# Patient Record
Sex: MALE | Race: BLACK OR AFRICAN AMERICAN | NOT HISPANIC OR LATINO | Employment: OTHER | URBAN - METROPOLITAN AREA
[De-identification: names, ages, dates, MRNs, and addresses within clinical notes are randomized per-mention and may not be internally consistent; named-entity substitution may affect disease eponyms.]

---

## 2017-01-26 ENCOUNTER — INFUSION (OUTPATIENT)
Dept: INFUSION THERAPY | Facility: HOSPITAL | Age: 72
End: 2017-01-26
Attending: INTERNAL MEDICINE
Payer: MEDICARE

## 2017-01-26 DIAGNOSIS — C85.90 LYMPHOMA: ICD-10-CM

## 2017-01-26 DIAGNOSIS — R11.15 EMESIS, PERSISTENT: Primary | ICD-10-CM

## 2017-01-26 PROCEDURE — 25000003 PHARM REV CODE 250: Mod: PO | Performed by: INTERNAL MEDICINE

## 2017-01-26 PROCEDURE — 96523 IRRIG DRUG DELIVERY DEVICE: CPT | Mod: PO

## 2017-01-26 RX ORDER — SODIUM CHLORIDE 0.9 % (FLUSH) 0.9 %
10 SYRINGE (ML) INJECTION
Status: CANCELLED | OUTPATIENT
Start: 2017-01-26

## 2017-01-26 RX ORDER — SODIUM CHLORIDE 0.9 % (FLUSH) 0.9 %
10 SYRINGE (ML) INJECTION
Status: DISCONTINUED | OUTPATIENT
Start: 2017-01-26 | End: 2017-01-26 | Stop reason: HOSPADM

## 2017-01-26 RX ORDER — HEPARIN 100 UNIT/ML
500 SYRINGE INTRAVENOUS
Status: CANCELLED | OUTPATIENT
Start: 2017-01-26

## 2017-01-26 RX ORDER — HEPARIN 100 UNIT/ML
500 SYRINGE INTRAVENOUS
Status: DISCONTINUED | OUTPATIENT
Start: 2017-01-26 | End: 2017-01-26 | Stop reason: HOSPADM

## 2017-01-26 RX ADMIN — HEPARIN 500 UNITS: 100 SYRINGE at 09:01

## 2017-01-26 RX ADMIN — SODIUM CHLORIDE, PRESERVATIVE FREE 10 ML: 5 INJECTION INTRAVENOUS at 09:01

## 2017-01-26 NOTE — MR AVS SNAPSHOT
Patient Information     Patient Name Sex Foreign [place] Male 1945      Visit Information        Provider Department Dept Phone Center    2017 9:00 AM [facility] Chemo Infusion [facility] Chemotherapy Infusion [phone] [facility]      Patient Instructions      [facility]  [address] [facility] [address]  [address]  [phone] phone     [phone] fax  Hours of Operation: Monday- Friday 8:00am- 5:00pm  After hours phone  [phone]  Hematology / Oncology Physicians on call      Dr. Luis Angulo    Please call with any concerns regarding your appointment today.       Your Current Medications Are     AMLODIPINE BESYLATE (AMLODIPINE ORAL)    aspirin (ECOTRIN) 81 MG EC tablet    atorvastatin (LIPITOR) 80 MG tablet    fish oil-omega-3 fatty acids 300-1,000 mg capsule    FLUZONE HIGH-DOSE , PF, 180 mcg/0.5 mL Syrg    FLUZONE HIGH-DOSE , PF, 180 mcg/0.5 mL Syrg    folic acid (FOLVITE) 1 MG tablet    hydrochlorothiazide (HYDRODIURIL) 25 MG tablet    metoprolol succinate (TOPROL-XL) 25 MG 24 hr tablet    NITROSTAT 0.4 mg SL tablet    quinapril (ACCUPRIL) 10 MG tablet    ranolazine (RANEXA) 1,000 mg Tb12    triamcinolone acetonide 0.1% (KENALOG) 0.1 % cream      Appointments for Next Year     2017  9:00 AM INFUSION 015 MIN (15 min.) [facility] CHAIR [facility]    Arrive at check-in approximately 15 minutes before your scheduled appointment time. Bring all outside medical records and imaging, along with a list of your current medications and insurance card.    3/23/2017  8:30 AM NON FASTING LAB (5 min.) [facility]    Arrive at check-in approximately 15 minutes before your scheduled appointment time. Bring all outside medical records and imaging, along with a list of your current medications and insurance card.    (off [facility]) 2nd floor    3/23/2017  9:00 AM  ESTABLISHED PATIENT (20 min.) [facility] Hemotology Oncology Fritz Silvestre Jr., MD    Arrive at check-in approximately 15 minutes before your scheduled appointment time. Bring all outside medical records and imaging, along with a list of your current medications and insurance card.    (off [facility]) 3rd Floor    3/23/2017  9:30 AM INFUSION 015 MIN (15 min.) [facility] CHAIR [address] [facility]    Arrive at check-in approximately 15 minutes before your scheduled appointment time. Bring all outside medical records and imaging, along with a list of your current medications and insurance card.      Allergies     No Known Allergies      Current Discharge Medication List     Cannot display discharge medications since this is not an admission.

## 2017-01-26 NOTE — PATIENT INSTRUCTIONS
[facility]  [address] [facility] [address]  [address] [address]  [phone] phone     [phone] fax  Hours of Operation: Monday- Friday 8:00am- 5:00pm  After hours phone  [phone]  Hematology / Oncology Physicians on call      Dr. Luis Angulo    Please call with any concerns regarding your appointment today.

## 2017-02-23 ENCOUNTER — INFUSION (OUTPATIENT)
Dept: INFUSION THERAPY | Facility: HOSPITAL | Age: 72
End: 2017-02-23
Attending: INTERNAL MEDICINE
Payer: MEDICARE

## 2017-02-23 DIAGNOSIS — R11.15 EMESIS, PERSISTENT: Primary | ICD-10-CM

## 2017-02-23 DIAGNOSIS — C85.90 LYMPHOMA: ICD-10-CM

## 2017-02-23 PROCEDURE — 96523 IRRIG DRUG DELIVERY DEVICE: CPT | Mod: PO

## 2017-02-23 PROCEDURE — 25000003 PHARM REV CODE 250: Mod: PO | Performed by: INTERNAL MEDICINE

## 2017-02-23 RX ORDER — SODIUM CHLORIDE 0.9 % (FLUSH) 0.9 %
10 SYRINGE (ML) INJECTION
Status: CANCELLED | OUTPATIENT
Start: 2017-02-23

## 2017-02-23 RX ORDER — SODIUM CHLORIDE 0.9 % (FLUSH) 0.9 %
10 SYRINGE (ML) INJECTION
Status: DISCONTINUED | OUTPATIENT
Start: 2017-02-23 | End: 2017-02-23 | Stop reason: HOSPADM

## 2017-02-23 RX ORDER — HEPARIN 100 UNIT/ML
500 SYRINGE INTRAVENOUS
Status: DISCONTINUED | OUTPATIENT
Start: 2017-02-23 | End: 2017-02-23 | Stop reason: HOSPADM

## 2017-02-23 RX ORDER — HEPARIN 100 UNIT/ML
500 SYRINGE INTRAVENOUS
Status: CANCELLED | OUTPATIENT
Start: 2017-02-23

## 2017-02-23 RX ADMIN — HEPARIN 500 UNITS: 100 SYRINGE at 09:02

## 2017-02-23 RX ADMIN — SODIUM CHLORIDE, PRESERVATIVE FREE 10 ML: 5 INJECTION INTRAVENOUS at 09:02
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
## 2017-03-23 ENCOUNTER — OFFICE VISIT (OUTPATIENT)
Dept: HEMATOLOGY/ONCOLOGY | Facility: CLINIC | Age: 72
End: 2017-03-23
Payer: MEDICARE

## 2017-03-23 ENCOUNTER — LAB VISIT (OUTPATIENT)
Dept: LAB | Facility: HOSPITAL | Age: 72
End: 2017-03-23
Attending: INTERNAL MEDICINE
Payer: MEDICARE

## 2017-03-23 VITALS
WEIGHT: 161.81 LBS | SYSTOLIC BLOOD PRESSURE: 98 MMHG | TEMPERATURE: 99 F | HEIGHT: 67 IN | OXYGEN SATURATION: 98 % | HEART RATE: 68 BPM | RESPIRATION RATE: 18 BRPM | DIASTOLIC BLOOD PRESSURE: 56 MMHG | BODY MASS INDEX: 25.4 KG/M2

## 2017-03-23 DIAGNOSIS — C84.48: Primary | ICD-10-CM

## 2017-03-23 DIAGNOSIS — C84.48: ICD-10-CM

## 2017-03-23 LAB
ALBUMIN SERPL BCP-MCNC: 3.8 G/DL
ALP SERPL-CCNC: 50 U/L
ALT SERPL W/O P-5'-P-CCNC: 19 U/L
ANION GAP SERPL CALC-SCNC: 10 MMOL/L
AST SERPL-CCNC: 25 U/L
BASOPHILS # BLD AUTO: 0.01 K/UL
BASOPHILS NFR BLD: 0.3 %
BILIRUB SERPL-MCNC: 0.8 MG/DL
BUN SERPL-MCNC: 20 MG/DL
CALCIUM SERPL-MCNC: 9.1 MG/DL
CHLORIDE SERPL-SCNC: 105 MMOL/L
CO2 SERPL-SCNC: 25 MMOL/L
CREAT SERPL-MCNC: 1.3 MG/DL
DIFFERENTIAL METHOD: ABNORMAL
EOSINOPHIL # BLD AUTO: 0 K/UL
EOSINOPHIL NFR BLD: 1.3 %
ERYTHROCYTE [DISTWIDTH] IN BLOOD BY AUTOMATED COUNT: 13.4 %
EST. GFR  (AFRICAN AMERICAN): >60 ML/MIN/1.73 M^2
EST. GFR  (NON AFRICAN AMERICAN): 55 ML/MIN/1.73 M^2
GLUCOSE SERPL-MCNC: 76 MG/DL
HCT VFR BLD AUTO: 30.9 %
HGB BLD-MCNC: 11 G/DL
LDH SERPL L TO P-CCNC: 130 U/L
LYMPHOCYTES # BLD AUTO: 1 K/UL
LYMPHOCYTES NFR BLD: 33.3 %
MCH RBC QN AUTO: 30.3 PG
MCHC RBC AUTO-ENTMCNC: 35.6 %
MCV RBC AUTO: 85 FL
MONOCYTES # BLD AUTO: 0.2 K/UL
MONOCYTES NFR BLD: 7.3 %
NEUTROPHILS # BLD AUTO: 1.8 K/UL
NEUTROPHILS NFR BLD: 57.8 %
PLATELET # BLD AUTO: 150 K/UL
PMV BLD AUTO: 8.7 FL
POTASSIUM SERPL-SCNC: 3.6 MMOL/L
PROT SERPL-MCNC: 7.6 G/DL
RBC # BLD AUTO: 3.63 M/UL
SODIUM SERPL-SCNC: 140 MMOL/L
WBC # BLD AUTO: 3.03 K/UL

## 2017-03-23 PROCEDURE — 99999 PR PBB SHADOW E&M-EST. PATIENT-LVL III: CPT | Mod: PBBFAC,,, | Performed by: INTERNAL MEDICINE

## 2017-03-23 PROCEDURE — 1160F RVW MEDS BY RX/DR IN RCRD: CPT | Mod: S$GLB,,, | Performed by: INTERNAL MEDICINE

## 2017-03-23 PROCEDURE — 3074F SYST BP LT 130 MM HG: CPT | Mod: S$GLB,,, | Performed by: INTERNAL MEDICINE

## 2017-03-23 PROCEDURE — 1126F AMNT PAIN NOTED NONE PRSNT: CPT | Mod: S$GLB,,, | Performed by: INTERNAL MEDICINE

## 2017-03-23 PROCEDURE — 99214 OFFICE O/P EST MOD 30 MIN: CPT | Mod: S$GLB,,, | Performed by: INTERNAL MEDICINE

## 2017-03-23 PROCEDURE — 1157F ADVNC CARE PLAN IN RCRD: CPT | Mod: S$GLB,,, | Performed by: INTERNAL MEDICINE

## 2017-03-23 PROCEDURE — 3078F DIAST BP <80 MM HG: CPT | Mod: S$GLB,,, | Performed by: INTERNAL MEDICINE

## 2017-03-23 PROCEDURE — 1159F MED LIST DOCD IN RCRD: CPT | Mod: S$GLB,,, | Performed by: INTERNAL MEDICINE

## 2017-03-23 RX ORDER — ERGOCALCIFEROL 1.25 MG/1
50000 CAPSULE ORAL
COMMUNITY
End: 2017-07-12

## 2017-03-23 NOTE — MR AVS SNAPSHOT
[facility] Hemotology Oncology  [address] [place] [address]  Phone: [phone]  Fax: [phone]                  [place]   3/23/2017 9:00 AM   Office Visit    Description:  Male : 1945   Provider:  Fritz Silvestre Jr., MD   Department:  [facility] - Hemotology Oncology           Diagnoses this Visit        Comments    Peripheral T cell lymphoma, lymph nodes of multiple sites    -  Primary            To Do List           Future Appointments        Provider Department Dept Phone    3/27/2017 10:30 AM Cristhian Jauregui OD [facility] Ophthalmology [phone]    3/28/2017 9:00 AM Ryan Burroughs MD '[place] - Cardiology [phone]    2017 4:00 PM Ben Temple MD [facility] General Surgery [phone]    2017 8:40 AM LAB[facility]ATORY, [facility] [phone]    2017 9:00 AM Fritz Silvestre Jr., MD [facility] Hemotology Oncology [phone]      Goals (5 Years of Data)     None      [facility]     [facility] Care [facility] -  Assistance  Registered nurses in the [facility] provide clinical advisement, health education, appointment booking, and other advisory services.  Call for this free service at [phone].             Medications           Message regarding Medications     Verify the changes and/or additions to your medication regime listed below are the same as discussed with your clinician today.  If any of these changes or additions are incorrect, please notify your healthcare provider.             Verify that the below list of medications is an accurate representation of the medications you are currently taking.  If none reported, the list may be blank. If incorrect, please contact your healthcare provider. Carry this list with you in case of emergency.           Current Medications     AMLODIPINE BESYLATE (AMLODIPINE ORAL) Take 5 mg by mouth once daily.    aspirin (ECOTRIN) 81 MG EC tablet Take 81 mg by mouth  "every morning.     atorvastatin (LIPITOR) 80 MG tablet 80 mg every evening.     ergocalciferol (VITAMIN D2) 50,000 unit Cap Take 50,000 Units by mouth every 7 days.    fish oil-omega-3 fatty acids 300-1,000 mg capsule Take 2 g by mouth 2 (two) times daily.     FLUZONE HIGH-DOSE 2015-16, PF, 180 mcg/0.5 mL Syrg     FLUZONE HIGH-DOSE 2016-17, PF, 180 mcg/0.5 mL Syrg TO BE ADMINISTERED BY PHARMACIST FOR IMMUNIZATION    folic acid (FOLVITE) 1 MG tablet Take 1 mg by mouth every morning.     hydrochlorothiazide (HYDRODIURIL) 25 MG tablet     metoprolol succinate (TOPROL-XL) 25 MG 24 hr tablet Take 25 mg by mouth once daily.    NITROSTAT 0.4 mg SL tablet     quinapril (ACCUPRIL) 10 MG tablet every morning.     ranolazine (RANEXA) 1,000 mg Tb12 Take 1 tablet (1,000 mg total) by mouth 2 (two) times daily.    triamcinolone acetonide 0.1% (KENALOG) 0.1 % cream every morning.            Clinical Reference Information           Your Vitals Were     BP Pulse Temp Resp Height Weight    98/56 68 98.7 °F (37.1 °C) (Oral) 18 5' 7" (1.702 m) 73.4 kg (161 lb 13.1 oz)    SpO2 BMI             98% 25.34 kg/m2         Blood Pressure          Most Recent Value    BP  (!)  98/56      Allergies as of 3/23/2017     No Known Allergies      Immunizations Administered on Date of Encounter - 3/23/2017     None      Orders Placed During Today's Visit     Future Labs/Procedures Expected by Expires    CBC auto differential  3/20/2017 5/19/2018    Comprehensive metabolic panel  3/20/2017 5/19/2018    Lactate dehydrogenase  3/20/2017 5/19/2018    CBC auto differential  3/23/2017 5/22/2018    Comprehensive metabolic panel  3/23/2017 5/22/2018    Lactate dehydrogenase  3/23/2017 5/22/2018      [facility] Sign-Up     Activating your [facility] account is as easy as 1-2-3!     1) Visit [email or web address], select Sign Up Now, enter this activation code and your date of birth, then select Next.  [email or web address]  Expires: 5/7/2017 10:29 AM      2) Create a " username and password to use when you visit [facility] in the future and select a security question in case you lose your password and select Next.    3) Enter your e-mail address and click Sign Up!    Additional Information  If you have questions, please e-mail [email or web address] or call [phone] to talk to our [facility] staff. Remember, [facility] is NOT to be used for urgent needs. For medical emergencies, dial 911.         Language Assistance Services     ATTENTION: Language assistance services are available, free of charge. Please call [phone].      ATENCIÓN: Si habla español, tiene a santillan disposición servicios gratuitos de asistencia lingüística. Llame al [phone].     CHÚ Ý: N?u b?n nói Ti?ng Vi?t, có các d?ch v? h? tr? ngôn ng? mi?n phí dành cho b?n. G?i s? [phone].         [facility] complies with applicable Federal civil rights laws and does not discriminate on the basis of race, color, national origin, age, disability, or sex.

## 2017-03-27 ENCOUNTER — OFFICE VISIT (OUTPATIENT)
Dept: OPHTHALMOLOGY | Facility: CLINIC | Age: 72
End: 2017-03-27
Payer: MEDICARE

## 2017-03-27 DIAGNOSIS — H25.013 CORTICAL AGE-RELATED CATARACT OF BOTH EYES: ICD-10-CM

## 2017-03-27 DIAGNOSIS — H25.13 NUCLEAR SCLEROSIS, BILATERAL: Primary | ICD-10-CM

## 2017-03-27 DIAGNOSIS — H52.4 HYPEROPIA WITH PRESBYOPIA, BILATERAL: ICD-10-CM

## 2017-03-27 DIAGNOSIS — H52.03 HYPEROPIA WITH PRESBYOPIA, BILATERAL: ICD-10-CM

## 2017-03-27 PROCEDURE — 92015 DETERMINE REFRACTIVE STATE: CPT | Mod: S$GLB,,, | Performed by: OPTOMETRIST

## 2017-03-27 PROCEDURE — 99999 PR PBB SHADOW E&M-EST. PATIENT-LVL I: CPT | Mod: PBBFAC,,, | Performed by: OPTOMETRIST

## 2017-03-27 PROCEDURE — 92014 COMPRE OPH EXAM EST PT 1/>: CPT | Mod: S$GLB,,, | Performed by: OPTOMETRIST

## 2017-03-27 NOTE — PROGRESS NOTES
HPI     HPI    Any vision changes since last exam: Pt states that he sees a bit of a   decline in his vision.  Eye pain: No  Other ocular symptoms: No    Do you wear currently wear glasses or contacts? Glasses    Interested in contacts today? No    Do you plan on getting new glasses today? If needed       Last edited by Juan R Prado, Patient Care Assistant on 3/27/2017 10:31   AM.         Assessment /Plan     For exam results, see Encounter Report.    1. Nuclear sclerosis, bilateral  Cataract accounts for vision change. New Rx for glasses/contacts will not improve vision. Refer to ophthalmologist for cataract evaluation.      2. 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 Hyperopia with presbyopia, bilateral       RTC for cat eval with Dr Montgomery, next available  Discussed above and all questions were answered.

## 2017-03-30 ENCOUNTER — OFFICE VISIT (OUTPATIENT)
Dept: OPHTHALMOLOGY | Facility: CLINIC | Age: 72
End: 2017-03-30
Payer: MEDICARE

## 2017-03-30 DIAGNOSIS — H25.013 CORTICAL SENILE CATARACT OF BOTH EYES: ICD-10-CM

## 2017-03-30 DIAGNOSIS — H25.13 NUCLEAR SCLEROSIS, BILATERAL: Primary | ICD-10-CM

## 2017-03-30 PROBLEM — H25.12 NUCLEAR SCLEROSIS OF LEFT EYE: Status: ACTIVE | Noted: 2017-03-30

## 2017-03-30 PROBLEM — H25.012 CORTICAL SENILE CATARACT OF LEFT EYE: Status: ACTIVE | Noted: 2017-03-30

## 2017-03-30 PROCEDURE — 92014 COMPRE OPH EXAM EST PT 1/>: CPT | Mod: S$GLB,,, | Performed by: OPHTHALMOLOGY

## 2017-03-30 PROCEDURE — 99999 PR PBB SHADOW E&M-EST. PATIENT-LVL II: CPT | Mod: PBBFAC,,, | Performed by: OPHTHALMOLOGY

## 2017-03-30 PROCEDURE — 92136 OPHTHALMIC BIOMETRY: CPT | Mod: LT,S$GLB,, | Performed by: OPHTHALMOLOGY

## 2017-03-30 RX ORDER — KETOROLAC TROMETHAMINE 5 MG/ML
1 SOLUTION OPHTHALMIC 4 TIMES DAILY
Qty: 1 BOTTLE | Refills: 2 | Status: SHIPPED | OUTPATIENT
Start: 2017-03-30 | End: 2017-05-25

## 2017-03-30 RX ORDER — GATIFLOXACIN 5 MG/ML
1 SOLUTION/ DROPS OPHTHALMIC 2 TIMES DAILY
Qty: 1 BOTTLE | Refills: 2 | Status: SHIPPED | OUTPATIENT
Start: 2017-03-30 | End: 2017-05-25

## 2017-03-30 RX ORDER — PREDNISOLONE ACETATE 10 MG/ML
1 SUSPENSION/ DROPS OPHTHALMIC 4 TIMES DAILY
Qty: 1 BOTTLE | Refills: 2 | Status: SHIPPED | OUTPATIENT
Start: 2017-03-30 | End: 2017-06-23 | Stop reason: ALTCHOICE

## 2017-03-30 NOTE — MR AVS SNAPSHOT
[facility] Ophthalmology  [address] [place] [address]  Phone: [phone]  Fax: [phone]                  [place]   3/30/2017 2:45 PM   Office Visit    Description:  Male : 1945   Provider:  Pierce Montgomery MD   Department:  [facility] - Ophthalmology           Reason for Visit     Blurred Vision           Diagnoses this Visit        Comments    Nuclear sclerosis, bilateral    -  Primary     Cortical senile cataract of both eyes         Posterior subcapsular cataract, left eye                To Do List           Future Appointments        Provider Department Dept Phone    2017 1:20 PM Ryan Burroughs MD [place] - Cardiology [phone]    2017 4:00 PM Ben Temple MD [facility] General Surgery [phone]    2017 8:40 AM LAB[facility]ATORY, [facility] [phone]    2017 9:00 AM Fritz Silvestre Jr., MD [facility] Hemotology Oncology [phone]      Goals (5 Years of Data)     None       These Medications        Disp Refills Start End    ketorolac 0.5% (ACULAR) 0.5 % Drop 1 Bottle 2 3/30/2017     Place 1 drop into the left eye 4 (four) times daily. Eyedrops to start one day before surgery - Left Eye    Pharm[id number] [place] [facility] [address] Ph #: [phone]       gatifloxacin (ZYMAR) 0.5 % Drop drops 1 Bottle 2 3/30/2017     Apply 1 drop to eye 2 (two) times daily. Eyedrops to start one day before surgery (left eye) - Ophthalmic    Pharmacy: [facility]/pharmacy #[id number]  [place], [place] - [address] Ph #: [phone]       prednisoLONE acetate (PRED FORTE) 1 % DrpS 1 Bottle 2 3/30/2017     Place 1 drop into the left eye 4 (four) times daily. Start one day before surgery - Left Eye    Pharmacy: [facility]/pharmacy #[id number]  [place], [place] - [address] Ph #: [phone]       Notes to Pharmacy: Brand Name Pred  Forte 1% only      [facility]     [facility] Care Line - 24/7 Assistance  Unless otherwise directed by your provider, please contact [facility], our nurse care line that is available for 24/7 assistance.     Registered nurses in the [facility] provide: appointment scheduling, clinical advisement, health education, and other advisory services.  Call: [phone] (toll free)               Medications           Message regarding Medications     Verify the changes and/or additions to your medication regime listed below are the same as discussed with your clinician today.  If any of these changes or additions are incorrect, please notify your healthcare provider.        START taking these NEW medications        Refills    ketorolac 0.5% (ACULAR) 0.5 % Drop 2    Sig: Place 1 drop into the left eye 4 (four) times daily. Eyedrops to start one day before surgery    Class: Normal    Route: Left Eye    gatifloxacin (ZYMAR) 0.5 % Drop drops 2    Sig: Apply 1 drop to eye 2 (two) times daily. Eyedrops to start one day before surgery (left eye)    Class: Normal    Route: Ophthalmic    prednisoLONE acetate (PRED FORTE) 1 % DrpS 2    Sig: Place 1 drop into the left eye 4 (four) times daily. Start one day before surgery    Class: Normal    Route: Left Eye           Verify that the below list of medications is an accurate representation of the medications you are currently taking.  If none reported, the list may be blank. If incorrect, please contact your healthcare provider. Carry this list with you in case of emergency.           Current Medications     AMLODIPINE BESYLATE (AMLODIPINE ORAL) Take 5 mg by mouth once daily.    aspirin (ECOTRIN) 81 MG EC tablet Take 81 mg by mouth every morning.     atorvastatin (LIPITOR) 80 MG tablet 80 mg every evening.     ergocalciferol (VITAMIN D2) 50,000 unit Cap Take 50,000 Units by mouth every 7 days.    fish oil-omega-3 fatty acids 300-1,000 mg capsule Take 2  g by mouth 2 (two) times daily.     FLUZONE HIGH-DOSE 2015-16, PF, 180 mcg/0.5 mL Syrg     FLUZONE HIGH-DOSE 2016-17, PF, 180 mcg/0.5 mL Syrg TO BE ADMINISTERED BY PHARMACIST FOR IMMUNIZATION    folic acid (FOLVITE) 1 MG tablet Take 1 mg by mouth every morning.     gatifloxacin (ZYMAR) 0.5 % Drop drops Apply 1 drop to eye 2 (two) times daily. Eyedrops to start one day before surgery (left eye)    hydrochlorothiazide (HYDRODIURIL) 25 MG tablet     ketorolac 0.5% (ACULAR) 0.5 % Drop Place 1 drop into the left eye 4 (four) times daily. Eyedrops to start one day before surgery    metoprolol succinate (TOPROL-XL) 25 MG 24 hr tablet Take 25 mg by mouth once daily.    NITROSTAT 0.4 mg SL tablet     prednisoLONE acetate (PRED FORTE) 1 % DrpS Place 1 drop into the left eye 4 (four) times daily. Start one day before surgery    quinapril (ACCUPRIL) 10 MG tablet every morning.     ranolazine (RANEXA) 1,000 mg Tb12 Take 1 tablet (1,000 mg total) by mouth 2 (two) times daily.    triamcinolone acetonide 0.1% (KENALOG) 0.1 % cream every morning.            Clinical Reference Information           Allergies as of 3/30/2017     No Known Allergies      Immunizations Administered on Date of Encounter - 3/30/2017     None      Orders Placed During Today's Visit      Normal Orders This Visit    Ambulatory Referral to External Surgery     IOL Master - OS - Left Eye       [facility] Sign-Up     Activating your [facility] account is as easy as 1-2-3!     1) Visit [email or web address], select Sign Up Now, enter this activation code and your date of birth, then select Next.  [email or web address]  Expires: 5/7/2017 10:29 AM      2) Create a username and password to use when you visit [facility] in the future and select a security question in case you lose your password and select Next.    3) Enter your e-mail address and click Sign Up!    Additional Information  If you have questions, please e-mail [email or web address] or call [phone] to talk to our  [facility] staff. Remember, [facility] is NOT to be used for urgent needs. For medical emergencies, dial 911.         Language Assistance Services     ATTENTION: Language assistance services are available, free of charge. Please call [phone].      ATENCIÓN: Si habla kranthi, tiene a santillan disposición servicios gratuitos de asistencia lingüística. Llame al [phone].     CHÚ Ý: N?u b?n nói Ti?ng Vi?t, có các d?ch v? h? tr? ngôn ng? mi?n phí dành cho b?n. G?i s? [phone].         [facility] - [facility] complies with applicable Federal civil rights laws and does not discriminate on the basis of race, color, national origin, age, disability, or sex.

## 2017-03-30 NOTE — PROGRESS NOTES
SUBJECTIVE:   [place] is a 71 y.o. male   Corrected distance visual acuity was 20/30 in the right eye and 20/100 in the left eye.   Chief Complaint   Patient presents with    Blurred Vision     pt states due to cataract va is always blurred does not like to drive at Night        HPI:  HPI     Blurred Vision    Additional comments: pt states due to cataract va is always blurred does   not like to drive at Night       Last edited by Nadine Alva MA on 3/30/2017  3:28 PM. (History)        Assessment /Plan :  1. Nuclear sclerosis, bilateral  Visually Significant Cataract OS > OD:   Patient reports decreased vision consistent with the clinical amount of lenticular opacity,  which reaches the level of visual significance and affects activities of daily living such as  drive. Risks, benefits, and alternatives to cataract surgery were  discussed.  IOL options were discussed, including Premium IOL'S and the associated  side effects and additional patient cost associated with them as well as patient's visual  goals. The pt expressed a desire to proceed with surgery with the potential for some  reasonable degree of visual improvement and was consented.  Risks of loss of vision and eye reviewed as well as possibility of need for spectacle correction after surgery even with premium implants. Verbal and written preop  instructions were provided to the pt.            Pt is interested in traditional monofocal IOL aiming for:       Distance OU and understands that  glasses will be generally needed at all times for near vision and often for finer distance correction especially for higher degrees of astigmatism.           Final visual acuity may be limited by none    Pt wishes to have OS eye done.    Requests a Monofocal IOL.    Will aim for distance    Other considerations: NONE       2. Cortical senile cataract of both eyes    3. Posterior subcapsular cataract, left eye

## 2017-04-05 ENCOUNTER — OFFICE VISIT (OUTPATIENT)
Dept: CARDIOLOGY | Facility: CLINIC | Age: 72
End: 2017-04-05
Payer: MEDICARE

## 2017-04-05 VITALS
BODY MASS INDEX: 25.24 KG/M2 | DIASTOLIC BLOOD PRESSURE: 64 MMHG | WEIGHT: 160.81 LBS | HEIGHT: 67 IN | HEART RATE: 58 BPM | OXYGEN SATURATION: 97 % | SYSTOLIC BLOOD PRESSURE: 114 MMHG

## 2017-04-05 DIAGNOSIS — Z95.1 S/P CORONARY ARTERY BYPASS GRAFT X 2: ICD-10-CM

## 2017-04-05 DIAGNOSIS — I10 ESSENTIAL HYPERTENSION: ICD-10-CM

## 2017-04-05 DIAGNOSIS — I25.2 OLD MI (MYOCARDIAL INFARCTION): ICD-10-CM

## 2017-04-05 DIAGNOSIS — I25.10 CORONARY ARTERY DISEASE INVOLVING NATIVE CORONARY ARTERY OF NATIVE HEART WITHOUT ANGINA PECTORIS: Primary | ICD-10-CM

## 2017-04-05 DIAGNOSIS — E78.5 DYSLIPIDEMIA: ICD-10-CM

## 2017-04-05 DIAGNOSIS — R06.02 SOB (SHORTNESS OF BREATH): ICD-10-CM

## 2017-04-05 PROCEDURE — 3078F DIAST BP <80 MM HG: CPT | Mod: S$GLB,,, | Performed by: INTERNAL MEDICINE

## 2017-04-05 PROCEDURE — 1157F ADVNC CARE PLAN IN RCRD: CPT | Mod: S$GLB,,, | Performed by: INTERNAL MEDICINE

## 2017-04-05 PROCEDURE — 99214 OFFICE O/P EST MOD 30 MIN: CPT | Mod: S$GLB,,, | Performed by: INTERNAL MEDICINE

## 2017-04-05 PROCEDURE — 1126F AMNT PAIN NOTED NONE PRSNT: CPT | Mod: S$GLB,,, | Performed by: INTERNAL MEDICINE

## 2017-04-05 PROCEDURE — 1160F RVW MEDS BY RX/DR IN RCRD: CPT | Mod: S$GLB,,, | Performed by: INTERNAL MEDICINE

## 2017-04-05 PROCEDURE — 3074F SYST BP LT 130 MM HG: CPT | Mod: S$GLB,,, | Performed by: INTERNAL MEDICINE

## 2017-04-05 PROCEDURE — 99999 PR PBB SHADOW E&M-EST. PATIENT-LVL III: CPT | Mod: PBBFAC,,, | Performed by: INTERNAL MEDICINE

## 2017-04-05 PROCEDURE — 1159F MED LIST DOCD IN RCRD: CPT | Mod: S$GLB,,, | Performed by: INTERNAL MEDICINE

## 2017-04-05 RX ORDER — ISOSORBIDE MONONITRATE 30 MG/1
30 TABLET, EXTENDED RELEASE ORAL DAILY
Qty: 90 TABLET | Refills: 3 | Status: SHIPPED | OUTPATIENT
Start: 2017-04-05 | End: 2017-07-12

## 2017-04-05 RX ORDER — RANOLAZINE 1000 MG/1
1000 TABLET, EXTENDED RELEASE ORAL 2 TIMES DAILY
Qty: 180 TABLET | Refills: 3 | Status: SHIPPED | OUTPATIENT
Start: 2017-04-05 | End: 2018-04-25 | Stop reason: SDUPTHER

## 2017-04-05 NOTE — MR AVS SNAPSHOT
[facility]  [address]  [place] [address]  Phone: [phone]  Fax: [phone]                  Foreign [place]   2017 1:20 PM   Office Visit    Description:  Male : 1945   Provider:  Ryan Burroughs MD   Department:  [facility]           Diagnoses this Visit        Comments    Coronary artery disease involving native coronary artery of native heart without angina pectoris    -  Primary     S/P coronary artery bypass graft x 2         Essential hypertension         Dyslipidemia         SOB (shortness of breath)         Old MI (myocardial infarction)                To Do List           Future Appointments        Provider Department Dept Phone    2017 4:00 PM Ben Temple MD [facility] General Surgery [phone]    2017 8:00 AM Pierce Montgomery MD [facility] Ophthalmology [phone]    2017 8:40 AM [facility]ATORY, [facility] [phone]    2017 9:00 AM Fritz Silvestre Jr., MD [facility] Hemotology Oncology [phone]      Goals (5 Years of Data)     None      Follow-Up and Disposition     Return in about 3 months (around 2017).       These Medications        Disp Refills Start End    ranolazine (RANEXA) 1,000 mg Tb12 180 tablet 3 2017     Take 1 tablet (1,000 mg total) by mouth 2 (two) times daily. - Oral    Pharmacy: [facility] #[id number] - [place] - [address] Ph #: [phone]       isosorbide mononitrate (IMDUR) 30 MG 24 hr tablet 90 tablet 3 2017    Take 1 tablet (30 mg total) by mouth once daily. - Oral    Pharmacy: [facility] #[id number]  [place], [place] - [address] Ph #: [phone]         [facility]     [facility] -  Assistance  Unless otherwise directed by your provider, please contact [facility], our nurse care line that is available for   assistance.     Registered nurses in the [facility] provide: appointment scheduling, clinical advisement, health education, and other advisory services.  Call: [phone] (toll free)               Medications           Message regarding Medications     Verify the changes and/or additions to your medication regime listed below are the same as discussed with your clinician today.  If any of these changes or additions are incorrect, please notify your healthcare provider.        START taking these NEW medications        Refills    isosorbide mononitrate (IMDUR) 30 MG 24 hr tablet 3    Sig: Take 1 tablet (30 mg total) by mouth once daily.    Class: Normal    Route: Oral      STOP taking these medications     folic acid (FOLVITE) 1 MG tablet Take 400 mcg by mouth every morning.     AMLODIPINE BESYLATE (AMLODIPINE ORAL) Take 5 mg by mouth once daily.           Verify that the below list of medications is an accurate representation of the medications you are currently taking.  If none reported, the list may be blank. If incorrect, please contact your healthcare provider. Carry this list with you in case of emergency.           Current Medications     aspirin (ECOTRIN) 81 MG EC tablet Take 81 mg by mouth every morning.     atorvastatin (LIPITOR) 80 MG tablet 80 mg every evening.     ergocalciferol (VITAMIN D2) 50,000 unit Cap Take 50,000 Units by mouth every 7 days.    fish oil-omega-3 fatty acids 300-1,000 mg capsule Take 2 g by mouth 2 (two) times daily.     FLUZONE HIGH-DOSE 2015-16, PF, 180 mcg/0.5 mL Syrg     FLUZONE HIGH-DOSE 2016-17, PF, 180 mcg/0.5 mL Syrg TO BE ADMINISTERED BY PHARMACIST FOR IMMUNIZATION    FOLIC ACID ORAL Take 400 mcg by mouth once daily.    gatifloxacin (ZYMAR) 0.5 % Drop drops Apply 1 drop to eye 2 (two) times daily. Eyedrops to start one day before surgery (left eye)    hydrochlorothiazide (HYDRODIURIL) 25 MG tablet once daily.     ketorolac 0.5% (ACULAR) 0.5 % Drop Place 1 drop  "into the left eye 4 (four) times daily. Eyedrops to start one day before surgery    metoprolol succinate (TOPROL-XL) 25 MG 24 hr tablet Take 25 mg by mouth once daily.    NITROSTAT 0.4 mg SL tablet     prednisoLONE acetate (PRED FORTE) 1 % DrpS Place 1 drop into the left eye 4 (four) times daily. Start one day before surgery    quinapril (ACCUPRIL) 10 MG tablet every morning.     ranolazine (RANEXA) 1,000 mg Tb12 Take 1 tablet (1,000 mg total) by mouth 2 (two) times daily.    triamcinolone acetonide 0.1% (KENALOG) 0.1 % cream every morning.     isosorbide mononitrate (IMDUR) 30 MG 24 hr tablet Take 1 tablet (30 mg total) by mouth once daily.           Clinical Reference Information           Your Vitals Were     BP Pulse Height Weight SpO2 BMI    114/64 (BP Location: Right arm, Patient Position: Sitting, BP Method: Manual) 58 5' 7" (1.702 m) 73 kg (160 lb 13.2 oz) 97% 25.19 kg/m2      Blood Pressure          Most Recent Value    Right Arm BP - Sitting  114/64    Left Arm BP - Sitting  110/60    BP  114/64      Allergies as of 4/5/2017     No Known Allergies      Immunizations Administered on Date of Encounter - 4/5/2017     None      Orders Placed During Today's Visit      Normal Orders This Visit    2D echo with color flow doppler       [facility] Sign-Up     Activating your [facility] account is as easy as 1-2-3!     1) Visit [email or web address], select Sign Up Now, enter this activation code and your date of birth, then select Next.  [email or web address]  Expires: 5/7/2017 10:29 AM      2) Create a username and password to use when you visit [facility] in the future and select a security question in case you lose your password and select Next.    3) Enter your e-mail address and click Sign Up!    Additional Information  If you have questions, please e-mail [email or web address] or call [phone] to talk to our [facility] staff. Remember, [facility] is NOT to be used for urgent needs. For medical emergencies, dial 911.    "      Language Assistance Services     ATTENTION: Language assistance services are available, free of charge. Please call [phone].      ATENCIÓN: Si habla kranthi, tiene a santillan disposición servicios gratuitos de asistencia lingüística. Llame al [phone].     CHÚ Ý: N?u b?n nói Ti?ng Vi?t, có các d?ch v? h? tr? ngôn ng? mi?n phí dành cho b?n. G?i s? [phone].         [facility] complies with applicable Federal civil rights laws and does not discriminate on the basis of race, color, national origin, age, disability, or sex.

## 2017-04-05 NOTE — PROGRESS NOTES
Subjective:   Patient ID:  Foreign [place] is a 71 y.o. male who presents for follow-up of CAD, HTN, HLP.  Patient denies CP, angina or anginal equivalent.Lipids are at goal. Pt finishing chemotx for lymphoma.    Hypertension   This is a chronic problem. The current episode started more than 1 year ago. The problem has been gradually improving since onset. The problem is controlled. Pertinent negatives include no chest pain, palpitations or shortness of breath. Past treatments include ACE inhibitors, beta blockers and diuretics. The current treatment provides moderate improvement. Compliance problems include exercise.    Hyperlipidemia   This is a chronic problem. The current episode started more than 1 year ago. The problem is controlled. Recent lipid tests were reviewed and are variable. Pertinent negatives include no chest pain or shortness of breath. Current antihyperlipidemic treatment includes statins. The current treatment provides moderate improvement of lipids. Compliance problems include adherence to exercise.        Review of Systems   Constitution: Negative. Negative for weight gain.   HENT: Negative.    Eyes: Negative.    Cardiovascular: Negative.  Negative for chest pain, leg swelling and palpitations.   Respiratory: Negative.  Negative for shortness of breath.    Endocrine: Negative.    Hematologic/Lymphatic: Negative.    Skin: Negative.    Musculoskeletal: Negative for muscle weakness.   Gastrointestinal: Negative.    Genitourinary: Negative.    Neurological: Negative.  Negative for dizziness.   Psychiatric/Behavioral: Negative.    Allergic/Immunologic: Negative.      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       atorvastatin (LIPITOR) 80 MG tablet 80 mg every evening.       ergocalciferol (VITAMIN D2) 50,000 unit Cap Take 50,000 Units by mouth every 7 days.      fish oil-omega-3 fatty acids 300-1,000 mg capsule Take 2 g by mouth 2 (two) times daily.       FLUZONE HIGH-DOSE 2015-16, PF, 180 mcg/0.5 mL Syrg   0    FLUZONE HIGH-DOSE 2016-17, PF, 180 mcg/0.5 mL Syrg TO BE ADMINISTERED BY PHARMACIST FOR IMMUNIZATION  0    gatifloxacin (ZYMAR) 0.5 % Drop drops Apply 1 drop to eye 2 (two) times daily. Eyedrops to start one day before surgery (left eye) 1 Bottle 2    hydrochlorothiazide (HYDRODIURIL) 25 MG tablet once daily.       ketorolac 0.5% (ACULAR) 0.5 % Drop Place 1 drop into the left eye 4 (four) times daily. Eyedrops to start one day before surgery 1 Bottle 2    metoprolol succinate (TOPROL-XL) 25 MG 24 hr tablet Take 25 mg by mouth once daily.  2    NITROSTAT 0.4 mg SL tablet   3    prednisoLONE acetate (PRED FORTE) 1 % DrpS Place 1 drop into the left eye 4 (four) times daily. Start one day before surgery 1 Bottle 2    quinapril (ACCUPRIL) 10 MG tablet every morning.       ranolazine (RANEXA) 1,000 mg Tb12 Take 1 tablet (1,000 mg total) by mouth 2 (two) times daily. 180 tablet 3    triamcinolone acetonide 0.1% (KENALOG) 0.1 % cream every morning.       [DISCONTINUED] folic acid (FOLVITE) 1 MG tablet Take 400 mcg by mouth every morning.       [DISCONTINUED] AMLODIPINE BESYLATE (AMLODIPINE ORAL) Take 5 mg by mouth once daily.       No current facility-administered medications on file prior to visit.      Review of patient's allergies indicates:  No Known Allergies    Objective:     Physical Exam   Constitutional: He is oriented to person, place, and time. He appears  well-developed and well-nourished.   HENT:   Head: Normocephalic and atraumatic.   Eyes: Conjunctivae are normal. Pupils are equal, round, and reactive to light.   Neck: Normal range of motion. Neck supple.   Cardiovascular: Normal rate, regular rhythm, normal heart sounds and intact distal pulses.    Pulmonary/Chest: Effort normal and breath sounds normal.   Abdominal: Soft. Bowel sounds are normal.   Neurological: He is alert and oriented to person, place, and time.   Skin: Skin is warm and dry.   Psychiatric: He has a normal mood and affect.   Nursing note and vitals reviewed.      Assessment:     1. Coronary artery disease involving native coronary artery of native heart without angina pectoris    2. S/P coronary artery bypass graft x 2    3. Essential hypertension    4. Dyslipidemia    5. SOB (shortness of breath)    6. Old MI (myocardial infarction)        Plan:     Coronary artery disease involving native coronary artery of native heart without angina pectoris    S/P coronary artery bypass graft x 2    Essential hypertension    Dyslipidemia    SOB (shortness of breath)    Old MI (myocardial infarction)    Other orders  -     ranolazine (RANEXA) 1,000 mg Tb12; Take 1 tablet (1,000 mg total) by mouth 2 (two) times daily.  Dispense: 180 tablet; Refill: 3    add imdur  Echo  Continue asa- cad  Continue statin-hlp  Continue benazapril metoprolol, hctz-htn

## 2017-04-06 ENCOUNTER — PROCEDURE VISIT (OUTPATIENT)
Dept: SURGERY | Facility: CLINIC | Age: 72
End: 2017-04-06
Payer: MEDICARE

## 2017-04-06 VITALS — BODY MASS INDEX: 24.9 KG/M2 | WEIGHT: 158.94 LBS

## 2017-04-06 DIAGNOSIS — C85.90 LYMPHOMA, UNSPECIFIED BODY REGION, UNSPECIFIED LYMPHOMA TYPE: Primary | ICD-10-CM

## 2017-04-06 NOTE — MR AVS SNAPSHOT
[facility] Surgery  [address] [place] [address]  Phone: [phone]  Fax: [phone]                  Foreign [place]   2017 4:00 PM   Procedure visit    Description:  Male : 1945   Provider:  Ben Temple MD   Department:  [facility] Surgery           Reason for Visit     Procedure                To Do List           Future Appointments        Provider Department Dept Phone    2017 8:00 AM Pierce Montgomery MD [facility] Ophthalmology [phone]    2017 9[facility]00 [phone]    2017 2:00 PM Ben Temple MD [facility] Surgery [phone]    2017 8:40 AM LAB[facility]ATORY, [facility] [phone]    2017 9:00 AM Fritz Silvestre Jr., MD [facility] Hemotology Oncology [phone]      Goals (5 Years of Data)     None      [facility]     [facility] Care [facility] -  Assistance  Unless otherwise directed by your provider, please contact [facility], our nurse care line that is available for  assistance.     Registered nurses in the [facility] provide: appointment scheduling, clinical advisement, health education, and other advisory services.  Call: [phone] (toll free)               Medications           Message regarding Medications     Verify the changes and/or additions to your medication regime listed below are the same as discussed with your clinician today.  If any of these changes or additions are incorrect, please notify your healthcare provider.             Verify that the below list of medications is an accurate representation of the medications you are currently taking.  If none reported, the list may be blank. If incorrect, please contact your healthcare provider. Carry this list with you in case of emergency.           Current Medications     aspirin (ECOTRIN) 81 MG EC tablet Take 81 mg by mouth every morning.      atorvastatin (LIPITOR) 80 MG tablet 80 mg every evening.     ergocalciferol (VITAMIN D2) 50,000 unit Cap Take 50,000 Units by mouth every 7 days.    fish oil-omega-3 fatty acids 300-1,000 mg capsule Take 2 g by mouth 2 (two) times daily.     FLUZONE HIGH-DOSE 2015-16, PF, 180 mcg/0.5 mL Syrg     FLUZONE HIGH-DOSE 2016-17, PF, 180 mcg/0.5 mL Syrg TO BE ADMINISTERED BY PHARMACIST FOR IMMUNIZATION    FOLIC ACID ORAL Take 400 mcg by mouth once daily.    gatifloxacin (ZYMAR) 0.5 % Drop drops Apply 1 drop to eye 2 (two) times daily. Eyedrops to start one day before surgery (left eye)    hydrochlorothiazide (HYDRODIURIL) 25 MG tablet once daily.     isosorbide mononitrate (IMDUR) 30 MG 24 hr tablet Take 1 tablet (30 mg total) by mouth once daily.    ketorolac 0.5% (ACULAR) 0.5 % Drop Place 1 drop into the left eye 4 (four) times daily. Eyedrops to start one day before surgery    metoprolol succinate (TOPROL-XL) 25 MG 24 hr tablet Take 25 mg by mouth once daily.    NITROSTAT 0.4 mg SL tablet     prednisoLONE acetate (PRED FORTE) 1 % DrpS Place 1 drop into the left eye 4 (four) times daily. Start one day before surgery    quinapril (ACCUPRIL) 10 MG tablet every morning.     ranolazine (RANEXA) 1,000 mg Tb12 Take 1 tablet (1,000 mg total) by mouth 2 (two) times daily.    triamcinolone acetonide 0.1% (KENALOG) 0.1 % cream every morning.            Clinical Reference Information           Your Vitals Were     Weight BMI             72.1 kg (158 lb 15.2 oz) 24.9 kg/m2         Allergies as of 4/6/2017     No Known Allergies      Immunizations Administered on Date of Encounter - 4/6/2017     None      [facility] Sign-Up     Activating your [facility] account is as easy as 1-2-3!     1) Visit [email or web address], select Sign Up Now, enter this activation code and your date of birth, then select Next.  [email or web address]  Expires: 5/7/2017 10:29 AM      2) Create a username and password to use when you visit [facility] in the future and  select a security question in case you lose your password and select Next.    3) Enter your e-mail address and click Sign Up!    Additional Information  If you have questions, please e-mail [email or web address] or call [phone] to talk to our [facility] staff. Remember, [facility] is NOT to be used for urgent needs. For medical emergencies, dial 911.         Language Assistance Services     ATTENTION: Language assistance services are available, free of charge. Please call [phone].      ATENCIÓN: Si habla español, tiene a santillan disposición servicios gratuitos de asistencia lingüística. Llame al [phone].     CHÚ Ý: N?u b?n nói Ti?ng Vi?t, có các d?ch v? h? tr? ngôn ng? mi?n phí dành cho b?n. G?i s? [phone].         [facility] complies with applicable Federal civil rights laws and does not discriminate on the basis of race, color, national origin, age, disability, or sex.

## 2017-04-13 ENCOUNTER — PROCEDURE VISIT (OUTPATIENT)
Dept: SURGERY | Facility: CLINIC | Age: 72
End: 2017-04-13
Payer: MEDICARE

## 2017-04-13 ENCOUNTER — APPOINTMENT (OUTPATIENT)
Dept: CARDIOLOGY | Facility: CLINIC | Age: 72
End: 2017-04-13
Payer: MEDICARE

## 2017-04-13 ENCOUNTER — OFFICE VISIT (OUTPATIENT)
Dept: OPHTHALMOLOGY | Facility: CLINIC | Age: 72
End: 2017-04-13
Payer: MEDICARE

## 2017-04-13 VITALS
SYSTOLIC BLOOD PRESSURE: 134 MMHG | TEMPERATURE: 98 F | WEIGHT: 158 LBS | BODY MASS INDEX: 24.75 KG/M2 | HEART RATE: 76 BPM | DIASTOLIC BLOOD PRESSURE: 70 MMHG

## 2017-04-13 DIAGNOSIS — C85.90 LYMPHOMA, UNSPECIFIED BODY REGION, UNSPECIFIED LYMPHOMA TYPE: Primary | ICD-10-CM

## 2017-04-13 DIAGNOSIS — Z98.890 POST-OPERATIVE STATE: Primary | ICD-10-CM

## 2017-04-13 PROCEDURE — 99024 POSTOP FOLLOW-UP VISIT: CPT | Mod: S$GLB,,, | Performed by: OPHTHALMOLOGY

## 2017-04-13 PROCEDURE — 93306 TTE W/DOPPLER COMPLETE: CPT | Mod: S$GLB,,, | Performed by: INTERNAL MEDICINE

## 2017-04-13 PROCEDURE — 99999 PR PBB SHADOW E&M-EST. PATIENT-LVL II: CPT | Mod: PBBFAC,,, | Performed by: OPHTHALMOLOGY

## 2017-04-13 RX ORDER — HYDROCODONE BITARTRATE AND ACETAMINOPHEN 5; 325 MG/1; MG/1
TABLET ORAL
Qty: 12 TABLET | Refills: 0 | Status: SHIPPED | OUTPATIENT
Start: 2017-04-13 | End: 2017-07-12

## 2017-04-13 NOTE — PATIENT INSTRUCTIONS
Is okay to shower and get everything wet.    Please start your aspirin and fish oil tomorrow    Please remove the dressing on Monday morning.  Remove the little paper strips when they begin to fall off.    Please call for any increasing pain, redness or drainage

## 2017-04-13 NOTE — PROGRESS NOTES
SUBJECTIVE:   Foreign [place] is a 71 y.o. male   Uncorrected distance visual acuity was not recorded in the right eye and 20/30 in the left eye.   Chief Complaint   Patient presents with    Post-op Evaluation        HPI:  HPI     Patient states o pain on pain scale.        Referred by DNL    1.PCIOL OS 04/12/17     CAT OD        OS PRED QID, KET QID, GAT BID       Last edited by BLACNA Baker on 4/13/2017  7:25 AM.     Assessment /Plan :  1. Post-operative state Exam:  SLE:  S/C: normal  K : trace k fold  AC: 1+ cell and flare  Iris: normal  Lens: PCIOL    IMP:  PO Day 1 S/P Phaco/IOL OS : Doing well.    Plan:  Continue gtts to operative eye:  Pred 1% QID  Ketorolac QID  Zymaxid BID  Reinstructed in importance of absolute compliance with Post-OP instructions including medications, shield at bedtime, and limitation of activities. Follow up appointments in approximately one and six weeks or call immediately for increased pain, redness or vision loss.

## 2017-04-13 NOTE — PROCEDURES
Removal, [place]  Date/Time: 4/13/2017 9:47 AM  Performed by: ODETTE ESCOBEDO  Authorized by: ODETTE ESCOBEDO     Consent Done?:  Yes (Verbal)    STAFF:  Assistants?: No    LOCATION:right    PREP:Position:  Supine    ANESTHESIA:  Anesthesia:  Local infiltration  Local anesthetic:  Lidocaine 1% with epinephrine    PROCEDURE DETAILS:  Excision type:  Skin  Scalpel size:  15  Incision type:  Single straight  Specimens?: Yes    Estimated blood loss (cc):  5  Needle, instrument, and sponge counts were correct.        Reason for MediPort excision was lymphoma   MediPort Removal    Informed consent was obtained.  MediPort location:right chest wall    1% lidocaine with epinephrine: 7 ml    The right chest was prepped and draped in typical sterile fashion.  A timeout was performed.  Lidocaine was infiltrated.  Transverse incision made through the previous scar.  MediPort capsule identified and opened.  The MediPort was partially removed.    The catheter exit site was closed with 3-0 Vicryl in a figure-of-eight fashion as the  catheter was removed.  The wound was irrigated.  The capsule was closed with 3-0 Vicryl in interrupted fashion.  The deep dermis with 3-0 Vicryl interrupted fashion.  The skin with 4-0 vicryl in a subcuticular fashion.    Steri-Strips dry and a dry sterile dressings were placed.    The patient was instructed to leave the dressing in place for 48 hours.  They will then removed the outer bandages.  They were instructed to remove the Steri-Strips as they begin to fall off.  They were provided with narcotics for pain control.    They were asked to notify us for increasing pain, redness, swelling or drainage from area.    Follow up with surgery as needed or for concerns about the incision

## 2017-04-13 NOTE — MR AVS SNAPSHOT
[facility]  [address] [facility] [address]  Phone: [phone]  Fax: [phone]                  [place]   2017 2:00 PM   Procedure visit    Description:  Male : 1945   Provider:  Ben Temple MD   Department:  [facility] Surgery           Reason for Visit     Other           Diagnoses this Visit        Comments    Lymphoma, unspecified body region, unspecified lymphoma type    -  Primary            To Do List           Future Appointments        Provider Department Dept Phone    2017 2:00 PM Ben Temple MD [facility] Surgery [phone]    2017 7:30 AM Pierce Montgomery MD [facility] Ophthalmology [phone]    2017 9:15 AM Cristhian Jauregui OD [facility] Ophthalmology [phone]    2017 8:40 AM LABORATORY, [facility] [phone]    2017 9:00 AM Fritz Silvestre Jr., MD [facility] Hemotology Oncology [phone]      Goals (5 Years of Data)     None       These Medications        Disp Refills Start End    hydrocodone-acetaminophen 5-325mg (NORCO) 5-325 mg per tablet 12 tablet 0 2017     1 every 6 hours as needed for pain    Pharmacy: [facility] [address] Ph #: [phone]         [facility]     [facility] Nurse Care Line -  Assistance  Unless otherwise directed by your provider, please contact [facility], our nurse care line that is available for  assistance.     Registered nurses in the [facility] provide: appointment scheduling, clinical advisement, health education, and other advisory services.  Call: [phone] (toll free)               Medications           Message regarding Medications     Verify the changes and/or additions to your medication regime listed below are the same as discussed with your clinician today.  If any of these changes or additions are incorrect, please notify your  healthcare provider.        START taking these NEW medications        Refills    hydrocodone-acetaminophen 5-325mg (NORCO) 5-325 mg per tablet 0    Si every 6 hours as needed for pain    Class: Normal           Verify that the below list of medications is an accurate representation of the medications you are currently taking.  If none reported, the list may be blank. If incorrect, please contact your healthcare provider. Carry this list with you in case of emergency.           Current Medications     atorvastatin (LIPITOR) 80 MG tablet 80 mg every evening.     ergocalciferol (VITAMIN D2) 50,000 unit Cap Take 50,000 Units by mouth every 7 days.    fish oil-omega-3 fatty acids 300-1,000 mg capsule Take 2 g by mouth 2 (two) times daily.     FLUZONE HIGH-DOSE , PF, 180 mcg/0.5 mL Syrg     FLUZONE HIGH-DOSE , PF, 180 mcg/0.5 mL Syrg TO BE ADMINISTERED BY PHARMACIST FOR IMMUNIZATION    FOLIC ACID ORAL Take 400 mcg by mouth once daily.    gatifloxacin (ZYMAR) 0.5 % Drop drops Apply 1 drop to eye 2 (two) times daily. Eyedrops to start one day before surgery (left eye)    hydrochlorothiazide (HYDRODIURIL) 25 MG tablet once daily.     isosorbide mononitrate (IMDUR) 30 MG 24 hr tablet Take 1 tablet (30 mg total) by mouth once daily.    ketorolac 0.5% (ACULAR) 0.5 % Drop Place 1 drop into the left eye 4 (four) times daily. Eyedrops to start one day before surgery    metoprolol succinate (TOPROL-XL) 25 MG 24 hr tablet Take 25 mg by mouth once daily.    prednisoLONE acetate (PRED FORTE) 1 % DrpS Place 1 drop into the left eye 4 (four) times daily. Start one day before surgery    quinapril (ACCUPRIL) 10 MG tablet every morning.     ranolazine (RANEXA) 1,000 mg Tb12 Take 1 tablet (1,000 mg total) by mouth 2 (two) times daily.    aspirin (ECOTRIN) 81 MG EC tablet Take 81 mg by mouth every morning.     hydrocodone-acetaminophen 5-325mg (NORCO) 5-325 mg per tablet 1 every 6 hours as needed for pain    NITROSTAT 0.4  mg SL tablet     triamcinolone acetonide 0.1% (KENALOG) 0.1 % cream every morning.            Clinical Reference Information           Your Vitals Were     BP Pulse Temp Weight BMI    134/70 76 97.8 °F (36.6 °C) 71.7 kg (158 lb) 24.75 kg/m2      Blood Pressure          Most Recent Value    BP  134/70      Allergies as of 4/13/2017     No Known Allergies      Immunizations Administered on Date of Encounter - 4/13/2017     None      Orders Placed During Today's Visit      Normal Orders This Visit    Removal of Portacath       Instructions    Is okay to shower and get everything wet.    Please start your aspirin and fish oil tomorrow    Please remove the dressing on Monday morning.  Remove the little paper strips when they begin to fall off.    Please call for any increasing pain, redness or drainage       Language Assistance Services     ATTENTION: Language assistance services are available, free of charge. Please call [phone].      ATENCIÓN: Si habla español, tiene a santillan disposición servicios gratuitos de asistencia lingüística. Llame al [phone].     CHÚ Ý: N?u b?n nói Ti?ng Vi?t, có các d?ch v? h? tr? ngôn ng? mi?n phí dành cho b?n. G?i s? [phone].         [facility] General Surgery complies with applicable Federal civil rights laws and does not discriminate on the basis of race, color, national origin, age, disability, or sex.

## 2017-04-13 NOTE — MR AVS SNAPSHOT
[facility] - Ophthalmology  [address] [facility] [place] [address]  Phone: [phone]  Fax: [phone]                  Foreign [place]   2017 8:00 AM   Office Visit    Description:  Male : 1945   Provider:  Pierce Montgomery MD   Department:  [facility] - Ophthalmology           Reason for Visit     Post-op Evaluation           Diagnoses this Visit        Comments    Post-operative state    -  Primary            To Do List           Future Appointments        Provider Department Dept Phone    2017 8:00 AM Pierce Montgomery MD [facility] Ophthalmology [phone]    2017 9:00 [phone]    2017 2:00 PM Ben Temple MD [facility] General Surgery [phone]    2017 8:40 AM LAB[facility]ATORY, [facility] [phone]    2017 9:00 AM Fritz Silvestre Jr., MD [facility] Hemotology Oncology [phone]      Goals (5 Years of Data)     None      [facility]     [facility] Nurse Care Line -  Assistance  Unless otherwise directed by your provider, please contact [facility], our nurse care line that is available for  assistance.     Registered nurses in the [facility] provide: appointment scheduling, clinical advisement, health education, and other advisory services.  Call: [phone] (toll free)               Medications           Message regarding Medications     Verify the changes and/or additions to your medication regime listed below are the same as discussed with your clinician today.  If any of these changes or additions are incorrect, please notify your healthcare provider.             Verify that the below list of medications is an accurate representation of the medications you are currently taking.  If none reported, the list may be blank. If incorrect, please contact your healthcare provider. Carry this list with you in case of emergency.           Current  Medications     atorvastatin (LIPITOR) 80 MG tablet 80 mg every evening.     ergocalciferol (VITAMIN D2) 50,000 unit Cap Take 50,000 Units by mouth every 7 days.    fish oil-omega-3 fatty acids 300-1,000 mg capsule Take 2 g by mouth 2 (two) times daily.     FLUZONE HIGH-DOSE 2015-16, PF, 180 mcg/0.5 mL Syrg     FLUZONE HIGH-DOSE 2016-17, PF, 180 mcg/0.5 mL Syrg TO BE ADMINISTERED BY PHARMACIST FOR IMMUNIZATION    FOLIC ACID ORAL Take 400 mcg by mouth once daily.    gatifloxacin (ZYMAR) 0.5 % Drop drops Apply 1 drop to eye 2 (two) times daily. Eyedrops to start one day before surgery (left eye)    hydrochlorothiazide (HYDRODIURIL) 25 MG tablet once daily.     isosorbide mononitrate (IMDUR) 30 MG 24 hr tablet Take 1 tablet (30 mg total) by mouth once daily.    ketorolac 0.5% (ACULAR) 0.5 % Drop Place 1 drop into the left eye 4 (four) times daily. Eyedrops to start one day before surgery    metoprolol succinate (TOPROL-XL) 25 MG 24 hr tablet Take 25 mg by mouth once daily.    prednisoLONE acetate (PRED FORTE) 1 % DrpS Place 1 drop into the left eye 4 (four) times daily. Start one day before surgery    quinapril (ACCUPRIL) 10 MG tablet every morning.     ranolazine (RANEXA) 1,000 mg Tb12 Take 1 tablet (1,000 mg total) by mouth 2 (two) times daily.    aspirin (ECOTRIN) 81 MG EC tablet Take 81 mg by mouth every morning.     NITROSTAT 0.4 mg SL tablet     triamcinolone acetonide 0.1% (KENALOG) 0.1 % cream every morning.            Clinical Reference Information           Allergies as of 4/13/2017     No Known Allergies      Immunizations Administered on Date of Encounter - 4/13/2017     None      [facility] Sign-Up     Activating your [facility] account is as easy as 1-2-3!     1) Visit [email or web address], select Sign Up Now, enter this activation code and your date of birth, then select Next.  [email or web address]  Expires: 5/7/2017 10:29 AM      2) Create a username and password to use when you visit [facility] in the future and  select a security question in case you lose your password and select Next.    3) Enter your e-mail address and click Sign Up!    Additional Information  If you have questions, please e-mail [email or web address] or call [phone] to talk to our [facility] staff. Remember, [facility] is NOT to be used for urgent needs. For medical emergencies, dial 911.         Language Assistance Services     ATTENTION: Language assistance services are available, free of charge. Please call [phone].      ATENCIÓN: Si habla español, tiene a santillan disposición servicios gratuitos de asistencia lingüística. Llame al [phone].     CHÚ Ý: N?u b?n nói Ti?ng Vi?t, có các d?ch v? h? tr? ngôn ng? mi?n phí dành cho b?n. G?i s? [phone].         [facility] - Ophthalmology complies with applicable Federal civil rights laws and does not discriminate on the basis of race, color, national origin, age, disability, or sex.

## 2017-04-14 LAB
DIASTOLIC DYSFUNCTION: NO
ESTIMATED PA SYSTOLIC PRESSURE: 31.52
MITRAL VALVE REGURGITATION: NORMAL
RETIRED EF AND QEF - SEE NOTES: 55 (ref 55–65)
TRICUSPID VALVE REGURGITATION: NORMAL

## 2017-04-21 ENCOUNTER — OFFICE VISIT (OUTPATIENT)
Dept: OPHTHALMOLOGY | Facility: CLINIC | Age: 72
End: 2017-04-21
Payer: MEDICARE

## 2017-04-21 DIAGNOSIS — Z98.890 POST-OPERATIVE STATE: Primary | ICD-10-CM

## 2017-04-21 DIAGNOSIS — H25.11 NUCLEAR SCLEROSIS, RIGHT: ICD-10-CM

## 2017-04-21 PROCEDURE — 99024 POSTOP FOLLOW-UP VISIT: CPT | Mod: S$GLB,,, | Performed by: OPHTHALMOLOGY

## 2017-04-21 PROCEDURE — 92136 OPHTHALMIC BIOMETRY: CPT | Mod: 26,RT,S$GLB, | Performed by: OPHTHALMOLOGY

## 2017-04-21 PROCEDURE — 99999 PR PBB SHADOW E&M-EST. PATIENT-LVL II: CPT | Mod: PBBFAC,,, | Performed by: OPHTHALMOLOGY

## 2017-04-21 RX ORDER — PREDNISOLONE ACETATE 10 MG/ML
1 SUSPENSION/ DROPS OPHTHALMIC 4 TIMES DAILY
Qty: 1 BOTTLE | Refills: 2 | Status: SHIPPED | OUTPATIENT
Start: 2017-04-21 | End: 2017-06-23 | Stop reason: ALTCHOICE

## 2017-04-21 RX ORDER — KETOROLAC TROMETHAMINE 5 MG/ML
1 SOLUTION OPHTHALMIC 4 TIMES DAILY
Qty: 1 BOTTLE | Refills: 2 | Status: SHIPPED | OUTPATIENT
Start: 2017-04-21 | End: 2017-06-23 | Stop reason: ALTCHOICE

## 2017-04-21 RX ORDER — GATIFLOXACIN 5 MG/ML
1 SOLUTION/ DROPS OPHTHALMIC 2 TIMES DAILY
Qty: 1 BOTTLE | Refills: 2 | Status: SHIPPED | OUTPATIENT
Start: 2017-04-21 | End: 2017-06-23 | Stop reason: ALTCHOICE

## 2017-04-21 NOTE — PROGRESS NOTES
SUBJECTIVE:   Foreign [place] is a 71 y.o. male   Uncorrected distance visual acuity was not recorded in the right eye and 20/25 +1 in the left eye. Corrected distance visual acuity was 20/25 +1 in the right eye and not recorded in the left eye.   Chief Complaint   Patient presents with    Post-op Evaluation     Pred Forte and Ketorolac TID OS         HPI:  HPI     Post-op Evaluation    Additional comments: Pred Forte and Ketorolac TID OS            Comments   One week post phaco OS check    Referred by DNL    No eye pain and no problems since the surgery  Vision has improved since the surgery left eye    1.PCIOL OS +22.5 SN60F (distance) 04/12/17  CAT OD        OS PRED TID, KET TID       Last edited by Ree Raines on 4/21/2017  7:35 AM. (History)        Assessment /Plan :  1. Post-operative state Slit lamp exam:  L/L: nl  K: clear, wound sealed  AC: trace cell and flare  Iris/Lens: IOL centered and stable      IMP:  PO Week 1 S/P Phaco/ IOL OS : Doing well with no evidence of infection or abnormal inflammation.     Plan:  Pt given and instructed in one week PO instructions. D/C zymaxid and start to taper off Ketorlac and Pred Forte 1% weekly. Can resume normal activitites and d/c eye shield. OTC reading glasses can be used until evaluated for final MR. Follow up in one month or PRN pain, redness, vision loss, or other concerns.     2. Nuclear sclerosis, right   Patient reports decreased vision in the fellow eye consistent with the clinical amount of lenticular opacity, which reaches the level of visual significance and affects activities of daily living including reading and glare. Risks, benefits, and alternatives to cataract surgery were discussed and pt desired to schedule cataract surgery. Pt was consented and the biometry and lens options were reviewed.     Schedule Cataract Surgery OD        Final visual acuity may be limited by astigmatism     Requests a Monofocal  IOL.     Will aim for distance      Other considerations: NONE

## 2017-04-25 ENCOUNTER — TELEPHONE (OUTPATIENT)
Dept: CARDIOLOGY | Facility: CLINIC | Age: 72
End: 2017-04-25

## 2017-04-25 NOTE — TELEPHONE ENCOUNTER
----- Message from Ryan Burroughs MD sent at 4/23/2017  6:29 PM CDT -----  Ok to stop imdur, no other new meds recommended  ----- Message -----     From: Caroline Branham LPN     Sent: 4/19/2017  10:40 AM       To: Ryan Burroughs MD        ----- Message -----     From: Carola Fink LPN     Sent: 4/17/2017   8:50 AM       To: [facility] Cardiology Clinical Support        ----- Message -----     From: Gisselle Post     Sent: 4/13/2017   1:24 PM       To: [facility] Cardio Clinical Staff        ----- Message -----     From: Ewa Sexton     Sent: 4/13/2017   8:48 AM       To: [facility] Staff    Pt wanted to let you know that he stopped taking Isosorbide MN er 30mg last Thursday.  Stopped because they made him extremely dizzy, almost fell when getting out of his car. States that he no longer is feeling dizzy now that he has stopped.  Please contact patient if you feel he needs to take a different medication now.

## 2017-05-17 ENCOUNTER — OFFICE VISIT (OUTPATIENT)
Dept: OPHTHALMOLOGY | Facility: CLINIC | Age: 72
End: 2017-05-17
Payer: MEDICARE

## 2017-05-17 DIAGNOSIS — Z98.890 POST-OPERATIVE STATE: Primary | ICD-10-CM

## 2017-05-17 PROCEDURE — 99024 POSTOP FOLLOW-UP VISIT: CPT | Mod: S$GLB,,, | Performed by: OPTOMETRIST

## 2017-05-17 PROCEDURE — 99999 PR PBB SHADOW E&M-EST. PATIENT-LVL I: CPT | Mod: PBBFAC,,, | Performed by: OPTOMETRIST

## 2017-05-17 NOTE — PROGRESS NOTES
HPI     Post-op Evaluation    Additional comments: 5 week followup            Comments   Last seen by CPG on 4/21/17 for postop cataract. Last annual exam 3/27/17   by DNL.  1. PCIOL OS +22.5 SN60F (distance) 04/12/17  2. CAT OD  Patient pain level is 0 when using drops pain level is a 3 when he's not   using drops.  Patient denies any flashes of light or floaters.      OS Gatifloxacin QD, KET QD  50% compliance        Last edited by Graciela Rowe, PCT on 5/17/2017  9:24 AM. (History)              Assessment /Plan     For exam results, see Encounter Report.    Post-operative state  Doing well OS, only mild persistent iritis remains  Recommended resuming pred acetate 1% qid OS x 4 days, then bid OS x 1 wk, then qd OS x 1 wk  D/c all other gtts OS  Written instructions given today    RTC PRN or as scheduled for post ops OD

## 2017-05-25 ENCOUNTER — OFFICE VISIT (OUTPATIENT)
Dept: OPHTHALMOLOGY | Facility: CLINIC | Age: 72
End: 2017-05-25
Payer: MEDICARE

## 2017-05-25 DIAGNOSIS — Z98.890 POST-OPERATIVE STATE: Primary | ICD-10-CM

## 2017-05-25 DIAGNOSIS — Z98.41 CATARACT EXTRACTION STATUS OF EYE, RIGHT: ICD-10-CM

## 2017-05-25 PROCEDURE — 99999 PR PBB SHADOW E&M-EST. PATIENT-LVL II: CPT | Mod: PBBFAC,,, | Performed by: OPHTHALMOLOGY

## 2017-05-25 PROCEDURE — 99024 POSTOP FOLLOW-UP VISIT: CPT | Mod: S$GLB,,, | Performed by: OPHTHALMOLOGY

## 2017-05-25 NOTE — PROGRESS NOTES
SUBJECTIVE:   Foreign [place] is a 71 y.o. male   Uncorrected distance visual acuity was 20/25 in the right eye and not recorded in the left eye.   Chief Complaint   Patient presents with    Post-op Evaluation        HPI:  HPI     Patient states 0/10 pain scale.        Referred by DNL    1.PCIOL OS +22.5 SN60WF (distance) 04/12/17  2. PCIOL OD +22.5 SN60WF (distance) 5-24-17      OD PRED QID, KET QID, GAT BID  OS PRED BID    Last edited by BLANCA Baker on 5/25/2017  8:04 AM. (History)        Assessment /Plan :  1. Post-operative state Exam:  SLE:  S/C: normal  K : trace k fold  AC: trace cell and flare  Iris: normal  Lens: PCIOL    IMP:  PO Day 1 S/P Phaco/IOL OD : Doing well.    Plan:  Continue gtts to operative eye:  Pred 1% QID  Ketorolac QID  Zymaxid BID  Reinstructed in importance of absolute compliance with Post-OP instructions including medications, shield at bedtime, and limitation of activities. Follow up appointments in approximately one and six weeks or call immediately for increased pain, redness or vision loss.      2. Cataract extraction status of eye, right

## 2017-05-30 ENCOUNTER — OFFICE VISIT (OUTPATIENT)
Dept: OPHTHALMOLOGY | Facility: CLINIC | Age: 72
End: 2017-05-30
Payer: MEDICARE

## 2017-05-30 DIAGNOSIS — Z98.890 POST-OPERATIVE STATE: Primary | ICD-10-CM

## 2017-05-30 DIAGNOSIS — H20.9 IRITIS: ICD-10-CM

## 2017-05-30 PROCEDURE — 99024 POSTOP FOLLOW-UP VISIT: CPT | Mod: S$GLB,,, | Performed by: OPHTHALMOLOGY

## 2017-05-30 PROCEDURE — 99999 PR PBB SHADOW E&M-EST. PATIENT-LVL II: CPT | Mod: PBBFAC,,, | Performed by: OPHTHALMOLOGY

## 2017-05-30 NOTE — PROGRESS NOTES
SUBJECTIVE:   Foreign [place] is a 71 y.o. male   Uncorrected distance visual acuity was 20/20 -1 in the right eye and 20/20 -1 in the left eye.   Chief Complaint   Patient presents with    Post-op Evaluation     1 week phaco OD pt states OS was red and painful this AM use Pred and pain was reduced        HPI:  HPI     Post-op Evaluation    Additional comments: 1 week phaco OD pt states OS was red and painful   this AM use Pred and pain was reduced           Comments   1.PCIOL OS +22.5 SN60WF (distance) 04/12/17  2. PCIOL OD +22.5 SN60WF (distance) 5-24-17      OD PRED QID, KET QID, GAT BID  OS PRED QD       Last edited by Nadine Alva MA on 5/30/2017  8:42 AM. (History)        Assessment /Plan :  1. Post-operative state Slit lamp exam:  L/L: nl  K: clear, wound sealed  AC: isolated cell and flare  Iris/Lens: IOL centered and stable      IMP:  PO Week 1 S/P Phaco/ IOL OD : Doing well with no evidence of infection or abnormal inflammation.     Plan:  Pt given and instructed in one week PO instructions. D/C zymaxid and start to taper off Ketorlac and Pred Forte 1% weekly. Can resume normal activitites and d/c eye shield. OTC reading glasses can be used until evaluated for final MR. Follow up in one month with Dr. Snider or PRN pain, redness, vision loss, or other concerns.     2. Iritis- rebound iritis left eye restart Pred Acetate QID OS and Ketorolac QID OS and taper as directed

## 2017-06-23 ENCOUNTER — OFFICE VISIT (OUTPATIENT)
Dept: OPHTHALMOLOGY | Facility: CLINIC | Age: 72
End: 2017-06-23
Payer: MEDICARE

## 2017-06-23 DIAGNOSIS — Z98.890 POST-OPERATIVE STATE: Primary | ICD-10-CM

## 2017-06-23 PROCEDURE — 99999 PR PBB SHADOW E&M-EST. PATIENT-LVL II: CPT | Mod: PBBFAC,,, | Performed by: OPHTHALMOLOGY

## 2017-06-23 PROCEDURE — 99024 POSTOP FOLLOW-UP VISIT: CPT | Mod: S$GLB,,, | Performed by: OPHTHALMOLOGY

## 2017-06-23 NOTE — PROGRESS NOTES
SUBJECTIVE:   Foreign [place] is a 71 y.o. male   Uncorrected distance visual acuity was 20/20 in the right eye and 20/20 -1 in the left eye.   Chief Complaint   Patient presents with    Post-op Evaluation     5 wk PCIOL OD PO        HPI:  HPI     Post-op Evaluation    Additional comments: 5 wk PCIOL OD PO           Comments   Pt states no pain or discomfort. Eye feels better this visit. VA stable.   No gtts.     Referred by DNL    1.PCIOL OS +22.5 SN60WF (distance) 04/12/17  2. PCIOL OD +22.5 SN60WF (distance) 5-24-17       Last edited by Juan R Prado, Patient Care Assistant on 6/23/2017  8:42   AM. (History)        Assessment /Plan :  1. Post-operative state   Exam:    Slit lamp exam:  L/L: nl  S/C: quiet and uninflamed  K: clear, wound sealed  AC: no cell or flare  Iris/Lens: IOL centered and stable      Assessment:    PO Month 1 S/P Phaco/IOL OD : Doing Well and completing healing process. Final visual correction options discussed and appropriate prescriptions and/or OTC reading glass recommendations offered to patient. Pt desires Bifocal Rx. Pt instructed to follow up with Dr. Domingo in 6 months for next eye exam or PRN any pain, redness, vision changes or other concerns.

## 2017-07-12 ENCOUNTER — OFFICE VISIT (OUTPATIENT)
Dept: CARDIOLOGY | Facility: CLINIC | Age: 72
End: 2017-07-12
Payer: MEDICARE

## 2017-07-12 VITALS
HEIGHT: 67 IN | WEIGHT: 165 LBS | BODY MASS INDEX: 25.9 KG/M2 | SYSTOLIC BLOOD PRESSURE: 88 MMHG | HEART RATE: 62 BPM | DIASTOLIC BLOOD PRESSURE: 48 MMHG

## 2017-07-12 DIAGNOSIS — R09.89 BRUIT OF RIGHT CAROTID ARTERY: ICD-10-CM

## 2017-07-12 DIAGNOSIS — E78.5 DYSLIPIDEMIA: ICD-10-CM

## 2017-07-12 DIAGNOSIS — Z95.1 S/P CORONARY ARTERY BYPASS GRAFT X 2: ICD-10-CM

## 2017-07-12 DIAGNOSIS — I25.10 CORONARY ARTERY DISEASE INVOLVING NATIVE CORONARY ARTERY OF NATIVE HEART WITHOUT ANGINA PECTORIS: Primary | ICD-10-CM

## 2017-07-12 DIAGNOSIS — I10 ESSENTIAL HYPERTENSION: ICD-10-CM

## 2017-07-12 DIAGNOSIS — I25.2 OLD MI (MYOCARDIAL INFARCTION): ICD-10-CM

## 2017-07-12 PROCEDURE — 99213 OFFICE O/P EST LOW 20 MIN: CPT | Mod: S$GLB,,, | Performed by: NURSE PRACTITIONER

## 2017-07-12 PROCEDURE — 99999 PR PBB SHADOW E&M-EST. PATIENT-LVL IV: CPT | Mod: PBBFAC,,, | Performed by: NURSE PRACTITIONER

## 2017-07-12 PROCEDURE — 1126F AMNT PAIN NOTED NONE PRSNT: CPT | Mod: S$GLB,,, | Performed by: NURSE PRACTITIONER

## 2017-07-12 PROCEDURE — 1159F MED LIST DOCD IN RCRD: CPT | Mod: S$GLB,,, | Performed by: NURSE PRACTITIONER

## 2017-07-12 RX ORDER — CYANOCOBALAMIN (VITAMIN B-12) 500 MCG
1 TABLET ORAL DAILY
COMMUNITY
End: 2020-08-25

## 2017-07-12 NOTE — PROGRESS NOTES
Subjective:    Patient ID:  Foreign [place] is a 71 y.o. male who presents for follow-up of CAD with H/O CABG.      HPI: Mr. Foreign [place] presents to the clinic for follow up of CAD, H/O CABG, HTN, DLP. he stated that he had dizziness on isosorbide and stopped taking it almost 3 months ago. He stated that he had only taken two pills from the bottle before stopping it. He denied any chest pain; he does experience EDGE riding his bike-up a hill, he get SOB within a few minutes; riding on a flat surface, he can ride for 1/2 hour before getting mildly SOB.    Medications: he is not missing any doses. Stopped isosorbide after two pills because of dizziness.  Sodium: he does add small amount of salt to foods when he cooks,  he is not reading labels for sodium content. Eating out at restaurants twice a week.  Dietary Fats: Eats fried foods once once a week; eats margarine every other day. Mustard greens, karan greens, okra, cabbage. Does not like cucumbers, broccoli, or cauliflower. Does put lettuce on his sandwiches.  Exercise: he riding a bike 3 times a week. Use a push mower, but has to rest about every 20 minutes or so.  Tobacco:Former smoker; quit 2002. no alcohol use     Weight: 74.9 kg (165 lb 0.2 oz) he states that his daily weights has been stable  Wt Readings from Last 3 Encounters:   07/12/17 74.9 kg (165 lb 0.2 oz)   04/13/17 71.7 kg (158 lb)   04/06/17 72.1 kg (158 lb 15.2 oz)     BP log: None. He does not check his BP.    Review of Systems   Constitution: Negative for chills, decreased appetite, fever, night sweats, weight gain and weight loss.   HENT: Negative for congestion.    Cardiovascular: Positive for dyspnea on exertion. Negative for chest pain, claudication, cyanosis, irregular heartbeat, leg swelling, near-syncope, orthopnea, palpitations, paroxysmal nocturnal dyspnea and syncope.   Respiratory: Negative for cough, hemoptysis, shortness of breath, sputum production and wheezing.     Hematologic/Lymphatic: Negative for adenopathy and bleeding problem. Does not bruise/bleed easily.   Skin: Negative for color change and nail changes.   Gastrointestinal: Negative for bloating, abdominal pain, change in bowel habit, heartburn, hematochezia, melena, nausea and vomiting.   Genitourinary: Negative for hematuria.   Neurological: Negative for dizziness and light-headedness.   Psychiatric/Behavioral: Negative for altered mental status.       Objective:   Physical Exam   Constitutional: He is oriented to person, place, and time. He appears well-developed and well-nourished. No distress.   HENT:   Head: Normocephalic and atraumatic.   Eyes: Conjunctivae are normal. No scleral icterus.   Neck: Neck supple. No JVD present. No tracheal deviation present. No thyromegaly present.   Cardiovascular: Normal rate, regular rhythm, normal heart sounds and intact distal pulses.  Exam reveals no gallop and no friction rub.    No murmur heard.  Pulses:       Carotid pulses are on the right side with bruit.  Pulmonary/Chest: Effort normal and breath sounds normal. No respiratory distress. He has no wheezes. He has no rales. He exhibits no tenderness.   Abdominal: Soft. Bowel sounds are normal. He exhibits no distension and no mass. There is no tenderness. There is no rebound and no guarding.   Musculoskeletal: Normal range of motion. He exhibits no edema.   Lymphadenopathy:     He has no cervical adenopathy.   Neurological: He is alert and oriented to person, place, and time.   Skin: Skin is warm and dry. No rash noted. He is not diaphoretic. No erythema. No pallor.   Pink   Psychiatric: He has a normal mood and affect.     Echo 4/13/17: CONCLUSIONS     1 - Concentric remodeling.     2 - No wall motion abnormalities.     3 - Normal left ventricular systolic function (EF 55-60%).     4 - Normal left ventricular diastolic function.     5 - Normal right ventricular systolic function .     6 - The estimated PA systolic  pressure is 32 mmHg.     7 - Mild mitral regurgitation.     8 - Mild tricuspid regurgitation.      Results for OLE SUH (MRN [id number]) as of 7/12/2017 15:29   Ref. Range 3/23/2017 08:21   Sodium Latest Ref Range: 136 - 145 mmol/L 140   Potassium Latest Ref Range: 3.5 - 5.1 mmol/L 3.6   Chloride Latest Ref Range: 95 - 110 mmol/L 105   CO2 Latest Ref Range: 23 - 29 mmol/L 25   Anion Gap Latest Ref Range: 8 - 16 mmol/L 10   BUN, Bld Latest Ref Range: 8 - 23 mg/dL 20   Creatinine Latest Ref Range: 0.5 - 1.4 mg/dL 1.3   eGFR if non African American Latest Ref Range: >60 mL/min/1.73 m^2 55 (A)   eGFR if African American Latest Ref Range: >60 mL/min/1.73 m^2 >60   Glucose Latest Ref Range: 70 - 110 mg/dL 76   Calcium Latest Ref Range: 8.7 - 10.5 mg/dL 9.1   Alkaline Phosphatase Latest Ref Range: 55 - 135 U/L 50 (L)   Total Protein Latest Ref Range: 6.0 - 8.4 g/dL 7.6   Albumin Latest Ref Range: 3.5 - 5.2 g/dL 3.8   Total Bilirubin Latest Ref Range: 0.1 - 1.0 mg/dL 0.8   AST Latest Ref Range: 10 - 40 U/L 25   ALT Latest Ref Range: 10 - 44 U/L 19       Assessment:      1. Coronary artery disease involving native coronary artery of native heart without angina pectoris    2. S/P coronary artery bypass graft x 2    3. Old MI (myocardial infarction)    4. Essential hypertension    5. Dyslipidemia        Plan:     Continue current medications as directed.  Reviewed Blood pressure readings from other clinic appointments; he tends to run from 90s-114 systolic.  Carotid ultrasound (bilateral) at [facility]-he lives close to that clinic. Phone review.  Continue exercise.  Increase non-starchy vegetables to daily. Omit [place] stick butter due to hydrogenated oil. Avoid trans fats. Gave handout on how to identify trans fats and healthy butter spread alternatives.  Follow up with Dr. Burroughs in 3 months with labs (lipid panel, ALT, BMP) or call sooner for any problems.

## 2017-07-18 ENCOUNTER — CLINICAL SUPPORT (OUTPATIENT)
Dept: CARDIOLOGY | Facility: CLINIC | Age: 72
End: 2017-07-18
Payer: MEDICARE

## 2017-07-18 DIAGNOSIS — I25.10 CORONARY ARTERY DISEASE INVOLVING NATIVE CORONARY ARTERY OF NATIVE HEART WITHOUT ANGINA PECTORIS: ICD-10-CM

## 2017-07-18 DIAGNOSIS — I10 ESSENTIAL HYPERTENSION: ICD-10-CM

## 2017-07-18 DIAGNOSIS — Z95.1 S/P CORONARY ARTERY BYPASS GRAFT X 2: ICD-10-CM

## 2017-07-18 DIAGNOSIS — R09.89 BRUIT OF RIGHT CAROTID ARTERY: ICD-10-CM

## 2017-07-18 LAB — INTERNAL CAROTID STENOSIS: NORMAL

## 2017-07-18 PROCEDURE — 93880 EXTRACRANIAL BILAT STUDY: CPT | Mod: S$GLB,,, | Performed by: INTERNAL MEDICINE

## 2017-07-27 ENCOUNTER — OFFICE VISIT (OUTPATIENT)
Dept: HEMATOLOGY/ONCOLOGY | Facility: CLINIC | Age: 72
End: 2017-07-27
Payer: MEDICARE

## 2017-07-27 ENCOUNTER — LAB VISIT (OUTPATIENT)
Dept: LAB | Facility: HOSPITAL | Age: 72
End: 2017-07-27
Attending: INTERNAL MEDICINE
Payer: MEDICARE

## 2017-07-27 VITALS
RESPIRATION RATE: 18 BRPM | HEIGHT: 67 IN | OXYGEN SATURATION: 97 % | BODY MASS INDEX: 25.78 KG/M2 | WEIGHT: 164.25 LBS | DIASTOLIC BLOOD PRESSURE: 60 MMHG | HEART RATE: 62 BPM | TEMPERATURE: 98 F | SYSTOLIC BLOOD PRESSURE: 104 MMHG

## 2017-07-27 DIAGNOSIS — C84.48 PERIPHERAL T CELL LYMPHOMA OF LYMPH NODES OF MULTIPLE SITES: ICD-10-CM

## 2017-07-27 DIAGNOSIS — D70.8 OTHER NEUTROPENIA: ICD-10-CM

## 2017-07-27 DIAGNOSIS — D70.9 NEUTROPENIA, UNSPECIFIED TYPE: ICD-10-CM

## 2017-07-27 DIAGNOSIS — D61.818 PANCYTOPENIA: ICD-10-CM

## 2017-07-27 DIAGNOSIS — C84.48 PERIPHERAL T CELL LYMPHOMA OF LYMPH NODES OF MULTIPLE SITES: Primary | ICD-10-CM

## 2017-07-27 LAB
ALBUMIN SERPL BCP-MCNC: 3.5 G/DL
ALP SERPL-CCNC: 51 U/L
ALT SERPL W/O P-5'-P-CCNC: 18 U/L
ANION GAP SERPL CALC-SCNC: 8 MMOL/L
AST SERPL-CCNC: 21 U/L
BASOPHILS # BLD AUTO: 0.02 K/UL
BASOPHILS NFR BLD: 0.8 %
BILIRUB SERPL-MCNC: 0.8 MG/DL
BUN SERPL-MCNC: 23 MG/DL
CALCIUM SERPL-MCNC: 9 MG/DL
CHLORIDE SERPL-SCNC: 106 MMOL/L
CO2 SERPL-SCNC: 26 MMOL/L
CREAT SERPL-MCNC: 1.3 MG/DL
DIFFERENTIAL METHOD: ABNORMAL
EOSINOPHIL # BLD AUTO: 0.1 K/UL
EOSINOPHIL NFR BLD: 3.1 %
ERYTHROCYTE [DISTWIDTH] IN BLOOD BY AUTOMATED COUNT: 13.3 %
EST. GFR  (AFRICAN AMERICAN): >60 ML/MIN/1.73 M^2
EST. GFR  (NON AFRICAN AMERICAN): 54 ML/MIN/1.73 M^2
GLUCOSE SERPL-MCNC: 98 MG/DL
HCT VFR BLD AUTO: 28.6 %
HGB BLD-MCNC: 10.3 G/DL
LDH SERPL L TO P-CCNC: 139 U/L
LYMPHOCYTES # BLD AUTO: 1 K/UL
LYMPHOCYTES NFR BLD: 36.6 %
MCH RBC QN AUTO: 30.3 PG
MCHC RBC AUTO-ENTMCNC: 36 G/DL
MCV RBC AUTO: 84 FL
MONOCYTES # BLD AUTO: 0.1 K/UL
MONOCYTES NFR BLD: 3.4 %
NEUTROPHILS # BLD AUTO: 1.5 K/UL
NEUTROPHILS NFR BLD: 56.1 %
PLATELET # BLD AUTO: 135 K/UL
PMV BLD AUTO: 8.8 FL
POTASSIUM SERPL-SCNC: 3.9 MMOL/L
PROT SERPL-MCNC: 7.2 G/DL
RBC # BLD AUTO: 3.4 M/UL
SODIUM SERPL-SCNC: 140 MMOL/L
WBC # BLD AUTO: 2.62 K/UL

## 2017-07-27 PROCEDURE — 99999 PR PBB SHADOW E&M-EST. PATIENT-LVL III: CPT | Mod: PBBFAC,,, | Performed by: INTERNAL MEDICINE

## 2017-07-27 PROCEDURE — 1159F MED LIST DOCD IN RCRD: CPT | Mod: S$GLB,,, | Performed by: INTERNAL MEDICINE

## 2017-07-27 PROCEDURE — 99214 OFFICE O/P EST MOD 30 MIN: CPT | Mod: S$GLB,,, | Performed by: INTERNAL MEDICINE

## 2017-07-27 PROCEDURE — 1126F AMNT PAIN NOTED NONE PRSNT: CPT | Mod: S$GLB,,, | Performed by: INTERNAL MEDICINE

## 2017-07-27 NOTE — PROGRESS NOTES
Hematology/Oncology Office Note    Reason for referral: Stage III Peripheral T Cell Lymphoma NOS    CC: Lymphoma    Referred by: Dr Mendieta    Diagnosis:  6/5/14: Stage III  Peripheral T cell lymphoma NOS    5/21/14 Left lower cervical Node:  Supplemental Diagnosis  [place] DIAGNOSIS:  LYMPH NODES, LEFT NECK, EXCISIONAL BIOPSY ([id number]; 5/21/2014):  Peripheral T-cell lymphoma. See comment.  Interpreted by: GUILHERME Vasques  Note: Please see attached report for comment.    5/26/14 Bone marrow bx:  COMMENT: Concomitantly submitted flow cytometric analysis detects a small subset of atypical T lymphocytes.  This subset, approximately 2% of total, shows expression of CD2, CD5, and CD7 with restriction to CD4, but loss of  surface CD3. It is negative for CD30. These findings are suggestive of involvement by the patient's previously diagnosed atypical T cell population; however, correlation with morphology, particularly the pending lymph nodebiopsy (pending outside consultation) and cytogenetic or molecular data, especially T cell gene rearrangement byPCR, as well as with clinical findings is highly recommended for further characterization of this process.Background B cells consist predominantly of developing hematogones, and the blast gate is not increased. Othersubsets of T cells appear immunophenotypically unremarkable.The flow cytometry finding suggests that the marrow is involved by this atypical T cell process which was seenpreviously on tissue biopsies. Correlation with the findings of the lymph node biopsy report is required for furtherclassification. If needed, the clot section of this biopsy could be sent for T-cell gene rearrangements by PCR;    Tcell gene rearrangement negative by PCR    5/21/14 PET:  Widespread hypermetabolic lymphadenopathy in the neck, chest, and abdomen as detailed consistent with reported diagnosis of lymphoma.    5/23/14 Echo:  LVEF 55-60%        Treatment:  6/9/14: C1D1  CHOP  Excellent clinic response  6/30/14: C2D1  7/8/14 to 7/14/14 Hospitalization for neutropenic fever  Prolonged recovery after cycle 2  8/5/14 cycle 3 with 15% DR  8/26/14: cycle 4  9/16/14: cycle 5   10/7/14 cycle 6   11/5/14 PET:  Findings: There has been significant interval improvement with complete resolution of previously demonstrated hypermetabolic lymph node groups in the neck, chest, and abdomen. Prior tonsillar uptake has likewise resolved. No pathologically enlarged or FDG avid nodes are identified at this time.   6/25/15: Bone marrow biopsy for persistent cytopenias was noted to be negative for pathology      4/17/14 visit: Mr Holland is a 69yo male referred for anemia and cervical/supraclavicular LAD.  Duration is unknown and patient cannot remember having anemia or LAD in the past.  He reports a 20lb wt loss over the past 18 months. He also reports a questionable history of lung cancer; he reports resection (no visible scar from surgery) and denies treatment with adjuvant therapy.  Patient denies pain, night sweast, n/v/d.  He reports sob with exertion and chest pain prolonged exertion.     I have reviewed and updated the HPI, PMHx, Social Hx, Family Hx and treatment history.    Today's Visit:  Patient is without complaints today and specifically denies fever, chills, night sweats or weight loss.    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   No family history of anemia or malignancy    Anemia   Presents for follow-up visit. There has been no abdominal pain, bruising/bleeding easily, confusion, light-headedness, pallor or  "palpitations.   Nausea   Pertinent negatives include no abdominal pain, arthralgias, chest pain, congestion, coughing, diaphoresis, fatigue, headaches, joint swelling, myalgias, nausea, numbness, rash, vomiting or weakness.     Review of Systems   Constitutional: Negative for activity change, appetite change, diaphoresis, fatigue and unexpected weight change.   HENT: Negative for congestion, dental problem, drooling, ear discharge, ear pain, facial swelling, hearing loss, mouth sores, nosebleeds, sinus pressure, tinnitus and voice change.    Eyes: Negative.    Respiratory: Negative for apnea, cough, choking, chest tightness, shortness of breath, wheezing and stridor.    Cardiovascular: Negative for chest pain, palpitations and leg swelling.   Gastrointestinal: Negative for abdominal distention, abdominal pain, blood in stool, constipation, nausea and vomiting.   Endocrine: Negative.    Genitourinary: Negative for difficulty urinating, dysuria, enuresis, flank pain, frequency, genital sores and hematuria.   Musculoskeletal: Negative for arthralgias, back pain, gait problem, joint swelling, myalgias and neck stiffness.   Skin: Negative for pallor and rash.   Allergic/Immunologic: Negative.    Neurological: Negative for dizziness, seizures, syncope, facial asymmetry, weakness, light-headedness, numbness and headaches.   Hematological: Negative for adenopathy. Does not bruise/bleed easily.   Psychiatric/Behavioral: Negative for agitation, confusion, hallucinations and sleep disturbance. The patient is not nervous/anxious and is not hyperactive.        Objective:      Vitals:    07/27/17 0845   BP: 104/60   Pulse: 62   Resp: 18   Temp: 98.2 °F (36.8 °C)   TempSrc: Oral   SpO2: 97%   Weight: 74.5 kg (164 lb 3.9 oz)   Height: 5' 7" (1.702 m)           Physical Exam   Constitutional: He is oriented to person, place, and time. He appears well-developed and well-nourished.  Non-toxic appearance. No distress.   HENT:   Head: " Normocephalic and atraumatic.   Right Ear: External ear normal.   Left Ear: External ear normal.   Nose: Nose normal.   Mouth/Throat: Oropharynx is clear and moist and mucous membranes are normal. Mucous membranes are not pale, not dry and not cyanotic. No oral lesions. Normal dentition. No oropharyngeal exudate or posterior oropharyngeal erythema.   Eyes: Conjunctivae and EOM are normal. Pupils are equal, round, and reactive to light. Right eye exhibits no chemosis and no discharge. Left eye exhibits no chemosis and no discharge. Right conjunctiva is not injected. Left conjunctiva is not injected. No scleral icterus. Pupils are equal.   Neck: Normal range of motion. Neck supple. No JVD present. No tracheal deviation present. No thyroid mass and no thyromegaly present.   Cardiovascular: Normal rate, regular rhythm and intact distal pulses.  Exam reveals no gallop and no friction rub.    Murmur heard.   Systolic murmur is present with a grade of 1/6   Pulmonary/Chest: Effort normal and breath sounds normal. No respiratory distress. He has no decreased breath sounds. He has no wheezes. He has no rhonchi. He has no rales. He exhibits no tenderness.   Abdominal: Soft. Bowel sounds are normal. He exhibits no distension, no fluid wave and no mass. There is no tenderness. There is no rebound and no guarding.   Musculoskeletal: Normal range of motion. He exhibits no edema, tenderness or deformity.   Lymphadenopathy:        Head (right side): No submental and no submandibular adenopathy present.        Head (left side): No submental and no submandibular adenopathy present.     He has no cervical adenopathy.     He has no axillary adenopathy.        Right: No inguinal and no supraclavicular adenopathy present.        Left: No inguinal and no supraclavicular adenopathy present.   Neurological: He is alert and oriented to person, place, and time. No cranial nerve deficit. He exhibits normal muscle tone. Coordination normal.    Skin: Skin is warm, dry and intact. Capillary refill takes less than 2 seconds. No rash noted. He is not diaphoretic. No cyanosis. No pallor. Nails show no clubbing.   Psychiatric: He has a normal mood and affect. His behavior is normal. Judgment and thought content normal.         Lab Results   Component Value Date    WBC 2.62 (L) 07/27/2017    HGB 10.3 (L) 07/27/2017    HCT 28.6 (L) 07/27/2017    MCV 84 07/27/2017     (L) 07/27/2017     Lab Results   Component Value Date    CREATININE 1.3 07/27/2017    BUN 23 07/27/2017     07/27/2017    K 3.9 07/27/2017     07/27/2017    CO2 26 07/27/2017     Lab Results   Component Value Date    ALT 18 07/27/2017    AST 21 07/27/2017    ALKPHOS 51 (L) 07/27/2017    BILITOT 0.8 07/27/2017 5/21/14: PET  Widespread hypermetabolic lymphadenopathy in the neck, chest, and abdomen as detailed consistent with reported diagnosis of lymphoma.        Assessment:  Mr Holland is a 69yo male with Stage III Peripheral T Cell Lymphoma NOS  s/p 6 cycles of CHOP (last cycle 10/7/14) with Complete Response per PET.  Of note there was abnormal T cell population in the bone marrow, however; PCR for T Cell receptor gene rearrangement were negative.  Bone marrow biopsy performed on 6/25/15 was negative for significant pathology. Counts continue to be mildly suppressed but remain relatively stable.    Patient continues to do very well clinically without evidence of recurrent disease.  However, pancytopenia is slightly worsened and we will bring the patient back in 2 months with repeat labs and include B12, folate, iron studies.  He was instructed to call the clinic immediately should he develop any new or concerning symptoms.      Plan:  Stage III Peripheral T cell lymphoma NOS: with CR after CHOP x 6  --s/p 6 cycles of CHOP  --Repeat PET as clinically indicate    Pancytopenia: Secondary to chemotherapy versus low-grade MDS  --counts stable today  --Continue to monitor;  bone marrow bx upon significant progression      F/u 2 months with repeat cbc, cmp, ldh, B12/folate, iron studies    Distress Screening Results: Psychosocial Distress screening score of Distress Score: 0 noted and reviewed. No intervention indicated.

## 2017-08-24 ENCOUNTER — OFFICE VISIT (OUTPATIENT)
Dept: OPHTHALMOLOGY | Facility: CLINIC | Age: 72
End: 2017-08-24
Payer: MEDICARE

## 2017-08-24 DIAGNOSIS — H20.9 IRITIS OF RIGHT EYE: Primary | ICD-10-CM

## 2017-08-24 PROCEDURE — 99999 PR PBB SHADOW E&M-EST. PATIENT-LVL I: CPT | Mod: PBBFAC,,, | Performed by: OPTOMETRIST

## 2017-08-24 PROCEDURE — 92012 INTRM OPH EXAM EST PATIENT: CPT | Mod: S$GLB,,, | Performed by: OPTOMETRIST

## 2017-08-24 RX ORDER — TRAMADOL HYDROCHLORIDE 50 MG/1
TABLET ORAL
COMMUNITY
Start: 2017-08-23 | End: 2018-04-25

## 2017-08-24 RX ORDER — PREDNISOLONE ACETATE 10 MG/ML
1 SUSPENSION/ DROPS OPHTHALMIC 4 TIMES DAILY
Qty: 10 ML | Refills: 0 | Status: SHIPPED | OUTPATIENT
Start: 2017-08-24 | End: 2017-09-23

## 2017-08-24 RX ORDER — KETOROLAC TROMETHAMINE 5 MG/ML
SOLUTION OPHTHALMIC
COMMUNITY
Start: 2017-08-01 | End: 2017-08-24 | Stop reason: ALTCHOICE

## 2017-08-24 RX ORDER — TIZANIDINE 2 MG/1
TABLET ORAL
COMMUNITY
Start: 2017-08-23 | End: 2018-01-25 | Stop reason: ALTCHOICE

## 2017-08-24 NOTE — PROGRESS NOTES
HPI     Eye Problem    Additional comments: Pain in OD           Comments   Last seen by [facility] on 6/23/17 for cataract post op. Patient here today for   pain in the right eye. Patient states is he does not use his drops   Prednisolone and Ketorolac everyday his eye starts hurting.  1.PCIOL OS +22.5 SN60WF (distance) 04/12/17  2. PCIOL OD +22.5 SN60WF (distance) 5-24-17  Pain Scale:  8 when in pain right now at a 3  Onset:   Since May  OD, OS, OU:   OD  Discharge:   No  A.M. Matting:  No  Itch:   No  Redness:   No  Photophobia:   No  Foreign body sensation:   No  Deep pain:   No  Previous occurrence:   No  Drops:   Prednisolone and Ketorolac         Last edited by Graciela Rowe, PCT on 8/24/2017 10:04 AM. (History)              Assessment /Plan     For exam results, see Encounter Report.    Iritis of right eye  -     prednisoLONE acetate (PRED FORTE) 1 % DrpS; Place 1 drop into the right eye 4 (four) times daily.  Dispense: 10 mL; Refill: 0    Start pred forte qid OD x 1 wk, then tid OD x 1 wk, then bid OD x 1 wk, then qd OD x 1 wk, then d/c  Written instructions given  RTC 6-8 weeks for f/u and check IOP  PRN sooner with any changes or concerns  Discussed above and all questions were answered.

## 2017-08-29 DIAGNOSIS — H25.11 NUCLEAR SCLEROSIS, RIGHT: ICD-10-CM

## 2017-08-29 RX ORDER — KETOROLAC TROMETHAMINE 5 MG/ML
SOLUTION OPHTHALMIC
Qty: 5 ML | Refills: 12 | Status: SHIPPED | OUTPATIENT
Start: 2017-08-29 | End: 2017-10-02

## 2017-09-21 ENCOUNTER — OFFICE VISIT (OUTPATIENT)
Dept: HEMATOLOGY/ONCOLOGY | Facility: CLINIC | Age: 72
End: 2017-09-21
Payer: MEDICARE

## 2017-09-21 ENCOUNTER — LAB VISIT (OUTPATIENT)
Dept: LAB | Facility: HOSPITAL | Age: 72
End: 2017-09-21
Attending: INTERNAL MEDICINE
Payer: MEDICARE

## 2017-09-21 VITALS
SYSTOLIC BLOOD PRESSURE: 92 MMHG | RESPIRATION RATE: 18 BRPM | BODY MASS INDEX: 25.22 KG/M2 | OXYGEN SATURATION: 99 % | HEIGHT: 67 IN | WEIGHT: 160.69 LBS | DIASTOLIC BLOOD PRESSURE: 60 MMHG | HEART RATE: 70 BPM | TEMPERATURE: 98 F

## 2017-09-21 DIAGNOSIS — C84.48 PERIPHERAL T CELL LYMPHOMA OF LYMPH NODES OF MULTIPLE SITES: ICD-10-CM

## 2017-09-21 DIAGNOSIS — D70.8 OTHER NEUTROPENIA: ICD-10-CM

## 2017-09-21 DIAGNOSIS — D61.818 PANCYTOPENIA: ICD-10-CM

## 2017-09-21 DIAGNOSIS — D70.9 NEUTROPENIA, UNSPECIFIED TYPE: ICD-10-CM

## 2017-09-21 DIAGNOSIS — C84.48 PERIPHERAL T CELL LYMPHOMA OF LYMPH NODES OF MULTIPLE SITES: Primary | ICD-10-CM

## 2017-09-21 LAB
ALBUMIN SERPL BCP-MCNC: 3.8 G/DL
ALP SERPL-CCNC: 53 U/L
ALT SERPL W/O P-5'-P-CCNC: 16 U/L
ANION GAP SERPL CALC-SCNC: 12 MMOL/L
AST SERPL-CCNC: 22 U/L
BASOPHILS # BLD AUTO: 0.01 K/UL
BASOPHILS NFR BLD: 0.3 %
BILIRUB SERPL-MCNC: 0.7 MG/DL
BUN SERPL-MCNC: 26 MG/DL
CALCIUM SERPL-MCNC: 9.6 MG/DL
CHLORIDE SERPL-SCNC: 104 MMOL/L
CO2 SERPL-SCNC: 24 MMOL/L
CREAT SERPL-MCNC: 1.5 MG/DL
DIFFERENTIAL METHOD: ABNORMAL
EOSINOPHIL # BLD AUTO: 0.1 K/UL
EOSINOPHIL NFR BLD: 2.6 %
ERYTHROCYTE [DISTWIDTH] IN BLOOD BY AUTOMATED COUNT: 13 %
EST. GFR  (AFRICAN AMERICAN): 53 ML/MIN/1.73 M^2
EST. GFR  (NON AFRICAN AMERICAN): 46 ML/MIN/1.73 M^2
FERRITIN SERPL-MCNC: 984 NG/ML
GLUCOSE SERPL-MCNC: 74 MG/DL
HCT VFR BLD AUTO: 31.8 %
HGB BLD-MCNC: 11.4 G/DL
IRON SERPL-MCNC: 90 UG/DL
LDH SERPL L TO P-CCNC: 158 U/L
LYMPHOCYTES # BLD AUTO: 0.9 K/UL
LYMPHOCYTES NFR BLD: 30.2 %
MCH RBC QN AUTO: 30.4 PG
MCHC RBC AUTO-ENTMCNC: 35.8 G/DL
MCV RBC AUTO: 85 FL
MONOCYTES # BLD AUTO: 0.2 K/UL
MONOCYTES NFR BLD: 6.4 %
NEUTROPHILS # BLD AUTO: 1.9 K/UL
NEUTROPHILS NFR BLD: 60.5 %
PATH REV BLD -IMP: NORMAL
PATH REV BLD -IMP: NORMAL
PLATELET # BLD AUTO: 160 K/UL
PMV BLD AUTO: 8.9 FL
POTASSIUM SERPL-SCNC: 4.3 MMOL/L
PROT SERPL-MCNC: 8 G/DL
RBC # BLD AUTO: 3.75 M/UL
RETICS/RBC NFR AUTO: 1.8 %
SATURATED IRON: 32 %
SODIUM SERPL-SCNC: 140 MMOL/L
TOTAL IRON BINDING CAPACITY: 281 UG/DL
TRANSFERRIN SERPL-MCNC: 190 MG/DL
VIT B12 SERPL-MCNC: 615 PG/ML
WBC # BLD AUTO: 3.11 K/UL

## 2017-09-21 PROCEDURE — 84165 PROTEIN E-PHORESIS SERUM: CPT | Mod: 26,,, | Performed by: PATHOLOGY

## 2017-09-21 PROCEDURE — 85025 COMPLETE CBC W/AUTO DIFF WBC: CPT | Mod: PO

## 2017-09-21 PROCEDURE — 1126F AMNT PAIN NOTED NONE PRSNT: CPT | Mod: S$GLB,,, | Performed by: INTERNAL MEDICINE

## 2017-09-21 PROCEDURE — 83615 LACTATE (LD) (LDH) ENZYME: CPT | Mod: PO

## 2017-09-21 PROCEDURE — 3078F DIAST BP <80 MM HG: CPT | Mod: S$GLB,,, | Performed by: INTERNAL MEDICINE

## 2017-09-21 PROCEDURE — 83540 ASSAY OF IRON: CPT

## 2017-09-21 PROCEDURE — 85045 AUTOMATED RETICULOCYTE COUNT: CPT

## 2017-09-21 PROCEDURE — 82607 VITAMIN B-12: CPT

## 2017-09-21 PROCEDURE — 3074F SYST BP LT 130 MM HG: CPT | Mod: S$GLB,,, | Performed by: INTERNAL MEDICINE

## 2017-09-21 PROCEDURE — 99999 PR PBB SHADOW E&M-EST. PATIENT-LVL III: CPT | Mod: PBBFAC,,, | Performed by: INTERNAL MEDICINE

## 2017-09-21 PROCEDURE — 85060 BLOOD SMEAR INTERPRETATION: CPT | Mod: ,,, | Performed by: PATHOLOGY

## 2017-09-21 PROCEDURE — 82746 ASSAY OF FOLIC ACID SERUM: CPT

## 2017-09-21 PROCEDURE — 84165 PROTEIN E-PHORESIS SERUM: CPT

## 2017-09-21 PROCEDURE — 1159F MED LIST DOCD IN RCRD: CPT | Mod: S$GLB,,, | Performed by: INTERNAL MEDICINE

## 2017-09-21 PROCEDURE — 36415 COLL VENOUS BLD VENIPUNCTURE: CPT | Mod: PO

## 2017-09-21 PROCEDURE — 99214 OFFICE O/P EST MOD 30 MIN: CPT | Mod: S$GLB,,, | Performed by: INTERNAL MEDICINE

## 2017-09-21 PROCEDURE — 80053 COMPREHEN METABOLIC PANEL: CPT | Mod: PO

## 2017-09-21 PROCEDURE — 82728 ASSAY OF FERRITIN: CPT

## 2017-09-21 PROCEDURE — 3008F BODY MASS INDEX DOCD: CPT | Mod: S$GLB,,, | Performed by: INTERNAL MEDICINE

## 2017-09-21 NOTE — PROGRESS NOTES
Hematology/Oncology Office Note    Reason for referral: Stage III Peripheral T Cell Lymphoma NOS    CC: Lymphoma    Referred by: Dr Mendieta    Diagnosis:  6/5/14: Stage III  Peripheral T cell lymphoma NOS    5/21/14 Left lower cervical Node:  Supplemental Diagnosis  [place] DIAGNOSIS:  LYMPH NODES, LEFT NECK, EXCISIONAL BIOPSY ([id number]; 5/21/2014):  Peripheral T-cell lymphoma. See comment.  Interpreted by: GUILHERME Vasques  Note: Please see attached report for comment.    5/26/14 Bone marrow bx:  COMMENT: Concomitantly submitted flow cytometric analysis detects a small subset of atypical T lymphocytes.  This subset, approximately 2% of total, shows expression of CD2, CD5, and CD7 with restriction to CD4, but loss of  surface CD3. It is negative for CD30. These findings are suggestive of involvement by the patient's previously diagnosed atypical T cell population; however, correlation with morphology, particularly the pending lymph nodebiopsy (pending outside consultation) and cytogenetic or molecular data, especially T cell gene rearrangement byPCR, as well as with clinical findings is highly recommended for further characterization of this process.Background B cells consist predominantly of developing hematogones, and the blast gate is not increased. Othersubsets of T cells appear immunophenotypically unremarkable.The flow cytometry finding suggests that the marrow is involved by this atypical T cell process which was seenpreviously on tissue biopsies. Correlation with the findings of the lymph node biopsy report is required for furtherclassification. If needed, the clot section of this biopsy could be sent for T-cell gene rearrangements by PCR;    Tcell gene rearrangement negative by PCR    5/21/14 PET:  Widespread hypermetabolic lymphadenopathy in the neck, chest, and abdomen as detailed consistent with reported diagnosis of lymphoma.    5/23/14 Echo:  LVEF 55-60%        Treatment:  6/9/14: C1D1  CHOP  Excellent clinic response  6/30/14: C2D1  7/8/14 to 7/14/14 Hospitalization for neutropenic fever  Prolonged recovery after cycle 2  8/5/14 cycle 3 with 15% DR  8/26/14: cycle 4  9/16/14: cycle 5   10/7/14 cycle 6   11/5/14 PET:  Findings: There has been significant interval improvement with complete resolution of previously demonstrated hypermetabolic lymph node groups in the neck, chest, and abdomen. Prior tonsillar uptake has likewise resolved. No pathologically enlarged or FDG avid nodes are identified at this time.   6/25/15: Bone marrow biopsy for persistent cytopenias was noted to be negative for pathology      4/17/14 visit: Mr Holland is a 69yo male referred for anemia and cervical/supraclavicular LAD.  Duration is unknown and patient cannot remember having anemia or LAD in the past.  He reports a 20lb wt loss over the past 18 months. He also reports a questionable history of lung cancer; he reports resection (no visible scar from surgery) and denies treatment with adjuvant therapy.  Patient denies pain, night sweast, n/v/d.  He reports sob with exertion and chest pain prolonged exertion.     I have reviewed and updated the HPI, PMHx, Social Hx, Family Hx and treatment history.    Today's Visit:  Patient is without complaints today.  He specifically denies excessive bleeding/bruising, fever, chills, night sweats or weight loss.    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   No family history of anemia or malignancy    Anemia   Presents for follow-up visit. There has been no abdominal pain, bruising/bleeding easily, confusion,  "light-headedness, pallor or palpitations.   Nausea   Pertinent negatives include no abdominal pain, arthralgias, chest pain, chills, congestion, coughing, diaphoresis, fatigue, headaches, joint swelling, myalgias, nausea, numbness, rash, vomiting or weakness.     Review of Systems   Constitutional: Negative for activity change, appetite change, chills, diaphoresis and fatigue.   HENT: Negative for congestion, drooling, ear discharge, ear pain, facial swelling, hearing loss, mouth sores, nosebleeds, sinus pressure and voice change.    Eyes: Negative.    Respiratory: Negative for apnea, cough, chest tightness, shortness of breath, wheezing and stridor.    Cardiovascular: Negative for chest pain, palpitations and leg swelling.   Gastrointestinal: Negative for abdominal distention, abdominal pain, blood in stool, constipation, nausea and vomiting.   Endocrine: Negative.    Genitourinary: Negative for difficulty urinating, enuresis, flank pain and frequency.   Musculoskeletal: Negative for arthralgias, back pain, gait problem, joint swelling, myalgias and neck stiffness.   Skin: Negative for color change, pallor and rash.   Allergic/Immunologic: Negative.    Neurological: Negative for dizziness, seizures, syncope, facial asymmetry, weakness, light-headedness, numbness and headaches.   Hematological: Negative for adenopathy. Does not bruise/bleed easily.   Psychiatric/Behavioral: Negative for agitation, confusion, hallucinations and sleep disturbance. The patient is not nervous/anxious and is not hyperactive.        Objective:      Vitals:    09/21/17 0914   BP: 92/60   BP Location: Right arm   Patient Position: Sitting   Pulse: 70   Resp: 18   Temp: 98 °F (36.7 °C)   TempSrc: Oral   SpO2: 99%   Weight: 72.9 kg (160 lb 11.5 oz)   Height: 5' 7" (1.702 m)           Physical Exam   Constitutional: He is oriented to person, place, and time. He appears well-developed and well-nourished.  Non-toxic appearance. No distress. "   HENT:   Head: Normocephalic and atraumatic.   Right Ear: External ear normal.   Left Ear: External ear normal.   Nose: Nose normal.   Mouth/Throat: Oropharynx is clear and moist and mucous membranes are normal. Mucous membranes are not pale, not dry and not cyanotic. No oral lesions. Normal dentition. No oropharyngeal exudate or posterior oropharyngeal erythema.   Eyes: Conjunctivae and EOM are normal. Pupils are equal, round, and reactive to light. Right eye exhibits no chemosis and no discharge. Left eye exhibits no chemosis and no discharge. Right conjunctiva is not injected. Left conjunctiva is not injected. Pupils are equal.   Neck: Normal range of motion. Neck supple. No tracheal deviation present. No thyroid mass and no thyromegaly present.   Cardiovascular: Normal rate, regular rhythm and intact distal pulses.  Exam reveals no gallop and no friction rub.    No murmur heard.  Pulmonary/Chest: Effort normal and breath sounds normal. No respiratory distress. He has no decreased breath sounds. He has no wheezes. He has no rhonchi. He has no rales. He exhibits no tenderness.   Abdominal: Soft. Bowel sounds are normal. He exhibits no distension and no fluid wave. There is no tenderness. There is no guarding.   Musculoskeletal: Normal range of motion. He exhibits no edema, tenderness or deformity.   Lymphadenopathy:        Head (right side): No submental and no submandibular adenopathy present.        Head (left side): No submental and no submandibular adenopathy present.     He has no cervical adenopathy.     He has no axillary adenopathy.        Right: No inguinal and no supraclavicular adenopathy present.        Left: No inguinal and no supraclavicular adenopathy present.   Neurological: He is alert and oriented to person, place, and time. No cranial nerve deficit. He exhibits normal muscle tone. Coordination normal.   Skin: Skin is warm, dry and intact. Capillary refill takes less than 2 seconds. He is not  diaphoretic.   Psychiatric: He has a normal mood and affect. His behavior is normal. Judgment and thought content normal.         Lab Results   Component Value Date    WBC 3.11 (L) 09/21/2017    HGB 11.4 (L) 09/21/2017    HCT 31.8 (L) 09/21/2017    MCV 85 09/21/2017     09/21/2017     Lab Results   Component Value Date    CREATININE 1.5 (H) 09/21/2017    BUN 26 (H) 09/21/2017     09/21/2017    K 4.3 09/21/2017     09/21/2017    CO2 24 09/21/2017     Lab Results   Component Value Date    ALT 16 09/21/2017    AST 22 09/21/2017    ALKPHOS 53 (L) 09/21/2017    BILITOT 0.7 09/21/2017 5/21/14: PET  Widespread hypermetabolic lymphadenopathy in the neck, chest, and abdomen as detailed consistent with reported diagnosis of lymphoma.        Assessment:  Mr Holland is a 67yo male with Stage III Peripheral T Cell Lymphoma NOS  s/p 6 cycles of CHOP (last cycle 10/7/14) with Complete Response per PET.  Of note there was abnormal T cell population in the bone marrow, however; PCR for T Cell receptor gene rearrangement were negative.  Bone marrow biopsy performed on 6/25/15 was negative for significant pathology. Counts continue to be mildly suppressed but remain relatively stable.    Patient continues to do very well clinically without evidence of recurrent disease.    Pancytopenia had worsened on 7/27/27 visit, therefore, he was scheduled with adequate turnaround.  CBC today demonstrates pancytopenia has significantly improved and now back to baseline.  He continues to remain clinically KANW[facility] and we will have the patient follow-up in 4 months.      Plan:  Stage III Peripheral T cell lymphoma NOS: with CR after CHOP x 6  --s/p 6 cycles of CHOP  --Repeat PET as clinically indicate    Pancytopenia: Secondary to chemotherapy versus low-grade MDS  --counts stable today  --Continue to monitor; bone marrow bx upon significant progression      F/u 4 months with repeat cbc, cmp, ldh, B12/folate, iron studies

## 2017-09-22 LAB
ALBUMIN SERPL ELPH-MCNC: 4.15 G/DL
ALPHA1 GLOB SERPL ELPH-MCNC: 0.34 G/DL
ALPHA2 GLOB SERPL ELPH-MCNC: 0.8 G/DL
B-GLOBULIN SERPL ELPH-MCNC: 0.87 G/DL
FOLATE SERPL-MCNC: >40 NG/ML
GAMMA GLOB SERPL ELPH-MCNC: 1.34 G/DL
PROT SERPL-MCNC: 7.5 G/DL

## 2017-09-25 LAB — PATHOLOGIST INTERPRETATION SPE: NORMAL

## 2017-10-02 ENCOUNTER — OFFICE VISIT (OUTPATIENT)
Dept: OPHTHALMOLOGY | Facility: CLINIC | Age: 72
End: 2017-10-02
Payer: MEDICARE

## 2017-10-02 DIAGNOSIS — H20.9 IRITIS OF RIGHT EYE: Primary | ICD-10-CM

## 2017-10-02 PROCEDURE — 92012 INTRM OPH EXAM EST PATIENT: CPT | Mod: S$GLB,,, | Performed by: OPTOMETRIST

## 2017-10-02 PROCEDURE — 99999 PR PBB SHADOW E&M-EST. PATIENT-LVL I: CPT | Mod: PBBFAC,,, | Performed by: OPTOMETRIST

## 2017-10-02 NOTE — PROGRESS NOTES
HPI     Follow-up    Additional comments: 6 Week IOP Check            Comments   Last seen by [facility] on 8/24/17 for Iritis OD. Patient here today for a follow   up visit and IOP check. Since the last visit patient states his eye is   doing much better. Patient denies any pain, flashes of light or floaters.   Patient finished drops.   1.PCIOL OS +22.5 SN60WF (distance) 04/12/17  2. PCIOL OD +22.5 SN60WF (distance) 5-24-17       Last edited by Graciela Rowe, PCT on 10/2/2017  9:01 AM. (History)              Assessment /Plan     For exam results, see Encounter Report.    Iritis of right eye      Much better but trace cell remains OD  No pain, pt is asymptomatic  IOP stable today  Continue pred bid OD x 1 week, then qd OD x 1 week, then d/c    RTC as scheduled for DFE in December or PRN  Discussed above and all questions were answered.

## 2017-10-04 DIAGNOSIS — H25.11 NUCLEAR SCLEROSIS, RIGHT: ICD-10-CM

## 2017-10-04 RX ORDER — KETOROLAC TROMETHAMINE 5 MG/ML
SOLUTION OPHTHALMIC
Qty: 5 ML | Refills: 2 | Status: SHIPPED | OUTPATIENT
Start: 2017-10-04 | End: 2017-11-01 | Stop reason: SDUPTHER

## 2017-10-25 ENCOUNTER — OFFICE VISIT (OUTPATIENT)
Dept: CARDIOLOGY | Facility: CLINIC | Age: 72
End: 2017-10-25
Payer: MEDICARE

## 2017-10-25 VITALS
SYSTOLIC BLOOD PRESSURE: 128 MMHG | HEART RATE: 60 BPM | DIASTOLIC BLOOD PRESSURE: 70 MMHG | BODY MASS INDEX: 25.37 KG/M2 | HEIGHT: 67 IN | WEIGHT: 161.63 LBS

## 2017-10-25 DIAGNOSIS — I10 ESSENTIAL HYPERTENSION: ICD-10-CM

## 2017-10-25 DIAGNOSIS — Z95.1 S/P CORONARY ARTERY BYPASS GRAFT X 2: ICD-10-CM

## 2017-10-25 DIAGNOSIS — I25.2 OLD MI (MYOCARDIAL INFARCTION): ICD-10-CM

## 2017-10-25 DIAGNOSIS — E78.5 DYSLIPIDEMIA: ICD-10-CM

## 2017-10-25 DIAGNOSIS — I25.10 CORONARY ARTERY DISEASE INVOLVING NATIVE CORONARY ARTERY OF NATIVE HEART WITHOUT ANGINA PECTORIS: Primary | ICD-10-CM

## 2017-10-25 PROCEDURE — 99214 OFFICE O/P EST MOD 30 MIN: CPT | Mod: S$GLB,,, | Performed by: INTERNAL MEDICINE

## 2017-10-25 PROCEDURE — 99999 PR PBB SHADOW E&M-EST. PATIENT-LVL III: CPT | Mod: PBBFAC,,, | Performed by: INTERNAL MEDICINE

## 2017-10-25 NOTE — PROGRESS NOTES
Subjective:   Patient ID:  Foreign Holland is a 72 y.o. male who presents for follow-up of Hypertension and Coronary Artery Disease  Patient denies CP, angina or anginal equivalent.Echo- 4-17 nml, carotid us - nonobstructive ds.    Hypertension   This is a chronic problem. The current episode started more than 1 year ago. The problem has been gradually improving since onset. The problem is controlled. Pertinent negatives include no chest pain, palpitations or shortness of breath. Past treatments include ACE inhibitors and beta blockers. The current treatment provides moderate improvement. Compliance problems include exercise.    Hyperlipidemia   This is a chronic problem. The current episode started more than 1 year ago. The problem is controlled. Pertinent negatives include no chest pain or shortness of breath. Current antihyperlipidemic treatment includes statins. The current treatment provides moderate improvement of lipids. Compliance problems include adherence to exercise.    Coronary Artery Disease   Presents for follow-up visit. Pertinent negatives include no chest pain, chest pressure, chest tightness, dizziness, leg swelling, muscle weakness, palpitations, shortness of breath or weight gain. Risk factors include hyperlipidemia. The symptoms have been stable. Compliance with diet is variable. Compliance with exercise is variable. Compliance with medications is good.       Review of Systems   Constitution: Negative. Negative for weight gain.   HENT: Negative.    Eyes: Negative.    Cardiovascular: Negative.  Negative for chest pain, leg swelling and palpitations.   Respiratory: Negative.  Negative for chest tightness and shortness of breath.    Endocrine: Negative.    Hematologic/Lymphatic: Negative.    Skin: Negative.    Musculoskeletal: Negative for muscle weakness.   Gastrointestinal: Negative.    Genitourinary: Negative.    Neurological: Negative.  Negative for dizziness.   Psychiatric/Behavioral: Negative.     Allergic/Immunologic: Negative.      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       atorvastatin (LIPITOR) 80 MG tablet 80 mg every evening.       cholecalciferol, vitamin D3, (VITAMIN D3) 2,000 unit Tab Take 1 tablet by mouth once daily.      fish oil-omega-3 fatty acids 300-1,000 mg capsule Take 2 g by mouth 2 (two) times daily.       FOLIC ACID ORAL Take 400 mcg by mouth once daily.      hydrochlorothiazide (HYDRODIURIL) 25 MG tablet once daily.       ketorolac 0.5% (ACULAR) 0.5 % Drop PLACE 1 DROP INTO THE RIGHT EYE 4 TIMES DAILY **START 1 DAY BEFORE SURGERY 5 mL 2    metoprolol succinate (TOPROL-XL) 25 MG 24 hr tablet Take 25 mg by mouth once daily.  2    NITROSTAT 0.4 mg SL tablet Patient states that he takes this medication as needed  3    quinapril (ACCUPRIL) 10 MG tablet every morning.       ranolazine (RANEXA) 1,000 mg Tb12 Take 1 tablet (1,000 mg total) by mouth 2 (two) times daily. 180 tablet 3    tizanidine (ZANAFLEX) 2 MG tablet       tramadol (ULTRAM) 50 mg tablet       triamcinolone acetonide 0.1% (KENALOG) 0.1 % cream every morning.        No current facility-administered medications on file prior to visit.      Review of  patient's allergies indicates:  No Known Allergies    Objective:     Physical Exam   Constitutional: He is oriented to person, place, and time. He appears well-developed and well-nourished.   HENT:   Head: Normocephalic and atraumatic.   Eyes: Conjunctivae are normal. Pupils are equal, round, and reactive to light.   Neck: Normal range of motion. Neck supple.   Cardiovascular: Normal rate, regular rhythm, normal heart sounds and intact distal pulses.    Pulmonary/Chest: Effort normal and breath sounds normal.   Abdominal: Soft. Bowel sounds are normal.   Neurological: He is alert and oriented to person, place, and time.   Skin: Skin is warm and dry.   Psychiatric: He has a normal mood and affect.   Nursing note and vitals reviewed.      Assessment:     1. Coronary artery disease involving native coronary artery of native heart without angina pectoris    2. S/P coronary artery bypass graft x 2    3. Essential hypertension    4. Dyslipidemia    5. Old MI (myocardial infarction)        Plan:     Coronary artery disease involving native coronary artery of native heart without angina pectoris    S/P coronary artery bypass graft x 2    Essential hypertension    Dyslipidemia    Old MI (myocardial infarction)    continue statin-hlp  Continue asa- cad  Continue quinapril, metoprolol-htn  Continue ranexa- angina

## 2017-11-01 DIAGNOSIS — H25.11 NUCLEAR SCLEROSIS, RIGHT: ICD-10-CM

## 2017-11-01 RX ORDER — KETOROLAC TROMETHAMINE 5 MG/ML
SOLUTION OPHTHALMIC
Qty: 5 ML | Refills: 2 | Status: SHIPPED | OUTPATIENT
Start: 2017-11-01 | End: 2018-02-27 | Stop reason: SDUPTHER

## 2017-12-19 ENCOUNTER — OFFICE VISIT (OUTPATIENT)
Dept: OPHTHALMOLOGY | Facility: CLINIC | Age: 72
End: 2017-12-19
Payer: MEDICARE

## 2017-12-19 DIAGNOSIS — Z96.1 PSEUDOPHAKIA OF BOTH EYES: ICD-10-CM

## 2017-12-19 DIAGNOSIS — H20.9 IRITIS OF RIGHT EYE: Primary | ICD-10-CM

## 2017-12-19 PROCEDURE — 99999 PR PBB SHADOW E&M-EST. PATIENT-LVL I: CPT | Mod: PBBFAC,,, | Performed by: OPTOMETRIST

## 2017-12-19 PROCEDURE — 92014 COMPRE OPH EXAM EST PT 1/>: CPT | Mod: S$GLB,,, | Performed by: OPTOMETRIST

## 2017-12-19 RX ORDER — PREDNISOLONE ACETATE 10 MG/ML
1 SUSPENSION/ DROPS OPHTHALMIC 4 TIMES DAILY
COMMUNITY
End: 2018-01-25 | Stop reason: ALTCHOICE

## 2017-12-19 NOTE — PROGRESS NOTES
HPI     Pts last visit was 10/2/17 with DNL for iritis. PT here today for 6 month   DFE.   PCIOL OS +22.5 SN60WF (distance) 04/12/17  PCIOL OD +22.5 SN60WF (distance) 5-24-17   HPI    Any vision changes since last exam: no  Eye pain: no  Other ocular symptoms: Occasional fb sensation OD, pt states he will use   Pred prn with relief      Do you wear currently wear glasses or contacts? gls part time for reading  Interested in contacts today? no    Do you plan on getting new glasses today? If needed         Last edited by Gisselle Hernandez MA on 12/19/2017 10:39 AM. (History)            Assessment /Plan     For exam results, see Encounter Report.    Iritis of right eye    Pseudophakia of both eyes    iritis has resolved nicely  Instructed pt to d/c pred and use refresh optive PRN for irritation  Retina intact OU  Monitor 12 months    RTC 1 yr for dilated eye exam or PRN if any problems.   Discussed above and answered questions.

## 2018-01-25 ENCOUNTER — LAB VISIT (OUTPATIENT)
Dept: LAB | Facility: HOSPITAL | Age: 73
End: 2018-01-25
Attending: INTERNAL MEDICINE
Payer: MEDICARE

## 2018-01-25 ENCOUNTER — OFFICE VISIT (OUTPATIENT)
Dept: HEMATOLOGY/ONCOLOGY | Facility: CLINIC | Age: 73
End: 2018-01-25
Payer: MEDICARE

## 2018-01-25 VITALS
OXYGEN SATURATION: 95 % | RESPIRATION RATE: 16 BRPM | SYSTOLIC BLOOD PRESSURE: 98 MMHG | DIASTOLIC BLOOD PRESSURE: 64 MMHG | HEIGHT: 67 IN | BODY MASS INDEX: 26.23 KG/M2 | WEIGHT: 167.13 LBS | HEART RATE: 71 BPM | TEMPERATURE: 98 F

## 2018-01-25 DIAGNOSIS — D70.8 OTHER NEUTROPENIA: ICD-10-CM

## 2018-01-25 DIAGNOSIS — C84.48 PERIPHERAL T CELL LYMPHOMA OF LYMPH NODES OF MULTIPLE SITES: ICD-10-CM

## 2018-01-25 DIAGNOSIS — D61.818 PANCYTOPENIA: ICD-10-CM

## 2018-01-25 DIAGNOSIS — C84.48 PERIPHERAL T CELL LYMPHOMA OF LYMPH NODES OF MULTIPLE SITES: Primary | ICD-10-CM

## 2018-01-25 LAB
ALBUMIN SERPL BCP-MCNC: 3.6 G/DL
ALP SERPL-CCNC: 54 U/L
ALT SERPL W/O P-5'-P-CCNC: 14 U/L
ANION GAP SERPL CALC-SCNC: 9 MMOL/L
AST SERPL-CCNC: 22 U/L
BASOPHILS # BLD AUTO: 0.01 K/UL
BASOPHILS NFR BLD: 0.3 %
BILIRUB SERPL-MCNC: 0.5 MG/DL
BUN SERPL-MCNC: 27 MG/DL
CALCIUM SERPL-MCNC: 9.1 MG/DL
CHLORIDE SERPL-SCNC: 105 MMOL/L
CO2 SERPL-SCNC: 26 MMOL/L
CREAT SERPL-MCNC: 1.4 MG/DL
DIFFERENTIAL METHOD: ABNORMAL
EOSINOPHIL # BLD AUTO: 0.1 K/UL
EOSINOPHIL NFR BLD: 2.7 %
ERYTHROCYTE [DISTWIDTH] IN BLOOD BY AUTOMATED COUNT: 13.6 %
EST. GFR  (AFRICAN AMERICAN): 57 ML/MIN/1.73 M^2
EST. GFR  (NON AFRICAN AMERICAN): 49 ML/MIN/1.73 M^2
GLUCOSE SERPL-MCNC: 107 MG/DL
HCT VFR BLD AUTO: 29.1 %
HGB BLD-MCNC: 10.4 G/DL
LDH SERPL L TO P-CCNC: 150 U/L
LYMPHOCYTES # BLD AUTO: 1.1 K/UL
LYMPHOCYTES NFR BLD: 36.4 %
MCH RBC QN AUTO: 29.6 PG
MCHC RBC AUTO-ENTMCNC: 35.7 G/DL
MCV RBC AUTO: 83 FL
MONOCYTES # BLD AUTO: 0.3 K/UL
MONOCYTES NFR BLD: 8.6 %
NEUTROPHILS # BLD AUTO: 1.5 K/UL
NEUTROPHILS NFR BLD: 52 %
PLATELET # BLD AUTO: 186 K/UL
PMV BLD AUTO: 8.8 FL
POTASSIUM SERPL-SCNC: 4 MMOL/L
PROT SERPL-MCNC: 7.6 G/DL
RBC # BLD AUTO: 3.51 M/UL
SODIUM SERPL-SCNC: 140 MMOL/L
WBC # BLD AUTO: 2.91 K/UL

## 2018-01-25 PROCEDURE — 80053 COMPREHEN METABOLIC PANEL: CPT | Mod: PO

## 2018-01-25 PROCEDURE — 83615 LACTATE (LD) (LDH) ENZYME: CPT | Mod: PO

## 2018-01-25 PROCEDURE — 36415 COLL VENOUS BLD VENIPUNCTURE: CPT | Mod: PO

## 2018-01-25 PROCEDURE — 99214 OFFICE O/P EST MOD 30 MIN: CPT | Mod: S$GLB,,, | Performed by: INTERNAL MEDICINE

## 2018-01-25 PROCEDURE — 99999 PR PBB SHADOW E&M-EST. PATIENT-LVL III: CPT | Mod: PBBFAC,,, | Performed by: INTERNAL MEDICINE

## 2018-01-25 PROCEDURE — 85025 COMPLETE CBC W/AUTO DIFF WBC: CPT | Mod: PO

## 2018-01-25 RX ORDER — PNEUMOCOCCAL 13-VALENT CONJUGATE VACCINE 2.2; 2.2; 2.2; 2.2; 2.2; 4.4; 2.2; 2.2; 2.2; 2.2; 2.2; 2.2; 2.2 UG/.5ML; UG/.5ML; UG/.5ML; UG/.5ML; UG/.5ML; UG/.5ML; UG/.5ML; UG/.5ML; UG/.5ML; UG/.5ML; UG/.5ML; UG/.5ML; UG/.5ML
INJECTION, SUSPENSION INTRAMUSCULAR
Status: ON HOLD | COMMUNITY
Start: 2018-01-15 | End: 2019-07-22 | Stop reason: HOSPADM

## 2018-01-25 NOTE — PROGRESS NOTES
Hematology/Oncology Office Note    Reason for referral: Stage III Peripheral T Cell Lymphoma NOS    CC: Lymphoma    Referred by: Dr Mendieta    Diagnosis:  6/5/14: Stage III  Peripheral T cell lymphoma NOS    5/21/14 Left lower cervical Node:  Supplemental Diagnosis  [facility] DIAGNOSIS:  LYMPH NODES, LEFT NECK, EXCISIONAL BIOPSY ([id number]; 5/21/2014):  Peripheral T-cell lymphoma. See comment.  Interpreted by: GUILHERME Vasques  Note: Please see attached report for comment.    5/26/14 Bone marrow bx:  COMMENT: Concomitantly submitted flow cytometric analysis detects a small subset of atypical T lymphocytes.  This subset, approximately 2% of total, shows expression of CD2, CD5, and CD7 with restriction to CD4, but loss of  surface CD3. It is negative for CD30. These findings are suggestive of involvement by the patient's previously diagnosed atypical T cell population; however, correlation with morphology, particularly the pending lymph nodebiopsy (pending outside consultation) and cytogenetic or molecular data, especially T cell gene rearrangement byPCR, as well as with clinical findings is highly recommended for further characterization of this process.Background B cells consist predominantly of developing hematogones, and the blast gate is not increased. Othersubsets of T cells appear immunophenotypically unremarkable.The flow cytometry finding suggests that the marrow is involved by this atypical T cell process which was seenpreviously on tissue biopsies. Correlation with the findings of the lymph node biopsy report is required for furtherclassification. If needed, the clot section of this biopsy could be sent for T-cell gene rearrangements by PCR;    Tcell gene rearrangement negative by PCR    5/21/14 PET:  Widespread hypermetabolic lymphadenopathy in the neck, chest, and abdomen as detailed consistent with reported diagnosis of lymphoma.    5/23/14 Echo:  LVEF 55-60%        Treatment:  6/9/14: C1D1  CHOP  Excellent clinic response  6/30/14: C2D1  7/8/14 to 7/14/14 Hospitalization for neutropenic fever  Prolonged recovery after cycle 2  8/5/14 cycle 3 with 15% DR  8/26/14: cycle 4  9/16/14: cycle 5   10/7/14 cycle 6   11/5/14 PET:  Findings: There has been significant interval improvement with complete resolution of previously demonstrated hypermetabolic lymph node groups in the neck, chest, and abdomen. Prior tonsillar uptake has likewise resolved. No pathologically enlarged or FDG avid nodes are identified at this time.   6/25/15: Bone marrow biopsy for persistent cytopenias was noted to be negative for pathology      4/17/14 visit: Mr Holland is a 69yo male referred for anemia and cervical/supraclavicular LAD.  Duration is unknown and patient cannot remember having anemia or LAD in the past.  He reports a 20lb wt loss over the past 18 months. He also reports a questionable history of lung cancer; he reports resection (no visible scar from surgery) and denies treatment with adjuvant therapy.  Patient denies pain, night sweast, n/v/d.  He reports sob with exertion and chest pain prolonged exertion.     I have reviewed and updated the HPI, PMHx, Social Hx, Family Hx and treatment history.    Today's Visit:  Patient is without complaints today and specifically denies fever, chills, night sweats or weight loss.    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   No family history of anemia or malignancy    Anemia   Presents for follow-up visit. There has been no abdominal pain, bruising/bleeding easily, confusion, light-headedness, pallor or  "palpitations.   Nausea   Pertinent negatives include no abdominal pain, arthralgias, chest pain, chills, congestion, coughing, diaphoresis, fatigue, joint swelling, myalgias, nausea, rash, vomiting or weakness.     Review of Systems   Constitutional: Negative for activity change, appetite change, chills, diaphoresis and fatigue.   HENT: Positive for mouth sores. Negative for congestion, drooling, ear discharge, hearing loss, nosebleeds, sinus pressure and voice change.    Eyes: Negative.    Respiratory: Negative for apnea, cough, chest tightness, shortness of breath, wheezing and stridor.    Cardiovascular: Negative for chest pain, palpitations and leg swelling.   Gastrointestinal: Negative for abdominal distention, abdominal pain, anal bleeding, blood in stool, constipation, nausea and vomiting.   Endocrine: Negative.    Genitourinary: Negative for difficulty urinating, dysuria, enuresis, flank pain and frequency.   Musculoskeletal: Negative for arthralgias, joint swelling, myalgias and neck stiffness.   Skin: Negative for color change, pallor and rash.   Allergic/Immunologic: Negative.    Neurological: Negative for dizziness, seizures, syncope, weakness and light-headedness.   Hematological: Negative for adenopathy. Does not bruise/bleed easily.   Psychiatric/Behavioral: Negative for agitation, behavioral problems, confusion, hallucinations and sleep disturbance. The patient is not nervous/anxious and is not hyperactive.        Objective:      Vitals:    01/25/18 0917   BP: 98/64   Pulse: 71   Resp: 16   Temp: 98.1 °F (36.7 °C)   TempSrc: Oral   SpO2: 95%   Weight: 75.8 kg (167 lb 1.7 oz)   Height: 5' 7" (1.702 m)           Physical Exam   Constitutional: He is oriented to person, place, and time. He appears well-developed and well-nourished.  Non-toxic appearance. No distress.   HENT:   Head: Normocephalic and atraumatic.   Right Ear: External ear normal.   Left Ear: External ear normal.   Nose: Nose normal. "   Mouth/Throat: Oropharynx is clear and moist and mucous membranes are normal. Mucous membranes are not pale, not dry and not cyanotic. No oral lesions. Normal dentition. No oropharyngeal exudate or posterior oropharyngeal erythema.   Eyes: Conjunctivae and EOM are normal. Pupils are equal, round, and reactive to light. Right eye exhibits no chemosis and no discharge. Left eye exhibits no chemosis and no discharge. Right conjunctiva is not injected. Left conjunctiva is not injected. Pupils are equal.   Neck: Normal range of motion. Neck supple. No tracheal deviation present. No thyroid mass and no thyromegaly present.   Cardiovascular: Normal rate, regular rhythm and intact distal pulses.  Exam reveals no gallop and no friction rub.    No murmur heard.  Pulmonary/Chest: Effort normal and breath sounds normal. No respiratory distress. He has no decreased breath sounds. He has no wheezes. He has no rhonchi. He has no rales. He exhibits no tenderness.   Abdominal: Soft. Bowel sounds are normal. He exhibits no distension and no fluid wave. There is no tenderness. There is no guarding.   Musculoskeletal: Normal range of motion. He exhibits no edema, tenderness or deformity.   Lymphadenopathy:        Head (right side): No submental and no submandibular adenopathy present.        Head (left side): No submental and no submandibular adenopathy present.     He has no cervical adenopathy.     He has no axillary adenopathy.        Right: No inguinal and no supraclavicular adenopathy present.        Left: No inguinal and no supraclavicular adenopathy present.   Neurological: He is alert and oriented to person, place, and time. No cranial nerve deficit. He exhibits normal muscle tone. Coordination normal.   Skin: Skin is warm, dry and intact. Capillary refill takes less than 2 seconds. No abrasion, no bruising and no rash noted. He is not diaphoretic. No cyanosis. No pallor. Nails show no clubbing.   Psychiatric: He has a normal  mood and affect. His behavior is normal. Judgment and thought content normal.         Lab Results   Component Value Date    WBC 2.91 (L) 01/25/2018    HGB 10.4 (L) 01/25/2018    HCT 29.1 (L) 01/25/2018    MCV 83 01/25/2018     01/25/2018     Lab Results   Component Value Date    CREATININE 1.5 (H) 09/21/2017    BUN 26 (H) 09/21/2017     09/21/2017    K 4.3 09/21/2017     09/21/2017    CO2 24 09/21/2017     Lab Results   Component Value Date    ALT 16 09/21/2017    AST 22 09/21/2017    ALKPHOS 53 (L) 09/21/2017    BILITOT 0.7 09/21/2017 5/21/14: PET  Widespread hypermetabolic lymphadenopathy in the neck, chest, and abdomen as detailed consistent with reported diagnosis of lymphoma.        Assessment:  Mr Holland is a 69yo male with Stage III Peripheral T Cell Lymphoma NOS  s/p 6 cycles of CHOP (last cycle 10/7/14) with Complete Response per PET.  Of note there was abnormal T cell population in the bone marrow, however; PCR for T Cell receptor gene rearrangement were negative.  Bone marrow biopsy performed on 6/25/15 was negative for significant pathology. Counts continue to be mildly suppressed but remain relatively stable over the previous 3+ years  Patient continues to do very well clinically without evidence of recurrent disease.  We will schedule the patient to follow-up in 6 months with repeat labs.        Plan:  Stage III Peripheral T cell lymphoma NOS: with CR after CHOP x 6  --s/p 6 cycles of CHOP completed in 10/2014  --Repeat PET as clinically indicated    Pancytopenia: Secondary to chemotherapy versus low-grade MDS  --Counts remain stable and we will plan to repeat bone marrow biopsy upon significant progression    Follow-up in 6 months with repeat labs

## 2018-02-27 DIAGNOSIS — H25.11 NUCLEAR SCLEROSIS, RIGHT: ICD-10-CM

## 2018-02-28 RX ORDER — KETOROLAC TROMETHAMINE 5 MG/ML
SOLUTION OPHTHALMIC
Qty: 5 ML | Refills: 2 | Status: SHIPPED | OUTPATIENT
Start: 2018-02-28 | End: 2018-04-24 | Stop reason: SDUPTHER

## 2018-04-24 DIAGNOSIS — H25.11 NUCLEAR SCLEROSIS, RIGHT: ICD-10-CM

## 2018-04-24 RX ORDER — KETOROLAC TROMETHAMINE 5 MG/ML
SOLUTION OPHTHALMIC
Qty: 5 ML | Refills: 2 | Status: SHIPPED | OUTPATIENT
Start: 2018-04-24 | End: 2018-04-25

## 2018-04-25 ENCOUNTER — OFFICE VISIT (OUTPATIENT)
Dept: CARDIOLOGY | Facility: CLINIC | Age: 73
End: 2018-04-25
Payer: MEDICARE

## 2018-04-25 VITALS
HEART RATE: 60 BPM | HEIGHT: 67 IN | DIASTOLIC BLOOD PRESSURE: 74 MMHG | SYSTOLIC BLOOD PRESSURE: 146 MMHG | WEIGHT: 167.31 LBS | BODY MASS INDEX: 26.26 KG/M2

## 2018-04-25 DIAGNOSIS — I10 ESSENTIAL HYPERTENSION: ICD-10-CM

## 2018-04-25 DIAGNOSIS — I25.2 OLD MI (MYOCARDIAL INFARCTION): ICD-10-CM

## 2018-04-25 DIAGNOSIS — Z95.1 S/P CORONARY ARTERY BYPASS GRAFT X 2: ICD-10-CM

## 2018-04-25 DIAGNOSIS — I25.10 CORONARY ARTERY DISEASE INVOLVING NATIVE CORONARY ARTERY OF NATIVE HEART WITHOUT ANGINA PECTORIS: Primary | ICD-10-CM

## 2018-04-25 DIAGNOSIS — E78.5 DYSLIPIDEMIA: ICD-10-CM

## 2018-04-25 PROCEDURE — 3077F SYST BP >= 140 MM HG: CPT | Mod: CPTII,S$GLB,, | Performed by: INTERNAL MEDICINE

## 2018-04-25 PROCEDURE — 3078F DIAST BP <80 MM HG: CPT | Mod: CPTII,S$GLB,, | Performed by: INTERNAL MEDICINE

## 2018-04-25 PROCEDURE — 99214 OFFICE O/P EST MOD 30 MIN: CPT | Mod: S$GLB,,, | Performed by: INTERNAL MEDICINE

## 2018-04-25 PROCEDURE — 99999 PR PBB SHADOW E&M-EST. PATIENT-LVL III: CPT | Mod: PBBFAC,,, | Performed by: INTERNAL MEDICINE

## 2018-04-25 RX ORDER — RANOLAZINE 1000 MG/1
1000 TABLET, EXTENDED RELEASE ORAL 2 TIMES DAILY
Qty: 180 TABLET | Refills: 3 | Status: SHIPPED | OUTPATIENT
Start: 2018-04-25 | End: 2019-04-01 | Stop reason: SDUPTHER

## 2018-04-25 NOTE — PROGRESS NOTES
Subjective:   Patient ID:  Foreign Holland is a 72 y.o. male who presents for follow-up of Follow-up  Patient denies CP, angina or anginal equivalent. Lipids are at goal. Pt rides his bike every other day.    Hypertension   This is a chronic problem. The current episode started more than 1 year ago. The problem has been gradually improving since onset. The problem is controlled. Pertinent negatives include no chest pain, palpitations or shortness of breath. Past treatments include beta blockers and ACE inhibitors. The current treatment provides moderate improvement. There are no compliance problems.    Hyperlipidemia   This is a chronic problem. The current episode started more than 1 year ago. The problem is controlled. Pertinent negatives include no chest pain or shortness of breath. Current antihyperlipidemic treatment includes statins. The current treatment provides moderate improvement of lipids. There are no compliance problems.        Review of Systems   Constitution: Negative. Negative for weight gain.   HENT: Negative.    Eyes: Negative.    Cardiovascular: Negative.  Negative for chest pain, leg swelling and palpitations.   Respiratory: Negative.  Negative for shortness of breath.    Endocrine: Negative.    Hematologic/Lymphatic: Negative.    Skin: Negative.    Musculoskeletal: Negative for muscle weakness.   Gastrointestinal: Negative.    Genitourinary: Negative.    Neurological: Negative.  Negative for dizziness.   Psychiatric/Behavioral: Negative.    Allergic/Immunologic: Negative.      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       atorvastatin (LIPITOR) 80 MG tablet 80 mg every evening.       cholecalciferol, vitamin D3, (VITAMIN D3) 400 unit Tab Take 1 tablet by mouth once daily.      fish oil-omega-3 fatty acids 300-1,000 mg capsule Take 1 g by mouth 2 (two) times daily.       FOLIC ACID ORAL Take 2,400 mcg by mouth once daily.       hydrochlorothiazide (HYDRODIURIL) 25 MG tablet once daily.       metoprolol succinate (TOPROL-XL) 25 MG 24 hr tablet Take 25 mg by mouth once daily.  2    NITROSTAT 0.4 mg SL tablet Patient states that he takes this medication as needed  3    PREVNAR 13, PF, 0.5 mL Syrg       quinapril (ACCUPRIL) 10 MG tablet every morning.       ranolazine (RANEXA) 1,000 mg Tb12 Take 1 tablet (1,000 mg total) by mouth 2 (two) times daily. 180 tablet 3    triamcinolone acetonide 0.1% (KENALOG) 0.1 % cream every morning.       [DISCONTINUED] ketorolac 0.5% (ACULAR) 0.5 % Drop PLACE 1 DROP INTO THE RIGHT EYE 4 TIMES DAILY **START 1 DAY BEFORE SURGERY 5 mL 2    [DISCONTINUED] tramadol (ULTRAM) 50 mg tablet        No current facility-administered medications on file prior to visit.      Review of patient's allergies indicates:  No Known Allergies    Objective:     Physical Exam   Constitutional: He is oriented to person, place, and time. He appears well-developed and well-nourished.   HENT:   Head: Normocephalic and atraumatic.   Eyes: Conjunctivae are normal. Pupils are equal, round, and reactive to light.   Neck: Normal range of motion. Neck supple.   Cardiovascular: Normal rate, regular rhythm, normal heart sounds and intact distal  pulses.    Pulmonary/Chest: Effort normal and breath sounds normal.   Abdominal: Soft. Bowel sounds are normal.   Neurological: He is alert and oriented to person, place, and time.   Skin: Skin is warm and dry.   Psychiatric: He has a normal mood and affect.   Nursing note and vitals reviewed.      Assessment:     1. Coronary artery disease involving native coronary artery of native heart without angina pectoris    2. S/P coronary artery bypass graft x 2    3. Essential hypertension    4. Dyslipidemia    5. Old MI (myocardial infarction)        Plan:     Coronary artery disease involving native coronary artery of native heart without angina pectoris    S/P coronary artery bypass graft x 2    Essential hypertension    Dyslipidemia    Old MI (myocardial infarction)    continue statin-hlp  Continue asa- cad  Continue quinapril, metoprolol-htn  Continue ranexa- angina

## 2018-07-19 ENCOUNTER — LAB VISIT (OUTPATIENT)
Dept: LAB | Facility: HOSPITAL | Age: 73
End: 2018-07-19
Attending: INTERNAL MEDICINE
Payer: MEDICARE

## 2018-07-19 ENCOUNTER — OFFICE VISIT (OUTPATIENT)
Dept: HEMATOLOGY/ONCOLOGY | Facility: CLINIC | Age: 73
End: 2018-07-19
Payer: MEDICARE

## 2018-07-19 VITALS
HEIGHT: 67 IN | WEIGHT: 163.38 LBS | SYSTOLIC BLOOD PRESSURE: 100 MMHG | HEART RATE: 60 BPM | RESPIRATION RATE: 18 BRPM | OXYGEN SATURATION: 99 % | TEMPERATURE: 98 F | DIASTOLIC BLOOD PRESSURE: 58 MMHG | BODY MASS INDEX: 25.64 KG/M2

## 2018-07-19 DIAGNOSIS — C84.48 PERIPHERAL T CELL LYMPHOMA OF LYMPH NODES OF MULTIPLE SITES: Primary | ICD-10-CM

## 2018-07-19 DIAGNOSIS — D70.8 OTHER NEUTROPENIA: ICD-10-CM

## 2018-07-19 DIAGNOSIS — D61.818 PANCYTOPENIA: ICD-10-CM

## 2018-07-19 DIAGNOSIS — N18.30 STAGE 3 CHRONIC KIDNEY DISEASE: ICD-10-CM

## 2018-07-19 DIAGNOSIS — C84.48 PERIPHERAL T CELL LYMPHOMA OF LYMPH NODES OF MULTIPLE SITES: ICD-10-CM

## 2018-07-19 LAB
ALBUMIN SERPL BCP-MCNC: 3.7 G/DL
ALP SERPL-CCNC: 54 U/L
ALT SERPL W/O P-5'-P-CCNC: 15 U/L
ANION GAP SERPL CALC-SCNC: 5 MMOL/L
AST SERPL-CCNC: 20 U/L
BASOPHILS # BLD AUTO: 0.01 K/UL
BASOPHILS NFR BLD: 0.3 %
BILIRUB SERPL-MCNC: 0.8 MG/DL
BUN SERPL-MCNC: 22 MG/DL
CALCIUM SERPL-MCNC: 9.1 MG/DL
CHLORIDE SERPL-SCNC: 105 MMOL/L
CO2 SERPL-SCNC: 28 MMOL/L
CREAT SERPL-MCNC: 1.7 MG/DL
DIFFERENTIAL METHOD: ABNORMAL
EOSINOPHIL # BLD AUTO: 0.1 K/UL
EOSINOPHIL NFR BLD: 3.1 %
ERYTHROCYTE [DISTWIDTH] IN BLOOD BY AUTOMATED COUNT: 13.9 %
EST. GFR  (AFRICAN AMERICAN): 45 ML/MIN/1.73 M^2
EST. GFR  (NON AFRICAN AMERICAN): 39 ML/MIN/1.73 M^2
GLUCOSE SERPL-MCNC: 70 MG/DL
HCT VFR BLD AUTO: 29 %
HGB BLD-MCNC: 10.1 G/DL
LDH SERPL L TO P-CCNC: 148 U/L
LYMPHOCYTES # BLD AUTO: 1.1 K/UL
LYMPHOCYTES NFR BLD: 37.6 %
MCH RBC QN AUTO: 29 PG
MCHC RBC AUTO-ENTMCNC: 34.8 G/DL
MCV RBC AUTO: 83 FL
MONOCYTES # BLD AUTO: 0.3 K/UL
MONOCYTES NFR BLD: 11 %
NEUTROPHILS # BLD AUTO: 1.4 K/UL
NEUTROPHILS NFR BLD: 48 %
PLATELET # BLD AUTO: 142 K/UL
PMV BLD AUTO: 8.8 FL
POTASSIUM SERPL-SCNC: 4.7 MMOL/L
PROT SERPL-MCNC: 7.3 G/DL
RBC # BLD AUTO: 3.48 M/UL
SODIUM SERPL-SCNC: 138 MMOL/L
WBC # BLD AUTO: 2.9 K/UL

## 2018-07-19 PROCEDURE — 36415 COLL VENOUS BLD VENIPUNCTURE: CPT | Mod: PO

## 2018-07-19 PROCEDURE — 99214 OFFICE O/P EST MOD 30 MIN: CPT | Mod: S$GLB,,, | Performed by: INTERNAL MEDICINE

## 2018-07-19 PROCEDURE — 85025 COMPLETE CBC W/AUTO DIFF WBC: CPT | Mod: PO

## 2018-07-19 PROCEDURE — 3078F DIAST BP <80 MM HG: CPT | Mod: CPTII,S$GLB,, | Performed by: INTERNAL MEDICINE

## 2018-07-19 PROCEDURE — 80053 COMPREHEN METABOLIC PANEL: CPT | Mod: PO

## 2018-07-19 PROCEDURE — 83615 LACTATE (LD) (LDH) ENZYME: CPT | Mod: PO

## 2018-07-19 PROCEDURE — 99999 PR PBB SHADOW E&M-EST. PATIENT-LVL III: CPT | Mod: PBBFAC,,, | Performed by: INTERNAL MEDICINE

## 2018-07-19 PROCEDURE — 3074F SYST BP LT 130 MM HG: CPT | Mod: CPTII,S$GLB,, | Performed by: INTERNAL MEDICINE

## 2018-07-19 NOTE — PROGRESS NOTES
Hematology/Oncology Office Note    Reason for referral: Stage III Peripheral T Cell Lymphoma NOS    CC: Lymphoma    Referred by: Dr Mendieta    Diagnosis:  6/5/14: Stage III  Peripheral T cell lymphoma NOS    5/21/14 Left lower cervical Node:  Supplemental Diagnosis  [place] DIAGNOSIS:  LYMPH NODES, LEFT NECK, EXCISIONAL BIOPSY ([id number]; 5/21/2014):  Peripheral T-cell lymphoma. See comment.  Interpreted by: GUILHERME Vasques  Note: Please see attached report for comment.    5/26/14 Bone marrow bx:  COMMENT: Concomitantly submitted flow cytometric analysis detects a small subset of atypical T lymphocytes.  This subset, approximately 2% of total, shows expression of CD2, CD5, and CD7 with restriction to CD4, but loss of  surface CD3. It is negative for CD30. These findings are suggestive of involvement by the patient's previously diagnosed atypical T cell population; however, correlation with morphology, particularly the pending lymph nodebiopsy (pending outside consultation) and cytogenetic or molecular data, especially T cell gene rearrangement byPCR, as well as with clinical findings is highly recommended for further characterization of this process.Background B cells consist predominantly of developing hematogones, and the blast gate is not increased. Othersubsets of T cells appear immunophenotypically unremarkable.The flow cytometry finding suggests that the marrow is involved by this atypical T cell process which was seenpreviously on tissue biopsies. Correlation with the findings of the lymph node biopsy report is required for furtherclassification. If needed, the clot section of this biopsy could be sent for T-cell gene rearrangements by PCR;    Tcell gene rearrangement negative by PCR    5/21/14 PET:  Widespread hypermetabolic lymphadenopathy in the neck, chest, and abdomen as detailed consistent with reported diagnosis of lymphoma.    5/23/14 Echo:  LVEF 55-60%        Treatment:  6/9/14: C1D1  CHOP  Excellent clinic response  6/30/14: C2D1  7/8/14 to 7/14/14 Hospitalization for neutropenic fever  Prolonged recovery after cycle 2  8/5/14 cycle 3 with 15% DR  8/26/14: cycle 4  9/16/14: cycle 5   10/7/14 cycle 6   11/5/14 PET:  Findings: There has been significant interval improvement with complete resolution of previously demonstrated hypermetabolic lymph node groups in the neck, chest, and abdomen. Prior tonsillar uptake has likewise resolved. No pathologically enlarged or FDG avid nodes are identified at this time.   6/25/15: Bone marrow biopsy for persistent cytopenias was noted to be negative for pathology      4/17/14 visit: Mr Holland is a 69yo male referred for anemia and cervical/supraclavicular LAD.  Duration is unknown and patient cannot remember having anemia or LAD in the past.  He reports a 20lb wt loss over the past 18 months. He also reports a questionable history of lung cancer; he reports resection (no visible scar from surgery) and denies treatment with adjuvant therapy.  Patient denies pain, night sweast, n/v/d.  He reports sob with exertion and chest pain prolonged exertion.     I have reviewed and updated the HPI, PMHx, Social Hx, Family Hx and treatment history.    Today's Visit:  Patient presents today without new complaints.  He specifically denies fever, chills, night sweats, weight loss, dizziness or palpitations.    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   No family history of anemia or malignancy    Anemia   Presents for follow-up visit. There has been no abdominal pain, bruising/bleeding easily,  "confusion, fever, light-headedness, pallor or palpitations.   Nausea   Pertinent negatives include no abdominal pain, arthralgias, chest pain, chills, congestion, coughing, diaphoresis, fatigue, fever, headaches, joint swelling, myalgias, neck pain, numbness, rash or vomiting.     Review of Systems   Constitutional: Negative for activity change, appetite change, chills, diaphoresis, fatigue, fever and unexpected weight change.   HENT: Negative for congestion, dental problem, drooling, ear discharge, hearing loss, mouth sores, nosebleeds and tinnitus.    Eyes: Negative.    Respiratory: Negative for apnea, cough, choking, chest tightness, shortness of breath, wheezing and stridor.    Cardiovascular: Negative for chest pain, palpitations and leg swelling.   Gastrointestinal: Negative for abdominal distention, abdominal pain, anal bleeding, blood in stool and vomiting.   Endocrine: Negative.    Genitourinary: Negative for difficulty urinating, dysuria, enuresis, flank pain and frequency.   Musculoskeletal: Negative for arthralgias, back pain, gait problem, joint swelling, myalgias, neck pain and neck stiffness.   Skin: Negative for color change, pallor, rash and wound.   Allergic/Immunologic: Negative.    Neurological: Negative for dizziness, seizures, facial asymmetry, light-headedness, numbness and headaches.   Hematological: Negative for adenopathy. Does not bruise/bleed easily.   Psychiatric/Behavioral: Negative for agitation, behavioral problems, confusion and hallucinations.       Objective:      Vitals:    07/19/18 0919   BP: (!) 100/58   Pulse: 60   Resp: 18   Temp: 98.3 °F (36.8 °C)   TempSrc: Oral   SpO2: 99%   Weight: 74.1 kg (163 lb 5.8 oz)   Height: 5' 7" (1.702 m)           Physical Exam   Constitutional: He is oriented to person, place, and time. He appears well-developed and well-nourished.  Non-toxic appearance. No distress.   HENT:   Head: Normocephalic and atraumatic.   Right Ear: External ear normal. "   Left Ear: External ear normal.   Nose: Nose normal.   Mouth/Throat: Oropharynx is clear and moist and mucous membranes are normal. Mucous membranes are not pale, not dry and not cyanotic. No oral lesions. Normal dentition. No oropharyngeal exudate or posterior oropharyngeal erythema.   Eyes: Conjunctivae and EOM are normal. Pupils are equal, round, and reactive to light. Right eye exhibits no chemosis. Left eye exhibits no chemosis. Right conjunctiva is not injected. Left conjunctiva is not injected. Pupils are equal.   Neck: Normal range of motion. Neck supple. No tracheal deviation present. No thyroid mass and no thyromegaly present.   Cardiovascular: Normal rate, regular rhythm and intact distal pulses.  Exam reveals no gallop and no friction rub.    No murmur heard.  Pulmonary/Chest: Effort normal and breath sounds normal. No respiratory distress. He has no decreased breath sounds. He has no wheezes. He has no rhonchi. He has no rales. He exhibits no tenderness.   Abdominal: Soft. Bowel sounds are normal. He exhibits no distension, no fluid wave and no mass. There is no tenderness. There is no rebound and no guarding.   Musculoskeletal: Normal range of motion.   Lymphadenopathy:        Head (right side): No submental and no submandibular adenopathy present.        Head (left side): No submental and no submandibular adenopathy present.     He has no cervical adenopathy.     He has no axillary adenopathy.        Right: No inguinal and no supraclavicular adenopathy present.        Left: No inguinal and no supraclavicular adenopathy present.   Neurological: He is alert and oriented to person, place, and time. He displays normal reflexes. No cranial nerve deficit. He exhibits normal muscle tone. Coordination normal.   Skin: Skin is warm, dry and intact. Capillary refill takes less than 2 seconds. No rash noted. He is not diaphoretic. No cyanosis. No pallor. Nails show no clubbing.   Psychiatric: He has a normal  mood and affect. His behavior is normal. Judgment and thought content normal.         Lab Results   Component Value Date    WBC 2.90 (L) 07/19/2018    HGB 10.1 (L) 07/19/2018    HCT 29.0 (L) 07/19/2018    MCV 83 07/19/2018     (L) 07/19/2018     Lab Results   Component Value Date    CREATININE 1.7 (H) 07/19/2018    BUN 22 07/19/2018     07/19/2018    K 4.7 07/19/2018     07/19/2018    CO2 28 07/19/2018     Lab Results   Component Value Date    ALT 15 07/19/2018    AST 20 07/19/2018    ALKPHOS 54 (L) 07/19/2018    BILITOT 0.8 07/19/2018 5/21/14: PET  Widespread hypermetabolic lymphadenopathy in the neck, chest, and abdomen as detailed consistent with reported diagnosis of lymphoma.        Assessment:  Mr Holland is a 67yo male with Stage III Peripheral T Cell Lymphoma NOS  s/p 6 cycles of CHOP (last cycle 10/7/14) with Complete Response per PET.  Of note there was abnormal T cell population in the bone marrow, however; PCR for T Cell receptor gene rearrangement were negative.  Bone marrow biopsy performed on 6/25/15 was negative for significant pathology. Counts continue to be mildly suppressed but remain relatively stable over the previous 3+ years  Patient continues to do very well clinically without evidence of recurrent disease.    However, renal function has worsened over the previous year with creatinine noted to be 1.7.  We will order a renal ultrasound refer the patient to renal clinic for further evaluation.  He will follow up in 4 months with repeat labs      Plan:  Stage III Peripheral T cell lymphoma NOS: with CR after CHOP x 6  --s/p 6 cycles of CHOP completed in 10/2014  --Repeat PET as clinically indicated    Pancytopenia: Secondary to chemotherapy versus low-grade MDS  --Counts remain stable and we will plan to repeat bone marrow biopsy upon significant progression    Renal insufficiency with creatinine 1.7 today:  --renal ultrasound  --referred renal clinic

## 2018-07-23 NOTE — PROGRESS NOTES
Patient, Foreign Holland (MRN #[id number]), presented with a recent Platelet count less than 150 K/uL consistent with the definition of thrombocytopenia (ICD10 - D69.6).    Platelets   Date Value Ref Range Status   07/19/2018 142 (L) 150 - 350 K/uL Final     The patient's thrombocytopenia was monitored, evaluated, addressed and/or treated. This addendum to the medical record is made on 07/23/2018.

## 2018-07-24 ENCOUNTER — OFFICE VISIT (OUTPATIENT)
Dept: NEPHROLOGY | Facility: CLINIC | Age: 73
End: 2018-07-24
Payer: MEDICARE

## 2018-07-24 VITALS
HEIGHT: 67 IN | HEART RATE: 66 BPM | DIASTOLIC BLOOD PRESSURE: 62 MMHG | SYSTOLIC BLOOD PRESSURE: 116 MMHG | WEIGHT: 163.38 LBS | BODY MASS INDEX: 25.64 KG/M2

## 2018-07-24 DIAGNOSIS — N18.30 CKD (CHRONIC KIDNEY DISEASE) STAGE 3, GFR 30-59 ML/MIN: Primary | ICD-10-CM

## 2018-07-24 PROCEDURE — 3078F DIAST BP <80 MM HG: CPT | Mod: CPTII,S$GLB,, | Performed by: INTERNAL MEDICINE

## 2018-07-24 PROCEDURE — 3074F SYST BP LT 130 MM HG: CPT | Mod: CPTII,S$GLB,, | Performed by: INTERNAL MEDICINE

## 2018-07-24 PROCEDURE — 99205 OFFICE O/P NEW HI 60 MIN: CPT | Mod: S$GLB,,, | Performed by: INTERNAL MEDICINE

## 2018-07-24 PROCEDURE — 99999 PR PBB SHADOW E&M-EST. PATIENT-LVL III: CPT | Mod: PBBFAC,,, | Performed by: INTERNAL MEDICINE

## 2018-07-24 NOTE — PATIENT INSTRUCTIONS
Hydrate with 60-80 ounces of water per day.     Avoid NSAID pain medications such as advil, aleve, motrin, ibuprofen, naprosyn, meloxicam, diclofenac, mobic.

## 2018-07-24 NOTE — LETTER
July 24, 2018      Fritz Silvestre Jr., MD  [address] [place] [address]           [facility] - Nephrology  [address]  [place] [address]  Phone: [phone]  Fax: [phone]          Patient: Foreign Holland   MR Number: [id number]   YOB: 1945   Date of Visit: 7/24/2018       Dear Dr. Fritz Silvestre Jr.:    Thank you for referring Foreign Holland to me for evaluation. Attached you will find relevant portions of my assessment and plan of care.    If you have questions, please do not hesitate to call me. I look forward to following Foreign Holland along with you.    Sincerely,    Fritz Anderson MD    Enclosure  CC:  No Recipients    If you would like to receive this communication electronically, please contact [email or web address] or [phone] to request more information on [facility] Link access.    For providers and/or their staff who would like to refer a patient to [facility], please contact us through our one-stop-shop provider referral line, [facility], at [phone].    If you feel you have received this communication in error or would no longer like to receive these types of communications, please e-mail [email or web address]

## 2018-07-24 NOTE — PROGRESS NOTES
NEPHROLOGY CONSULTATION    PHYSICIAN REQUESTING THE CONSULT: Dr. Fritz Silvestre, PMD: DR. Oziel Menditea    REASON FOR CONSULTATION: Renal insufficiency    73 y.o. male with history of HTN, CHF, CAD, prostate cancer, anemia, pancytopenia, lymphoma presents to the renal clinic for evaluation of renal insufficiency. A consultation has been requested by Dr. Fritz Silvestre. Patient today presents to the clinic complaining of SOB with exercise and mild right ankle rash.  He denies any headaches, congenital hearing loss, chest pain, hemoptysis, abdominal or flank pain, diarrhea, nausea/vomiting, hematuria, dysuria, LE swelling, hand/foot paresthesia, nasal congestion. Patient denies any NSAID use history except for aspirin.   Patient has a longstanding history of HTN that was diagnosed > 6 years ago. BP is currently well controlled at 116/62. In addition, patient reports history of CHF (sal depressed EF of 55% on ECHO from 4/13/17), CAD (s/p CABG), prostate cancer (s/p resection), lymphoma, (T-cell lymphoma, s/p multiple chemotherapy regimens in past), cataracts (s/p bilateral cataract surgery). He denies any history of nephrolithiasis, DM2. Patient's nephew was on HD temporarily for unclear reasons. Laboratory review revealed that the patient's renal function has been worsening over time with creatinine at 1.3 on 7/27/17 and 1.7 on 7/19/18.       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 MCCRAY     PROSTATE SURGERY  02/2014       Review of patient's allergies indicates:  No Known Allergies    Current Outpatient Prescriptions   Medication Sig Dispense Refill    aspirin (ECOTRIN) 81 MG EC tablet Take 81 mg by mouth every morning.       atorvastatin (LIPITOR) 80 MG tablet 80 mg every evening.       cholecalciferol, vitamin D3, (VITAMIN D3) 400 unit Tab Take 1 tablet by mouth once daily.      fish oil-omega-3 fatty acids 300-1,000 mg capsule Take 1 g by mouth 2 (two) times daily.       FOLIC ACID ORAL Take 2,400 mcg by mouth once daily.       hydrochlorothiazide (HYDRODIURIL) 25 MG tablet Take 25 mg by mouth once daily.       metoprolol succinate (TOPROL-XL) 25 MG 24 hr tablet Take 25 mg by mouth once daily.  2    NITROSTAT 0.4 mg SL tablet Patient states that he takes this medication as needed  3    PREVNAR 13, PF, 0.5 mL Syrg       quinapril (ACCUPRIL) 10 MG tablet every morning.       ranolazine (RANEXA) 1,000 mg Tb12 Take 1 tablet (1,000 mg total) by mouth 2 (two) times daily. 180 tablet 3    triamcinolone acetonide 0.1% (KENALOG) 0.1 % cream every morning.        No current facility-administered medications for this visit.        Family History   Problem Relation Age of Onset    Blindness Father     Cancer Neg Hx     Diabetes Neg Hx     Heart failure Neg Hx     Coronary artery disease Neg Hx     Cataracts Neg Hx     Glaucoma Neg Hx     Hypertension Neg Hx     Macular degeneration Neg Hx     Retinal detachment Neg Hx     Strabismus Neg Hx     Stroke Neg Hx     Thyroid disease Neg Hx        Social History     Social History    Marital status: Single     Spouse name: N/A    Number of children: N/A    Years of education: N/A     Occupational History    Not on file.     Social History Main Topics    Smoking status: Former Smoker     Packs/day: 1.00     Years: 45.00     Quit date: 1/1/2005    Smokeless tobacco: Never Used    Alcohol use No      Comment: former    Drug use: No  "   Sexual activity: Not on file     Other Topics Concern    Not on file     Social History Narrative    No narrative on file       Review of Systems:  1. GENERAL: patient denies any fever, weight changes, generalized weakness, dizziness.  2. HEENT: patient denies headaches, visual disturbances, swallowing problems, sinus pain, nasal congestion.  3. CARDIOVASCULAR: patient denies chest pain, palpitations.  4. PULMONARY: patient reports SOB with exercise, no coughing, hemoptysis, wheezing.  5. GASTROINTESTINAL: patient denies abdominal pain, nausea, vomiting, diarrhea.  6. GENITOURINARY: patient denies dysuria, hematuria, hesitancy, frequency.  7. EXTREMITIES: patient denies LE edema, LE cramping.  8. DERMATOLOGY: patient reports right ankle rash  9. NEURO: patient denies tremors, extremity weakness, extremity numbness/tingling.  10. MUSCULOSKELETAL: patient denies joint pain, joint swelling.  11. HEMATOLOGY: patient denies rectal or gum bleeding.  12: PSYCH: patient denies anxiety, depression.      PHYSICAL EXAM:    /62   Pulse 66   Ht 5' 7" (1.702 m)   Wt 74.1 kg (163 lb 5.8 oz)   BMI 25.59 kg/m²     GENERAL: Pleasant lady/gentleman presents to clinic with non-labored breathing.  HEENT: Anisocoria, no nasal discharge, no icterus, no oral exudates, moist mucosal membranes.  NECK: no thyroid mass, no lymphadenopathy.  HEART: RRR S1/S2, no rubs, good peripheral pulses.  LUNGS: CTA bilaterally, no wheezing, breathing is nonlabored.  ABDOMEN: soft, nontender, not distended, bowel sounds are present, no abdominal hernia.  EXTREM: no LE edema.  SKIN: right heel rash, skin is warm and dry.  NEURO: A & O x 3, no obvious focal signs.    LABORATORY RESULTS:    Lab Results   Component Value Date    CREATININE 1.7 (H) 07/19/2018    BUN 22 07/19/2018     07/19/2018    K 4.7 07/19/2018     07/19/2018    CO2 28 07/19/2018      Lab Results   Component Value Date    CALCIUM 9.1 07/19/2018    PHOS 2.8 " 08/26/2014     Lab Results   Component Value Date    ALBUMIN 3.7 07/19/2018     Lab Results   Component Value Date    WBC 2.90 (L) 07/19/2018    HGB 10.1 (L) 07/19/2018    HCT 29.0 (L) 07/19/2018    MCV 83 07/19/2018     (L) 07/19/2018     Urinalysis: no protein, no blood (7/9/14)      ASSESSMENT AND PLAN:  73 y.o. male with history of HTN, CHF, CAD, prostate cancer, anemia, pancytopenia, lymphoma presents to the renal clinic for evaluation of renal insufficiency    1. Renal insufficiency: Patient presents with mild renal insufficiency, consistent with CKD stage 3. Cause of his renal insufficiency is currently not clear, but hypertension in past, past surgeries, past chemotherapy and intermittent dehydration are in the differential. Patient's renal function will be monitored closely and he will return to the clinic in 1 month for follow up. Patient was advised to avoid NSAID pain medications such as advil, aleve, motrin, ibuprofen, naprosyn, meloxicam, diclofenac, mobic and to hydrate with 60-65 ounces of water per day.     2. Electrolytes: Within normal limits.    3. Acid base status: No acute issues.    4. Volume: Euvolemic.    5. Hypertension: Good BP control.     6. Medications: Reviewed. Patient was advised to avoid NSAID pain medications such as advil, aleve, motrin, ibuprofen, naprosyn, meloxicam, diclofenac, mobic.     7. Lymphoma: as per Oncology team.       Thank you very much for this consult. Please see my note in Epic for recommendations.    Total time spent was about 45 min. 50 % or more was spend on counseling about treatment options disease etiology. Level 5 consult.

## 2018-08-09 ENCOUNTER — LAB VISIT (OUTPATIENT)
Dept: LAB | Facility: HOSPITAL | Age: 73
End: 2018-08-09
Attending: INTERNAL MEDICINE
Payer: MEDICARE

## 2018-08-09 DIAGNOSIS — N18.30 CKD (CHRONIC KIDNEY DISEASE) STAGE 3, GFR 30-59 ML/MIN: ICD-10-CM

## 2018-08-09 LAB
ALBUMIN SERPL BCP-MCNC: 3.6 G/DL
ANION GAP SERPL CALC-SCNC: 6 MMOL/L
BASOPHILS # BLD AUTO: 0.02 K/UL
BASOPHILS NFR BLD: 0.7 %
BUN SERPL-MCNC: 26 MG/DL
CALCIUM SERPL-MCNC: 9.3 MG/DL
CHLORIDE SERPL-SCNC: 105 MMOL/L
CO2 SERPL-SCNC: 27 MMOL/L
CREAT SERPL-MCNC: 1.5 MG/DL
DIFFERENTIAL METHOD: ABNORMAL
EOSINOPHIL # BLD AUTO: 0.1 K/UL
EOSINOPHIL NFR BLD: 2 %
ERYTHROCYTE [DISTWIDTH] IN BLOOD BY AUTOMATED COUNT: 13.9 %
EST. GFR  (AFRICAN AMERICAN): 52.3 ML/MIN/1.73 M^2
EST. GFR  (NON AFRICAN AMERICAN): 45.2 ML/MIN/1.73 M^2
GLUCOSE SERPL-MCNC: 93 MG/DL
HCT VFR BLD AUTO: 28.3 %
HGB BLD-MCNC: 9.6 G/DL
IMM GRANULOCYTES # BLD AUTO: 0.01 K/UL
IMM GRANULOCYTES NFR BLD AUTO: 0.3 %
LYMPHOCYTES # BLD AUTO: 1 K/UL
LYMPHOCYTES NFR BLD: 33.6 %
MCH RBC QN AUTO: 29.3 PG
MCHC RBC AUTO-ENTMCNC: 33.9 G/DL
MCV RBC AUTO: 86 FL
MONOCYTES # BLD AUTO: 0.3 K/UL
MONOCYTES NFR BLD: 8.2 %
NEUTROPHILS # BLD AUTO: 1.7 K/UL
NEUTROPHILS NFR BLD: 55.2 %
NRBC BLD-RTO: 0 /100 WBC
PHOSPHATE SERPL-MCNC: 2.9 MG/DL
PLATELET # BLD AUTO: 185 K/UL
PMV BLD AUTO: 10.5 FL
POTASSIUM SERPL-SCNC: 4.2 MMOL/L
PTH-INTACT SERPL-MCNC: 145 PG/ML
RBC # BLD AUTO: 3.28 M/UL
SODIUM SERPL-SCNC: 138 MMOL/L
WBC # BLD AUTO: 3.04 K/UL

## 2018-08-09 PROCEDURE — 83970 ASSAY OF PARATHORMONE: CPT

## 2018-08-09 PROCEDURE — 36415 COLL VENOUS BLD VENIPUNCTURE: CPT | Mod: PO

## 2018-08-09 PROCEDURE — 80069 RENAL FUNCTION PANEL: CPT

## 2018-08-09 PROCEDURE — 85025 COMPLETE CBC W/AUTO DIFF WBC: CPT

## 2018-08-16 ENCOUNTER — OFFICE VISIT (OUTPATIENT)
Dept: NEPHROLOGY | Facility: CLINIC | Age: 73
End: 2018-08-16
Payer: MEDICARE

## 2018-08-16 VITALS
DIASTOLIC BLOOD PRESSURE: 60 MMHG | SYSTOLIC BLOOD PRESSURE: 98 MMHG | HEIGHT: 67 IN | WEIGHT: 162.94 LBS | HEART RATE: 65 BPM | BODY MASS INDEX: 25.57 KG/M2

## 2018-08-16 DIAGNOSIS — N18.30 CKD (CHRONIC KIDNEY DISEASE) STAGE 3, GFR 30-59 ML/MIN: Primary | ICD-10-CM

## 2018-08-16 PROCEDURE — 3074F SYST BP LT 130 MM HG: CPT | Mod: CPTII,S$GLB,, | Performed by: INTERNAL MEDICINE

## 2018-08-16 PROCEDURE — 99999 PR PBB SHADOW E&M-EST. PATIENT-LVL III: CPT | Mod: PBBFAC,,, | Performed by: INTERNAL MEDICINE

## 2018-08-16 PROCEDURE — 3078F DIAST BP <80 MM HG: CPT | Mod: CPTII,S$GLB,, | Performed by: INTERNAL MEDICINE

## 2018-08-16 PROCEDURE — 99213 OFFICE O/P EST LOW 20 MIN: CPT | Mod: S$GLB,,, | Performed by: INTERNAL MEDICINE

## 2018-08-16 RX ORDER — UBIDECARENONE 75 MG
1000 CAPSULE ORAL DAILY
COMMUNITY
End: 2019-03-15

## 2018-08-16 NOTE — PROGRESS NOTES
PROGRESS NOTE FOR ESTABLISHED PATIENT    PHYSICIAN REQUESTING THE CONSULT: Dr. Fritz Silvestre, PMD: DR. Oziel Mendieta    REASON FOR CONSULTATION: Renal insufficiency    73 y.o. male with history of HTN, CHF, CAD, prostate cancer, anemia, pancytopenia, lymphoma presents to the renal clinic for evaluation of renal insufficiency.     Patient reports SOB with exercise and right ankle rash (which has been improving). No other issues.         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 MCCRAY    PROSTATE SURGERY  02/2014       Review of patient's allergies indicates:  No Known Allergies    Current Outpatient Medications   Medication Sig Dispense Refill    aspirin (ECOTRIN) 81 MG EC tablet Take 81 mg by mouth every morning.       atorvastatin (LIPITOR) 80 MG tablet 80 mg every evening.       cholecalciferol, vitamin D3, (VITAMIN D3) 400 unit Tab Take 1 tablet by mouth once daily.      cyanocobalamin (VITAMIN B-12) 1000 MCG tablet Take 100 mcg by mouth once daily.      fish oil-omega-3 fatty acids 300-1,000 mg capsule Take 1 g by mouth 2 (two) times daily.       FOLIC ACID ORAL Take 2,400 mcg by mouth once daily.       metoprolol succinate (TOPROL-XL) 25 MG 24 hr tablet Take 25 mg by mouth once daily.  2    NITROSTAT 0.4 mg SL tablet Patient states that he takes this medication as needed  3    quinapril (ACCUPRIL) 10 MG tablet every morning.       ranolazine (RANEXA) 1,000 mg Tb12 Take 1  tablet (1,000 mg total) by mouth 2 (two) times daily. 180 tablet 3    triamcinolone acetonide 0.1% (KENALOG) 0.1 % cream every morning.       PREVNAR 13, PF, 0.5 mL Syrg        No current facility-administered medications for this visit.        Family History   Problem Relation Age of Onset    Blindness Father     Cancer Neg Hx     Diabetes Neg Hx     Heart failure Neg Hx     Coronary artery disease Neg Hx     Cataracts Neg Hx     Glaucoma Neg Hx     Hypertension Neg Hx     Macular degeneration Neg Hx     Retinal detachment Neg Hx     Strabismus Neg Hx     Stroke Neg Hx     Thyroid disease Neg Hx        Social History     Socioeconomic History    Marital status: Single     Spouse name: Not on file    Number of children: Not on file    Years of education: Not on file    Highest education level: Not on file   Social Needs    Financial resource strain: Not on file    Food insecurity - worry: Not on file    Food insecurity - inability: Not on file    Transportation needs - medical: Not on file    Transportation needs - non-medical: Not on file   Occupational History    Not on file   Tobacco Use    Smoking status: Former Smoker     Packs/day: 1.00     Years: 45.00     Pack years: 45.00     Last attempt to quit: 2005     Years since quittin.6    Smokeless tobacco: Never Used   Substance and Sexual Activity    Alcohol use: No     Comment: former    Drug use: No    Sexual activity: Not on file   Other Topics Concern    Not on file   Social History Narrative    Not on file       Review of Systems:  1. GENERAL: patient denies any fever, weight changes, generalized weakness, dizziness.  2. HEENT: patient denies headaches, visual disturbances, swallowing problems, sinus pain, nasal congestion.  3. CARDIOVASCULAR: patient denies chest pain, palpitations.  4. PULMONARY: patient reports SOB with exercise, no coughing, hemoptysis, wheezing.  5. GASTROINTESTINAL: patient denies abdominal  "pain, nausea, vomiting, diarrhea.  6. GENITOURINARY: patient denies dysuria, hematuria, hesitancy, frequency.  7. EXTREMITIES: patient denies LE edema, LE cramping.  8. DERMATOLOGY: patient reports right ankle rash  9. NEURO: patient denies tremors, extremity weakness, extremity numbness/tingling.  10. MUSCULOSKELETAL: patient denies joint pain, joint swelling.  11. HEMATOLOGY: patient denies rectal or gum bleeding.  12: PSYCH: patient denies anxiety, depression.      PHYSICAL EXAM:    BP 98/60   Pulse 65   Ht 5' 7" (1.702 m)   Wt 73.9 kg (162 lb 14.7 oz)   BMI 25.52 kg/m²     GENERAL: Pleasant lady/gentleman presents to clinic with non-labored breathing.  HEENT: Anisocoria, no nasal discharge, no icterus, no oral exudates, moist mucosal membranes.  NECK: no thyroid mass, no lymphadenopathy.  HEART: RRR S1/S2, no rubs, good peripheral pulses.  LUNGS: CTA bilaterally, no wheezing, breathing is nonlabored.  ABDOMEN: soft, nontender, not distended, bowel sounds are present, no abdominal hernia.  EXTREM: no LE edema.  SKIN: right heel rash, skin is warm and dry.  NEURO: A & O x 3, no obvious focal signs.    LABORATORY RESULTS:    Lab Results   Component Value Date    CREATININE 1.5 (H) 08/09/2018    BUN 26 (H) 08/09/2018     08/09/2018    K 4.2 08/09/2018     08/09/2018    CO2 27 08/09/2018      Lab Results   Component Value Date    .0 (H) 08/09/2018    CALCIUM 9.3 08/09/2018    PHOS 2.9 08/09/2018     Lab Results   Component Value Date    ALBUMIN 3.6 08/09/2018     Lab Results   Component Value Date    WBC 3.04 (L) 08/09/2018    HGB 9.6 (L) 08/09/2018    HCT 28.3 (L) 08/09/2018    MCV 86 08/09/2018     08/09/2018     Urinalysis: no protein, no blood (7/9/14)      ASSESSMENT AND PLAN:  73 y.o. male with history of HTN, CHF, CAD, prostate cancer, anemia, pancytopenia, lymphoma presents to the renal clinic for evaluation of renal insufficiency    1. Renal insufficiency: Patient presents with " mild renal insufficiency, consistent with CKD stage 3. Cause of his renal insufficiency is currently not clear, but hypertension in past, past surgeries, past chemotherapy and intermittent dehydration are in the differential. Patient's renal function has slightly improved which is likely related to improved hydration. Patient was advised to hydrate with 60-80 ounces of water per day and to avoid NSAID pain medications such as advil, aleve, motrin, ibuprofen, naprosyn, meloxicam, diclofenac, mobic and to hydrate with 60-65 ounces of water per day.     2. Electrolytes: Within normal limits.    3. Acid base status: No acute issues.    4. Volume: Euvolemic.    5. Hypertension: Steven stable with SBP in 90s.     6. Medications: Reviewed. Patient was advised to avoid NSAID pain medications such as advil, aleve, motrin, ibuprofen, naprosyn, meloxicam, diclofenac, mobic.     7. Lymphoma: as per Oncology team.

## 2018-10-30 ENCOUNTER — TELEPHONE (OUTPATIENT)
Dept: RADIOLOGY | Facility: HOSPITAL | Age: 73
End: 2018-10-30

## 2018-10-31 ENCOUNTER — HOSPITAL ENCOUNTER (OUTPATIENT)
Dept: RADIOLOGY | Facility: HOSPITAL | Age: 73
Discharge: HOME | End: 2018-10-31
Attending: INTERNAL MEDICINE
Payer: MEDICARE

## 2018-10-31 DIAGNOSIS — N18.30 STAGE 3 CHRONIC KIDNEY DISEASE: ICD-10-CM

## 2018-10-31 DIAGNOSIS — C84.48 PERIPHERAL T CELL LYMPHOMA OF LYMPH NODES OF MULTIPLE SITES: ICD-10-CM

## 2018-10-31 DIAGNOSIS — D61.818 PANCYTOPENIA: ICD-10-CM

## 2018-10-31 PROCEDURE — 76770 US EXAM ABDO BACK WALL COMP: CPT | Mod: TC,PO

## 2018-10-31 PROCEDURE — 76770 US EXAM ABDO BACK WALL COMP: CPT | Mod: 26,,, | Performed by: RADIOLOGY

## 2018-11-06 DIAGNOSIS — I25.10 CORONARY ARTERY DISEASE INVOLVING NATIVE CORONARY ARTERY OF NATIVE HEART WITHOUT ANGINA PECTORIS: Primary | ICD-10-CM

## 2018-11-07 ENCOUNTER — OFFICE VISIT (OUTPATIENT)
Dept: CARDIOLOGY | Facility: CLINIC | Age: 73
End: 2018-11-07
Payer: MEDICARE

## 2018-11-07 VITALS
HEART RATE: 60 BPM | WEIGHT: 159.5 LBS | HEIGHT: 67 IN | BODY MASS INDEX: 25.03 KG/M2 | DIASTOLIC BLOOD PRESSURE: 58 MMHG | SYSTOLIC BLOOD PRESSURE: 106 MMHG

## 2018-11-07 DIAGNOSIS — I25.2 OLD MI (MYOCARDIAL INFARCTION): ICD-10-CM

## 2018-11-07 DIAGNOSIS — I25.10 CORONARY ARTERY DISEASE INVOLVING NATIVE CORONARY ARTERY OF NATIVE HEART WITHOUT ANGINA PECTORIS: Primary | ICD-10-CM

## 2018-11-07 DIAGNOSIS — E78.5 DYSLIPIDEMIA: ICD-10-CM

## 2018-11-07 DIAGNOSIS — I10 ESSENTIAL HYPERTENSION: ICD-10-CM

## 2018-11-07 DIAGNOSIS — Z95.1 S/P CORONARY ARTERY BYPASS GRAFT X 2: ICD-10-CM

## 2018-11-07 PROCEDURE — 3074F SYST BP LT 130 MM HG: CPT | Mod: CPTII,S$GLB,, | Performed by: INTERNAL MEDICINE

## 2018-11-07 PROCEDURE — 1101F PT FALLS ASSESS-DOCD LE1/YR: CPT | Mod: CPTII,S$GLB,, | Performed by: INTERNAL MEDICINE

## 2018-11-07 PROCEDURE — 99999 PR PBB SHADOW E&M-EST. PATIENT-LVL III: CPT | Mod: PBBFAC,,, | Performed by: INTERNAL MEDICINE

## 2018-11-07 PROCEDURE — 99214 OFFICE O/P EST MOD 30 MIN: CPT | Mod: S$GLB,,, | Performed by: INTERNAL MEDICINE

## 2018-11-07 PROCEDURE — 3078F DIAST BP <80 MM HG: CPT | Mod: CPTII,S$GLB,, | Performed by: INTERNAL MEDICINE

## 2018-11-07 RX ORDER — HYDROCHLOROTHIAZIDE 25 MG/1
TABLET ORAL DAILY
COMMUNITY
End: 2020-08-25

## 2018-11-07 NOTE — PROGRESS NOTES
Subjective:   Patient ID:  Foreign Holland is a 73 y.o. male who presents for follow-up of No chief complaint on file.  Patient denies CP, angina or anginal equivalent.Pt walking every day and riding his bike every other day.    Hypertension   This is a chronic problem. The current episode started more than 1 year ago. The problem has been gradually improving since onset. The problem is controlled. Pertinent negatives include no chest pain, palpitations or shortness of breath. Past treatments include ACE inhibitors and beta blockers. The current treatment provides moderate improvement. There are no compliance problems.    Hyperlipidemia   This is a chronic problem. The problem is controlled. Recent lipid tests were reviewed and are variable. Pertinent negatives include no chest pain or shortness of breath. The current treatment provides moderate improvement of lipids. There are no compliance problems.    Coronary Artery Disease   Presents for follow-up visit. Pertinent negatives include no chest pain, chest pressure, chest tightness, dizziness, leg swelling, muscle weakness, palpitations, shortness of breath or weight gain. Risk factors include hyperlipidemia. The symptoms have been stable. Compliance with diet is variable. Compliance with exercise is variable. Compliance with medications is good.       Review of Systems   Constitution: Negative. Negative for weight gain.   HENT: Negative.    Eyes: Negative.    Cardiovascular: Negative.  Negative for chest pain, leg swelling and palpitations.   Respiratory: Negative.  Negative for chest tightness and shortness of breath.    Endocrine: Negative.    Hematologic/Lymphatic: Negative.    Skin: Negative.    Musculoskeletal: Negative for muscle weakness.   Gastrointestinal: Negative.    Genitourinary: Negative.    Neurological: Negative.  Negative for dizziness.   Psychiatric/Behavioral: Negative.    Allergic/Immunologic: Negative.      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       atorvastatin (LIPITOR) 80 MG tablet 80 mg every evening.       cholecalciferol, vitamin D3, (VITAMIN D3) 400 unit Tab Take 1 tablet by mouth once daily.      cyanocobalamin (VITAMIN B-12) 1000 MCG tablet Take 100 mcg by mouth once daily.      fish oil-omega-3 fatty acids 300-1,000 mg capsule Take 1 g by mouth 2 (two) times daily.       FOLIC ACID ORAL Take 2,400 mcg by mouth once daily.       metoprolol succinate (TOPROL-XL) 25 MG 24 hr tablet Take 25 mg by mouth once daily.  2    NITROSTAT 0.4 mg SL tablet Patient states that he takes this medication as needed  3    PREVNAR 13, PF, 0.5 mL Syrg       quinapril (ACCUPRIL) 10 MG tablet every morning.       ranolazine (RANEXA) 1,000 mg Tb12 Take 1 tablet (1,000 mg total) by mouth 2 (two) times daily. 180 tablet 3    triamcinolone acetonide 0.1% (KENALOG) 0.1 % cream every morning.        No current facility-administered medications on file prior to visit.      Review of patient's allergies indicates:  No Known Allergies    Objective:     Physical Exam   Constitutional: He is oriented to person, place, and time. He appears well-developed and well-nourished.   HENT:   Head: Normocephalic and  atraumatic.   Eyes: Conjunctivae are normal. Pupils are equal, round, and reactive to light.   Neck: Normal range of motion. Neck supple.   Cardiovascular: Normal rate, regular rhythm, normal heart sounds and intact distal pulses.   Pulmonary/Chest: Effort normal and breath sounds normal.   Abdominal: Soft. Bowel sounds are normal.   Neurological: He is alert and oriented to person, place, and time.   Skin: Skin is warm and dry.   Psychiatric: He has a normal mood and affect.   Nursing note and vitals reviewed.      Assessment:     1. Coronary artery disease involving native coronary artery of native heart without angina pectoris    2. S/P coronary artery bypass graft x 2    3. Essential hypertension    4. Dyslipidemia    5. Old MI (myocardial infarction)        Plan:     Coronary artery disease involving native coronary artery of native heart without angina pectoris    S/P coronary artery bypass graft x 2    Essential hypertension    Dyslipidemia    Old MI (myocardial infarction)    continue statin-hlp  Continue asa- cad  Continue quinapril, metoprolol-htn  Continue ranexa- angina

## 2018-11-16 ENCOUNTER — LAB VISIT (OUTPATIENT)
Dept: LAB | Facility: HOSPITAL | Age: 73
End: 2018-11-16
Attending: INTERNAL MEDICINE
Payer: MEDICARE

## 2018-11-16 ENCOUNTER — OFFICE VISIT (OUTPATIENT)
Dept: HEMATOLOGY/ONCOLOGY | Facility: CLINIC | Age: 73
End: 2018-11-16
Payer: MEDICARE

## 2018-11-16 VITALS
WEIGHT: 164 LBS | TEMPERATURE: 98 F | OXYGEN SATURATION: 98 % | DIASTOLIC BLOOD PRESSURE: 63 MMHG | BODY MASS INDEX: 25.74 KG/M2 | HEIGHT: 67 IN | HEART RATE: 93 BPM | SYSTOLIC BLOOD PRESSURE: 102 MMHG

## 2018-11-16 DIAGNOSIS — D61.818 PANCYTOPENIA: ICD-10-CM

## 2018-11-16 DIAGNOSIS — Z79.899 OTHER LONG TERM (CURRENT) DRUG THERAPY: ICD-10-CM

## 2018-11-16 DIAGNOSIS — N18.30 STAGE 3 CHRONIC KIDNEY DISEASE: ICD-10-CM

## 2018-11-16 DIAGNOSIS — D64.9 NORMOCYTIC ANEMIA: ICD-10-CM

## 2018-11-16 DIAGNOSIS — C84.48 PERIPHERAL T CELL LYMPHOMA OF LYMPH NODES OF MULTIPLE SITES: ICD-10-CM

## 2018-11-16 DIAGNOSIS — C84.48 PERIPHERAL T CELL LYMPHOMA OF LYMPH NODES OF MULTIPLE SITES: Primary | ICD-10-CM

## 2018-11-16 LAB
ALBUMIN SERPL BCP-MCNC: 3.6 G/DL
ALP SERPL-CCNC: 51 U/L
ALT SERPL W/O P-5'-P-CCNC: 13 U/L
ANION GAP SERPL CALC-SCNC: 6 MMOL/L
AST SERPL-CCNC: 17 U/L
BASOPHILS # BLD AUTO: 0.01 K/UL
BASOPHILS NFR BLD: 0.3 %
BILIRUB SERPL-MCNC: 0.5 MG/DL
BUN SERPL-MCNC: 25 MG/DL
CALCIUM SERPL-MCNC: 9 MG/DL
CHLORIDE SERPL-SCNC: 108 MMOL/L
CO2 SERPL-SCNC: 28 MMOL/L
CREAT SERPL-MCNC: 1.5 MG/DL
DIFFERENTIAL METHOD: ABNORMAL
EOSINOPHIL # BLD AUTO: 0.1 K/UL
EOSINOPHIL NFR BLD: 3.4 %
ERYTHROCYTE [DISTWIDTH] IN BLOOD BY AUTOMATED COUNT: 13.4 %
EST. GFR  (AFRICAN AMERICAN): 52 ML/MIN/1.73 M^2
EST. GFR  (NON AFRICAN AMERICAN): 45 ML/MIN/1.73 M^2
GLUCOSE SERPL-MCNC: 87 MG/DL
HCT VFR BLD AUTO: 28.8 %
HGB BLD-MCNC: 10 G/DL
LDH SERPL L TO P-CCNC: 130 U/L
LYMPHOCYTES # BLD AUTO: 1.1 K/UL
LYMPHOCYTES NFR BLD: 35.6 %
MCH RBC QN AUTO: 29.8 PG
MCHC RBC AUTO-ENTMCNC: 34.7 G/DL
MCV RBC AUTO: 86 FL
MONOCYTES # BLD AUTO: 0.3 K/UL
MONOCYTES NFR BLD: 10.5 %
NEUTROPHILS # BLD AUTO: 1.5 K/UL
NEUTROPHILS NFR BLD: 50.2 %
PLATELET # BLD AUTO: 131 K/UL
PMV BLD AUTO: 8.9 FL
POTASSIUM SERPL-SCNC: 4.9 MMOL/L
PROT SERPL-MCNC: 7.2 G/DL
RBC # BLD AUTO: 3.36 M/UL
SODIUM SERPL-SCNC: 142 MMOL/L
WBC # BLD AUTO: 2.95 K/UL

## 2018-11-16 PROCEDURE — 3074F SYST BP LT 130 MM HG: CPT | Mod: CPTII,S$GLB,, | Performed by: INTERNAL MEDICINE

## 2018-11-16 PROCEDURE — 80053 COMPREHEN METABOLIC PANEL: CPT | Mod: PO

## 2018-11-16 PROCEDURE — 99999 PR PBB SHADOW E&M-EST. PATIENT-LVL III: CPT | Mod: PBBFAC,,, | Performed by: INTERNAL MEDICINE

## 2018-11-16 PROCEDURE — 1101F PT FALLS ASSESS-DOCD LE1/YR: CPT | Mod: CPTII,S$GLB,, | Performed by: INTERNAL MEDICINE

## 2018-11-16 PROCEDURE — 99215 OFFICE O/P EST HI 40 MIN: CPT | Mod: S$GLB,,, | Performed by: INTERNAL MEDICINE

## 2018-11-16 PROCEDURE — 85025 COMPLETE CBC W/AUTO DIFF WBC: CPT | Mod: PO

## 2018-11-16 PROCEDURE — 36415 COLL VENOUS BLD VENIPUNCTURE: CPT | Mod: PO

## 2018-11-16 PROCEDURE — 83615 LACTATE (LD) (LDH) ENZYME: CPT | Mod: PO

## 2018-11-16 PROCEDURE — 3078F DIAST BP <80 MM HG: CPT | Mod: CPTII,S$GLB,, | Performed by: INTERNAL MEDICINE

## 2018-11-16 NOTE — PROGRESS NOTES
Hematology/Oncology Office Note    Reason for referral: Stage III Peripheral T Cell Lymphoma NOS    CC: Lymphoma    Referred by: Dr Mendieta    Diagnosis:  6/5/14: Stage III  Peripheral T cell lymphoma NOS    5/21/14 Left lower cervical Node:  Supplemental Diagnosis  [facility] DIAGNOSIS:  LYMPH NODES, LEFT NECK, EXCISIONAL BIOPSY ([id number]; 5/21/2014):  Peripheral T-cell lymphoma. See comment.  Interpreted by: GUILHERME Vasques  Note: Please see attached report for comment.    5/26/14 Bone marrow bx:  COMMENT: Concomitantly submitted flow cytometric analysis detects a small subset of atypical T lymphocytes.  This subset, approximately 2% of total, shows expression of CD2, CD5, and CD7 with restriction to CD4, but loss of  surface CD3. It is negative for CD30. These findings are suggestive of involvement by the patient's previously diagnosed atypical T cell population; however, correlation with morphology, particularly the pending lymph nodebiopsy (pending outside consultation) and cytogenetic or molecular data, especially T cell gene rearrangement byPCR, as well as with clinical findings is highly recommended for further characterization of this process.Background B cells consist predominantly of developing hematogones, and the blast gate is not increased. Othersubsets of T cells appear immunophenotypically unremarkable.The flow cytometry finding suggests that the marrow is involved by this atypical T cell process which was seenpreviously on tissue biopsies. Correlation with the findings of the lymph node biopsy report is required for furtherclassification. If needed, the clot section of this biopsy could be sent for T-cell gene rearrangements by PCR;    Tcell gene rearrangement negative by PCR    5/21/14 PET:  Widespread hypermetabolic lymphadenopathy in the neck, chest, and abdomen as detailed consistent with reported diagnosis of lymphoma.    5/23/14 Echo:  LVEF 55-60%        Treatment:  6/9/14: C1D1  CHOP  Excellent clinic response  6/30/14: C2D1  7/8/14 to 7/14/14 Hospitalization for neutropenic fever  Prolonged recovery after cycle 2  8/5/14 cycle 3 with 15% DR  8/26/14: cycle 4  9/16/14: cycle 5   10/7/14 cycle 6   11/5/14 PET:  Findings: There has been significant interval improvement with complete resolution of previously demonstrated hypermetabolic lymph node groups in the neck, chest, and abdomen. Prior tonsillar uptake has likewise resolved. No pathologically enlarged or FDG avid nodes are identified at this time.   6/25/15: Bone marrow biopsy for persistent cytopenias was noted to be negative for pathology      4/17/14 visit: Mr Holland is a 67yo male referred for anemia and cervical/supraclavicular LAD.  Duration is unknown and patient cannot remember having anemia or LAD in the past.  He reports a 20lb wt loss over the past 18 months. He also reports a questionable history of lung cancer; he reports resection (no visible scar from surgery) and denies treatment with adjuvant therapy.  Patient denies pain, night sweast, n/v/d.  He reports sob with exertion and chest pain prolonged exertion.     I have reviewed and updated the HPI, PMHx, Social Hx, Family Hx and treatment history.    Today's Visit:  Patient is without complaints today.  He denies fever, chills, night sweats, weight loss, dizziness or palpitations.    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   No family history of anemia or malignancy    Anemia   Presents for follow-up visit. There has been no abdominal pain, bruising/bleeding easily, confusion, fever,  "light-headedness or palpitations.   Nausea   Pertinent negatives include no abdominal pain, arthralgias, chest pain, chills, coughing, diaphoresis, fatigue, fever, headaches, joint swelling, myalgias, nausea, numbness, rash or vomiting.     Review of Systems   Constitutional: Negative for activity change, appetite change, chills, diaphoresis, fatigue, fever and unexpected weight change.   HENT: Negative for drooling, ear discharge, hearing loss, mouth sores and nosebleeds.    Eyes: Negative.    Respiratory: Negative for apnea, cough, shortness of breath, wheezing and stridor.    Cardiovascular: Negative for chest pain, palpitations and leg swelling.   Gastrointestinal: Negative for abdominal distention, abdominal pain, anal bleeding, blood in stool, nausea, rectal pain and vomiting.   Endocrine: Negative.    Genitourinary: Negative for difficulty urinating, discharge, dysuria, enuresis, flank pain, frequency and penile pain.   Musculoskeletal: Negative for arthralgias, joint swelling and myalgias.   Skin: Negative for rash and wound.   Allergic/Immunologic: Negative.    Neurological: Negative for dizziness, tremors, seizures, syncope, facial asymmetry, speech difficulty, light-headedness, numbness and headaches.   Hematological: Negative for adenopathy. Does not bruise/bleed easily.   Psychiatric/Behavioral: Negative for agitation, behavioral problems, confusion, decreased concentration, dysphoric mood and hallucinations.       Objective:      Vitals:    11/16/18 1010   BP: 102/63   Pulse: 93   Temp: 97.9 °F (36.6 °C)   SpO2: 98%   Weight: 74.4 kg (164 lb 0.4 oz)   Height: 5' 7" (1.702 m)           Physical Exam   Constitutional: He is oriented to person, place, and time. He appears well-developed and well-nourished.  Non-toxic appearance. No distress.   HENT:   Head: Normocephalic and atraumatic.   Right Ear: External ear normal.   Left Ear: External ear normal.   Nose: Nose normal.   Mouth/Throat: Oropharynx is " clear and moist and mucous membranes are normal. Mucous membranes are not pale, not dry and not cyanotic. No oral lesions. Normal dentition. No oropharyngeal exudate or posterior oropharyngeal erythema.   Eyes: Conjunctivae and EOM are normal. Pupils are equal, round, and reactive to light. Right eye exhibits no chemosis. Left eye exhibits no chemosis. Right conjunctiva is not injected. Left conjunctiva is not injected. Pupils are equal.   Neck: Normal range of motion. Neck supple. No tracheal deviation present. No thyroid mass and no thyromegaly present.   Cardiovascular: Normal rate, regular rhythm and intact distal pulses. Exam reveals no gallop and no friction rub.   No murmur heard.  Pulmonary/Chest: Effort normal and breath sounds normal. No respiratory distress. He has no decreased breath sounds. He has no wheezes. He has no rhonchi. He has no rales. He exhibits no tenderness.   Abdominal: Soft. Bowel sounds are normal. He exhibits no distension, no fluid wave and no mass. There is no tenderness. There is no rebound and no guarding.   Musculoskeletal: Normal range of motion.   Lymphadenopathy:        Head (right side): No submental and no submandibular adenopathy present.        Head (left side): No submental and no submandibular adenopathy present.     He has no cervical adenopathy.     He has no axillary adenopathy.        Right: No inguinal and no supraclavicular adenopathy present.        Left: No inguinal and no supraclavicular adenopathy present.   Neurological: He is alert and oriented to person, place, and time. He displays normal reflexes. No cranial nerve deficit. He exhibits normal muscle tone. Coordination normal.   Skin: Skin is warm, dry and intact. Capillary refill takes less than 2 seconds. No abrasion, no bruising, no ecchymosis and no rash noted. Rash is not pustular and not urticarial. He is not diaphoretic.   Psychiatric: He has a normal mood and affect. His behavior is normal. Judgment  and thought content normal.         Lab Results   Component Value Date    WBC 2.95 (L) 11/16/2018    HGB 10.0 (L) 11/16/2018    HCT 28.8 (L) 11/16/2018    MCV 86 11/16/2018     (L) 11/16/2018     Lab Results   Component Value Date    CREATININE 1.5 (H) 11/16/2018    BUN 25 (H) 11/16/2018     11/16/2018    K 4.9 11/16/2018     11/16/2018    CO2 28 11/16/2018     Lab Results   Component Value Date    ALT 13 11/16/2018    AST 17 11/16/2018    ALKPHOS 51 (L) 11/16/2018    BILITOT 0.5 11/16/2018 5/21/14: PET  Widespread hypermetabolic lymphadenopathy in the neck, chest, and abdomen as detailed consistent with reported diagnosis of lymphoma.        Assessment:  Mr Holland is a 67yo male with Stage III Peripheral T Cell Lymphoma NOS  s/p 6 cycles of CHOP (last cycle 10/7/14) with Complete Response per PET.  Of note there was abnormal T cell population in the bone marrow, however; PCR for T Cell receptor gene rearrangement were negative.  Bone marrow biopsy performed on 6/25/15 was negative for significant pathology. Counts continue to be mildly suppressed but remain relatively stable over the previous 3+ years  Patient continues to do very well without evidence of recurrent disease.  He is currently followed in the Nephrology Clinic for CKD.  We will have the patient follow up in 4 months with repeat labs      Plan:  Stage III Peripheral T cell lymphoma NOS: with CR after CHOP x 6  --s/p 6 cycles of CHOP completed in 10/2014  --Repeat PET as clinically indicated  --continue active surveillance    Pancytopenia: Secondary to chemotherapy versus low-grade MDS  --Counts remain stable and we will plan to repeat bone marrow biopsy upon significant progression    Anemia secondary to chronic kidney disease:  --consider initiation of Procrit when hemoglobin falls below 9.5    Renal insufficiency with creatinine 1.7 today:  --renal ultrasound  --referred renal clinic

## 2019-03-07 ENCOUNTER — OFFICE VISIT (OUTPATIENT)
Dept: OPHTHALMOLOGY | Facility: CLINIC | Age: 74
End: 2019-03-07
Payer: MEDICARE

## 2019-03-07 DIAGNOSIS — Z96.1 PSEUDOPHAKIA OF BOTH EYES: Primary | ICD-10-CM

## 2019-03-07 DIAGNOSIS — H04.129 DRY EYE: ICD-10-CM

## 2019-03-07 PROCEDURE — 99999 PR PBB SHADOW E&M-EST. PATIENT-LVL II: ICD-10-PCS | Mod: PBBFAC,,, | Performed by: OPHTHALMOLOGY

## 2019-03-07 PROCEDURE — 92014 PR EYE EXAM, EST PATIENT,COMPREHESV: ICD-10-PCS | Mod: S$GLB,,, | Performed by: OPHTHALMOLOGY

## 2019-03-07 PROCEDURE — 99999 PR PBB SHADOW E&M-EST. PATIENT-LVL II: CPT | Mod: PBBFAC,,, | Performed by: OPHTHALMOLOGY

## 2019-03-07 PROCEDURE — 92014 COMPRE OPH EXAM EST PT 1/>: CPT | Mod: S$GLB,,, | Performed by: OPHTHALMOLOGY

## 2019-03-07 NOTE — PROGRESS NOTES
SUBJECTIVE:   Foreign Holland is a 73 y.o. male   Uncorrected distance visual acuity was 20/20 -1 in the right eye and 20/20 -1 in the left eye.   Chief Complaint   Patient presents with    Annual Exam        HPI:  HPI     The patient states his eyes have been doing good and he denies any ocular   pain at this time. The patient states his eyes will get dry from time to   time but refresh helps. The patient denies a glasses RX for distance   today.    Referred by [facility]    1.PCIOL OS +22.5 SN60WF (distance) 04/12/17  2. PCIOL OD +22.5 SN60WF (distance) 5-24-17  3. HX Iritis OD    Refresh PRN OU    Last edited by Analisa Garcia on 3/7/2019  8:27 AM. (History)        Assessment /Plan :  1. Pseudophakia of both eyes -stable    2. Dry eye -continue refresh prn OU          RTC in 1 year or prn any changes

## 2019-03-15 ENCOUNTER — OFFICE VISIT (OUTPATIENT)
Dept: HEMATOLOGY/ONCOLOGY | Facility: CLINIC | Age: 74
End: 2019-03-15
Payer: MEDICARE

## 2019-03-15 ENCOUNTER — LAB VISIT (OUTPATIENT)
Dept: LAB | Facility: HOSPITAL | Age: 74
End: 2019-03-15
Attending: INTERNAL MEDICINE
Payer: MEDICARE

## 2019-03-15 VITALS
WEIGHT: 162.06 LBS | BODY MASS INDEX: 25.44 KG/M2 | SYSTOLIC BLOOD PRESSURE: 106 MMHG | OXYGEN SATURATION: 99 % | DIASTOLIC BLOOD PRESSURE: 60 MMHG | HEART RATE: 67 BPM | RESPIRATION RATE: 18 BRPM | TEMPERATURE: 97 F | HEIGHT: 67 IN

## 2019-03-15 DIAGNOSIS — Z79.899 OTHER LONG TERM (CURRENT) DRUG THERAPY: ICD-10-CM

## 2019-03-15 DIAGNOSIS — C84.48 PERIPHERAL T CELL LYMPHOMA OF LYMPH NODES OF MULTIPLE SITES: ICD-10-CM

## 2019-03-15 DIAGNOSIS — D64.9 NORMOCYTIC ANEMIA: ICD-10-CM

## 2019-03-15 DIAGNOSIS — D64.9 NORMOCYTIC ANEMIA: Primary | ICD-10-CM

## 2019-03-15 DIAGNOSIS — D61.818 PANCYTOPENIA: ICD-10-CM

## 2019-03-15 LAB
ALBUMIN SERPL BCP-MCNC: 3.7 G/DL
ALP SERPL-CCNC: 57 U/L
ALT SERPL W/O P-5'-P-CCNC: 13 U/L
ANION GAP SERPL CALC-SCNC: 9 MMOL/L
AST SERPL-CCNC: 20 U/L
BASOPHILS # BLD AUTO: 0.01 K/UL
BASOPHILS NFR BLD: 0.4 %
BILIRUB SERPL-MCNC: 0.7 MG/DL
BUN SERPL-MCNC: 32 MG/DL
CALCIUM SERPL-MCNC: 9.1 MG/DL
CHLORIDE SERPL-SCNC: 106 MMOL/L
CO2 SERPL-SCNC: 25 MMOL/L
CREAT SERPL-MCNC: 1.5 MG/DL
DIFFERENTIAL METHOD: ABNORMAL
EOSINOPHIL # BLD AUTO: 0.1 K/UL
EOSINOPHIL NFR BLD: 3.2 %
ERYTHROCYTE [DISTWIDTH] IN BLOOD BY AUTOMATED COUNT: 13.2 %
EST. GFR  (AFRICAN AMERICAN): 52 ML/MIN/1.73 M^2
EST. GFR  (NON AFRICAN AMERICAN): 45 ML/MIN/1.73 M^2
FERRITIN SERPL-MCNC: 695 NG/ML
GLUCOSE SERPL-MCNC: 101 MG/DL
HCT VFR BLD AUTO: 28.9 %
HGB BLD-MCNC: 9.8 G/DL
IMM GRANULOCYTES # BLD AUTO: 0.01 K/UL
IMM GRANULOCYTES NFR BLD AUTO: 0.4 %
IRON SERPL-MCNC: 69 UG/DL
LDH SERPL L TO P-CCNC: 127 U/L
LYMPHOCYTES # BLD AUTO: 0.8 K/UL
LYMPHOCYTES NFR BLD: 34 %
MCH RBC QN AUTO: 29.3 PG
MCHC RBC AUTO-ENTMCNC: 33.9 G/DL
MCV RBC AUTO: 87 FL
MONOCYTES # BLD AUTO: 0.2 K/UL
MONOCYTES NFR BLD: 8.9 %
NEUTROPHILS # BLD AUTO: 1.3 K/UL
NEUTROPHILS NFR BLD: 53.5 %
NRBC BLD-RTO: 0 /100 WBC
PLATELET # BLD AUTO: 152 K/UL
PMV BLD AUTO: 9 FL
POTASSIUM SERPL-SCNC: 4.2 MMOL/L
PROT SERPL-MCNC: 7.6 G/DL
RBC # BLD AUTO: 3.34 M/UL
RETICS/RBC NFR AUTO: 1.7 %
SATURATED IRON: 26 %
SODIUM SERPL-SCNC: 140 MMOL/L
TOTAL IRON BINDING CAPACITY: 262 UG/DL
TRANSFERRIN SERPL-MCNC: 177 MG/DL
VIT B12 SERPL-MCNC: 1450 PG/ML
WBC # BLD AUTO: 2.47 K/UL

## 2019-03-15 PROCEDURE — 82607 VITAMIN B-12: CPT

## 2019-03-15 PROCEDURE — 3074F PR MOST RECENT SYSTOLIC BLOOD PRESSURE < 130 MM HG: ICD-10-PCS | Mod: CPTII,S$GLB,, | Performed by: INTERNAL MEDICINE

## 2019-03-15 PROCEDURE — 3078F DIAST BP <80 MM HG: CPT | Mod: CPTII,S$GLB,, | Performed by: INTERNAL MEDICINE

## 2019-03-15 PROCEDURE — 3078F PR MOST RECENT DIASTOLIC BLOOD PRESSURE < 80 MM HG: ICD-10-PCS | Mod: CPTII,S$GLB,, | Performed by: INTERNAL MEDICINE

## 2019-03-15 PROCEDURE — 1101F PT FALLS ASSESS-DOCD LE1/YR: CPT | Mod: CPTII,S$GLB,, | Performed by: INTERNAL MEDICINE

## 2019-03-15 PROCEDURE — 83540 ASSAY OF IRON: CPT

## 2019-03-15 PROCEDURE — 99999 PR PBB SHADOW E&M-EST. PATIENT-LVL III: CPT | Mod: PBBFAC,,, | Performed by: INTERNAL MEDICINE

## 2019-03-15 PROCEDURE — 85045 AUTOMATED RETICULOCYTE COUNT: CPT

## 2019-03-15 PROCEDURE — 99214 PR OFFICE/OUTPT VISIT, EST, LEVL IV, 30-39 MIN: ICD-10-PCS | Mod: S$GLB,,, | Performed by: INTERNAL MEDICINE

## 2019-03-15 PROCEDURE — 83615 LACTATE (LD) (LDH) ENZYME: CPT

## 2019-03-15 PROCEDURE — 99214 OFFICE O/P EST MOD 30 MIN: CPT | Mod: S$GLB,,, | Performed by: INTERNAL MEDICINE

## 2019-03-15 PROCEDURE — 3074F SYST BP LT 130 MM HG: CPT | Mod: CPTII,S$GLB,, | Performed by: INTERNAL MEDICINE

## 2019-03-15 PROCEDURE — 82728 ASSAY OF FERRITIN: CPT

## 2019-03-15 PROCEDURE — 99999 PR PBB SHADOW E&M-EST. PATIENT-LVL III: ICD-10-PCS | Mod: PBBFAC,,, | Performed by: INTERNAL MEDICINE

## 2019-03-15 PROCEDURE — 36415 COLL VENOUS BLD VENIPUNCTURE: CPT

## 2019-03-15 PROCEDURE — 85025 COMPLETE CBC W/AUTO DIFF WBC: CPT

## 2019-03-15 PROCEDURE — 1101F PR PT FALLS ASSESS DOC 0-1 FALLS W/OUT INJ PAST YR: ICD-10-PCS | Mod: CPTII,S$GLB,, | Performed by: INTERNAL MEDICINE

## 2019-03-15 PROCEDURE — 80053 COMPREHEN METABOLIC PANEL: CPT

## 2019-03-15 NOTE — PROGRESS NOTES
Hematology/Oncology Office Note    Reason for referral: Stage III Peripheral T Cell Lymphoma NOS    CC: Lymphoma    Referred by: Dr Mendieta    Diagnosis:  6/5/14: Stage III  Peripheral T cell lymphoma NOS    5/21/14 Left lower cervical Node:  Supplemental Diagnosis  [place] DIAGNOSIS:  LYMPH NODES, LEFT NECK, EXCISIONAL BIOPSY ([id number]; 5/21/2014):  Peripheral T-cell lymphoma. See comment.  Interpreted by: GUILHERME Vasques  Note: Please see attached report for comment.    5/26/14 Bone marrow bx:  COMMENT: Concomitantly submitted flow cytometric analysis detects a small subset of atypical T lymphocytes.  This subset, approximately 2% of total, shows expression of CD2, CD5, and CD7 with restriction to CD4, but loss of  surface CD3. It is negative for CD30. These findings are suggestive of involvement by the patient's previously diagnosed atypical T cell population; however, correlation with morphology, particularly the pending lymph nodebiopsy (pending outside consultation) and cytogenetic or molecular data, especially T cell gene rearrangement byPCR, as well as with clinical findings is highly recommended for further characterization of this process.Background B cells consist predominantly of developing hematogones, and the blast gate is not increased. Othersubsets of T cells appear immunophenotypically unremarkable.The flow cytometry finding suggests that the marrow is involved by this atypical T cell process which was seenpreviously on tissue biopsies. Correlation with the findings of the lymph node biopsy report is required for furtherclassification. If needed, the clot section of this biopsy could be sent for T-cell gene rearrangements by PCR;    Tcell gene rearrangement negative by PCR    5/21/14 PET:  Widespread hypermetabolic lymphadenopathy in the neck, chest, and abdomen as detailed consistent with reported diagnosis of lymphoma.    5/23/14 Echo:  LVEF 55-60%        Treatment:  6/9/14: C1D1  CHOP  Excellent clinic response  6/30/14: C2D1  7/8/14 to 7/14/14 Hospitalization for neutropenic fever  Prolonged recovery after cycle 2  8/5/14 cycle 3 with 15% DR  8/26/14: cycle 4  9/16/14: cycle 5   10/7/14 cycle 6   11/5/14 PET:  Findings: There has been significant interval improvement with complete resolution of previously demonstrated hypermetabolic lymph node groups in the neck, chest, and abdomen. Prior tonsillar uptake has likewise resolved. No pathologically enlarged or FDG avid nodes are identified at this time.   6/25/15: Bone marrow biopsy for persistent cytopenias was noted to be negative for pathology      4/17/14 visit: Mr Holland is a 67yo male referred for anemia and cervical/supraclavicular LAD.  Duration is unknown and patient cannot remember having anemia or LAD in the past.  He reports a 20lb wt loss over the past 18 months. He also reports a questionable history of lung cancer; he reports resection (no visible scar from surgery) and denies treatment with adjuvant therapy.  Patient denies pain, night sweast, n/v/d.  He reports sob with exertion and chest pain prolonged exertion.     I have reviewed and updated the HPI, PMHx, Social Hx, Family Hx and treatment history.    Today's Visit:  Patient is without complaints today.  He denies fever, chills, night sweats, weight loss, dizziness or palpitations.    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   No family history of anemia or malignancy    Anemia   Presents for follow-up visit. There has been no bruising/bleeding easily, confusion, fever or light-headedness.  "  Nausea   Pertinent negatives include no arthralgias, chest pain, chills, congestion, coughing, diaphoresis, fatigue, fever, joint swelling, myalgias, nausea, numbness, rash or vomiting.     Review of Systems   Constitutional: Negative for activity change, appetite change, chills, diaphoresis, fatigue, fever and unexpected weight change.   HENT: Negative for congestion, dental problem, drooling, ear discharge, hearing loss, mouth sores, nosebleeds, sinus pressure and sinus pain.    Eyes: Negative.    Respiratory: Negative for apnea, cough, choking, chest tightness, shortness of breath and stridor.    Cardiovascular: Negative for chest pain and leg swelling.   Gastrointestinal: Negative for abdominal distention, anal bleeding, blood in stool, nausea, rectal pain and vomiting.   Endocrine: Negative.    Genitourinary: Negative for difficulty urinating, discharge, flank pain, frequency, penile pain, penile swelling, scrotal swelling, testicular pain and urgency.   Musculoskeletal: Negative for arthralgias, back pain, gait problem, joint swelling and myalgias.   Skin: Negative for rash and wound.   Allergic/Immunologic: Negative.    Neurological: Negative for dizziness, tremors, seizures, syncope, facial asymmetry, speech difficulty, light-headedness and numbness.   Hematological: Negative for adenopathy. Does not bruise/bleed easily.   Psychiatric/Behavioral: Negative for agitation, behavioral problems, confusion, decreased concentration, dysphoric mood and hallucinations.       Objective:      Vitals:    03/15/19 0927   BP: 106/60   Pulse: 67   Resp: 18   Temp: 97.4 °F (36.3 °C)   TempSrc: Oral   SpO2: 99%   Weight: 73.5 kg (162 lb 0.6 oz)   Height: 5' 7" (1.702 m)           Physical Exam   Constitutional: He is oriented to person, place, and time. He appears well-developed and well-nourished.  Non-toxic appearance. No distress.   HENT:   Head: Normocephalic and atraumatic.   Right Ear: External ear normal.   Left " Ear: External ear normal.   Nose: Nose normal.   Mouth/Throat: Oropharynx is clear and moist and mucous membranes are normal. Mucous membranes are not pale, not dry and not cyanotic. No oral lesions. Normal dentition. No oropharyngeal exudate or posterior oropharyngeal erythema.   Eyes: Conjunctivae and EOM are normal. Pupils are equal, round, and reactive to light. Right eye exhibits no chemosis. Left eye exhibits no chemosis. Right conjunctiva is not injected. Left conjunctiva is not injected. Pupils are equal.   Neck: Normal range of motion. Neck supple. No tracheal deviation present. No thyroid mass and no thyromegaly present.   Cardiovascular: Normal rate, regular rhythm and intact distal pulses. Exam reveals no gallop and no friction rub.   No murmur heard.  Pulmonary/Chest: Effort normal and breath sounds normal. No respiratory distress. He has no decreased breath sounds. He has no wheezes. He has no rhonchi. He has no rales. He exhibits no tenderness.   Abdominal: Soft. Bowel sounds are normal. He exhibits no distension, no fluid wave and no mass. There is no tenderness. There is no rebound and no guarding.   Musculoskeletal: Normal range of motion.   Lymphadenopathy:        Head (right side): No submental and no submandibular adenopathy present.        Head (left side): No submental and no submandibular adenopathy present.     He has no cervical adenopathy.     He has no axillary adenopathy.        Right: No inguinal and no supraclavicular adenopathy present.        Left: No inguinal and no supraclavicular adenopathy present.   Neurological: He is alert and oriented to person, place, and time. He displays normal reflexes. No cranial nerve deficit. He exhibits normal muscle tone. Coordination normal.   Skin: Skin is warm, dry and intact. Capillary refill takes less than 2 seconds. No rash noted. He is not diaphoretic. No cyanosis. No pallor. Nails show no clubbing.   Psychiatric: He has a normal mood and  affect. His behavior is normal. Judgment and thought content normal.         Lab Results   Component Value Date    WBC 2.47 (L) 03/15/2019    HGB 9.8 (L) 03/15/2019    HCT 28.9 (L) 03/15/2019    MCV 87 03/15/2019     03/15/2019     Lab Results   Component Value Date    CREATININE 1.5 (H) 03/15/2019    BUN 32 (H) 03/15/2019     03/15/2019    K 4.2 03/15/2019     03/15/2019    CO2 25 03/15/2019     Lab Results   Component Value Date    ALT 13 03/15/2019    AST 20 03/15/2019    ALKPHOS 57 03/15/2019    BILITOT 0.7 03/15/2019           5/21/14: PET  Widespread hypermetabolic lymphadenopathy in the neck, chest, and abdomen as detailed consistent with reported diagnosis of lymphoma.        Assessment:  Mr Holland is a 69yo male with Stage III Peripheral T Cell Lymphoma NOS  s/p 6 cycles of CHOP (last cycle 10/7/14) with Complete Response per PET.  Of note there was abnormal T cell population in the bone marrow, however; PCR for T Cell receptor gene rearrangement were negative.  Bone marrow biopsy performed on 6/25/15 was negative for significant pathology. Counts continue to be mildly suppressed but remain relatively stable over the previous 3+ years  He continues to do very well clinically with slight worsening of neutropenia and anemia noted today.  I believe his anemia is multifactorial with chronic kidney disease contributing therefore we will have a short interim follow-up with repeat labs including iron studies.        Plan:  Stage III Peripheral T cell lymphoma NOS: with CR after CHOP x 6  --s/p 6 cycles of CHOP completed in 10/2014  --Repeat PET as clinically indicated  --continue active surveillance    Pancytopenia: Secondary to chemotherapy versus low-grade MDS  Anemia likely multifactorial with chronic kidney disease contributing  --short interval follow-up with repeat CBC/iron studies  --Counts remain stable and we will plan to repeat bone marrow biopsy upon significant progression    Anemia  secondary to chronic kidney disease:  --consider initiation of Procrit when hemoglobin falls below 9.5    Renal insufficiency with creatinine 1.7 today:  --renal ultrasound  --referred renal clinic

## 2019-04-01 RX ORDER — RANOLAZINE 1000 MG/1
TABLET, FILM COATED, EXTENDED RELEASE ORAL
Qty: 60 TABLET | Refills: 5 | Status: SHIPPED | OUTPATIENT
Start: 2019-04-01 | End: 2019-12-07 | Stop reason: SDUPTHER

## 2019-05-17 ENCOUNTER — OFFICE VISIT (OUTPATIENT)
Dept: HEMATOLOGY/ONCOLOGY | Facility: CLINIC | Age: 74
End: 2019-05-17
Payer: MEDICARE

## 2019-05-17 ENCOUNTER — LAB VISIT (OUTPATIENT)
Dept: LAB | Facility: HOSPITAL | Age: 74
End: 2019-05-17
Payer: MEDICARE

## 2019-05-17 VITALS
RESPIRATION RATE: 18 BRPM | HEART RATE: 55 BPM | SYSTOLIC BLOOD PRESSURE: 124 MMHG | DIASTOLIC BLOOD PRESSURE: 65 MMHG | WEIGHT: 163.81 LBS | TEMPERATURE: 98 F | OXYGEN SATURATION: 96 % | HEIGHT: 67 IN | BODY MASS INDEX: 25.71 KG/M2

## 2019-05-17 DIAGNOSIS — Z79.899 OTHER LONG TERM (CURRENT) DRUG THERAPY: ICD-10-CM

## 2019-05-17 DIAGNOSIS — C84.48 PERIPHERAL T CELL LYMPHOMA OF LYMPH NODES OF MULTIPLE SITES: Primary | ICD-10-CM

## 2019-05-17 DIAGNOSIS — D64.9 NORMOCYTIC ANEMIA: ICD-10-CM

## 2019-05-17 DIAGNOSIS — N18.30 STAGE 3 CHRONIC KIDNEY DISEASE: ICD-10-CM

## 2019-05-17 LAB
ALBUMIN SERPL BCP-MCNC: 3.6 G/DL (ref 3.5–5.2)
ALP SERPL-CCNC: 50 U/L (ref 55–135)
ALT SERPL W/O P-5'-P-CCNC: 15 U/L (ref 10–44)
ANION GAP SERPL CALC-SCNC: 10 MMOL/L (ref 8–16)
AST SERPL-CCNC: 18 U/L (ref 10–40)
BASOPHILS # BLD AUTO: 0.01 K/UL (ref 0–0.2)
BASOPHILS NFR BLD: 0.3 % (ref 0–1.9)
BILIRUB SERPL-MCNC: 0.8 MG/DL (ref 0.1–1)
BUN SERPL-MCNC: 27 MG/DL (ref 8–23)
CALCIUM SERPL-MCNC: 8.9 MG/DL (ref 8.7–10.5)
CHLORIDE SERPL-SCNC: 106 MMOL/L (ref 95–110)
CO2 SERPL-SCNC: 24 MMOL/L (ref 23–29)
CREAT SERPL-MCNC: 1.6 MG/DL (ref 0.5–1.4)
DIFFERENTIAL METHOD: ABNORMAL
EOSINOPHIL # BLD AUTO: 0.1 K/UL (ref 0–0.5)
EOSINOPHIL NFR BLD: 2 % (ref 0–8)
ERYTHROCYTE [DISTWIDTH] IN BLOOD BY AUTOMATED COUNT: 13.5 % (ref 11.5–14.5)
EST. GFR  (AFRICAN AMERICAN): 48 ML/MIN/1.73 M^2
EST. GFR  (NON AFRICAN AMERICAN): 42 ML/MIN/1.73 M^2
FERRITIN SERPL-MCNC: 670 NG/ML (ref 20–300)
GLUCOSE SERPL-MCNC: 105 MG/DL (ref 70–110)
HCT VFR BLD AUTO: 27.5 % (ref 40–54)
HGB BLD-MCNC: 9.6 G/DL (ref 14–18)
IMM GRANULOCYTES # BLD AUTO: 0.01 K/UL (ref 0–0.04)
IMM GRANULOCYTES NFR BLD AUTO: 0.3 % (ref 0–0.5)
IRON SERPL-MCNC: 72 UG/DL (ref 45–160)
LDH SERPL L TO P-CCNC: 150 U/L (ref 110–260)
LYMPHOCYTES # BLD AUTO: 1 K/UL (ref 1–4.8)
LYMPHOCYTES NFR BLD: 32.3 % (ref 18–48)
MCH RBC QN AUTO: 30.2 PG (ref 27–31)
MCHC RBC AUTO-ENTMCNC: 34.9 G/DL (ref 32–36)
MCV RBC AUTO: 87 FL (ref 82–98)
MONOCYTES # BLD AUTO: 0.3 K/UL (ref 0.3–1)
MONOCYTES NFR BLD: 8.3 % (ref 4–15)
NEUTROPHILS # BLD AUTO: 1.7 K/UL (ref 1.8–7.7)
NEUTROPHILS NFR BLD: 57.1 % (ref 38–73)
NRBC BLD-RTO: 0 /100 WBC
PLATELET # BLD AUTO: 149 K/UL (ref 150–350)
PMV BLD AUTO: 9 FL (ref 9.2–12.9)
POTASSIUM SERPL-SCNC: 4.1 MMOL/L (ref 3.5–5.1)
PROT SERPL-MCNC: 7.2 G/DL (ref 6–8.4)
RBC # BLD AUTO: 3.18 M/UL (ref 4.6–6.2)
RETICS/RBC NFR AUTO: 2 % (ref 0.4–2)
SATURATED IRON: 27 % (ref 20–50)
SODIUM SERPL-SCNC: 140 MMOL/L (ref 136–145)
TOTAL IRON BINDING CAPACITY: 262 UG/DL (ref 250–450)
TRANSFERRIN SERPL-MCNC: 177 MG/DL (ref 200–375)
TSH SERPL DL<=0.005 MIU/L-ACNC: 0.5 UIU/ML (ref 0.4–4)
VIT B12 SERPL-MCNC: 744 PG/ML (ref 210–950)
WBC # BLD AUTO: 3 K/UL (ref 3.9–12.7)

## 2019-05-17 PROCEDURE — 99214 PR OFFICE/OUTPT VISIT, EST, LEVL IV, 30-39 MIN: ICD-10-PCS | Mod: S$GLB,,, | Performed by: INTERNAL MEDICINE

## 2019-05-17 PROCEDURE — 36415 COLL VENOUS BLD VENIPUNCTURE: CPT

## 2019-05-17 PROCEDURE — 82728 ASSAY OF FERRITIN: CPT

## 2019-05-17 PROCEDURE — 99999 PR PBB SHADOW E&M-EST. PATIENT-LVL III: CPT | Mod: PBBFAC,,, | Performed by: INTERNAL MEDICINE

## 2019-05-17 PROCEDURE — 99999 PR PBB SHADOW E&M-EST. PATIENT-LVL III: ICD-10-PCS | Mod: PBBFAC,,, | Performed by: INTERNAL MEDICINE

## 2019-05-17 PROCEDURE — 99499 UNLISTED E&M SERVICE: CPT | Mod: S$GLB,,, | Performed by: INTERNAL MEDICINE

## 2019-05-17 PROCEDURE — 85045 AUTOMATED RETICULOCYTE COUNT: CPT

## 2019-05-17 PROCEDURE — 3078F PR MOST RECENT DIASTOLIC BLOOD PRESSURE < 80 MM HG: ICD-10-PCS | Mod: CPTII,S$GLB,, | Performed by: INTERNAL MEDICINE

## 2019-05-17 PROCEDURE — 84443 ASSAY THYROID STIM HORMONE: CPT

## 2019-05-17 PROCEDURE — 1101F PT FALLS ASSESS-DOCD LE1/YR: CPT | Mod: CPTII,S$GLB,, | Performed by: INTERNAL MEDICINE

## 2019-05-17 PROCEDURE — 85025 COMPLETE CBC W/AUTO DIFF WBC: CPT

## 2019-05-17 PROCEDURE — 80053 COMPREHEN METABOLIC PANEL: CPT

## 2019-05-17 PROCEDURE — 99214 OFFICE O/P EST MOD 30 MIN: CPT | Mod: S$GLB,,, | Performed by: INTERNAL MEDICINE

## 2019-05-17 PROCEDURE — 1101F PR PT FALLS ASSESS DOC 0-1 FALLS W/OUT INJ PAST YR: ICD-10-PCS | Mod: CPTII,S$GLB,, | Performed by: INTERNAL MEDICINE

## 2019-05-17 PROCEDURE — 83615 LACTATE (LD) (LDH) ENZYME: CPT

## 2019-05-17 PROCEDURE — 99499 RISK ADDL DX/OHS AUDIT: ICD-10-PCS | Mod: S$GLB,,, | Performed by: INTERNAL MEDICINE

## 2019-05-17 PROCEDURE — 3078F DIAST BP <80 MM HG: CPT | Mod: CPTII,S$GLB,, | Performed by: INTERNAL MEDICINE

## 2019-05-17 PROCEDURE — 82607 VITAMIN B-12: CPT

## 2019-05-17 PROCEDURE — 3074F SYST BP LT 130 MM HG: CPT | Mod: CPTII,S$GLB,, | Performed by: INTERNAL MEDICINE

## 2019-05-17 PROCEDURE — 83540 ASSAY OF IRON: CPT

## 2019-05-17 PROCEDURE — 3074F PR MOST RECENT SYSTOLIC BLOOD PRESSURE < 130 MM HG: ICD-10-PCS | Mod: CPTII,S$GLB,, | Performed by: INTERNAL MEDICINE

## 2019-05-17 NOTE — PROGRESS NOTES
Hematology/Oncology Office Note    Reason for referral: Stage III Peripheral T Cell Lymphoma NOS    CC: Lymphoma    Referred by: Dr Mendieta    Diagnosis:  6/5/14: Stage III  Peripheral T cell lymphoma NOS    5/21/14 Left lower cervical Node:  Supplemental Diagnosis  [place] DIAGNOSIS:  LYMPH NODES, LEFT NECK, EXCISIONAL BIOPSY ([id number]; 5/21/2014):  Peripheral T-cell lymphoma. See comment.  Interpreted by: GUILHERME Vasques  Note: Please see attached report for comment.    5/26/14 Bone marrow bx:  COMMENT: Concomitantly submitted flow cytometric analysis detects a small subset of atypical T lymphocytes.  This subset, approximately 2% of total, shows expression of CD2, CD5, and CD7 with restriction to CD4, but loss of  surface CD3. It is negative for CD30. These findings are suggestive of involvement by the patient's previously diagnosed atypical T cell population; however, correlation with morphology, particularly the pending lymph nodebiopsy (pending outside consultation) and cytogenetic or molecular data, especially T cell gene rearrangement byPCR, as well as with clinical findings is highly recommended for further characterization of this process.Background B cells consist predominantly of developing hematogones, and the blast gate is not increased. Othersubsets of T cells appear immunophenotypically unremarkable.The flow cytometry finding suggests that the marrow is involved by this atypical T cell process which was seenpreviously on tissue biopsies. Correlation with the findings of the lymph node biopsy report is required for furtherclassification. If needed, the clot section of this biopsy could be sent for T-cell gene rearrangements by PCR;    Tcell gene rearrangement negative by PCR    5/21/14 PET:  Widespread hypermetabolic lymphadenopathy in the neck, chest, and abdomen as detailed consistent with reported diagnosis of lymphoma.    5/23/14 Echo:  LVEF 55-60%        Treatment:  6/9/14: C1D1  CHOP  Excellent clinic response  6/30/14: C2D1  7/8/14 to 7/14/14 Hospitalization for neutropenic fever  Prolonged recovery after cycle 2  8/5/14 cycle 3 with 15% DR  8/26/14: cycle 4  9/16/14: cycle 5   10/7/14 cycle 6   11/5/14 PET:  Findings: There has been significant interval improvement with complete resolution of previously demonstrated hypermetabolic lymph node groups in the neck, chest, and abdomen. Prior tonsillar uptake has likewise resolved. No pathologically enlarged or FDG avid nodes are identified at this time.   6/25/15: Bone marrow biopsy for persistent cytopenias was noted to be negative for pathology      4/17/14 visit: Mr Holland is a 69yo male referred for anemia and cervical/supraclavicular LAD.  Duration is unknown and patient cannot remember having anemia or LAD in the past.  He reports a 20lb wt loss over the past 18 months. He also reports a questionable history of lung cancer; he reports resection (no visible scar from surgery) and denies treatment with adjuvant therapy.  Patient denies pain, night sweast, n/v/d.  He reports sob with exertion and chest pain prolonged exertion.     I have reviewed and updated the HPI, PMHx, Social Hx, Family Hx and treatment history.    Today's Visit:  Patient is without complaints today.  He denies fever, chills night sweats, weight loss, or other constitutional symptoms.    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   No family history of anemia or malignancy    Anemia   Presents for follow-up visit. There has been no bruising/bleeding easily, fever or light-headedness.   Nausea  "  Pertinent negatives include no arthralgias, chest pain, congestion, coughing, fatigue, fever, headaches, joint swelling, myalgias, nausea, numbness, rash or vomiting.     Review of Systems   Constitutional: Negative for activity change, appetite change, fatigue, fever and unexpected weight change.   HENT: Negative for congestion, drooling, ear pain, facial swelling, hearing loss, mouth sores, nosebleeds, sinus pressure and sinus pain.    Eyes: Negative.    Respiratory: Negative for apnea, cough, shortness of breath and stridor.    Cardiovascular: Negative for chest pain and leg swelling.   Gastrointestinal: Negative for abdominal distention, anal bleeding, blood in stool, constipation, diarrhea, nausea, rectal pain and vomiting.   Endocrine: Negative.    Genitourinary: Negative for difficulty urinating, discharge, flank pain, frequency, penile pain, penile swelling, scrotal swelling, testicular pain and urgency.   Musculoskeletal: Negative for arthralgias, back pain, gait problem, joint swelling, myalgias and neck stiffness.   Skin: Negative for rash and wound.   Allergic/Immunologic: Negative.    Neurological: Negative for dizziness, seizures, syncope, facial asymmetry, speech difficulty, light-headedness, numbness and headaches.   Hematological: Negative for adenopathy. Does not bruise/bleed easily.   Psychiatric/Behavioral: Negative for agitation, decreased concentration, dysphoric mood, hallucinations, self-injury and sleep disturbance. The patient is not nervous/anxious and is not hyperactive.        Objective:      Vitals:    05/17/19 0857   BP: 124/65   Pulse: (!) 55   Resp: 18   Temp: 97.5 °F (36.4 °C)   TempSrc: Oral   SpO2: 96%   Weight: 74.3 kg (163 lb 12.8 oz)   Height: 5' 7" (1.702 m)           Physical Exam   Constitutional: He is oriented to person, place, and time. He appears well-developed and well-nourished.  Non-toxic appearance. No distress.   HENT:   Head: Normocephalic and atraumatic.   Right " Ear: External ear normal.   Left Ear: External ear normal.   Nose: Nose normal.   Mouth/Throat: Oropharynx is clear and moist and mucous membranes are normal. Mucous membranes are not pale, not dry and not cyanotic. No oral lesions. Normal dentition. No oropharyngeal exudate or posterior oropharyngeal erythema.   Eyes: Pupils are equal, round, and reactive to light. Conjunctivae and EOM are normal. Right eye exhibits no chemosis. Left eye exhibits no chemosis. Right conjunctiva is not injected. Left conjunctiva is not injected. Pupils are equal.   Neck: Normal range of motion. Neck supple. No tracheal deviation present. No thyroid mass and no thyromegaly present.   Cardiovascular: Normal rate, regular rhythm and intact distal pulses. Exam reveals no gallop and no friction rub.   No murmur heard.  Pulmonary/Chest: Effort normal and breath sounds normal. No stridor. No respiratory distress. He has no decreased breath sounds. He has no wheezes. He has no rhonchi. He has no rales. He exhibits no tenderness.   Abdominal: Soft. Bowel sounds are normal. He exhibits no distension, no fluid wave and no mass. There is no tenderness. There is no rebound and no guarding.   Musculoskeletal: Normal range of motion.   Lymphadenopathy:        Head (right side): No submental and no submandibular adenopathy present.        Head (left side): No submental and no submandibular adenopathy present.     He has no cervical adenopathy.     He has no axillary adenopathy.        Right: No inguinal and no supraclavicular adenopathy present.        Left: No inguinal and no supraclavicular adenopathy present.   Neurological: He is alert and oriented to person, place, and time. He displays normal reflexes. No cranial nerve deficit. He exhibits normal muscle tone. Coordination normal.   Skin: Skin is warm, dry and intact. Capillary refill takes less than 2 seconds. No abrasion, no bruising, no ecchymosis and no rash noted. Rash is not pustular and  not urticarial. No cyanosis. Nails show no clubbing.   Psychiatric: He has a normal mood and affect. His behavior is normal. Judgment and thought content normal.         Lab Results   Component Value Date    WBC 3.00 (L) 05/17/2019    HGB 9.6 (L) 05/17/2019    HCT 27.5 (L) 05/17/2019    MCV 87 05/17/2019     (L) 05/17/2019     Lab Results   Component Value Date    CREATININE 1.6 (H) 05/17/2019    BUN 27 (H) 05/17/2019     05/17/2019    K 4.1 05/17/2019     05/17/2019    CO2 24 05/17/2019     Lab Results   Component Value Date    ALT 15 05/17/2019    AST 18 05/17/2019    ALKPHOS 50 (L) 05/17/2019    BILITOT 0.8 05/17/2019 5/21/14: PET  Widespread hypermetabolic lymphadenopathy in the neck, chest, and abdomen as detailed consistent with reported diagnosis of lymphoma.        Assessment:  Mr Holland is a 67yo male with Stage III Peripheral T Cell Lymphoma NOS  s/p 6 cycles of CHOP (last cycle 10/7/14) with Complete Response per PET.  Of note there was abnormal T cell population in the bone marrow, however; PCR for T Cell receptor gene rearrangement were negative.  Bone marrow biopsy performed on 6/25/15 was negative for significant pathology. Counts continue to be mildly suppressed but remain relatively stable over the previous 3+ years  He continues to do very well clinically/remains asymptomatic with relatively stable CBC.  We will arrange close follow-up with plans to repeat bone marrow biopsy if anemia/cytopenias worsen      Plan:  Stage III Peripheral T cell lymphoma NOS: with CR after CHOP x 6  --s/p 6 cycles of CHOP completed in 10/2014  --Repeat PET as clinically indicated  --continue active surveillance    Pancytopenia: Secondary to chemotherapy versus low-grade MDS  Anemia likely multifactorial with chronic kidney disease contributing  --short interval follow-up with repeat CBC/iron studies/SPEP/serum free light chains  --continue to monitor closely and plan repeat bone marrow  biopsy upon significant progression      Anemia secondary to chronic kidney disease:  --consider initiation of Procrit when hemoglobin falls below 9.5    Renal insufficiency with creatinine 1.7 today:  --referred renal clinic

## 2019-05-28 NOTE — PROGRESS NOTES
Patient, Foreign Holland (MRN #[id number]), presented with a recent Platelet count less than 150 K/uL consistent with the definition of thrombocytopenia (ICD10 - D69.6).    Platelets   Date Value Ref Range Status   05/17/2019 149 (L) 150 - 350 K/uL Final     The patient's thrombocytopenia was monitored, evaluated, addressed and/or treated. This addendum to the medical record is made on 05/28/2019.

## 2019-07-17 ENCOUNTER — HOSPITAL ENCOUNTER (INPATIENT)
Facility: HOSPITAL | Age: 74
LOS: 5 days | Discharge: NURSING, REHAB OR LONG-TERM CARE | DRG: 493 | End: 2019-07-22
Attending: EMERGENCY MEDICINE | Admitting: HOSPITALIST
Payer: MEDICARE

## 2019-07-17 ENCOUNTER — TELEPHONE (OUTPATIENT)
Dept: NEUROSURGERY | Facility: CLINIC | Age: 74
End: 2019-07-17

## 2019-07-17 ENCOUNTER — OFFICE VISIT (OUTPATIENT)
Dept: INTERNAL MEDICINE | Facility: CLINIC | Age: 74
DRG: 493 | End: 2019-07-17
Payer: MEDICARE

## 2019-07-17 ENCOUNTER — TELEPHONE (OUTPATIENT)
Dept: ORTHOPEDICS | Facility: CLINIC | Age: 74
End: 2019-07-17

## 2019-07-17 ENCOUNTER — HOSPITAL ENCOUNTER (OUTPATIENT)
Dept: RADIOLOGY | Facility: HOSPITAL | Age: 74
Discharge: HOME | DRG: 493 | End: 2019-07-17
Attending: NURSE PRACTITIONER
Payer: MEDICARE

## 2019-07-17 VITALS
OXYGEN SATURATION: 95 % | DIASTOLIC BLOOD PRESSURE: 82 MMHG | BODY MASS INDEX: 25.96 KG/M2 | HEIGHT: 67 IN | SYSTOLIC BLOOD PRESSURE: 133 MMHG | HEART RATE: 64 BPM | TEMPERATURE: 97 F | WEIGHT: 165.38 LBS
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
## 2019-07-17 DIAGNOSIS — S82.402A TIBIA/FIBULA FRACTURE, LEFT, CLOSED, INITIAL ENCOUNTER: Primary | ICD-10-CM

## 2019-07-17 DIAGNOSIS — M79.662 PAIN IN LEFT LOWER LEG: ICD-10-CM

## 2019-07-17 DIAGNOSIS — S82.872A CLOSED DISPLACED PILON FRACTURE OF LEFT TIBIA, INITIAL ENCOUNTER: Primary | ICD-10-CM

## 2019-07-17 DIAGNOSIS — S82.832A OTHER CLOSED FRACTURE OF DISTAL END OF LEFT FIBULA, INITIAL ENCOUNTER: ICD-10-CM

## 2019-07-17 DIAGNOSIS — S82.202A TIBIA/FIBULA FRACTURE, LEFT, CLOSED, INITIAL ENCOUNTER: Primary | ICD-10-CM

## 2019-07-17 DIAGNOSIS — S82.252A CLOSED DISPLACED COMMINUTED FRACTURE OF SHAFT OF LEFT TIBIA, INITIAL ENCOUNTER: ICD-10-CM

## 2019-07-17 DIAGNOSIS — Z01.818 PRE-OP EVALUATION: ICD-10-CM

## 2019-07-17 DIAGNOSIS — N28.9 RENAL INSUFFICIENCY: ICD-10-CM

## 2019-07-17 DIAGNOSIS — I50.9 CHF (CONGESTIVE HEART FAILURE): ICD-10-CM

## 2019-07-17 DIAGNOSIS — D64.9 ANEMIA, UNSPECIFIED TYPE: ICD-10-CM

## 2019-07-17 LAB
ALBUMIN SERPL BCP-MCNC: 3.9 G/DL (ref 3.5–5.2)
ALP SERPL-CCNC: 49 U/L (ref 55–135)
ALT SERPL W/O P-5'-P-CCNC: 17 U/L (ref 10–44)
ANION GAP SERPL CALC-SCNC: 12 MMOL/L (ref 8–16)
APTT BLDCRRT: 24.6 SEC (ref 21–32)
AST SERPL-CCNC: 25 U/L (ref 10–40)
BASOPHILS # BLD AUTO: 0.01 K/UL (ref 0–0.2)
BASOPHILS NFR BLD: 0.2 % (ref 0–1.9)
BILIRUB SERPL-MCNC: 0.7 MG/DL (ref 0.1–1)
BUN SERPL-MCNC: 33 MG/DL (ref 8–23)
CALCIUM SERPL-MCNC: 9 MG/DL (ref 8.7–10.5)
CHLORIDE SERPL-SCNC: 103 MMOL/L (ref 95–110)
CO2 SERPL-SCNC: 22 MMOL/L (ref 23–29)
CREAT SERPL-MCNC: 1.8 MG/DL (ref 0.5–1.4)
DIFFERENTIAL METHOD: ABNORMAL
EOSINOPHIL # BLD AUTO: 0 K/UL (ref 0–0.5)
EOSINOPHIL NFR BLD: 0.2 % (ref 0–8)
ERYTHROCYTE [DISTWIDTH] IN BLOOD BY AUTOMATED COUNT: 13.4 % (ref 11.5–14.5)
EST. GFR  (AFRICAN AMERICAN): 42 ML/MIN/1.73 M^2
EST. GFR  (NON AFRICAN AMERICAN): 36 ML/MIN/1.73 M^2
GLUCOSE SERPL-MCNC: 105 MG/DL (ref 70–110)
HCT VFR BLD AUTO: 24.4 % (ref 40–54)
HGB BLD-MCNC: 8.9 G/DL (ref 14–18)
INR PPP: 1 (ref 0.8–1.2)
LYMPHOCYTES # BLD AUTO: 0.8 K/UL (ref 1–4.8)
LYMPHOCYTES NFR BLD: 15.2 % (ref 18–48)
MCH RBC QN AUTO: 30.3 PG (ref 27–31)
MCHC RBC AUTO-ENTMCNC: 36.5 G/DL (ref 32–36)
MCV RBC AUTO: 83 FL (ref 82–98)
MONOCYTES # BLD AUTO: 0.5 K/UL (ref 0.3–1)
MONOCYTES NFR BLD: 9.1 % (ref 4–15)
NEUTROPHILS # BLD AUTO: 4.2 K/UL (ref 1.8–7.7)
NEUTROPHILS NFR BLD: 75.7 % (ref 38–73)
PLATELET # BLD AUTO: 155 K/UL (ref 150–350)
PMV BLD AUTO: 9.5 FL (ref 9.2–12.9)
POTASSIUM SERPL-SCNC: 4.4 MMOL/L (ref 3.5–5.1)
PROT SERPL-MCNC: 7.6 G/DL (ref 6–8.4)
PROTHROMBIN TIME: 10.9 SEC (ref 9–12.5)
RBC # BLD AUTO: 2.94 M/UL (ref 4.6–6.2)
SODIUM SERPL-SCNC: 137 MMOL/L (ref 136–145)
WBC # BLD AUTO: 5.52 K/UL (ref 3.9–12.7)

## 2019-07-17 PROCEDURE — 3075F SYST BP GE 130 - 139MM HG: CPT | Mod: CPTII,S$GLB,, | Performed by: NURSE PRACTITIONER

## 2019-07-17 PROCEDURE — 99214 PR OFFICE/OUTPT VISIT, EST, LEVL IV, 30-39 MIN: ICD-10-PCS | Mod: S$GLB,,, | Performed by: NURSE PRACTITIONER

## 2019-07-17 PROCEDURE — 84100 ASSAY OF PHOSPHORUS: CPT

## 2019-07-17 PROCEDURE — 73590 XR TIBIA FIBULA 2 VIEW LEFT: ICD-10-PCS | Mod: 26,LT,, | Performed by: RADIOLOGY

## 2019-07-17 PROCEDURE — 99214 OFFICE O/P EST MOD 30 MIN: CPT | Mod: S$GLB,,, | Performed by: NURSE PRACTITIONER

## 2019-07-17 PROCEDURE — 73610 XR ANKLE COMPLETE 3 VIEW LEFT: ICD-10-PCS | Mod: 26,LT,, | Performed by: RADIOLOGY

## 2019-07-17 PROCEDURE — 3075F PR MOST RECENT SYSTOLIC BLOOD PRESS GE 130-139MM HG: ICD-10-PCS | Mod: CPTII,S$GLB,, | Performed by: NURSE PRACTITIONER

## 2019-07-17 PROCEDURE — 93010 EKG 12-LEAD: ICD-10-PCS | Mod: ,,, | Performed by: INTERNAL MEDICINE

## 2019-07-17 PROCEDURE — 80053 COMPREHEN METABOLIC PANEL: CPT

## 2019-07-17 PROCEDURE — 99999 PR PBB SHADOW E&M-EST. PATIENT-LVL IV: CPT | Mod: PBBFAC,,, | Performed by: NURSE PRACTITIONER

## 2019-07-17 PROCEDURE — 93010 ELECTROCARDIOGRAM REPORT: CPT | Mod: ,,, | Performed by: INTERNAL MEDICINE

## 2019-07-17 PROCEDURE — 73590 X-RAY EXAM OF LOWER LEG: CPT | Mod: TC,LT

## 2019-07-17 PROCEDURE — 73610 X-RAY EXAM OF ANKLE: CPT | Mod: 26,LT,, | Performed by: RADIOLOGY

## 2019-07-17 PROCEDURE — 96361 HYDRATE IV INFUSION ADD-ON: CPT

## 2019-07-17 PROCEDURE — 85025 COMPLETE CBC W/AUTO DIFF WBC: CPT

## 2019-07-17 PROCEDURE — 25000003 PHARM REV CODE 250: Performed by: EMERGENCY MEDICINE

## 2019-07-17 PROCEDURE — 93005 ELECTROCARDIOGRAM TRACING: CPT

## 2019-07-17 PROCEDURE — 85730 THROMBOPLASTIN TIME PARTIAL: CPT

## 2019-07-17 PROCEDURE — 1101F PT FALLS ASSESS-DOCD LE1/YR: CPT | Mod: CPTII,S$GLB,, | Performed by: NURSE PRACTITIONER

## 2019-07-17 PROCEDURE — 85610 PROTHROMBIN TIME: CPT

## 2019-07-17 PROCEDURE — 3079F PR MOST RECENT DIASTOLIC BLOOD PRESSURE 80-89 MM HG: ICD-10-PCS | Mod: CPTII,S$GLB,, | Performed by: NURSE PRACTITIONER

## 2019-07-17 PROCEDURE — 73590 X-RAY EXAM OF LOWER LEG: CPT | Mod: 26,LT,, | Performed by: RADIOLOGY

## 2019-07-17 PROCEDURE — 83735 ASSAY OF MAGNESIUM: CPT

## 2019-07-17 PROCEDURE — 96360 HYDRATION IV INFUSION INIT: CPT

## 2019-07-17 PROCEDURE — 99999 PR PBB SHADOW E&M-EST. PATIENT-LVL IV: ICD-10-PCS | Mod: PBBFAC,,, | Performed by: NURSE PRACTITIONER

## 2019-07-17 PROCEDURE — 11000001 HC ACUTE MED/SURG PRIVATE ROOM

## 2019-07-17 PROCEDURE — 3079F DIAST BP 80-89 MM HG: CPT | Mod: CPTII,S$GLB,, | Performed by: NURSE PRACTITIONER

## 2019-07-17 PROCEDURE — 73610 X-RAY EXAM OF ANKLE: CPT | Mod: TC,LT

## 2019-07-17 PROCEDURE — 99285 EMERGENCY DEPT VISIT HI MDM: CPT | Mod: 25

## 2019-07-17 PROCEDURE — 1101F PR PT FALLS ASSESS DOC 0-1 FALLS W/OUT INJ PAST YR: ICD-10-PCS | Mod: CPTII,S$GLB,, | Performed by: NURSE PRACTITIONER

## 2019-07-17 RX ORDER — SODIUM CHLORIDE 9 MG/ML
1000 INJECTION, SOLUTION INTRAVENOUS
Status: COMPLETED | OUTPATIENT
Start: 2019-07-17 | End: 2019-07-17

## 2019-07-17 RX ORDER — SODIUM CHLORIDE 0.9 % (FLUSH) 0.9 %
10 SYRINGE (ML) INJECTION
Status: DISCONTINUED | OUTPATIENT
Start: 2019-07-18 | End: 2019-07-22 | Stop reason: HOSPADM

## 2019-07-17 RX ORDER — TRAMADOL HYDROCHLORIDE 50 MG/1
50 TABLET ORAL EVERY 12 HOURS PRN
Qty: 20 TABLET | Refills: 0 | Status: ON HOLD | OUTPATIENT
Start: 2019-07-17 | End: 2019-07-22 | Stop reason: HOSPADM

## 2019-07-17 RX ADMIN — SODIUM CHLORIDE 1000 ML: 0.9 INJECTION, SOLUTION INTRAVENOUS at 10:07

## 2019-07-17 NOTE — TELEPHONE ENCOUNTER
Phoned patient again since not at ED. Patient stated he went to outpatient clinic and attempted to check in. He was told he didn't have an appt so he left. Explained to patient that he needs to go to ED not outpatient clinic. Repeated to go to ED several times. Patient replies that he will come see me tomorrow in clinic to avoid traffic. Explained to patient that he needs to be seen for multi-comminuted fracture in ED and that he can not come to outpatient clinic. Patient seems confused. Patient instructed again to go to ED. Patient verbalizes understanding and states that he will go to ED.     BM

## 2019-07-17 NOTE — PROGRESS NOTES
Subjective:       Patient ID: Foreign Holland is a 73 y.o. male.    Chief Complaint: Fall (Hurt Lt. leg pain)    HPI    Pt reports that about an hour ago he was cutting his yard when he suddenly became dizzy and fell on his left lower leg. He reports that he could not get up and cannot put weight on the affected leg. Pain is 10/10 when he attempts to stand or the LLE is touched. He has not taken anything for pain. His neighbor brought him here today. He reports that he always cuts his own yard. He does not recall feeling overheated. He is not dizzy currently. No pain elsewhere. No other neurological s/s occurred during event or is occurring now. He denies numbness/tingling in the affected leg/foot. No hip/thigh pain.    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     Past Surgical History:   Procedure Laterality Date    BIOPSY-LYMPH NODE Left 5/21/2014    Performed by [facility] at [facility] OR [address] [place] [address]    CARDIAC CATHETERIZATION      CARDIAC SURGERY  2006    CATARACT EXTRACTION W/  INTRAOCULAR LENS IMPLANT Right 05/24/2017    CATARACT EXTRACTION W/  INTRAOCULAR LENS IMPLANT Left 04/12/2017    CORONARY ARTERY BYPASS GRAFT      [id number]-CATH Right 5/28/2014    Per[facility] at [facility] OR    LYMPH NODES, LEFT NECK, EXCISIONAL BIOPSY  5/21/2014    MEDIPORT INSERTION, SINGLE  5/28/14    PCIOL Left 04/12/2017    DR. MCCRAY    PROSTATE SURGERY  02/2014       Review of patient's allergies indicates:  No Known Allergies  Current Outpatient Medications   Medication Sig    aspirin (ECOTRIN) 81 MG EC tablet Take 81 mg by mouth every morning.     atorvastatin (LIPITOR) 80 MG tablet 80 mg every evening.     cholecalciferol, vitamin D3, (VITAMIN D3) 400 unit Tab Take  1 tablet by mouth once daily.    fish oil-omega-3 fatty acids 300-1,000 mg capsule Take 1 g by mouth 2 (two) times daily.     FLUZONE HIGH-DOSE 2018-19, PF, 180 mcg/0.5 mL vaccine     FOLIC ACID ORAL Take 2,400 mcg by mouth once daily.     hydroCHLOROthiazide (HYDRODIURIL) 25 MG tablet Take by mouth once daily.    metoprolol succinate (TOPROL-XL) 25 MG 24 hr tablet Take 25 mg by mouth once daily.    NITROSTAT 0.4 mg SL tablet Patient states that he takes this medication as needed    PREVNAR 13, PF, 0.5 mL Syrg     quinapril (ACCUPRIL) 10 MG tablet every morning.     RANEXA 1,000 mg Tb12 TAKE 1 TABLET BY MOUTH TWICE A DAY    triamcinolone acetonide 0.1% (KENALOG) 0.1 % cream every morning.     traMADol (ULTRAM) 50 mg tablet Take 1 tablet (50 mg total) by mouth every 12 (twelve) hours as needed for Pain.     No current facility-administered medications for this visit.            Review of Systems   Constitutional: Negative for activity change, appetite change, chills, diaphoresis, fatigue, fever and unexpected weight change.   HENT: Negative for congestion, ear pain, postnasal drip, rhinorrhea, sinus pressure, sinus pain, sneezing, sore throat, tinnitus, trouble swallowing and voice change.    Eyes: Negative for photophobia, pain and visual disturbance.   Respiratory: Negative for cough, chest tightness, shortness of breath and wheezing.    Cardiovascular: Positive for leg swelling. Negative for chest pain and palpitations.   Gastrointestinal: Negative for abdominal distention, abdominal pain, constipation, diarrhea, nausea and vomiting.   Genitourinary: Negative for decreased urine volume, difficulty urinating, dysuria, flank pain, frequency, hematuria and urgency.   Musculoskeletal: Positive for arthralgias, joint swelling and myalgias. Negative for back pain, neck pain and neck stiffness.        LLE pain    Allergic/Immunologic: Negative for immunocompromised state.   Neurological: Negative for  dizziness, tremors, seizures, syncope, facial asymmetry, speech difficulty, weakness, light-headedness, numbness and headaches.   Hematological: Negative for adenopathy. Does not bruise/bleed easily.   Psychiatric/Behavioral: Negative for confusion and sleep disturbance.       Objective:      Physical Exam   Constitutional: He is oriented to person, place, and time.   HENT:   Head: Normocephalic and atraumatic.   Eyes: Conjunctivae and EOM are normal.   Cardiovascular: Normal rate, regular rhythm, normal heart sounds and intact distal pulses.   Pulmonary/Chest: Effort normal and breath sounds normal.   Musculoskeletal:        Left ankle: He exhibits decreased range of motion and swelling.        Left lower leg: He exhibits tenderness, bony tenderness and swelling.        Legs:  Neurological: He is alert and oriented to person, place, and time.   Skin: Skin is warm and dry.       Assessment:     Vitals:    07/17/19 0955   BP: 133/82   Pulse: 64   Temp: 97.3 °F (36.3 °C)         1. Fall, initial encounter    2. Pain in left lower leg        Plan:   Fall, initial encounter  -     X-Ray Tibia Fibula 2 View Left; Future; Expected date: 07/17/2019  -     traMADol (ULTRAM) 50 mg tablet; Take 1 tablet (50 mg total) by mouth every 12 (twelve) hours as needed for Pain.  Dispense: 20 tablet; Refill: 0  -     X-Ray Ankle Complete Left; Future; Expected date: 07/17/2019    Pain in left lower leg  -     X-Ray Tibia Fibula 2 View Left; Future; Expected date: 07/17/2019  -     traMADol (ULTRAM) 50 mg tablet; Take 1 tablet (50 mg total) by mouth every 12 (twelve) hours as needed for Pain.  Dispense: 20 tablet; Refill: 0        Xray now- pt to remain in clinic until review        Generalized osteopenia with the degenerative changes at the knee and ankle.    Comminuted fracture distal tibia with intra-articular extension of fracture line along the medial margin of the tibial talar joint.  Medial angulation distal fragment    Minimally  displaced oblique fracture distal fibula with medial angulation distal fragment.    There is additional note made of nondisplaced hairline fracture of the medial malleolus with intra-articular extension.  Degenerative changes along the margins of the malleoli.  Ankle mortise appears maintained.  Degenerative change noted in the midfoot with dorsal spurring.  Dorsal and plantar calcaneal spurs and vascular calcifications noted.  Diffuse soft tissue swelling at the ankle bilaterally.       No ortho appts available today- pt placed in high top boot/ given crutches (demonstration of how to apply boot and use crutches provided)  -first available appt tomorrow  -AVS reviewed in depth with patient-(no weight bearing to LLE)  -all questions answered  -neighbor brought pt to clinic today and will bring him to appt tomorrow  ER indications discussed

## 2019-07-17 NOTE — TELEPHONE ENCOUNTER
Patient contacted and advised to report to ED for Ortho Trauma Evaluation. Dicussed with patient he should remain NWB. Patient understood and states he will have his neighbor bring him to ED.     BM

## 2019-07-17 NOTE — PATIENT INSTRUCTIONS
Leg Fracture    You have a break (fracture) of the leg. A fracture is treated with a splint, cast, or special boot. It will usually take at about 8 to 12 weeks for the fracture to heal, but it can take several months in some cases. If you have a severe injury, you may need surgery to fix it.  Home care  Follow these guidelines when caring for yourself at home:  · You will be given a splint, cast, boot, or other device to keep the injured area from moving. Unless you were told otherwise, use crutches or a walker. Dont put weight on the injured leg until your healthcare provider says you can do so. (You can rent crutches and a walker at many pharmacies and surgical or orthopedic supply stores.)  · Keep your leg elevated to reduce pain and swelling. When sleeping, put a pillow under the injured leg. When sitting, support the injured leg so it is above your waist. This is very important during the first 2 days (48 hours).  · Put an ice pack on the injured area. Do this for 20 minutes every 1 to 2 hours the first day for pain relief. You can make an ice pack by wrapping a plastic bag of ice cubes in a thin towel. As the ice melts, be careful that the cast, splint, or boot doesnt get wet. You can put the ice pack directly over the splint or cast. Continue using the ice pack 3 to 4 times a day for the next 2 days. Then use the ice pack as needed to ease pain and swelling.  · Keep the cast, splint, or boot completely dry at all times. Bathe with your cast, splint, or boot out of the water. Protect it with a large plastic bag, rubber-banded at the top end. If a boot or fiberglass cast or splint gets wet, you can dry it with a hair dryer.  · You may use acetaminophen or ibuprofen to control pain, unless another pain medicine was prescribed. If you have chronic liver or kidney disease, talk with your healthcare provider before using these medicines. Also talk with your provider if youve had a stomach ulcer or  gastrointestinal bleeding.  · Dont put creams or objects under the cast if you have itching.  Follow-up care  Follow up with your healthcare provider, or as advised. This is to make sure the bone is healing the way it should. If a splint was put on, it may be converted to a cast at your next visit.  X-rays may be taken. You will be told of any new findings that may affect your care.  When to seek medical advice  Call your healthcare provider right away if any of these occur:  · The cast or splint cracks  · The plaster cast or splint becomes wet or soft  · The fiberglass cast or splint stays wet for more than 24 hours  · Bad odor from the cast or wound fluid stains the cast  · Tightness or pain under the cast or splint gets worse  · Toes become swollen, cold, blue, numb, or tingly  · You cant move your toes  · Skin around cast or splint becomes red  · Fever of 100.4ºF (38ºC) or higher, or as directed by your healthcare provider  Date Last Reviewed: 2/1/2017 © 2000-2017 [employer]. [address] [place] [address]. All rights reserved. This information is not intended as a substitute for professional medical care. Always follow your healthcare professional's instructions.

## 2019-07-18 ENCOUNTER — ANESTHESIA (OUTPATIENT)
Dept: SURGERY | Facility: HOSPITAL | Age: 74
DRG: 493 | End: 2019-07-18
Payer: MEDICARE

## 2019-07-18 ENCOUNTER — ANESTHESIA EVENT (OUTPATIENT)
Dept: SURGERY | Facility: HOSPITAL | Age: 74
DRG: 493 | End: 2019-07-18
Payer: MEDICARE

## 2019-07-18 PROBLEM — S82.402A TIBIA/FIBULA FRACTURE, LEFT, CLOSED, INITIAL ENCOUNTER: Status: ACTIVE | Noted: 2019-07-18

## 2019-07-18 PROBLEM — S82.202A TIBIA/FIBULA FRACTURE, LEFT, CLOSED, INITIAL ENCOUNTER: Status: ACTIVE | Noted: 2019-07-18

## 2019-07-18 PROBLEM — Z01.818 PRE-OP EVALUATION: Status: ACTIVE | Noted: 2019-07-18

## 2019-07-18 PROBLEM — I50.32 CHRONIC DIASTOLIC CONGESTIVE HEART FAILURE: Status: ACTIVE | Noted: 2019-07-18

## 2019-07-18 PROBLEM — N28.9 RENAL INSUFFICIENCY: Status: ACTIVE | Noted: 2019-07-18

## 2019-07-18 LAB
ABO + RH BLD: NORMAL
ANION GAP SERPL CALC-SCNC: 11 MMOL/L (ref 8–16)
APTT BLDCRRT: 30.5 SEC (ref 21–32)
BASOPHILS # BLD AUTO: 0.01 K/UL (ref 0–0.2)
BASOPHILS NFR BLD: 0.3 % (ref 0–1.9)
BILIRUB UR QL STRIP: NEGATIVE
BLD GP AB SCN CELLS X3 SERPL QL: NORMAL
BNP SERPL-MCNC: 125 PG/ML (ref 0–99)
BUN SERPL-MCNC: 33 MG/DL (ref 8–23)
CALCIUM SERPL-MCNC: 8.4 MG/DL (ref 8.7–10.5)
CHLORIDE SERPL-SCNC: 105 MMOL/L (ref 95–110)
CHOLEST SERPL-MCNC: 123 MG/DL (ref 120–199)
CHOLEST/HDLC SERPL: 2.9 {RATIO} (ref 2–5)
CLARITY UR: CLEAR
CO2 SERPL-SCNC: 23 MMOL/L (ref 23–29)
COLOR UR: YELLOW
CREAT SERPL-MCNC: 1.6 MG/DL (ref 0.5–1.4)
DIASTOLIC DYSFUNCTION: NO
DIFFERENTIAL METHOD: ABNORMAL
EOSINOPHIL # BLD AUTO: 0.1 K/UL (ref 0–0.5)
EOSINOPHIL NFR BLD: 1.8 % (ref 0–8)
ERYTHROCYTE [DISTWIDTH] IN BLOOD BY AUTOMATED COUNT: 13.5 % (ref 11.5–14.5)
EST. GFR  (AFRICAN AMERICAN): 48 ML/MIN/1.73 M^2
EST. GFR  (NON AFRICAN AMERICAN): 42 ML/MIN/1.73 M^2
ESTIMATED AVG GLUCOSE: 94 MG/DL (ref 68–131)
GLUCOSE SERPL-MCNC: 101 MG/DL (ref 70–110)
GLUCOSE UR QL STRIP: NEGATIVE
HBA1C MFR BLD HPLC: 4.9 % (ref 4–5.6)
HCT VFR BLD AUTO: 22.9 % (ref 40–54)
HDLC SERPL-MCNC: 42 MG/DL (ref 40–75)
HDLC SERPL: 34.1 % (ref 20–50)
HGB BLD-MCNC: 8.2 G/DL (ref 14–18)
HGB UR QL STRIP: NEGATIVE
INR PPP: 1 (ref 0.8–1.2)
KETONES UR QL STRIP: NEGATIVE
LDLC SERPL CALC-MCNC: 65.6 MG/DL (ref 63–159)
LEUKOCYTE ESTERASE UR QL STRIP: NEGATIVE
LYMPHOCYTES # BLD AUTO: 1.3 K/UL (ref 1–4.8)
LYMPHOCYTES NFR BLD: 39.3 % (ref 18–48)
MAGNESIUM SERPL-MCNC: 1.4 MG/DL (ref 1.6–2.6)
MAGNESIUM SERPL-MCNC: 1.5 MG/DL (ref 1.6–2.6)
MCH RBC QN AUTO: 29.9 PG (ref 27–31)
MCHC RBC AUTO-ENTMCNC: 35.8 G/DL (ref 32–36)
MCV RBC AUTO: 84 FL (ref 82–98)
MITRAL VALVE REGURGITATION: NORMAL
MONOCYTES # BLD AUTO: 0.4 K/UL (ref 0.3–1)
MONOCYTES NFR BLD: 13 % (ref 4–15)
NEUTROPHILS # BLD AUTO: 1.5 K/UL (ref 1.8–7.7)
NEUTROPHILS NFR BLD: 45.9 % (ref 38–73)
NITRITE UR QL STRIP: NEGATIVE
NONHDLC SERPL-MCNC: 81 MG/DL
PH UR STRIP: 6 [PH] (ref 5–8)
PHOSPHATE SERPL-MCNC: 3 MG/DL (ref 2.7–4.5)
PHOSPHATE SERPL-MCNC: 3.7 MG/DL (ref 2.7–4.5)
PLATELET # BLD AUTO: 134 K/UL (ref 150–350)
PMV BLD AUTO: 9.5 FL (ref 9.2–12.9)
POTASSIUM SERPL-SCNC: 3.8 MMOL/L (ref 3.5–5.1)
PROT UR QL STRIP: NEGATIVE
PROTHROMBIN TIME: 10.9 SEC (ref 9–12.5)
RBC # BLD AUTO: 2.74 M/UL (ref 4.6–6.2)
RETIRED EF AND QEF - SEE NOTES: 60 (ref 55–65)
SODIUM SERPL-SCNC: 139 MMOL/L (ref 136–145)
SP GR UR STRIP: 1.02 (ref 1–1.03)
T4 FREE SERPL-MCNC: 0.92 NG/DL (ref 0.71–1.51)
TRIGL SERPL-MCNC: 77 MG/DL (ref 30–150)
TROPONIN I SERPL DL<=0.01 NG/ML-MCNC: 0.02 NG/ML (ref 0–0.03)
TROPONIN I SERPL DL<=0.01 NG/ML-MCNC: 0.03 NG/ML (ref 0–0.03)
TSH SERPL DL<=0.005 MIU/L-ACNC: 0.32 UIU/ML (ref 0.4–4)
URN SPEC COLLECT METH UR: NORMAL
UROBILINOGEN UR STRIP-ACNC: NEGATIVE EU/DL
WBC # BLD AUTO: 3.38 K/UL (ref 3.9–12.7)

## 2019-07-18 PROCEDURE — 99900031 HC PATIENT EDUCATION (STAT)

## 2019-07-18 PROCEDURE — 93306 2D ECHO WITH COLOR FLOW DOPPLER: ICD-10-PCS | Mod: 26,,, | Performed by: INTERNAL MEDICINE

## 2019-07-18 PROCEDURE — 81003 URINALYSIS AUTO W/O SCOPE: CPT

## 2019-07-18 PROCEDURE — 86920 COMPATIBILITY TEST SPIN: CPT

## 2019-07-18 PROCEDURE — 99900035 HC TECH TIME PER 15 MIN (STAT)

## 2019-07-18 PROCEDURE — 37000009 HC ANESTHESIA EA ADD 15 MINS: Performed by: ORTHOPAEDIC SURGERY

## 2019-07-18 PROCEDURE — 63600175 PHARM REV CODE 636 W HCPCS: Performed by: NURSE ANESTHETIST, CERTIFIED REGISTERED

## 2019-07-18 PROCEDURE — 80048 BASIC METABOLIC PNL TOTAL CA: CPT

## 2019-07-18 PROCEDURE — 84443 ASSAY THYROID STIM HORMONE: CPT

## 2019-07-18 PROCEDURE — 11000001 HC ACUTE MED/SURG PRIVATE ROOM

## 2019-07-18 PROCEDURE — 36000710: Performed by: ORTHOPAEDIC SURGERY

## 2019-07-18 PROCEDURE — 99900037 HC PT THERAPY SCREENING (STAT)

## 2019-07-18 PROCEDURE — 85025 COMPLETE CBC W/AUTO DIFF WBC: CPT

## 2019-07-18 PROCEDURE — 63600175 PHARM REV CODE 636 W HCPCS: Performed by: NURSE PRACTITIONER

## 2019-07-18 PROCEDURE — 80061 LIPID PANEL: CPT

## 2019-07-18 PROCEDURE — 36000711: Performed by: ORTHOPAEDIC SURGERY

## 2019-07-18 PROCEDURE — C1713 ANCHOR/SCREW BN/BN,TIS/BN: HCPCS | Performed by: ORTHOPAEDIC SURGERY

## 2019-07-18 PROCEDURE — 25000003 PHARM REV CODE 250: Performed by: NURSE PRACTITIONER

## 2019-07-18 PROCEDURE — 99223 PR INITIAL HOSPITAL CARE,LEVL III: ICD-10-PCS | Mod: ,,, | Performed by: INTERNAL MEDICINE

## 2019-07-18 PROCEDURE — 93306 TTE W/DOPPLER COMPLETE: CPT

## 2019-07-18 PROCEDURE — 99223 1ST HOSP IP/OBS HIGH 75: CPT | Mod: ,,, | Performed by: INTERNAL MEDICINE

## 2019-07-18 PROCEDURE — 37000008 HC ANESTHESIA 1ST 15 MINUTES: Performed by: ORTHOPAEDIC SURGERY

## 2019-07-18 PROCEDURE — 94799 UNLISTED PULMONARY SVC/PX: CPT

## 2019-07-18 PROCEDURE — 84439 ASSAY OF FREE THYROXINE: CPT

## 2019-07-18 PROCEDURE — 94760 N-INVAS EAR/PLS OXIMETRY 1: CPT

## 2019-07-18 PROCEDURE — 63600175 PHARM REV CODE 636 W HCPCS: Performed by: ORTHOPAEDIC SURGERY

## 2019-07-18 PROCEDURE — 83735 ASSAY OF MAGNESIUM: CPT

## 2019-07-18 PROCEDURE — 83036 HEMOGLOBIN GLYCOSYLATED A1C: CPT

## 2019-07-18 PROCEDURE — 86901 BLOOD TYPING SEROLOGIC RH(D): CPT

## 2019-07-18 PROCEDURE — 25000003 PHARM REV CODE 250: Performed by: NURSE ANESTHETIST, CERTIFIED REGISTERED

## 2019-07-18 PROCEDURE — 71000033 HC RECOVERY, INTIAL HOUR: Performed by: ORTHOPAEDIC SURGERY

## 2019-07-18 PROCEDURE — 84484 ASSAY OF TROPONIN QUANT: CPT | Mod: 91

## 2019-07-18 PROCEDURE — 27201423 OPTIME MED/SURG SUP & DEVICES STERILE SUPPLY: Performed by: ORTHOPAEDIC SURGERY

## 2019-07-18 PROCEDURE — 85730 THROMBOPLASTIN TIME PARTIAL: CPT

## 2019-07-18 PROCEDURE — 93306 TTE W/DOPPLER COMPLETE: CPT | Mod: 26,,, | Performed by: INTERNAL MEDICINE

## 2019-07-18 PROCEDURE — 83880 ASSAY OF NATRIURETIC PEPTIDE: CPT

## 2019-07-18 PROCEDURE — 85610 PROTHROMBIN TIME: CPT

## 2019-07-18 PROCEDURE — 84100 ASSAY OF PHOSPHORUS: CPT

## 2019-07-18 DEVICE — IMPLANTABLE DEVICE
Type: IMPLANTABLE DEVICE | Site: LEG | Status: NON-FUNCTIONAL
Removed: 2019-08-09

## 2019-07-18 DEVICE — CLAMP LARGE OPEN ADJUSTABLE
Type: IMPLANTABLE DEVICE | Site: LEG | Status: NON-FUNCTIONAL
Removed: 2019-08-09

## 2019-07-18 DEVICE — FIXATION EXTERNAL ROD CARBON
Type: IMPLANTABLE DEVICE | Site: LEG | Status: NON-FUNCTIONAL
Removed: 2019-08-09

## 2019-07-18 DEVICE — ROD CARBON FIBER 11MM X 400MM
Type: IMPLANTABLE DEVICE | Site: LEG | Status: NON-FUNCTIONAL
Removed: 2019-08-09

## 2019-07-18 DEVICE — SCREW SCHANZ 5/60/150 294.784
Type: IMPLANTABLE DEVICE | Site: LEG | Status: NON-FUNCTIONAL
Removed: 2019-08-09

## 2019-07-18 RX ORDER — ONDANSETRON 2 MG/ML
INJECTION INTRAMUSCULAR; INTRAVENOUS
Status: DISCONTINUED | OUTPATIENT
Start: 2019-07-18 | End: 2019-07-18

## 2019-07-18 RX ORDER — SODIUM CHLORIDE, SODIUM LACTATE, POTASSIUM CHLORIDE, CALCIUM CHLORIDE 600; 310; 30; 20 MG/100ML; MG/100ML; MG/100ML; MG/100ML
INJECTION, SOLUTION INTRAVENOUS CONTINUOUS PRN
Status: DISCONTINUED | OUTPATIENT
Start: 2019-07-18 | End: 2019-07-18

## 2019-07-18 RX ORDER — ACETAMINOPHEN 10 MG/ML
1000 INJECTION, SOLUTION INTRAVENOUS ONCE
Status: DISCONTINUED | OUTPATIENT
Start: 2019-07-18 | End: 2019-07-18 | Stop reason: HOSPADM

## 2019-07-18 RX ORDER — HYDROMORPHONE HYDROCHLORIDE 2 MG/ML
0.2 INJECTION, SOLUTION INTRAMUSCULAR; INTRAVENOUS; SUBCUTANEOUS EVERY 5 MIN PRN
Status: DISCONTINUED | OUTPATIENT
Start: 2019-07-18 | End: 2019-07-18 | Stop reason: HOSPADM

## 2019-07-18 RX ORDER — CHLORHEXIDINE GLUCONATE ORAL RINSE 1.2 MG/ML
10 SOLUTION DENTAL
Status: DISCONTINUED | OUTPATIENT
Start: 2019-07-18 | End: 2019-07-18 | Stop reason: HOSPADM

## 2019-07-18 RX ORDER — DEXAMETHASONE SODIUM PHOSPHATE 4 MG/ML
INJECTION, SOLUTION INTRA-ARTICULAR; INTRALESIONAL; INTRAMUSCULAR; INTRAVENOUS; SOFT TISSUE
Status: DISCONTINUED | OUTPATIENT
Start: 2019-07-18 | End: 2019-07-18

## 2019-07-18 RX ORDER — PROPOFOL 10 MG/ML
VIAL (ML) INTRAVENOUS
Status: DISCONTINUED | OUTPATIENT
Start: 2019-07-18 | End: 2019-07-18

## 2019-07-18 RX ORDER — METOCLOPRAMIDE HYDROCHLORIDE 5 MG/ML
10 INJECTION INTRAMUSCULAR; INTRAVENOUS EVERY 10 MIN PRN
Status: DISCONTINUED | OUTPATIENT
Start: 2019-07-18 | End: 2019-07-18 | Stop reason: HOSPADM

## 2019-07-18 RX ORDER — RANOLAZINE 500 MG/1
1000 TABLET, EXTENDED RELEASE ORAL 2 TIMES DAILY
Status: DISCONTINUED | OUTPATIENT
Start: 2019-07-18 | End: 2019-07-22 | Stop reason: HOSPADM

## 2019-07-18 RX ORDER — SODIUM CHLORIDE 0.9 % (FLUSH) 0.9 %
3 SYRINGE (ML) INJECTION
Status: DISCONTINUED | OUTPATIENT
Start: 2019-07-18 | End: 2019-07-18 | Stop reason: HOSPADM

## 2019-07-18 RX ORDER — DIPHENHYDRAMINE HYDROCHLORIDE 50 MG/ML
25 INJECTION INTRAMUSCULAR; INTRAVENOUS EVERY 6 HOURS PRN
Status: DISCONTINUED | OUTPATIENT
Start: 2019-07-18 | End: 2019-07-18 | Stop reason: HOSPADM

## 2019-07-18 RX ORDER — FENTANYL CITRATE 50 UG/ML
INJECTION, SOLUTION INTRAMUSCULAR; INTRAVENOUS
Status: DISCONTINUED | OUTPATIENT
Start: 2019-07-18 | End: 2019-07-18

## 2019-07-18 RX ORDER — HYDROCODONE BITARTRATE AND ACETAMINOPHEN 10; 325 MG/1; MG/1
1 TABLET ORAL EVERY 6 HOURS PRN
Status: DISCONTINUED | OUTPATIENT
Start: 2019-07-18 | End: 2019-07-22 | Stop reason: HOSPADM

## 2019-07-18 RX ORDER — LIDOCAINE HCL/PF 100 MG/5ML
SYRINGE (ML) INTRAVENOUS
Status: DISCONTINUED | OUTPATIENT
Start: 2019-07-18 | End: 2019-07-18

## 2019-07-18 RX ORDER — CEFAZOLIN SODIUM 2 G/50ML
2 SOLUTION INTRAVENOUS
Status: COMPLETED | OUTPATIENT
Start: 2019-07-18 | End: 2019-07-18

## 2019-07-18 RX ORDER — MORPHINE SULFATE 4 MG/ML
2 INJECTION, SOLUTION INTRAMUSCULAR; INTRAVENOUS EVERY 6 HOURS PRN
Status: DISCONTINUED | OUTPATIENT
Start: 2019-07-18 | End: 2019-07-19

## 2019-07-18 RX ORDER — MAGNESIUM SULFATE HEPTAHYDRATE 40 MG/ML
2 INJECTION, SOLUTION INTRAVENOUS ONCE
Status: COMPLETED | OUTPATIENT
Start: 2019-07-18 | End: 2019-07-18

## 2019-07-18 RX ORDER — MEPERIDINE HYDROCHLORIDE 50 MG/ML
12.5 INJECTION INTRAMUSCULAR; INTRAVENOUS; SUBCUTANEOUS ONCE AS NEEDED
Status: DISCONTINUED | OUTPATIENT
Start: 2019-07-18 | End: 2019-07-18 | Stop reason: HOSPADM

## 2019-07-18 RX ORDER — HEPARIN SODIUM 5000 [USP'U]/ML
5000 INJECTION, SOLUTION INTRAVENOUS; SUBCUTANEOUS ONCE
Status: COMPLETED | OUTPATIENT
Start: 2019-07-18 | End: 2019-07-18

## 2019-07-18 RX ORDER — ASPIRIN 81 MG/1
81 TABLET ORAL EVERY MORNING
Status: DISCONTINUED | OUTPATIENT
Start: 2019-07-18 | End: 2019-07-22 | Stop reason: HOSPADM

## 2019-07-18 RX ORDER — ATORVASTATIN CALCIUM 40 MG/1
80 TABLET, FILM COATED ORAL NIGHTLY
Status: DISCONTINUED | OUTPATIENT
Start: 2019-07-19 | End: 2019-07-22 | Stop reason: HOSPADM

## 2019-07-18 RX ORDER — NALOXONE HCL 0.4 MG/ML
0.4 VIAL (ML) INJECTION
Status: DISCONTINUED | OUTPATIENT
Start: 2019-07-18 | End: 2019-07-22 | Stop reason: HOSPADM

## 2019-07-18 RX ORDER — SUCCINYLCHOLINE CHLORIDE 20 MG/ML
INJECTION INTRAMUSCULAR; INTRAVENOUS
Status: DISCONTINUED | OUTPATIENT
Start: 2019-07-18 | End: 2019-07-18

## 2019-07-18 RX ORDER — PANTOPRAZOLE SODIUM 40 MG/1
40 TABLET, DELAYED RELEASE ORAL DAILY
Status: DISCONTINUED | OUTPATIENT
Start: 2019-07-18 | End: 2019-07-22 | Stop reason: HOSPADM

## 2019-07-18 RX ORDER — ROCURONIUM BROMIDE 10 MG/ML
INJECTION, SOLUTION INTRAVENOUS
Status: DISCONTINUED | OUTPATIENT
Start: 2019-07-18 | End: 2019-07-18

## 2019-07-18 RX ORDER — METOPROLOL SUCCINATE 25 MG/1
25 TABLET, EXTENDED RELEASE ORAL DAILY
Status: DISCONTINUED | OUTPATIENT
Start: 2019-07-18 | End: 2019-07-22 | Stop reason: HOSPADM

## 2019-07-18 RX ADMIN — RANOLAZINE 1000 MG: 500 TABLET, FILM COATED, EXTENDED RELEASE ORAL at 09:07

## 2019-07-18 RX ADMIN — HEPARIN SODIUM 5000 UNITS: 5000 INJECTION, SOLUTION INTRAVENOUS; SUBCUTANEOUS at 12:07

## 2019-07-18 RX ADMIN — HYDROCODONE BITARTRATE AND ACETAMINOPHEN 1 TABLET: 10; 325 TABLET ORAL at 11:07

## 2019-07-18 RX ADMIN — METOPROLOL SUCCINATE 25 MG: 25 TABLET, EXTENDED RELEASE ORAL at 08:07

## 2019-07-18 RX ADMIN — CEFAZOLIN SODIUM 2 G: 2 SOLUTION INTRAVENOUS at 09:07

## 2019-07-18 RX ADMIN — SODIUM CHLORIDE, SODIUM LACTATE, POTASSIUM CHLORIDE, AND CALCIUM CHLORIDE: .6; .31; .03; .02 INJECTION, SOLUTION INTRAVENOUS at 10:07

## 2019-07-18 RX ADMIN — ROCURONIUM BROMIDE 5 MG: 10 INJECTION, SOLUTION INTRAVENOUS at 09:07

## 2019-07-18 RX ADMIN — MORPHINE SULFATE 2 MG: 4 INJECTION INTRAVENOUS at 01:07

## 2019-07-18 RX ADMIN — DEXAMETHASONE SODIUM PHOSPHATE 8 MG: 4 INJECTION, SOLUTION INTRA-ARTICULAR; INTRALESIONAL; INTRAMUSCULAR; INTRAVENOUS; SOFT TISSUE at 10:07

## 2019-07-18 RX ADMIN — LIDOCAINE HYDROCHLORIDE 100 MG: 20 INJECTION, SOLUTION INTRAVENOUS at 09:07

## 2019-07-18 RX ADMIN — SODIUM CHLORIDE, SODIUM LACTATE, POTASSIUM CHLORIDE, AND CALCIUM CHLORIDE: .6; .31; .03; .02 INJECTION, SOLUTION INTRAVENOUS at 09:07

## 2019-07-18 RX ADMIN — FENTANYL CITRATE 100 MCG: 50 INJECTION, SOLUTION INTRAMUSCULAR; INTRAVENOUS at 09:07

## 2019-07-18 RX ADMIN — MAGNESIUM SULFATE IN WATER 2 G: 40 INJECTION, SOLUTION INTRAVENOUS at 05:07

## 2019-07-18 RX ADMIN — SUCCINYLCHOLINE CHLORIDE 140 MG: 20 INJECTION, SOLUTION INTRAMUSCULAR; INTRAVENOUS at 09:07

## 2019-07-18 RX ADMIN — PROPOFOL 100 MG: 10 INJECTION, EMULSION INTRAVENOUS at 09:07

## 2019-07-18 RX ADMIN — ONDANSETRON 4 MG: 2 INJECTION, SOLUTION INTRAMUSCULAR; INTRAVENOUS at 10:07

## 2019-07-18 NOTE — OP NOTE
[facility] -   General Surgery  Operative Note    SUMMARY     Date of Procedure: 7/18/2019     Procedure: Procedure(s) (LRB):  APPLICATION, EXTERNAL FIXATION DEVICE (Left)  CLOSED REDUCTION, FRACTURE, FIBULA (Left)       Surgeon(s) and Role:     * Philippe Bustamante Jr., MD - Primary    Assisting Surgeon: None    Pre-Operative Diagnosis:  Closed comminuted distal tibia and fibular fracture left leg    Post-Operative Diagnosis: Post-Op Diagnosis Codes:     Closed comminuted distal tibia and fibular fracture left leg    Anesthesia: General    Technical Procedures Used:  Application of external fixator.  Closed reduction of tibia and fibula fracture.    Description of the Findings of the Procedure:  The patient was initially seen and evaluated in the holding area. The anticipated procedure was explained to the patient in detail and all questions answered to ensure the patient understood the nature of the procedure.     Patient was transported to operating room 6  and placed in the supine position upon the operating table. A general anesthetic was administered per Anesthesia. Following onset of satisfactory anesthesia with the patient's left foot ankle and leg were prepped and draped in the sterile fashion. A time-out was called and the patient's identity verified and the procedure verified. 2 g Ancef were administered intravenously as antibiotic prophylaxis.     The C-arm positioned and the fracture assessed. A calcaneal pin was placed transversely through the calcaneus in a through and through fashion. Two half pins were placed through the tibia proximal to the fracture. A pin clamp was placed on the 2 tibial pins after visualizing the pins in both planes to ensure that they were across both cortices.  Pin clamps were then placed on the calcaneal pin medially and laterally. The attachments to the pins were tightened.  Two large carbon bars were then placed into the clamps and held in place loosely.  The  fracture was then reduced by applying longitudinal traction and a slight valgus force.  The C-arm was used to verify adequacy of reduction following which all clamp attachments were tightened with the wrench.  The fracture was visualized in multiple planes after complete tightening to ensure that the reduction had been maintained.    The skin was cleansed and then dressed sterilely.  The pin sites were also dressed with Xeroform and 4x4s.  Sterile cast padding and a posterior splint were then applied. At this point the patient was awakened and transported recovery room in satisfactory condition    Significant Surgical Tasks Conducted by the Assistant(s), if Applicable:  Sterile technique. Application of an external fixator.  Use of power equipment.    Complications: No    Estimated Blood Loss (EBL): 2 mL           Implants:   Implant Name Type Inv. Item Serial No.  Lot No. LRB No. Used   CLAMP LARGE MULTI-PIN - [id number]  CLAMP LARGE MULTI-PIN  SYNTHES  Left 1   URIEL EXT FIX MR SAFE LRG SS - [id number]  URIEL EXT FIX MR SAFE LRG SS  SYNTHES  Left 1   CLAMP LARGE OPEN ADJUSTABLE - [id number]  CLAMP LARGE OPEN ADJUSTABLE  SYNTHES  Left 2   FIXATION EXTERNAL URIEL CARBON - [id number]  FIXATION EXTERNAL URIEL CARBON  SYNTHES  Left 1   URIEL CARBON FIBER 11MM X 400MM - [id number]  URIEL CARBON FIBER 11MM X 400MM  SYNTHES  Left 1   5.0mm large external fixation and adjustable large fixator    SYNTHES  Left 1   SCREW SCHANZ 5/60/150 294.784 - [id number]  SCREW SCHANZ 5/60/150 294.784  SYNTHES  Left 2       Specimens:   Specimen (12h ago, onward)    None                  Condition: Good    Disposition: PACU - hemodynamically stable.    Attestation: I was present and scrubbed for the entire procedure.

## 2019-07-18 NOTE — SUBJECTIVE & OBJECTIVE
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       Past Surgical History:   Procedure Laterality Date    BIOPSY-LYMPH NODE Left 5/21/2014    Performed by [facility]un [facility]ine [facility] at [facility] OR 2ND FLR    CARDIAC CATHETERIZATION      CARDIAC SURGERY  2006    CATARACT EXTRACTION W/  INTRAOCULAR LENS IMPLANT Right 05/24/2017    CATARACT EXTRACTION W/  INTRAOCULAR LENS IMPLANT Left 04/12/2017    CORONARY ARTERY BYPASS GRAFT      [id number]-CATH Right 5/28/2014    [facility] at [facility] OR    LYMPH NODES, LEFT NECK, EXCISIONAL BIOPSY  5/21/2014    MEDIPORT INSERTION, SINGLE  5/28/14    PCIOL Left 04/12/2017    DR. MCCRAY    PROSTATE SURGERY  02/2014       Review of patient's allergies indicates:  No Known Allergies    No current facility-administered medications on file prior to encounter.      Current Outpatient Medications on File Prior to Encounter   Medication Sig    aspirin (ECOTRIN) 81 MG EC tablet Take 81 mg by mouth every morning.     atorvastatin (LIPITOR) 80 MG tablet 80 mg every evening.     cholecalciferol, vitamin D3, (VITAMIN D3) 400 unit Tab Take 1 tablet by mouth once daily.    fish oil-omega-3 fatty acids 300-1,000 mg capsule Take 1 g by mouth 2 (two) times daily.     FLUZONE HIGH-DOSE 2018-19, PF, 180 mcg/0.5 mL vaccine     FOLIC ACID ORAL Take 2,400 mcg by mouth once daily.     hydroCHLOROthiazide (HYDRODIURIL) 25 MG tablet Take by mouth once daily.    metoprolol succinate (TOPROL-XL) 25 MG 24 hr tablet Take 25 mg by mouth once daily.    NITROSTAT 0.4 mg SL tablet Patient states that he takes this medication as needed    PREVNAR 13, PF, 0.5 mL Syrg     quinapril (ACCUPRIL) 10 MG tablet every morning.     RANEXA 1,000 mg Tb12 TAKE 1 TABLET BY  MOUTH TWICE A DAY    traMADol (ULTRAM) 50 mg tablet Take 1 tablet (50 mg total) by mouth every 12 (twelve) hours as needed for Pain.    triamcinolone acetonide 0.1% (KENALOG) 0.1 % cream every morning.      Family History     Problem Relation (Age of Onset)    Blindness Father        Tobacco Use    Smoking status: Former Smoker     Packs/day: 1.00     Years: 45.00     Pack years: 45.00     Last attempt to quit: 2005     Years since quittin.5    Smokeless tobacco: Never Used   Substance and Sexual Activity    Alcohol use: No     Comment: former    Drug use: No    Sexual activity: Not on file     Review of Systems   Constitutional: Negative for chills, diaphoresis, fatigue and fever.   HENT: Negative for congestion, sore throat and voice change.    Eyes: Negative for photophobia and visual disturbance.   Respiratory: Negative for cough, shortness of breath, wheezing and stridor.    Cardiovascular: Negative for chest pain and leg swelling.   Gastrointestinal: Negative for abdominal distention, abdominal pain, constipation, diarrhea, nausea and vomiting.   Endocrine: Negative for polydipsia, polyphagia and polyuria.   Genitourinary: Negative for difficulty urinating, dysuria, flank pain, testicular pain and urgency.   Musculoskeletal: Positive for arthralgias and gait problem. Negative for back pain, joint swelling, neck pain and neck stiffness.   Skin: Negative for color change and rash.   Allergic/Immunologic: Negative for immunocompromised state.   Neurological: Negative for dizziness, syncope, weakness, numbness and headaches.   Hematological: Does not bruise/bleed easily.   Psychiatric/Behavioral: Negative for agitation, behavioral problems and confusion.     Objective:     Vital Signs (Most Recent):  Temp: 98.4 °F (36.9 °C) (19)  Pulse: 64 (19)  Resp: 20 (19)  BP: (!) 157/75 (19)  SpO2: 98 % (19) Vital Signs (24h Range):  Temp:  [97.3 °F (36.3  °C)-98.4 °F (36.9 °C)] 98.4 °F (36.9 °C)  Pulse:  [64] 64  Resp:  [20] 20  SpO2:  [95 %-98 %] 98 %  BP: (133-157)/(75-82) 157/75     Weight: 75 kg (165 lb 5.5 oz)  Body mass index is 25.9 kg/m².    Physical Exam   Constitutional: He is oriented to person, place, and time. He appears well-developed and well-nourished. No distress.   HENT:   Head: Normocephalic and atraumatic.   Nose: Nose normal.   Eyes: Pupils are equal, round, and reactive to light. Conjunctivae and EOM are normal. No scleral icterus.   Neck: Normal range of motion. Neck supple. No tracheal deviation present.   Cardiovascular: Normal rate, regular rhythm, normal heart sounds and intact distal pulses.   No murmur heard.  Bilateral lower ext edema.      Pulmonary/Chest: Effort normal and breath sounds normal. No stridor. No respiratory distress. He has no wheezes. He has no rales.   Abdominal: Soft. Bowel sounds are normal. He exhibits no distension. There is no tenderness. There is no guarding.   Genitourinary:   Genitourinary Comments: Check ua   Musculoskeletal: Normal range of motion. He exhibits no edema or deformity.   Neurovascular intact.   LLE tenderness.    Neurological: He is alert and oriented to person, place, and time. No cranial nerve deficit.   Skin: Skin is warm and dry. Capillary refill takes 2 to 3 seconds. No rash noted. He is not diaphoretic.   Blistering lower ext   Psychiatric: He has a normal mood and affect. His behavior is normal. Judgment and thought content normal.   Nursing note and vitals reviewed.        CRANIAL NERVES     CN III, IV, VI   Pupils are equal, round, and reactive to light.  Extraocular motions are normal.        Significant Labs: All pertinent labs within the past 24 hours have been reviewed.  Results for orders placed or performed during the hospital encounter of 07/17/19   CBC auto differential   Result Value Ref Range    WBC 5.52 3.90 - 12.70 K/uL    RBC 2.94 (L) 4.60 - 6.20 M/uL    Hemoglobin 8.9 (L)  14.0 - 18.0 g/dL    Hematocrit 24.4 (L) 40.0 - 54.0 %    Mean Corpuscular Volume 83 82 - 98 fL    Mean Corpuscular Hemoglobin 30.3 27.0 - 31.0 pg    Mean Corpuscular Hemoglobin Conc 36.5 (H) 32.0 - 36.0 g/dL    RDW 13.4 11.5 - 14.5 %    Platelets 155 150 - 350 K/uL    MPV 9.5 9.2 - 12.9 fL    Gran # (ANC) 4.2 1.8 - 7.7 K/uL    Lymph # 0.8 (L) 1.0 - 4.8 K/uL    Mono # 0.5 0.3 - 1.0 K/uL    Eos # 0.0 0.0 - 0.5 K/uL    Baso # 0.01 0.00 - 0.20 K/uL    Gran% 75.7 (H) 38.0 - 73.0 %    Lymph% 15.2 (L) 18.0 - 48.0 %    Mono% 9.1 4.0 - 15.0 %    Eosinophil% 0.2 0.0 - 8.0 %    Basophil% 0.2 0.0 - 1.9 %    Differential Method Automated    Comprehensive metabolic panel   Result Value Ref Range    Sodium 137 136 - 145 mmol/L    Potassium 4.4 3.5 - 5.1 mmol/L    Chloride 103 95 - 110 mmol/L    CO2 22 (L) 23 - 29 mmol/L    Glucose 105 70 - 110 mg/dL    BUN, Bld 33 (H) 8 - 23 mg/dL    Creatinine 1.8 (H) 0.5 - 1.4 mg/dL    Calcium 9.0 8.7 - 10.5 mg/dL    Total Protein 7.6 6.0 - 8.4 g/dL    Albumin 3.9 3.5 - 5.2 g/dL    Total Bilirubin 0.7 0.1 - 1.0 mg/dL    Alkaline Phosphatase 49 (L) 55 - 135 U/L    AST 25 10 - 40 U/L    ALT 17 10 - 44 U/L    Anion Gap 12 8 - 16 mmol/L    eGFR if African American 42 (A) >60 mL/min/1.73 m^2    eGFR if non African American 36 (A) >60 mL/min/1.73 m^2   Protime-INR   Result Value Ref Range    Prothrombin Time 10.9 9.0 - 12.5 sec    INR 1.0 0.8 - 1.2   APTT   Result Value Ref Range    aPTT 24.6 21.0 - 32.0 sec       Significant Imaging: I have reviewed all pertinent imaging results/findings within the past 24 hours.   Imaging Results          CT Leg (Tibia-Fibula) Without Contrast Left (Final result)  Result time 07/17/19 23:52:44    Final result by Jess De Leon MD (07/17/19 23:52:44)                 Impression:      Acute fractures of the distal tibia and fibula, as above.    Techniques were utilized for this exam to obtain a radiation dose as low as reasonably be  achievable.      Electronically signed by: Jess De Leon  Date:    07/17/2019  Time:    23:52             Narrative:    TECHNIQUE:  Noncontrast axial helical images were obtained throughout the left leg.  Coronal axial reformatted images were created.    FINDINGS:  There is a comminuted fracture of the distal tibial diaphysis with approximately 1 cm lateral displacement of the distal fracture fragment and approximately 2 cm cranial overriding of the distal fracture fragment.  The fracture does appear to extend into the tibiotalar joint.  There is also a minimally displaced fracture of the distal fibular diaphysis.  There is extensive surrounding edema.  There are no other acute fractures.                               X-Ray Chest 1 View (Final result)  Result time 07/17/19 23:27:26    Final result by Jess De Leon MD (07/17/19 23:27:26)                 Impression:      No cardiopulmonary process noted.      Electronically signed by: Jess De Leon  Date:    07/17/2019  Time:    23:27             Narrative:    EXAMINATION:  XR CHEST 1 VIEW    CLINICAL HISTORY:  Encounter for other preprocedural examination    COMPARISON:  Two thousand fourteen    FINDINGS:  No infiltrate or effusion identified.  Cardiomediastinal silhouette is within normal limits.

## 2019-07-18 NOTE — ASSESSMENT & PLAN NOTE
Last echo 2 years ago  Does not appear to be overly decompensated.   Repeat  Hold diuretic  Continue home meds  Optimization pending course  Consider cardiology consult pending course.   Further evaluation/diagnostics/interventions/consults pending course

## 2019-07-18 NOTE — ED NOTES
"Patient identifiers verified and correct for Foreign Holland.    Pt sent to ED by PCP for LLE fracture after pt was cutting grass this AM and suddenly fell onto L leg. Pt denies LOC. Pt reports PCP told him he had "an ankle fracture". Pt currently in walking boot and verbalizes he was given crutches but cannot use them. Swelling, erythema, and drainage noted to LLE with pain.     LOC: The patient is awake, alert and aware of environment with an appropriate affect, the patient is oriented x 3 and speaking appropriately.  Appearance: Pt is resting in stretcher, no acute distress is noted. Clothing and hygiene are appropriate.   Skin: The skin is warm and dry, color consistent with ethnicity, patient has normal skin turgor and moist mucus membranes. Erythema, swelling and drainage noted to LLE.   Musculoskeletal: Pain and swelling noted to LLE w/ previous dx of fracture. CMS intact except pt cannot walk on L leg.   Respiratory: Airway open and patent. Respiration rate even and unlabored. No use of accessory muscles noted.   Cardiac: Patient has a normal rate, no periphreal edema noted, capillary refill < 3 seconds.  Abdomen: No distension noted. Soft and non-tender to palpation.  Neurologic: Symmetrical expression noted in face. Hand grasps equal. Normal sensation reported in all extremities. No obvious neurological deficits noted.     "

## 2019-07-18 NOTE — PT/OT/SLP PROGRESS
Physical Therapy      Patient Name:  Foreign Holland   MRN:  [id number]    0803 P.T. CHART REVIEW COMPLETED. PT SEEN IN [address] BEING WHEELED DOWN FOR SX. P.T. EVAL TO BE COMPLETED NEXT VISIT. THANK YOU     Brook Lucio, PT,7/18/2019

## 2019-07-18 NOTE — SUBJECTIVE & OBJECTIVE
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       Past Surgical History:   Procedure Laterality Date    BIOPSY-LYMPH NODE Left 5/21/2014    [facility] 2ND F[facility]    CARDIAC CATHETERIZATION      CARDIAC SURGERY  2006    CATARACT EXTRACTION W/  INTRAOCULAR LENS IMPLANT Right 05/24/2017    CATARACT EXTRACTION W/  INTRAOCULAR LENS IMPLANT Left 04/12/2017    CORONARY ARTERY BYPASS GRAFT      EYE SURGERY      Cataract extraction with intraocular lens implant    [id number] Right 5/28/2014    [facility]    LYMPH NODES, LEFT NECK, EXCISIONAL BIOPSY  5/21/2014    MEDIPORT INSERTION, SINGLE  5/28/14    PCIOL Left 04/12/2017    DR. MCCRAY    PROSTATE SURGERY  02/2014       Review of patient's allergies indicates:  No Known Allergies    No current facility-administered medications on file prior to encounter.      Current Outpatient Medications on File Prior to Encounter   Medication Sig    aspirin (ECOTRIN) 81 MG EC tablet Take 81 mg by mouth every morning.     atorvastatin (LIPITOR) 80 MG tablet 80 mg every evening.     cholecalciferol, vitamin D3, (VITAMIN D3) 400 unit Tab Take 1 tablet by mouth once daily.    fish oil-omega-3 fatty acids 300-1,000 mg capsule Take 1 g by mouth 2 (two) times daily.     FOLIC ACID ORAL Take 2,400 mcg by mouth once daily.     hydroCHLOROthiazide (HYDRODIURIL) 25 MG tablet Take by mouth once daily.    metoprolol succinate (TOPROL-XL) 25 MG 24 hr tablet Take 25 mg by mouth once daily.    NITROSTAT 0.4 mg SL tablet Patient states that he takes this medication as needed    quinapril (ACCUPRIL) 10 MG tablet every morning.     RANEXA 1,000 mg Tb12 TAKE 1 TABLET BY MOUTH TWICE A DAY     triamcinolone acetonide 0.1% (KENALOG) 0.1 % cream every morning.     FLUZONE HIGH-DOSE -, PF, 180 mcg/0.5 mL vaccine     PREVNAR 13, PF, 0.5 mL Syrg     traMADol (ULTRAM) 50 mg tablet Take 1 tablet (50 mg total) by mouth every 12 (twelve) hours as needed for Pain.     Family History     Problem Relation (Age of Onset)    Blindness Father        Tobacco Use    Smoking status: Former Smoker     Packs/day: 1.00     Years: 45.00     Pack years: 45.00     Last attempt to quit: 2005     Years since quittin.5    Smokeless tobacco: Never Used    Tobacco comment: Pt no longer smokes   Substance and Sexual Activity    Alcohol use: No     Comment: former    Drug use: No    Sexual activity: Yes     Birth control/protection: None     Review of Systems   Constitution: Negative.   HENT: Negative.    Eyes: Negative.    Cardiovascular: Positive for chest pain (chronic intermittent angina) and dyspnea on exertion (chronic).   Respiratory: Negative.    Endocrine: Negative.    Hematologic/Lymphatic: Negative.    Skin: Negative.    Musculoskeletal: Positive for joint pain.   Gastrointestinal: Negative.    Genitourinary: Negative.    Neurological: Positive for dizziness and light-headedness.   Psychiatric/Behavioral: Negative.    Allergic/Immunologic: Negative.      Objective:     Vital Signs (Most Recent):  Temp: 97.5 °F (36.4 °C) (19 1115)  Pulse: 79 (19 1129)  Resp: 17 (19 1129)  BP: (!) 121/56 (19 1129)  SpO2: 95 % (19 1129) Vital Signs (24h Range):  Temp:  [97 °F (36.1 °C)-99.1 °F (37.3 °C)] 97.5 °F (36.4 °C)  Pulse:  [58-79] 79  Resp:  [10-20] 17  SpO2:  [95 %-100 %] 95 %  BP: (115-158)/(56-77) 121/56     Weight: 75 kg (165 lb 5.5 oz)  Body mass index is 25.9 kg/m².    SpO2: 95 %  O2 Device (Oxygen Therapy): room air      Intake/Output Summary (Last 24 hours) at 2019 1144  Last data filed at 2019 1040  Gross per 24 hour   Intake 1150 ml   Output 252 ml   Net 898 ml        Lines/Drains/Airways     Peripheral Intravenous Line                 Peripheral IV - Single Lumen 07/17/19 2243 18 G Left Antecubital less than 1 day                Physical Exam   Constitutional: He is oriented to person, place, and time. He appears well-developed and well-nourished. No distress.   HENT:   Head: Normocephalic and atraumatic.   Eyes: Pupils are equal, round, and reactive to light. Right eye exhibits no discharge. Left eye exhibits no discharge.   Neck: Neck supple. No JVD present.   Cardiovascular: Normal rate, regular rhythm, S1 normal, S2 normal and normal heart sounds.   No murmur heard.  Pulmonary/Chest: Effort normal and breath sounds normal. No respiratory distress. He has no wheezes. He has no rales.   CABG site well-healed   Abdominal: Soft. He exhibits no distension.   Musculoskeletal: He exhibits no edema.   Neurological: He is alert and oriented to person, place, and time.   Skin: Skin is warm and dry. He is not diaphoretic. No erythema.   Psychiatric: He has a normal mood and affect. His behavior is normal. Thought content normal.   Nursing note and vitals reviewed.      Significant Labs:   CMP   Recent Labs   Lab 07/17/19 2244 07/18/19 0517    139   K 4.4 3.8    105   CO2 22* 23    101   BUN 33* 33*   CREATININE 1.8* 1.6*   CALCIUM 9.0 8.4*   PROT 7.6  --    ALBUMIN 3.9  --    BILITOT 0.7  --    ALKPHOS 49*  --    AST 25  --    ALT 17  --    ANIONGAP 12 11   ESTGFRAFRICA 42* 48*   EGFRNONAA 36* 42*   , CBC   Recent Labs   Lab 07/17/19 2244 07/18/19 0517   WBC 5.52 3.38*   HGB 8.9* 8.2*   HCT 24.4* 22.9*    134*   , Troponin   Recent Labs   Lab 07/18/19  0058 07/18/19 0517   TROPONINI 0.023 0.033*    and All pertinent lab results from the last 24 hours have been reviewed.    Significant Imaging: Echocardiogram:   2D echo with color flow doppler:   Results for orders placed or performed during the hospital encounter of 07/17/19   2D echo with color  flow doppler    Narrative    This study is in progress....   , EKG: Reviewed and X-Ray: CXR: X-Ray Chest 1 View (CXR):   Results for orders placed or performed during the hospital encounter of 07/17/19   X-Ray Chest 1 View    Narrative    EXAMINATION:  XR CHEST 1 VIEW    CLINICAL HISTORY:  Encounter for other preprocedural examination    COMPARISON:  Two thousand fourteen    FINDINGS:  No infiltrate or effusion identified.  Cardiomediastinal silhouette is within normal limits.      Impression    No cardiopulmonary process noted.      Electronically signed by: Jess De Leon  Date:    07/17/2019  Time:    23:27    and X-Ray Chest PA and Lateral (CXR): No results found for this visit on 07/17/19.

## 2019-07-18 NOTE — PROGRESS NOTES
[facility] Hospital Medicine  Progress Note    Patient Name: Foreign Holland  MRN: [id number]  Patient Class: IP- Inpatient   Admission Date: 7/17/2019  Length of Stay: 0 days  Attending Physician: Goran Hdez MD  Primary Care Provider: Oziel Mendieta MD        Subjective:     Principal Problem:Tibia/fibula fracture, left, closed, initial encounter      HPI:  Foreign Holland is a 73 y.o. male patient with a PMHx of CA, CAD, HTN, who presents evaluation of L ankle pain which onset this morning after ground level fall.  Pt states that he was mowing the lawn at his home, when he began to feel dizzy and fell to the ground.  Pt denies dizziness at this time.  Pt denies LOC at time of fall. ROM worsen.  No associated sxs reported.  No other complaints. Patient was evaluated in the ER. Cr 1.8, baseline 1.5-1.6. CT lower L ext:Acute fractures of the distal tibia and fibula, as above. Ortho consulted and recommended admission and NPO. HM consulted. Patient admitted.     Overview/Hospital Course:  Admitted for stabilization of left leg fracture.  Successful OREF 18 July.    Interval History:  Successful stabilization by orthopedic surgery.  Complains of sore throat and expected surgical site pain.    Review of Systems   Constitutional: Negative.  Negative for chills and fever.   HENT: Positive for sore throat. Negative for congestion.    Eyes: Negative.  Negative for visual disturbance.   Respiratory: Negative.  Negative for cough, shortness of breath and wheezing.    Cardiovascular: Negative.  Negative for chest pain.   Gastrointestinal: Negative for abdominal pain, diarrhea, nausea and vomiting.   Endocrine: Negative.    Genitourinary: Negative.    Musculoskeletal: Positive for arthralgias. Negative for myalgias and neck stiffness.   Skin: Negative.  Negative for color change and pallor.   Allergic/Immunologic: Negative.    Neurological: Negative.    Hematological: Negative.     Psychiatric/Behavioral: Negative.    All other systems reviewed and are negative.    Objective:     Vital Signs (Most Recent):  Temp: 97.5 °F (36.4 °C) (07/18/19 1115)  Pulse: 79 (07/18/19 1129)  Resp: 17 (07/18/19 1129)  BP: (!) 121/56 (07/18/19 1129)  SpO2: 95 % (07/18/19 1129) Vital Signs (24h Range):  Temp:  [97 °F (36.1 °C)-99.1 °F (37.3 °C)] 97.5 °F (36.4 °C)  Pulse:  [58-79] 79  Resp:  [10-20] 17  SpO2:  [95 %-100 %] 95 %  BP: (115-158)/(56-77) 121/56     Weight: 75 kg (165 lb 5.5 oz)  Body mass index is 25.9 kg/m².    Intake/Output Summary (Last 24 hours) at 7/18/2019 1304  Last data filed at 7/18/2019 1040  Gross per 24 hour   Intake 1150 ml   Output 252 ml   Net 898 ml      Physical Exam   Constitutional: He is oriented to person, place, and time. He appears well-developed and well-nourished. No distress.   HENT:   Head: Normocephalic and atraumatic.   Mouth/Throat: Oropharynx is clear and moist.   Eyes: Pupils are equal, round, and reactive to light. Conjunctivae and EOM are normal.   Neck: No JVD present. No thyromegaly present.   Cardiovascular: Normal rate, regular rhythm and normal heart sounds. Exam reveals no gallop and no friction rub.   No murmur heard.  Pulmonary/Chest: Effort normal and breath sounds normal. No respiratory distress. He has no wheezes. He has no rales.   Abdominal: Soft. Bowel sounds are normal. He exhibits no distension. There is no tenderness. There is no rebound and no guarding.   Musculoskeletal: Normal range of motion. He exhibits no edema or tenderness.   Right lower leg external fixator in placed.  Dressing clean and intact.   Lymphadenopathy:     He has no cervical adenopathy.   Neurological: He is alert and oriented to person, place, and time. He has normal reflexes. He displays normal reflexes. No cranial nerve deficit.   Skin: Skin is warm and dry. No rash noted. He is not diaphoretic. No erythema.   Psychiatric: He has a normal mood and affect. His behavior is  normal. Judgment and thought content normal.       Significant Labs: All pertinent labs within the past 24 hours have been reviewed.    Significant Imaging: I have reviewed all pertinent imaging results/findings within the past 24 hours.      Assessment/Plan:      * Tibia/fibula fracture, left, closed, initial encounter  Ortho consulted by ED and to manage  Control pain  Elevate lower ext.  Patient with lower ext blistering.  No jordin/scd.  1x dose heparin for dvt mitigation overnight.   External fixation of fracture by orthopedic surgery 18 July.    Renal insufficiency  On CKD  Monitor RFP close  Gentle hydration.  Hold ace/diuretic for now.        Chronic diastolic congestive heart failure  Last echo 2 years ago  Does not appear to be overly decompensated.   Repeat  Hold diuretic  Continue home meds  Optimization pending course  Consider cardiology consult pending course.   Further evaluation/diagnostics/interventions/consults pending course         Anemia  Chronic. Type and screen transfuse if need  Monitor.  Further evaluation/diagnostics/interventions/consults pending course         HTN (hypertension)  Continue home meds  Monitor trends  Hold ace/arb and HCTZ given acute renal insufficiency on CKD.  Monitor RFP and fluid status with CHF hx.   Consider renal consult pending course  Gentle hydration overnight   Further evaluation/diagnostics/interventions/consults pending course          CAD (coronary artery disease)  Continue asa, statin, bb  Trend troponin   Further evaluation/diagnostics/interventions/consults pending course           VTE Risk Mitigation (From admission, onward)        Ordered     Place sequential compression device  Until discontinued      07/18/19 0849     Place sequential compression device  Until discontinued      07/18/19 0306                Goran Hdez MD  Department of Hospital Medicine   [facility]

## 2019-07-18 NOTE — HPI
Foreign Holland is a 73 y.o. male patient with a PMHx of CA, CAD, HTN, who presents evaluation of L ankle pain which onset this morning after ground level fall.  Pt states that he was mowing the lawn at his home, when he began to feel dizzy and fell to the ground.  Pt denies dizziness at this time.  Pt denies LOC at time of fall. ROM worsen.  No associated sxs reported. No other complaints. Patient was evaluated in the ER. Cr 1.8, baseline 1.5-1.6. CT lower L ext:Acute fractures of the distal tibia and fibula, as above. Ortho consulted and recommended admission and NPO.   Patient is a full code and his SDM is his sister, Dionicio Calvillo.

## 2019-07-18 NOTE — ASSESSMENT & PLAN NOTE
NPO  Ortho consulted by ED and to manage  Control pain  Elevate lower ext.  Patient with lower ext blistering.  No jordin/scd.  1x dose heparin for dvt mitigation overnight.   Further evaluation/diagnostics/interventions/consults pending course     Given patient age, history is likely high risk for any surgical procedure.

## 2019-07-18 NOTE — PLAN OF CARE
CM met with patient at the bedside to assess for discharge needs.  Patient lives at home alone.  He states that he did not use medical equipment prior to admission.  CM discussed SNF placement if needed and patient is agreeable since he lives at home alone. CM provided patient with an integrated partner list and [facility] in network list.  He is agreeable for CM to send a blanket referral out and let him know which facilities will accept.  At the time that a facility accepts, he will sign a preference letter.  CM will continue to follow.  CM provided a transitional care folder, information on advanced directives, information on pharmacy bedside delivery, and discharge planning begins on admission with contact information for any needs/questions.    D/C Plan: SNF  PCP: Dr Mendieta  Preferred Pharmacy: [facility]  Discharge transportation: Facility  [facility]: Declined  Pharmacy Bedside Delivery: No, SNF     07/18/19 1233   Discharge Assessment   Assessment Type Discharge Planning Assessment   Confirmed/corrected address and phone number on facesheet? Yes   Assessment information obtained from? Patient;Medical Record   Expected Length of Stay (days)   (1-2)   Communicated expected length of stay with patient/caregiver yes   Prior to hospitilization cognitive status: Alert/Oriented   Prior to hospitalization functional status: Independent   Current cognitive status: Alert/Oriented   Current Functional Status: Needs Assistance;Partially Dependent   Facility Arrived From: home   Lives With alone   Able to Return to Prior Arrangements yes   Is patient able to care for self after discharge? Unable to determine at this time (comments)   Who are your caregiver(s) and their phone number(s)? Dionicio Calvillo, Sister [phone]   Patient's perception of discharge disposition skilled nursing facility   Readmission Within the Last 30 Days no previous admission in last 30 days   Patient currently being followed by outpatient case  management? No   Patient currently receives any other outside agency services? No   Equipment Currently Used at Home none   Do you have any problems affording any of your prescribed medications? No   Is the patient taking medications as prescribed? yes   Does the patient have transportation home? Yes   Transportation Anticipated family or friend will provide   Dialysis Name and Scheduled days NA   Does the patient receive services at the [facility]? No   Discharge Plan A Skilled Nursing Facility   Patient/Family in Agreement with Plan yes

## 2019-07-18 NOTE — ASSESSMENT & PLAN NOTE
Ortho consulted by ED and to manage  Control pain  Elevate lower ext.  Patient with lower ext blistering.  No jordin/scd.  1x dose heparin for dvt mitigation overnight.   External fixation of fracture by orthopedic surgery 18 July.

## 2019-07-18 NOTE — HOSPITAL COURSE
Patient was admitted to Med Surg for LLE fx under the care of Hospital Medicine. Ortho surgery was consulted and performed closed reduction of tibia and fibula fracture with application of external fixator on 07/18. Pain is well controlled. PT/OT following. Pt will DC to SNF once auth is obtained - likely Monday.  On 7/20/19, pt seen and examined with no acute distress reported.  H/H trending down- will transfuse as needed. Creatinine 1.5 with gentle hydration given.  SNF placement in progress pending acceptance with authroization.  7/21/19, H/H 7/19.4 with PRBCs x 1 unit transfused.  On 7/22/19, H/H and creatinine stable post transfusion.  Pt stable with no signs of acute distress.  Surgical site intact with dressing secure and external fixator in place.  Pt verbalized symptom improvement and sitting up to chair.  Pt seen and examined on the date of discharge and deemed suitable for discharge to [facility] for continued care and therapy.  Current medications resumed with Protonix, Miralax, and Norco prescribed.  Accupril and Tramadol held.  Pt instructed to follow up with PCP and Orthopedic Surgery for further evaluation.

## 2019-07-18 NOTE — ASSESSMENT & PLAN NOTE
Continue home meds  Monitor trends  Hold ace/arb and HCTZ given acute renal insufficiency on CKD.  Monitor RFP and fluid status with CHF hx.   Consider renal consult pending course  Gentle hydration overnight   Further evaluation/diagnostics/interventions/consults pending course

## 2019-07-18 NOTE — NURSING
Pt had Aspirin ordered for 0700. Pt is NPO and going to surgery. Per Dr. Sergio cisse to chart against med.

## 2019-07-18 NOTE — ASSESSMENT & PLAN NOTE
Chronic. Type and screen transfuse if need  Monitor.  Further evaluation/diagnostics/interventions/consults pending course

## 2019-07-18 NOTE — PROGRESS NOTES
[facility]  Orthopedics  Progress Note    Patient Name: Foreign Holland  MRN: [id number]  Admission Date: 7/17/2019  Hospital Length of Stay: 0 days  Attending Provider: Goran Hdez MD  Primary Care Provider: Oziel Mendieta MD  Follow-up For: Procedure(s) (LRB):  APPLICATION, EXTERNAL FIXATION DEVICE (Left)    Post-Operative Day: Day of Surgery  Subjective:   Foreign Holland is a 73 y.o. male patient with a PMHx of CA, CAD, HTN, who presents evaluation of L ankle pain which onset yesterday after ground level fall.  Pt states that he was mowing the lawn at his home, when he began to feel dizzy and fell to the ground.  Pt denies dizziness at this time.  Pt denies LOC at time of fall. ROM worsen.  No associated sxs reported.  No other complaints. Patient was evaluated in the ER. Cr 1.8, baseline 1.5-1.6. CT lower L ext:Acute fractures of the distal tibia and fibula, as above. Ortho consulted and recommended admission and NPO. HM consulted. Patient admitted.  Seen by Dr. White last night scheduled him for closed reduction and application of external fixator left leg.  No new subjective & objective note has been filed under this hospital service since the last note was generated.    Principal Problem:Tibia/fibula fracture, left, closed, initial encounter    Principal Orthopedic Problem:  Closed displaced left distal tibia and fibula fracture    Interval History:  Patient was splinted in the ED and admitted last evening.  Is currently been transported to the preop holding area where I have seen and evaluated. He is resting comfortably on the hospital stretcher with minimal pain complaints.    Review of patient's allergies indicates:  No Known Allergies    Current Facility-Administered Medications   Medication    aspirin EC tablet 81 mg    [START ON 7/19/2019] atorvastatin tablet 80 mg    HYDROcodone-acetaminophen  mg per tablet 1 tablet    metoprolol succinate (TOPROL-XL) 24 hr tablet  "25 mg    morphine injection 2 mg    naloxone 0.4 mg/mL injection 0.4 mg    pantoprazole EC tablet 40 mg    ranolazine 12 hr tablet 1,000 mg    sodium chloride 0.9% flush 10 mL     Objective:   Left lower extremity:  There is a long leg splint in place. There is obvious fracture deformity distally. There is significant swelling and blistering of the skin consistent with acute trauma.  Patient has intact sensation.  The skin appears to be intact other than the fracture blisters.  Vital Signs (Most Recent):  Temp: 97.9 °F (36.6 °C) (07/18/19 0730)  Pulse: (!) 58 (07/18/19 0730)  Resp: 19 (07/18/19 0730)  BP: (!) 116/59 (07/18/19 0730)  SpO2: 97 % (07/18/19 0730) Vital Signs (24h Range):  Temp:  [97 °F (36.1 °C)-99.1 °F (37.3 °C)] 97.9 °F (36.6 °C)  Pulse:  [58-68] 58  Resp:  [17-20] 19  SpO2:  [95 %-98 %] 97 %  BP: (115-157)/(58-82) 116/59     Weight: 75 kg (165 lb 5.5 oz)  Height: 5' 7" (170.2 cm)  Body mass index is 25.9 kg/m².      Intake/Output Summary (Last 24 hours) at 7/18/2019 0825  Last data filed at 7/18/2019 0504  Gross per 24 hour   Intake 50 ml   Output 250 ml   Net -200 ml       Ortho/SPM Exam    Significant Labs:   Recent Lab Results       07/18/19  0517   07/18/19  0149   07/18/19  0058   07/17/19  2244        Albumin       3.9     Alkaline Phosphatase       49     ALT       17     Anion Gap 11     12     Appearance, UA   Clear         aPTT 30.5  Comment:  aPTT therapeutic range = 39-69 seconds     24.6  Comment:  aPTT therapeutic range = 39-69 seconds     AST       25     Baso # 0.01     0.01     Basophil% 0.3     0.2     Bilirubin (UA)   Negative         BILIRUBIN TOTAL       0.7  Comment:  For infants and newborns, interpretation of results should be based  on gestational age, weight and in agreement with clinical  observations.  Premature Infant recommended reference ranges:  Up to 24 hours.............<8.0 mg/dL  Up to 48 hours............<12.0 mg/dL  3-5 days..................<15.0 " mg/dL  6-29 days.................<15.0 mg/dL       BNP     125  Comment:  Values of less than 100 pg/ml are consistent with non-CHF populations.       BUN, Bld 33     33     Calcium 8.4     9.0     Chloride 105     103     CO2 23     22     Color, UA   Yellow         Creatinine 1.6     1.8     Differential Method Automated     Automated     eGFR if  48     42     eGFR if non  42  Comment:  Calculation used to obtain the estimated glomerular filtration  rate (eGFR) is the CKD-EPI equation.        36  Comment:  Calculation used to obtain the estimated glomerular filtration  rate (eGFR) is the CKD-EPI equation.        Eos # 0.1     0.0     Eosinophil% 1.8     0.2     Free T4 0.92           Glucose 101     105     Glucose, UA   Negative         Gran # (ANC) 1.5     4.2     Gran% 45.9     75.7     Group & Rh     O POS       Hematocrit 22.9     24.4     Hemoglobin 8.2     8.9     INDIRECT CORIE     NEG       Coumadin Monitoring INR 1.0  Comment:  Coumadin Therapy:  2.0 - 3.0 for INR for all indicators except mechanical heart valves  and antiphospholipid syndromes which should use 2.5 - 3.5.       1.0  Comment:  Coumadin Therapy:  2.0 - 3.0 for INR for all indicators except mechanical heart valves  and antiphospholipid syndromes which should use 2.5 - 3.5.       Ketones, UA   Negative         Leukocytes, UA   Negative         Lymph # 1.3     0.8     Lymph% 39.3     15.2     Magnesium 1.5     1.4     MCH 29.9     30.3     MCHC 35.8     36.5     MCV 84     83     Mono # 0.4     0.5     Mono% 13.0     9.1     MPV 9.5     9.5     NITRITE UA   Negative         Occult Blood UA   Negative         pH, UA   6.0         Phosphorus 3.7     3.0     Platelets 134     155     Potassium 3.8     4.4     PROTEIN TOTAL       7.6     Protein, UA   Negative  Comment:  Recommend a 24 hour urine protein or a urine   protein/creatinine ratio if globulin induced proteinuria is  clinically suspected.            Protime 10.9     10.9     RBC 2.74     2.94     RDW 13.5     13.4     Sodium 139     137     Specific [place], UA   1.025         Specimen UA   Urine, Clean Catch         Troponin I 0.033  Comment:  The reference interval for Troponin I represents the 99th percentile   cutoff   for our facility and is consistent with 3rd generation assay   performance.     0.023  Comment:  The reference interval for Troponin I represents the 99th percentile   cutoff   for our facility and is consistent with 3rd generation assay   performance.         TSH 0.319           UROBILINOGEN UA   Negative         WBC 3.38     5.52         All pertinent labs within the past 24 hours have been reviewed.    Significant Imaging: CT: I have reviewed all pertinent results/findings and my personal findings are:  Comminuted distal tibia fracture with intra-articular extension.  X-Ray: I have reviewed all pertinent results/findings and my personal findings are:  Comminuted distal tibia and fibular fracture with questionable intra-articular extension.  I have reviewed and interpreted all pertinent imaging results/findings.  Pilon fracture  Assessment/Plan:  Closed comminuted distal tibia and fibular fracture left leg:  I will plan on closed reduction and application of an external fixator in the OR as an urgent procedure today. Case discussed with the patient and the procedure was outlined in detail. The patient voiced understanding and consent for the procedure. All questions were answered.     No notes have been filed under this hospital service.  Service: Orthopedic Surgery        Philippe Bustamante Jr, MD  Orthopedics  [facility]

## 2019-07-18 NOTE — SUBJECTIVE & OBJECTIVE
Interval History:  Successful stabilization by orthopedic surgery.  Complains of sore throat and expected surgical site pain.    Review of Systems   Constitutional: Negative.  Negative for chills and fever.   HENT: Positive for sore throat. Negative for congestion.    Eyes: Negative.  Negative for visual disturbance.   Respiratory: Negative.  Negative for cough, shortness of breath and wheezing.    Cardiovascular: Negative.  Negative for chest pain.   Gastrointestinal: Negative for abdominal pain, diarrhea, nausea and vomiting.   Endocrine: Negative.    Genitourinary: Negative.    Musculoskeletal: Positive for arthralgias. Negative for myalgias and neck stiffness.   Skin: Negative.  Negative for color change and pallor.   Allergic/Immunologic: Negative.    Neurological: Negative.    Hematological: Negative.    Psychiatric/Behavioral: Negative.    All other systems reviewed and are negative.    Objective:     Vital Signs (Most Recent):  Temp: 97.5 °F (36.4 °C) (07/18/19 1115)  Pulse: 79 (07/18/19 1129)  Resp: 17 (07/18/19 1129)  BP: (!) 121/56 (07/18/19 1129)  SpO2: 95 % (07/18/19 1129) Vital Signs (24h Range):  Temp:  [97 °F (36.1 °C)-99.1 °F (37.3 °C)] 97.5 °F (36.4 °C)  Pulse:  [58-79] 79  Resp:  [10-20] 17  SpO2:  [95 %-100 %] 95 %  BP: (115-158)/(56-77) 121/56     Weight: 75 kg (165 lb 5.5 oz)  Body mass index is 25.9 kg/m².    Intake/Output Summary (Last 24 hours) at 7/18/2019 1304  Last data filed at 7/18/2019 1040  Gross per 24 hour   Intake 1150 ml   Output 252 ml   Net 898 ml      Physical Exam   Constitutional: He is oriented to person, place, and time. He appears well-developed and well-nourished. No distress.   HENT:   Head: Normocephalic and atraumatic.   Mouth/Throat: Oropharynx is clear and moist.   Eyes: Pupils are equal, round, and reactive to light. Conjunctivae and EOM are normal.   Neck: No JVD present. No thyromegaly present.   Cardiovascular: Normal rate, regular rhythm and normal heart sounds.  Exam reveals no gallop and no friction rub.   No murmur heard.  Pulmonary/Chest: Effort normal and breath sounds normal. No respiratory distress. He has no wheezes. He has no rales.   Abdominal: Soft. Bowel sounds are normal. He exhibits no distension. There is no tenderness. There is no rebound and no guarding.   Musculoskeletal: Normal range of motion. He exhibits no edema or tenderness.   Right lower leg external fixator in placed.  Dressing clean and intact.   Lymphadenopathy:     He has no cervical adenopathy.   Neurological: He is alert and oriented to person, place, and time. He has normal reflexes. He displays normal reflexes. No cranial nerve deficit.   Skin: Skin is warm and dry. No rash noted. He is not diaphoretic. No erythema.   Psychiatric: He has a normal mood and affect. His behavior is normal. Judgment and thought content normal.       Significant Labs: All pertinent labs within the past 24 hours have been reviewed.    Significant Imaging: I have reviewed all pertinent imaging results/findings within the past 24 hours.

## 2019-07-18 NOTE — PLAN OF CARE
Problem: Adult Inpatient Plan of Care  Goal: Plan of Care Review  Outcome: Ongoing (interventions implemented as appropriate)  Patient remains free of falls and safety precautions maintained. Afebrile. Pain controlled. Tolerating well after surgery. Dressing clean, dry, and intact. Tolerating regular diet. Non weight bearing to LLE. Will continue to monitor. 12 hour chart check.

## 2019-07-18 NOTE — ED PROVIDER NOTES
SCRIBE #1 NOTE: I, Oziel Sue, am scribing for, and in the presence of, Edy Honeycutt MD. I have scribed the entire note.       History     Chief Complaint   Patient presents with    Ankle Pain     pt was seen by PCP earlier today and dx with L ankle fx and referred to ER     Review of patient's allergies indicates:  No Known Allergies      History of Present Illness     HPI    7/17/2019, 10:07 PM  History obtained from the patient      History of Present Illness: Foreign Holland is a 73 y.o. male patient with a PMHx of CA, CAD, HTN, who presents to the Emergency Department for evaluation of L ankle pain which onset this morning. Pt states that he was mowing the lawn at his home, when he began to feel dizzy and fell to the ground.  Pt denies dizziness at this time.  Pt denies LOC at time of fall.  Symptoms are constant and moderate in severity. No mitigating or exacerbating factors reported. No associated sxs reported. Patient denies any fever, chills, n/v/d, cough, SOB, CP, palpitations, HA, syncope, light-headedness, weakness, and all other sxs at this time. No further complaints or concerns at this time.         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       Past Surgical History:  Past Surgical History:   Procedure Laterality Date    BIOPSY-LYMPH NODE Left 5/21/2014    Performed by [facility]un Levine [facility] [address] [place] [address]    CARDIAC CATHETERIZATION      CARDIAC SURGERY  2006    CATARACT EXTRACTION W/  INTRAOCULAR LENS IMPLANT Right 05/24/2017    CATARACT EXTRACTION W/  INTRAOCULAR LENS IMPLANT Left 04/12/2017    CORONARY ARTERY BYPASS GRAFT      EYE SURGERY      Cataract extraction with  intraocular lens implant    DONJAPBLY-LNGV-G-CATH Right 2014    [facility] at [facility]    LYMPH NODES, LEFT NECK, EXCISIONAL BIOPSY  2014    MEDIPORT INSERTION, SINGLE  14    PCIOL Left 2017    DR. MCCRAY    PROSTATE SURGERY  2014         Family History:  Family History   Problem Relation Age of Onset    Blindness Father     Cancer Neg Hx     Diabetes Neg Hx     Heart failure Neg Hx     Coronary artery disease Neg Hx     Cataracts Neg Hx     Glaucoma Neg Hx     Hypertension Neg Hx     Macular degeneration Neg Hx     Retinal detachment Neg Hx     Strabismus Neg Hx     Stroke Neg Hx     Thyroid disease Neg Hx        Social History:  Social History     Tobacco Use    Smoking status: Former Smoker     Packs/day: 1.00     Years: 45.00     Pack years: 45.00     Last attempt to quit: 2005     Years since quittin.5    Smokeless tobacco: Never Used   Substance and Sexual Activity    Alcohol use: No     Comment: former    Drug use: No    Sexual activity: Unknown        Review of Systems     Review of Systems   Constitutional: Negative for chills and fever.   HENT: Negative for sore throat.    Respiratory: Negative for cough and shortness of breath.    Cardiovascular: Negative for chest pain and palpitations.   Gastrointestinal: Negative for diarrhea, nausea and vomiting.   Genitourinary: Negative for dysuria.   Musculoskeletal: Positive for arthralgias (L ankle). Negative for back pain.   Skin: Negative for rash.   Neurological: Negative for dizziness, syncope, weakness, light-headedness and headaches.   Hematological: Does not bruise/bleed easily.   All other systems reviewed and are negative.       Physical Exam     Initial Vitals [19]   BP Pulse Resp Temp SpO2   (!) 157/75 64 20 98.4 °F (36.9 °C) 98 %      MAP       --          Physical Exam  Nursing Notes and Vital Signs Reviewed.  Constitutional: Patient is in no acute distress.  "Well-developed and well-nourished.  Head: Atraumatic. Normocephalic.  Eyes: PERRL. EOM intact. Conjunctivae are not pale. No scleral icterus.  ENT: Mucous membranes are moist. Oropharynx is clear and symmetric.    Neck: Supple. Full ROM. No lymphadenopathy.  Cardiovascular: Regular rate. Regular rhythm. No murmurs, rubs, or gallops. Distal pulses are 2+ and symmetric.  Pulmonary/Chest: No respiratory distress. Clear to auscultation bilaterally. No wheezing or rales.  Abdominal: Soft and non-distended.  There is no tenderness.  No rebound, guarding, or rigidity.  Genitourinary: No CVA tenderness  Musculoskeletal: Moves all extremities. No obvious deformities.  LLE:  3+ edema. Positive for tenderness. no evident deformity. ROM is normal. Cap refill distally is <2 seconds. DP and PT pulses are equal and 2+ bilaterally. No motor deficit. No distal sensory deficit  Skin: Bulbous lesions to LLE. Warm and dry.  Neurological:  Alert, awake, and appropriate.  Normal speech.  No acute focal neurological deficits are appreciated.  Psychiatric: Normal affect. Good eye contact. Appropriate in content.     ED Course   Procedures  ED Vital Signs:  Vitals:    07/17/19 2024 07/18/19 0113 07/18/19 0137 07/18/19 0407   BP: (!) 157/75 139/68 137/73 (!) 115/58   Pulse: 64 68 65 62   Resp: 20 18 20 17   Temp: 98.4 °F (36.9 °C) 97 °F (36.1 °C) 99.1 °F (37.3 °C) 97.8 °F (36.6 °C)   TempSrc: Oral Oral Oral Oral   SpO2: 98% 97% 97% 97%   Weight: 75 kg (165 lb 5.5 oz)      Height: 5' 7" (1.702 m)          Abnormal Lab Results:  Labs Reviewed   CBC W/ AUTO DIFFERENTIAL - Abnormal; Notable for the following components:       Result Value    RBC 2.94 (*)     Hemoglobin 8.9 (*)     Hematocrit 24.4 (*)     Mean Corpuscular Hemoglobin Conc 36.5 (*)     Lymph # 0.8 (*)     Gran% 75.7 (*)     Lymph% 15.2 (*)     All other components within normal limits   COMPREHENSIVE METABOLIC PANEL - Abnormal; Notable for the following components:    CO2 22 (*)  "    BUN, Bld 33 (*)     Creatinine 1.8 (*)     Alkaline Phosphatase 49 (*)     eGFR if  42 (*)     eGFR if non  36 (*)     All other components within normal limits   B-TYPE NATRIURETIC PEPTIDE - Abnormal; Notable for the following components:     (*)     All other components within normal limits   MAGNESIUM - Abnormal; Notable for the following components:    Magnesium 1.4 (*)     All other components within normal limits   PROTIME-INR   APTT   MAGNESIUM   PHOSPHORUS   TROPONIN I   PHOSPHORUS        All Lab Results:  Results for orders placed or performed during the hospital encounter of 07/17/19   CBC auto differential   Result Value Ref Range    WBC 5.52 3.90 - 12.70 K/uL    RBC 2.94 (L) 4.60 - 6.20 M/uL    Hemoglobin 8.9 (L) 14.0 - 18.0 g/dL    Hematocrit 24.4 (L) 40.0 - 54.0 %    Mean Corpuscular Volume 83 82 - 98 fL    Mean Corpuscular Hemoglobin 30.3 27.0 - 31.0 pg    Mean Corpuscular Hemoglobin Conc 36.5 (H) 32.0 - 36.0 g/dL    RDW 13.4 11.5 - 14.5 %    Platelets 155 150 - 350 K/uL    MPV 9.5 9.2 - 12.9 fL    Gran # (ANC) 4.2 1.8 - 7.7 K/uL    Lymph # 0.8 (L) 1.0 - 4.8 K/uL    Mono # 0.5 0.3 - 1.0 K/uL    Eos # 0.0 0.0 - 0.5 K/uL    Baso # 0.01 0.00 - 0.20 K/uL    Gran% 75.7 (H) 38.0 - 73.0 %    Lymph% 15.2 (L) 18.0 - 48.0 %    Mono% 9.1 4.0 - 15.0 %    Eosinophil% 0.2 0.0 - 8.0 %    Basophil% 0.2 0.0 - 1.9 %    Differential Method Automated    Comprehensive metabolic panel   Result Value Ref Range    Sodium 137 136 - 145 mmol/L    Potassium 4.4 3.5 - 5.1 mmol/L    Chloride 103 95 - 110 mmol/L    CO2 22 (L) 23 - 29 mmol/L    Glucose 105 70 - 110 mg/dL    BUN, Bld 33 (H) 8 - 23 mg/dL    Creatinine 1.8 (H) 0.5 - 1.4 mg/dL    Calcium 9.0 8.7 - 10.5 mg/dL    Total Protein 7.6 6.0 - 8.4 g/dL    Albumin 3.9 3.5 - 5.2 g/dL    Total Bilirubin 0.7 0.1 - 1.0 mg/dL    Alkaline Phosphatase 49 (L) 55 - 135 U/L    AST 25 10 - 40 U/L    ALT 17 10 - 44 U/L    Anion Gap 12 8 - 16 mmol/L     eGFR if African American 42 (A) >60 mL/min/1.73 m^2    eGFR if non African American 36 (A) >60 mL/min/1.73 m^2   Protime-INR   Result Value Ref Range    Prothrombin Time 10.9 9.0 - 12.5 sec    INR 1.0 0.8 - 1.2   APTT   Result Value Ref Range    aPTT 24.6 21.0 - 32.0 sec   Urinalysis, Reflex to Urine Culture Urine, Clean Catch   Result Value Ref Range    Specimen UA Urine, Clean Catch     Color, UA Yellow Yellow, Straw, Avril    Appearance, UA Clear Clear    pH, UA 6.0 5.0 - 8.0    Specific Gravity, UA 1.025 1.005 - 1.030    Protein, UA Negative Negative    Glucose, UA Negative Negative    Ketones, UA Negative Negative    Bilirubin (UA) Negative Negative    Occult Blood UA Negative Negative    Nitrite, UA Negative Negative    Urobilinogen, UA Negative <2.0 EU/dL    Leukocytes, UA Negative Negative   Brain natriuretic peptide   Result Value Ref Range     (H) 0 - 99 pg/mL   Troponin I   Result Value Ref Range    Troponin I 0.023 0.000 - 0.026 ng/mL   Magnesium   Result Value Ref Range    Magnesium 1.4 (L) 1.6 - 2.6 mg/dL   Phosphorus   Result Value Ref Range    Phosphorus 3.0 2.7 - 4.5 mg/dL   Type & Screen   Result Value Ref Range    Group & Rh O POS     Indirect Cherise NEG          Imaging Results:  Imaging Results          CT Leg (Tibia-Fibula) Without Contrast Left (Final result)  Result time 07/17/19 23:52:44    Final result by Jess De Leon MD (07/17/19 23:52:44)                 Impression:      Acute fractures of the distal tibia and fibula, as above.    Techniques were utilized for this exam to obtain a radiation dose as low as reasonably be achievable.      Electronically signed by: Jess De Leon  Date:    07/17/2019  Time:    23:52             Narrative:    TECHNIQUE:  Noncontrast axial helical images were obtained throughout the left leg.  Coronal axial reformatted images were created.    FINDINGS:  There is a comminuted fracture of the distal tibial diaphysis with approximately  1 cm lateral displacement of the distal fracture fragment and approximately 2 cm cranial overriding of the distal fracture fragment.  The fracture does appear to extend into the tibiotalar joint.  There is also a minimally displaced fracture of the distal fibular diaphysis.  There is extensive surrounding edema.  There are no other acute fractures.                               X-Ray Chest 1 View (Final result)  Result time 07/17/19 23:27:26    Final result by Jess De Leon MD (07/17/19 23:27:26)                 Impression:      No cardiopulmonary process noted.      Electronically signed by: Jess De Leon  Date:    07/17/2019  Time:    23:27             Narrative:    EXAMINATION:  XR CHEST 1 VIEW    CLINICAL HISTORY:  Encounter for other preprocedural examination    COMPARISON:  Two thousand fourteen    FINDINGS:  No infiltrate or effusion identified.  Cardiomediastinal silhouette is within normal limits.                                 The EKG was ordered, reviewed, and independently interpreted by the ED provider.  Interpretation time: 2233  Rate: 62 BPM  Rhythm: normal sinus rhythm  Interpretation: Normal ECG.  No acute ST changes. No STEMI.         The Emergency Provider reviewed the vital signs and test results, which are outlined above.     ED Discussion     10:20 PM: Discussed pt's case with Dr. White (Orthopedics) who recommends admission to hospital medicine.    12:29 AM: Discussed case with Yovani (Hospital Medicine). Dr. Pina agrees with current care and management of pt and accepts admission.   Admitting Service: Hospital Medicine  Admitting Physician: Dr. Pina  Admit to: med/surg        ED Medication(s):  Medications   sodium chloride 0.9% flush 10 mL (has no administration in time range)   pantoprazole EC tablet 40 mg (has no administration in time range)   aspirin EC tablet 81 mg (has no administration in time range)   atorvastatin tablet 80 mg (has no administration in time range)    metoprolol succinate (TOPROL-XL) 24 hr tablet 25 mg (has no administration in time range)   ranolazine 12 hr tablet 1,000 mg (has no administration in time range)   HYDROcodone-acetaminophen  mg per tablet 1 tablet (has no administration in time range)   morphine injection 2 mg (2 mg Intravenous Given 7/18/19 0109)   naloxone 0.4 mg/mL injection 0.4 mg (has no administration in time range)   magnesium sulfate 2g in water 50mL IVPB (premix) (2 g Intravenous New Bag 7/18/19 0504)   0.9%  NaCl infusion (1,000 mLs Intravenous New Bag 7/17/19 2251)   heparin (porcine) injection 5,000 Units (5,000 Units Subcutaneous Given 7/18/19 0051)       Current Discharge Medication List                    Medical Decision Making:   Clinical Tests:   Lab Tests: Ordered and Reviewed  Radiological Study: Ordered and Reviewed  Medical Tests: Ordered and Reviewed             Scribe Attestation:   Scribe #1: I performed the above scribed service and the documentation accurately describes the services I performed. I attest to the accuracy of the note.     Attending:   Physician Attestation Statement for Scribe #1: I, Edy Honeycutt MD, personally performed the services described in this documentation, as scribed by Oziel Sue, in my presence, and it is both accurate and complete.           Clinical Impression       ICD-10-CM ICD-9-CM   1. Pre-op evaluation Z01.818 V72.84   2. Closed displaced comminuted fracture of shaft of left tibia, initial encounter S82.252A 823.20   3. Anemia, unspecified type D64.9 285.9   4. Renal insufficiency N28.9 593.9   5. CHF (congestive heart failure) I50.9 428.0       Disposition:   Disposition: Admitted  Condition: Fair         Edy Honeycutt MD  07/18/19 6974

## 2019-07-18 NOTE — ED NOTES
Pt eating sandwich and jello at this time. Pt educated on NPO status after midnight and verbalizes understanding.

## 2019-07-18 NOTE — ANESTHESIA PREPROCEDURE EVALUATION
07/18/2019  Foreign Holland is a 73 y.o., male.    Anesthesia Evaluation    I have reviewed the Patient Summary Reports.     I have reviewed the Medications.     Review of Systems  Anesthesia Hx:  No problems with previous Anesthesia   Denies Personal Hx of Anesthesia complications.   Hematology/Oncology:  Hematology Normal   Oncology Normal     EENT/Dental:EENT/Dental Normal   Cardiovascular:   Hypertension Past MI CAD asymptomatic CABG/stent  CHF ECG has been reviewed.    Pulmonary:   Shortness of breath    Renal/:   Chronic Renal Disease, CRI    Hepatic/GI:  Hepatic/GI Normal    Musculoskeletal:  Musculoskeletal Normal    Neurological:  Neurology Normal    Endocrine:  Endocrine Normal    Dermatological:  Skin Normal    Psych:  Psychiatric Normal           Physical Exam  General:  Well nourished    Airway/Jaw/Neck:  Airway Findings: Mouth Opening: Normal Tongue: Normal  Mallampati: III      Dental:  Dental Findings: In tact   Chest/Lungs:  Chest/Lungs Clear    Heart/Vascular:  Heart Findings: Normal Heart murmur: negative       Mental Status:  Mental Status Findings:  Cooperative, Alert and Oriented         Anesthesia Plan  Type of Anesthesia, risks & benefits discussed:  Anesthesia Type:  general, MAC  Patient's Preference:   Intra-op Monitoring Plan: standard ASA monitors  Intra-op Monitoring Plan Comments:   Post Op Pain Control Plan: multimodal analgesia  Post Op Pain Control Plan Comments:   Induction:   IV  Beta Blocker:  Patient is on a Beta-Blocker and has received one dose within the past 24 hours (No further documentation required).       Informed Consent: Patient understands risks and agrees with Anesthesia plan.  Questions answered. Anesthesia consent signed with patient.  ASA Score: 3     Day of Surgery Review of History & Physical: I have interviewed and examined the patient. I have reviewed  the patient's H&P dated:    H&P update referred to the surgeon.         Ready For Surgery From Anesthesia Perspective.

## 2019-07-18 NOTE — PT/OT/SLP PROGRESS
Occupational Therapy      Patient Name:  Foreign Holland   MRN:  [id number]    Eval initiated via chart review. Pt in sx.  Will complete eval on later date.     Stacey Whittaker OT  7/18/2019   1011

## 2019-07-18 NOTE — TRANSFER OF CARE
"Anesthesia Transfer of Care Note    Patient: Foreign Holland    Procedure(s) Performed: Procedure(s) (LRB):  APPLICATION, EXTERNAL FIXATION DEVICE (Left)  CLOSED REDUCTION, FRACTURE, FIBULA (Left)    Patient location: PACU    Anesthesia Type: general    Transport from OR: Transported from OR on room air with adequate spontaneous ventilation    Post pain: adequate analgesia    Post assessment: no apparent anesthetic complications and tolerated procedure well    Post vital signs: stable    Level of consciousness: awake    Nausea/Vomiting: no nausea/vomiting    Complications: none    Transfer of care protocol was followed      Last vitals:   Visit Vitals  /65 (BP Location: Right arm, Patient Position: Lying)   Pulse (!) 59   Temp 36.6 °C (97.9 °F) (Oral)   Resp 12   Ht 5' 7" (1.702 m)   Wt 75 kg (165 lb 5.5 oz)   SpO2 98%   BMI 25.90 kg/m²     "

## 2019-07-18 NOTE — ASSESSMENT & PLAN NOTE
Continue asa, statin, bb  Trend troponin   Further evaluation/diagnostics/interventions/consults pending course

## 2019-07-18 NOTE — ANESTHESIA POSTPROCEDURE EVALUATION
Anesthesia Post Evaluation    Patient: Foreign Holland    Procedure(s) Performed: Procedure(s) (LRB):  APPLICATION, EXTERNAL FIXATION DEVICE (Left)  CLOSED REDUCTION, FRACTURE, FIBULA (Left)    Final Anesthesia Type: general  Patient location during evaluation: PACU  Patient participation: Yes- Able to Participate  Level of consciousness: awake and alert, oriented and awake  Post-procedure vital signs: reviewed and stable  Pain management: adequate  Airway patency: patent  PONV status at discharge: No PONV  Anesthetic complications: no      Cardiovascular status: blood pressure returned to baseline  Respiratory status: unassisted and spontaneous ventilation  Hydration status: euvolemic  Follow-up not needed.          Vitals Value Taken Time   /56 7/18/2019 11:29 AM   Temp 36.4 °C (97.5 °F) 7/18/2019 11:15 AM   Pulse 79 7/18/2019 11:29 AM   Resp 17 7/18/2019 11:29 AM   SpO2 95 % 7/18/2019 11:29 AM         Event Time     Out of Recovery 07/18/2019 11:18:54          Pain/Alvarez Score: Pain Rating Prior to Med Admin: 4 (7/18/2019 11:37 AM)  Pain Rating Post Med Admin: 6 (7/18/2019  1:39 AM)  Alvarez Score: 10 (7/18/2019 11:15 AM)

## 2019-07-18 NOTE — PLAN OF CARE
Report received from nereida, floor nurse. Pt able to move self from bed to stretcher with rn maintaining neutral position of left leg. Pt escorted to preop via stretcher and accompanied by rn. Pt states his sister will be here later, but ok to put her on text messaging. Transfer completed without incident.

## 2019-07-18 NOTE — PROGRESS NOTES
Dr estrada, cardiology at bedside. 2d echo done as ordered. Per dr estrada, ok for pt to proceed with surgery. Dr amanda and dr jain informed.

## 2019-07-18 NOTE — ASSESSMENT & PLAN NOTE
-Clinically compensated  -May have been slightly dehydrated upon admission which triggered fall/questionable syncopal episode  -Continue BB  -ACEi held due to bumped creatinine, resume pending clinical course and repeat lab work

## 2019-07-18 NOTE — CONSULTS
[facility] -   Orthopedics  Consult Note    Patient Name: Foreign Holland  MRN: [id number]  Admission Date: 7/17/2019  Hospital Length of Stay: 0 days  Attending Provider: Edy Honeycutt MD  Primary Care Provider: Oziel Mendieta MD    Patient information was obtained from patient and ER records.     Consults  Subjective:     Principal Problem:<principal problem not specified>    Chief Complaint:   Chief Complaint   Patient presents with    Ankle Pain     pt was seen by PCP earlier today and dx with L ankle fx and referred to ER        HPI: Pt reports that about an hour ago he was cutting his yard when he suddenly became dizzy and fell on his left lower leg. He reports that he could not get up and cannot put weight on the affected leg. Pain is 10/10 when he attempts to stand or the LLE is touched. He has not taken anything for pain. His neighbor brought him here today. No pain elsewhere. No other neurological s/s occurred during event or is occurring now. He denies numbness/tingling in the affected leg/foot. No hip/thigh pain.    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       Past Surgical History:   Procedure Laterality Date    BIOPSY-LYMPH NODE Left 5/21/2014    [facility]    CARDIAC CATHETERIZATION      CARDIAC SURGERY  2006    CATARACT EXTRACTION W/  INTRAOCULAR LENS IMPLANT Right 05/24/2017    CATARACT EXTRACTION W/  INTRAOCULAR LENS IMPLANT Left 04/12/2017    CORONARY ARTERY BYPASS GRAFT      [id number]-D-CATH Right 5/28/2014    [facility]    LYMPH NODES, LEFT NECK, EXCISIONAL BIOPSY  5/21/2014    MEDIPORT INSERTION, SINGLE   14    PCIOL Left 2017    DR. MCCRAY    PROSTATE SURGERY  2014       Review of patient's allergies indicates:  No Known Allergies    No current facility-administered medications for this encounter.      Current Outpatient Medications   Medication Sig    aspirin (ECOTRIN) 81 MG EC tablet Take 81 mg by mouth every morning.     atorvastatin (LIPITOR) 80 MG tablet 80 mg every evening.     cholecalciferol, vitamin D3, (VITAMIN D3) 400 unit Tab Take 1 tablet by mouth once daily.    fish oil-omega-3 fatty acids 300-1,000 mg capsule Take 1 g by mouth 2 (two) times daily.     FLUZONE HIGH-DOSE , PF, 180 mcg/0.5 mL vaccine     FOLIC ACID ORAL Take 2,400 mcg by mouth once daily.     hydroCHLOROthiazide (HYDRODIURIL) 25 MG tablet Take by mouth once daily.    metoprolol succinate (TOPROL-XL) 25 MG 24 hr tablet Take 25 mg by mouth once daily.    NITROSTAT 0.4 mg SL tablet Patient states that he takes this medication as needed    PREVNAR 13, PF, 0.5 mL Syrg     quinapril (ACCUPRIL) 10 MG tablet every morning.     RANEXA 1,000 mg Tb12 TAKE 1 TABLET BY MOUTH TWICE A DAY    traMADol (ULTRAM) 50 mg tablet Take 1 tablet (50 mg total) by mouth every 12 (twelve) hours as needed for Pain.    triamcinolone acetonide 0.1% (KENALOG) 0.1 % cream every morning.      Family History     Problem Relation (Age of Onset)    Blindness Father        Tobacco Use    Smoking status: Former Smoker     Packs/day: 1.00     Years: 45.00     Pack years: 45.00     Last attempt to quit: 2005     Years since quittin.5    Smokeless tobacco: Never Used   Substance and Sexual Activity    Alcohol use: No     Comment: former    Drug use: No    Sexual activity: Not on file     ROS  Objective:     Vital Signs (Most Recent):  Temp: 98.4 °F (36.9 °C) (19)  Pulse: 64 (19)  Resp: 20 (19)  BP: (!) 157/75 (19)  SpO2: 98 % (19) Vital Signs (24h Range):  Temp:  [97.3 °F  "(36.3 °C)-98.4 °F (36.9 °C)] 98.4 °F (36.9 °C)  Pulse:  [64] 64  Resp:  [20] 20  SpO2:  [95 %-98 %] 98 %  BP: (133-157)/(75-82) 157/75     Weight: 75 kg (165 lb 5.5 oz)  Height: 5' 7" (170.2 cm)  Body mass index is 25.9 kg/m².    No intake or output data in the 24 hours ending 07/17/19 2314  Physical Exam   Constitutional: He is oriented to person, place, and time.   HENT:   Head: Normocephalic and atraumatic.   Eyes: Conjunctivae and EOM are normal.   Cardiovascular: Normal rate, regular rhythm, normal heart sounds and intact distal pulses.   Pulmonary/Chest: Effort normal and breath sounds normal.   Ortho/SPM Exam   Left lower extremity:  Diffuse fracture blisters medial and anterior tibia.  Moderate swelling.  Leg compartments soft.  Skin intact.   Active dorsiflexion, plantar flexion, inversion eversion.   Light touch sensation intact. Brisk cap refill all 5 digits.    Significant Labs: All pertinent labs within the past 24 hours have been reviewed.    Significant Imaging: I have reviewed and interpreted all pertinent imaging results/findings.    Assessment/Plan: Closed displaced distal tibia/fibula fracture with IA extension    1. Long leg posterior splint  2. Strict ice and elevation  3. Compartment checks  4. CT scan left lower extremity  5. Medical optimization and NPO after midnight  6. Likely external fixator application  tomorrow.   7. Case discussed with Dr. Bustamante who will assume care in AM     There are no hospital problems to display for this patient.      Thank you for your consult. I will follow-up with patient. Please contact us if you have any additional questions.    Flynn White MD  Orthopedics  [facility]      "

## 2019-07-18 NOTE — CONSULTS
"[facility]  Cardiology  Consult Note    Patient Name: Foreign Holland  MRN: [id number]  Admission Date: 7/17/2019  Hospital Length of Stay: 0 days  Code Status: Full Code   Attending Provider: Goran Hdez MD   Consulting Provider: Ana Maynard PA-C  Primary Care Physician: Oziel Mendieta MD  Principal Problem:Tibia/fibula fracture, left, closed, initial encounter    Patient information was obtained from patient, past medical records and ER records.     Inpatient consult to Cardiology  Consult performed by: Ana Maynard PA-C  Consult ordered by: Goran Hdez MD        Subjective:     Chief Complaint: Left ankle/leg pain    HPI:   Mr. Holland is a 73 year old male patient whose current medical conditions include CAD s/p remote CABG x 2, old MI, HTN, and hyperlipidemia who presented to [facility] ED yesterday s/p fall. Patient reported falling/"passing out" after becoming dizzy while mowing the grass. He denied any associated natalia chest pain, SOB, or palpitations. Post-fall, he complained of left leg/ankle pain. Further imaging revealed acute comminuted fractures of the tibia and fibula and patient was subsequently admitted for reduction and external fixation. Cardiology consulted for pre-op clearance given patient's history. Patient seen and examined today. Feels well. No complaints at time of exam. Reports chronic bouts of intermittent angina/chest pain that occur after heavy exertion. States this has been an ongoing issue for "many years". Denies any increase in frequency or severity. States he will occasionally have to take sublingual nitro, which mitigates his symptoms. Denies any other cardiac complaints. He reports compliance with his medications. Followed by Dr. Burroughs in clinic. Chart reviewed. EKG shows NSR, normal EKG. Preliminary read of 2D echo at bedside by Dr. Hernandez shows normal EF. Troponin 0.028>0.033. H/H 8.2/22.9. Creatinine 1.8>1.6.    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       Past Surgical History:   Procedure Laterality Date    BIOPSY-LYMPH NODE Left 5/21/2014    [facility]    CARDIAC CATHETERIZATION      CARDIAC SURGERY  2006    CATARACT EXTRACTION W/  INTRAOCULAR LENS IMPLANT Right 05/24/2017    CATARACT EXTRACTION W/  INTRAOCULAR LENS IMPLANT Left 04/12/2017    CORONARY ARTERY BYPASS GRAFT      EYE SURGERY      Cataract extraction with intraocular lens implant    [id number] Right 5/28/2014    [facility]    LYMPH NODES, LEFT NECK, EXCISIONAL BIOPSY  5/21/2014    MEDIPORT INSERTION, SINGLE  5/28/14    PCIOL Left 04/12/2017    DR. MCCRAY    PROSTATE SURGERY  02/2014       Review of patient's allergies indicates:  No Known Allergies    No current facility-administered medications on file prior to encounter.      Current Outpatient Medications on File Prior to Encounter   Medication Sig    aspirin (ECOTRIN) 81 MG EC tablet Take 81 mg by mouth every morning.     atorvastatin (LIPITOR) 80 MG tablet 80 mg every evening.     cholecalciferol, vitamin D3, (VITAMIN D3) 400 unit Tab Take 1 tablet by mouth once daily.    fish oil-omega-3 fatty acids 300-1,000 mg capsule Take 1 g by mouth 2 (two) times daily.     FOLIC ACID ORAL Take 2,400 mcg by mouth once daily.     hydroCHLOROthiazide (HYDRODIURIL) 25 MG tablet Take by mouth once daily.    metoprolol succinate (TOPROL-XL) 25 MG 24 hr tablet Take 25 mg by mouth once daily.    NITROSTAT 0.4 mg SL tablet Patient states that he takes this medication as needed    quinapril (ACCUPRIL) 10 MG tablet every morning.     RANEXA 1,000 mg Tb12 TAKE 1 TABLET BY MOUTH TWICE A DAY    triamcinolone  acetonide 0.1% (KENALOG) 0.1 % cream every morning.     FLUZONE HIGH-DOSE -, PF, 180 mcg/0.5 mL vaccine     PREVNAR 13, PF, 0.5 mL Syrg     traMADol (ULTRAM) 50 mg tablet Take 1 tablet (50 mg total) by mouth every 12 (twelve) hours as needed for Pain.     Family History     Problem Relation (Age of Onset)    Blindness Father        Tobacco Use    Smoking status: Former Smoker     Packs/day: 1.00     Years: 45.00     Pack years: 45.00     Last attempt to quit: 2005     Years since quittin.5    Smokeless tobacco: Never Used    Tobacco comment: Pt no longer smokes   Substance and Sexual Activity    Alcohol use: No     Comment: former    Drug use: No    Sexual activity: Yes     Birth control/protection: None     Review of Systems   Constitution: Negative.   HENT: Negative.    Eyes: Negative.    Cardiovascular: Positive for chest pain (chronic intermittent angina) and dyspnea on exertion (chronic).   Respiratory: Negative.    Endocrine: Negative.    Hematologic/Lymphatic: Negative.    Skin: Negative.    Musculoskeletal: Positive for joint pain.   Gastrointestinal: Negative.    Genitourinary: Negative.    Neurological: Positive for dizziness and light-headedness.   Psychiatric/Behavioral: Negative.    Allergic/Immunologic: Negative.      Objective:     Vital Signs (Most Recent):  Temp: 97.5 °F (36.4 °C) (19 1115)  Pulse: 79 (19 1129)  Resp: 17 (19 1129)  BP: (!) 121/56 (19 1129)  SpO2: 95 % (19 1129) Vital Signs (24h Range):  Temp:  [97 °F (36.1 °C)-99.1 °F (37.3 °C)] 97.5 °F (36.4 °C)  Pulse:  [58-79] 79  Resp:  [10-20] 17  SpO2:  [95 %-100 %] 95 %  BP: (115-158)/(56-77) 121/56     Weight: 75 kg (165 lb 5.5 oz)  Body mass index is 25.9 kg/m².    SpO2: 95 %  O2 Device (Oxygen Therapy): room air      Intake/Output Summary (Last 24 hours) at 2019 1144  Last data filed at 2019 1040  Gross per 24 hour   Intake 1150 ml   Output 252 ml   Net 898 ml        Lines/Drains/Airways     Peripheral Intravenous Line                 Peripheral IV - Single Lumen 07/17/19 2243 18 G Left Antecubital less than 1 day                Physical Exam   Constitutional: He is oriented to person, place, and time. He appears well-developed and well-nourished. No distress.   HENT:   Head: Normocephalic and atraumatic.   Eyes: Pupils are equal, round, and reactive to light. Right eye exhibits no discharge. Left eye exhibits no discharge.   Neck: Neck supple. No JVD present.   Cardiovascular: Normal rate, regular rhythm, S1 normal, S2 normal and normal heart sounds.   No murmur heard.  Pulmonary/Chest: Effort normal and breath sounds normal. No respiratory distress. He has no wheezes. He has no rales.   CABG site well-healed   Abdominal: Soft. He exhibits no distension.   Musculoskeletal: He exhibits no edema.   Neurological: He is alert and oriented to person, place, and time.   Skin: Skin is warm and dry. He is not diaphoretic. No erythema.   Psychiatric: He has a normal mood and affect. His behavior is normal. Thought content normal.   Nursing note and vitals reviewed.      Significant Labs:   CMP   Recent Labs   Lab 07/17/19 2244 07/18/19 0517    139   K 4.4 3.8    105   CO2 22* 23    101   BUN 33* 33*   CREATININE 1.8* 1.6*   CALCIUM 9.0 8.4*   PROT 7.6  --    ALBUMIN 3.9  --    BILITOT 0.7  --    ALKPHOS 49*  --    AST 25  --    ALT 17  --    ANIONGAP 12 11   ESTGFRAFRICA 42* 48*   EGFRNONAA 36* 42*   , CBC   Recent Labs   Lab 07/17/19 2244 07/18/19 0517   WBC 5.52 3.38*   HGB 8.9* 8.2*   HCT 24.4* 22.9*    134*   , Troponin   Recent Labs   Lab 07/18/19  0058 07/18/19 0517   TROPONINI 0.023 0.033*    and All pertinent lab results from the last 24 hours have been reviewed.    Significant Imaging: Echocardiogram:   2D echo with color flow doppler:   Results for orders placed or performed during the hospital encounter of 07/17/19   2D echo with color  flow doppler    Narrative    This study is in progress....   , EKG: Reviewed and X-Ray: CXR: X-Ray Chest 1 View (CXR):   Results for orders placed or performed during the hospital encounter of 07/17/19   X-Ray Chest 1 View    Narrative    EXAMINATION:  XR CHEST 1 VIEW    CLINICAL HISTORY:  Encounter for other preprocedural examination    COMPARISON:  Two thousand fourteen    FINDINGS:  No infiltrate or effusion identified.  Cardiomediastinal silhouette is within normal limits.      Impression    No cardiopulmonary process noted.      Electronically signed by: Jess De Leon  Date:    07/17/2019  Time:    23:27    and X-Ray Chest PA and Lateral (CXR): No results found for this visit on 07/17/19.    Assessment and Plan:   Patient who presents with acute tibia/fibula fracture s/p fall. Seems stable CV wise. Pattern of angina unchanged. EKG shows NSR, normal EKG. Preliminary read of normal EF on 2D echo by Dr. Hernandez. Continue same mgmt. Ok to proceed with surgery at moderate risk of jailene and post OP CV complications. Follow-up in clinic.    * Tibia/fibula fracture, left, closed, initial encounter  -Scheduled for surgical repair today  -Mgmt as per ortho    Chronic diastolic congestive heart failure  -Clinically compensated  -May have been slightly dehydrated upon admission which triggered fall/questionable syncopal episode  -Continue BB  -ACEi held due to bumped creatinine, resume pending clinical course and repeat lab work    Pre-op evaluation  -Patient with history of CAD s/p CABG x 2 and old MI  -Seems stable CV wise  -Reports chronic bouts of intermittent angina after heavy exertion with occasional sublingual nitro usage  -No increase in frequency or severity  -No other cardiac complaints  -Troponin 0.028>0.033, trivial elevation likely secondary to underlying CKD/dehydration and anemia  -EKG shows NSR, normal EKG  -2D echo pending but preliminary read of normal EF by Dr. Hernandez at bedside  -Ok to proceed with  surgery at moderate risk of jailene and post OP CV complications  -Continue ASA, BB, statin, Ranexa    Anemia  -Mgmt as per primary team    CAD (coronary artery disease)  -Stable, reports chronic bouts of angina after heavy exertion  -No change in pattern, no increase in frequency or severity  -Continue home meds-statin, BB, Ranexa  -Resume ASA post-surgery  -ACEi held due to bumped creatinine        VTE Risk Mitigation (From admission, onward)        Ordered     Place sequential compression device  Until discontinued      07/18/19 0825     Place sequential compression device  Until discontinued      07/18/19 0308          Thank you for your consult. I will sign off. Please contact us if you have any additional questions.    Ana Maynard PA-C  Cardiology   [facility]    Chart reviewed. Dr. Hernandez examined patient and agrees with plan as outlined above.

## 2019-07-18 NOTE — ASSESSMENT & PLAN NOTE
-Stable, reports chronic bouts of angina after heavy exertion  -No change in pattern, no increase in frequency or severity  -Continue home meds-statin, BB, Ranexa  -Resume ASA post-surgery  -ACEi held due to bumped creatinine

## 2019-07-18 NOTE — BRIEF OP NOTE
[facility] -   Brief Operative Note    SUMMARY     Surgery Date: 7/18/2019     Surgeon(s) and Role:     * Philippe Bustamante Jr., MD - Primary    Assisting Surgeon: None    Pre-op Diagnosis:  Closed comminuted fracture left distal tibia and fibula    Post-op Diagnosis:  Post-Op Diagnosis Codes:     Closed comminuted fracture left distal tibia and fibula    Procedure(s) (LRB):  APPLICATION, EXTERNAL FIXATION DEVICE (Left)  CLOSED REDUCTION, FRACTURE, FIBULA (Left)    Anesthesia: General    Description of Procedure:  Closed reduction left tibia-fibular fracture with application of external fixator    Description of the findings of the procedure:  Comminuted distal tibia and fibular fracture left leg with compromised soft tissues including significant swelling and fracture blisters.    Estimated Blood Loss: 2 mL         Specimens:   Specimen (12h ago, onward)    None

## 2019-07-18 NOTE — ASSESSMENT & PLAN NOTE
-Patient with history of CAD s/p CABG x 2 and old MI  -Seems stable CV wise  -Reports chronic bouts of intermittent angina after heavy exertion with occasional sublingual nitro usage  -No increase in frequency or severity  -No other cardiac complaints  -Troponin 0.028>0.033, trivial elevation likely secondary to underlying CKD/dehydration and anemia  -EKG shows NSR, normal EKG  -2D echo pending but preliminary read of normal EF by Dr. Hernandez at bedside  -Ok to proceed with surgery at moderate risk of jailene and post OP CV complications  -Continue ASA, BB, statin, Ranexa

## 2019-07-18 NOTE — H&P
[facility] Hospital Medicine  History & Physical    Patient Name: Foreign Holland  MRN: [id number]  Admission Date: 7/17/2019  Attending Physician: Yovani Hill MD  Primary Care Provider: Oziel Mendieta MD         Patient information was obtained from patient, past medical records and ER records.     Subjective:     Principal Problem:Tibia/fibula fracture, left, closed, initial encounter    Chief Complaint:   Chief Complaint   Patient presents with    Ankle Pain     pt was seen by PCP earlier today and dx with L ankle fx and referred to ER        HPI: Foreign Holland is a 73 y.o. male patient with a PMHx of CA, CAD, HTN, who presents evaluation of L ankle pain which onset this morning after ground level fall.  Pt states that he was mowing the lawn at his home, when he began to feel dizzy and fell to the ground.  Pt denies dizziness at this time.  Pt denies LOC at time of fall. ROM worsen.  No associated sxs reported.  No other complaints. Patient was evaluated in the ER. Cr 1.8, baseline 1.5-1.6. CT lower L ext:Acute fractures of the distal tibia and fibula, as above. Ortho consulted and recommended admission and NPO. HM consulted. Patient admitted.     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       Past Surgical History:   Procedure Laterality Date    BIOPSY-LYMPH NODE Left 5/21/2014    Performed by [facility]un Lev[facility] at [facility]    CARDIAC CATHETERIZATION      CARDIAC SURGERY  2006    CATARACT EXTRACTION W/  INTRAOCULAR LENS IMPLANT Right 05/24/2017    CATARACT EXTRACTION W/  INTRAOCULAR LENS IMPLANT Left 04/12/2017    CORONARY ARTERY BYPASS GRAFT      [id number]-U-CATH Right 5/28/2014    Performed by [facility] at [facility] OR    LYMPH NODES, LEFT  NECK, EXCISIONAL BIOPSY  2014    MEDIPORT INSERTION, SINGLE  14    PCIOL Left 2017    DR. MCCRAY    PROSTATE SURGERY  2014       Review of patient's allergies indicates:  No Known Allergies    No current facility-administered medications on file prior to encounter.      Current Outpatient Medications on File Prior to Encounter   Medication Sig    aspirin (ECOTRIN) 81 MG EC tablet Take 81 mg by mouth every morning.     atorvastatin (LIPITOR) 80 MG tablet 80 mg every evening.     cholecalciferol, vitamin D3, (VITAMIN D3) 400 unit Tab Take 1 tablet by mouth once daily.    fish oil-omega-3 fatty acids 300-1,000 mg capsule Take 1 g by mouth 2 (two) times daily.     FLUZONE HIGH-DOSE -, PF, 180 mcg/0.5 mL vaccine     FOLIC ACID ORAL Take 2,400 mcg by mouth once daily.     hydroCHLOROthiazide (HYDRODIURIL) 25 MG tablet Take by mouth once daily.    metoprolol succinate (TOPROL-XL) 25 MG 24 hr tablet Take 25 mg by mouth once daily.    NITROSTAT 0.4 mg SL tablet Patient states that he takes this medication as needed    PREVNAR 13, PF, 0.5 mL Syrg     quinapril (ACCUPRIL) 10 MG tablet every morning.     RANEXA 1,000 mg Tb12 TAKE 1 TABLET BY MOUTH TWICE A DAY    traMADol (ULTRAM) 50 mg tablet Take 1 tablet (50 mg total) by mouth every 12 (twelve) hours as needed for Pain.    triamcinolone acetonide 0.1% (KENALOG) 0.1 % cream every morning.      Family History     Problem Relation (Age of Onset)    Blindness Father        Tobacco Use    Smoking status: Former Smoker     Packs/day: 1.00     Years: 45.00     Pack years: 45.00     Last attempt to quit: 2005     Years since quittin.5    Smokeless tobacco: Never Used   Substance and Sexual Activity    Alcohol use: No     Comment: former    Drug use: No    Sexual activity: Not on file     Review of Systems   Constitutional: Negative for chills, diaphoresis, fatigue and fever.   HENT: Negative for congestion, sore throat and  voice change.    Eyes: Negative for photophobia and visual disturbance.   Respiratory: Negative for cough, shortness of breath, wheezing and stridor.    Cardiovascular: Negative for chest pain and leg swelling.   Gastrointestinal: Negative for abdominal distention, abdominal pain, constipation, diarrhea, nausea and vomiting.   Endocrine: Negative for polydipsia, polyphagia and polyuria.   Genitourinary: Negative for difficulty urinating, dysuria, flank pain, testicular pain and urgency.   Musculoskeletal: Positive for arthralgias and gait problem. Negative for back pain, joint swelling, neck pain and neck stiffness.   Skin: Negative for color change and rash.   Allergic/Immunologic: Negative for immunocompromised state.   Neurological: Negative for dizziness, syncope, weakness, numbness and headaches.   Hematological: Does not bruise/bleed easily.   Psychiatric/Behavioral: Negative for agitation, behavioral problems and confusion.     Objective:     Vital Signs (Most Recent):  Temp: 98.4 °F (36.9 °C) (07/17/19 2024)  Pulse: 64 (07/17/19 2024)  Resp: 20 (07/17/19 2024)  BP: (!) 157/75 (07/17/19 2024)  SpO2: 98 % (07/17/19 2024) Vital Signs (24h Range):  Temp:  [97.3 °F (36.3 °C)-98.4 °F (36.9 °C)] 98.4 °F (36.9 °C)  Pulse:  [64] 64  Resp:  [20] 20  SpO2:  [95 %-98 %] 98 %  BP: (133-157)/(75-82) 157/75     Weight: 75 kg (165 lb 5.5 oz)  Body mass index is 25.9 kg/m².    Physical Exam   Constitutional: He is oriented to person, place, and time. He appears well-developed and well-nourished. No distress.   HENT:   Head: Normocephalic and atraumatic.   Nose: Nose normal.   Eyes: Pupils are equal, round, and reactive to light. Conjunctivae and EOM are normal. No scleral icterus.   Neck: Normal range of motion. Neck supple. No tracheal deviation present.   Cardiovascular: Normal rate, regular rhythm, normal heart sounds and intact distal pulses.   No murmur heard.  Bilateral lower ext edema.      Pulmonary/Chest: Effort  normal and breath sounds normal. No stridor. No respiratory distress. He has no wheezes. He has no rales.   Abdominal: Soft. Bowel sounds are normal. He exhibits no distension. There is no tenderness. There is no guarding.   Genitourinary:   Genitourinary Comments: Check ua   Musculoskeletal: Normal range of motion. He exhibits no edema or deformity.   Neurovascular intact.   LLE splint inplace.   Neurological: He is alert and oriented to person, place, and time. No cranial nerve deficit.   Skin: Skin is warm and dry. Capillary refill takes 2 to 3 seconds. No rash noted. He is not diaphoretic. No leision or breakdown noted to visible skin  Psychiatric: He has a normal mood and affect. His behavior is normal. Judgment and thought content normal.   Nursing note and vitals reviewed.        CRANIAL NERVES     CN III, IV, VI   Pupils are equal, round, and reactive to light.  Extraocular motions are normal.        Significant Labs: All pertinent labs within the past 24 hours have been reviewed.  Results for orders placed or performed during the hospital encounter of 07/17/19   CBC auto differential   Result Value Ref Range    WBC 5.52 3.90 - 12.70 K/uL    RBC 2.94 (L) 4.60 - 6.20 M/uL    Hemoglobin 8.9 (L) 14.0 - 18.0 g/dL    Hematocrit 24.4 (L) 40.0 - 54.0 %    Mean Corpuscular Volume 83 82 - 98 fL    Mean Corpuscular Hemoglobin 30.3 27.0 - 31.0 pg    Mean Corpuscular Hemoglobin Conc 36.5 (H) 32.0 - 36.0 g/dL    RDW 13.4 11.5 - 14.5 %    Platelets 155 150 - 350 K/uL    MPV 9.5 9.2 - 12.9 fL    Gran # (ANC) 4.2 1.8 - 7.7 K/uL    Lymph # 0.8 (L) 1.0 - 4.8 K/uL    Mono # 0.5 0.3 - 1.0 K/uL    Eos # 0.0 0.0 - 0.5 K/uL    Baso # 0.01 0.00 - 0.20 K/uL    Gran% 75.7 (H) 38.0 - 73.0 %    Lymph% 15.2 (L) 18.0 - 48.0 %    Mono% 9.1 4.0 - 15.0 %    Eosinophil% 0.2 0.0 - 8.0 %    Basophil% 0.2 0.0 - 1.9 %    Differential Method Automated    Comprehensive metabolic panel   Result Value Ref Range    Sodium 137 136 - 145 mmol/L     Potassium 4.4 3.5 - 5.1 mmol/L    Chloride 103 95 - 110 mmol/L    CO2 22 (L) 23 - 29 mmol/L    Glucose 105 70 - 110 mg/dL    BUN, Bld 33 (H) 8 - 23 mg/dL    Creatinine 1.8 (H) 0.5 - 1.4 mg/dL    Calcium 9.0 8.7 - 10.5 mg/dL    Total Protein 7.6 6.0 - 8.4 g/dL    Albumin 3.9 3.5 - 5.2 g/dL    Total Bilirubin 0.7 0.1 - 1.0 mg/dL    Alkaline Phosphatase 49 (L) 55 - 135 U/L    AST 25 10 - 40 U/L    ALT 17 10 - 44 U/L    Anion Gap 12 8 - 16 mmol/L    eGFR if African American 42 (A) >60 mL/min/1.73 m^2    eGFR if non African American 36 (A) >60 mL/min/1.73 m^2   Protime-INR   Result Value Ref Range    Prothrombin Time 10.9 9.0 - 12.5 sec    INR 1.0 0.8 - 1.2   APTT   Result Value Ref Range    aPTT 24.6 21.0 - 32.0 sec       Significant Imaging: I have reviewed all pertinent imaging results/findings within the past 24 hours.   Imaging Results          CT Leg (Tibia-Fibula) Without Contrast Left (Final result)  Result time 07/17/19 23:52:44    Final result by Jess De Leon MD (07/17/19 23:52:44)                 Impression:      Acute fractures of the distal tibia and fibula, as above.    Techniques were utilized for this exam to obtain a radiation dose as low as reasonably be achievable.      Electronically signed by: Jess De Leon  Date:    07/17/2019  Time:    23:52             Narrative:    TECHNIQUE:  Noncontrast axial helical images were obtained throughout the left leg.  Coronal axial reformatted images were created.    FINDINGS:  There is a comminuted fracture of the distal tibial diaphysis with approximately 1 cm lateral displacement of the distal fracture fragment and approximately 2 cm cranial overriding of the distal fracture fragment.  The fracture does appear to extend into the tibiotalar joint.  There is also a minimally displaced fracture of the distal fibular diaphysis.  There is extensive surrounding edema.  There are no other acute fractures.                               X-Ray Chest 1  View (Final result)  Result time 07/17/19 23:27:26    Final result by Jess De Leon MD (07/17/19 23:27:26)                 Impression:      No cardiopulmonary process noted.      Electronically signed by: Jess De Leon  Date:    07/17/2019  Time:    23:27             Narrative:    EXAMINATION:  XR CHEST 1 VIEW    CLINICAL HISTORY:  Encounter for other preprocedural examination    COMPARISON:  Two thousand fourteen    FINDINGS:  No infiltrate or effusion identified.  Cardiomediastinal silhouette is within normal limits.                                I have personally reviewed the patients labs, imaging, ekg and discussed the patient case in detail with the Er provider    Assessment/Plan:     * Tibia/fibula fracture, left, closed, initial encounter  NPO  Ortho consulted by ED and to manage  Control pain  Elevate lower ext.  Patient with lower ext blistering.  No jordin/scd.  1x dose heparin for dvt mitigation overnight.   Further evaluation/diagnostics/interventions/consults pending course     Given patient age, history is likely high risk for any surgical procedure.     Renal insufficiency  On CKD  Monitor RFP close  Gentle hydration.  Hold ace/diuretic for now.        Chronic diastolic congestive heart failure  Last echo 2 years ago  Does not appear to be overly decompensated.   Repeat  Hold diuretic  Continue home meds  Optimization pending course  Consider cardiology consult pending course.   Further evaluation/diagnostics/interventions/consults pending course         Anemia  Chronic. Type and screen transfuse if need  Monitor.  Further evaluation/diagnostics/interventions/consults pending course         HTN (hypertension)  Continue home meds  Monitor trends  Hold ace/arb and HCTZ given acute renal insufficiency on CKD.  Monitor RFP and fluid status with CHF hx.   Consider renal consult pending course  Gentle hydration overnight   Further evaluation/diagnostics/interventions/consults pending course           CAD (coronary artery disease)  Continue asa, statin, bb  Trend troponin   Further evaluation/diagnostics/interventions/consults pending course       Renal Insufficiency  On CKD  Monitor RFP close  Gentle hydration.  Hold ace/diuretic for now.        VTE Risk Mitigation (From admission, onward)    One time dose heparin sq          **Portions of this note has been dictated using speech recognition software, M*Modal Fluency Direct; although, time has been taken to proof read and revise it may still contain misspellings, grammatical and or other errors.**      Ranulfo Thomson NP  Department of Hospital Medicine   [facility]

## 2019-07-19 LAB
ANION GAP SERPL CALC-SCNC: 10 MMOL/L (ref 8–16)
BASOPHILS # BLD AUTO: 0.01 K/UL (ref 0–0.2)
BASOPHILS NFR BLD: 0.2 % (ref 0–1.9)
BUN SERPL-MCNC: 30 MG/DL (ref 8–23)
CALCIUM SERPL-MCNC: 9.1 MG/DL (ref 8.7–10.5)
CHLORIDE SERPL-SCNC: 104 MMOL/L (ref 95–110)
CO2 SERPL-SCNC: 25 MMOL/L (ref 23–29)
CREAT SERPL-MCNC: 1.5 MG/DL (ref 0.5–1.4)
DIFFERENTIAL METHOD: ABNORMAL
EOSINOPHIL # BLD AUTO: 0 K/UL (ref 0–0.5)
EOSINOPHIL NFR BLD: 0 % (ref 0–8)
ERYTHROCYTE [DISTWIDTH] IN BLOOD BY AUTOMATED COUNT: 13.4 % (ref 11.5–14.5)
EST. GFR  (AFRICAN AMERICAN): 52 ML/MIN/1.73 M^2
EST. GFR  (NON AFRICAN AMERICAN): 45 ML/MIN/1.73 M^2
GLUCOSE SERPL-MCNC: 104 MG/DL (ref 70–110)
HCT VFR BLD AUTO: 21.5 % (ref 40–54)
HGB BLD-MCNC: 7.8 G/DL (ref 14–18)
LYMPHOCYTES # BLD AUTO: 1 K/UL (ref 1–4.8)
LYMPHOCYTES NFR BLD: 21.2 % (ref 18–48)
MCH RBC QN AUTO: 30.2 PG (ref 27–31)
MCHC RBC AUTO-ENTMCNC: 36.3 G/DL (ref 32–36)
MCV RBC AUTO: 83 FL (ref 82–98)
MONOCYTES # BLD AUTO: 0.7 K/UL (ref 0.3–1)
MONOCYTES NFR BLD: 13.8 % (ref 4–15)
NEUTROPHILS # BLD AUTO: 3.2 K/UL (ref 1.8–7.7)
NEUTROPHILS NFR BLD: 65 % (ref 38–73)
PLATELET # BLD AUTO: 133 K/UL (ref 150–350)
PMV BLD AUTO: 9.3 FL (ref 9.2–12.9)
POTASSIUM SERPL-SCNC: 4.5 MMOL/L (ref 3.5–5.1)
RBC # BLD AUTO: 2.58 M/UL (ref 4.6–6.2)
SODIUM SERPL-SCNC: 139 MMOL/L (ref 136–145)
WBC # BLD AUTO: 4.86 K/UL (ref 3.9–12.7)

## 2019-07-19 PROCEDURE — 97530 THERAPEUTIC ACTIVITIES: CPT

## 2019-07-19 PROCEDURE — 36415 COLL VENOUS BLD VENIPUNCTURE: CPT

## 2019-07-19 PROCEDURE — 85025 COMPLETE CBC W/AUTO DIFF WBC: CPT

## 2019-07-19 PROCEDURE — 11000001 HC ACUTE MED/SURG PRIVATE ROOM

## 2019-07-19 PROCEDURE — 94799 UNLISTED PULMONARY SVC/PX: CPT

## 2019-07-19 PROCEDURE — 97116 GAIT TRAINING THERAPY: CPT

## 2019-07-19 PROCEDURE — 97162 PT EVAL MOD COMPLEX 30 MIN: CPT

## 2019-07-19 PROCEDURE — 80048 BASIC METABOLIC PNL TOTAL CA: CPT

## 2019-07-19 PROCEDURE — 97166 OT EVAL MOD COMPLEX 45 MIN: CPT

## 2019-07-19 PROCEDURE — 25000003 PHARM REV CODE 250: Performed by: NURSE PRACTITIONER

## 2019-07-19 PROCEDURE — 99900035 HC TECH TIME PER 15 MIN (STAT)

## 2019-07-19 PROCEDURE — 94760 N-INVAS EAR/PLS OXIMETRY 1: CPT

## 2019-07-19 RX ORDER — MORPHINE SULFATE 4 MG/ML
2 INJECTION, SOLUTION INTRAMUSCULAR; INTRAVENOUS EVERY 6 HOURS PRN
Status: DISCONTINUED | OUTPATIENT
Start: 2019-07-19 | End: 2019-07-22 | Stop reason: HOSPADM

## 2019-07-19 RX ORDER — POLYETHYLENE GLYCOL 3350 17 G/17G
17 POWDER, FOR SOLUTION ORAL DAILY
Status: DISCONTINUED | OUTPATIENT
Start: 2019-07-20 | End: 2019-07-22 | Stop reason: HOSPADM

## 2019-07-19 RX ADMIN — RANOLAZINE 1000 MG: 500 TABLET, FILM COATED, EXTENDED RELEASE ORAL at 08:07

## 2019-07-19 RX ADMIN — ASPIRIN 81 MG: 81 TABLET, COATED ORAL at 06:07

## 2019-07-19 RX ADMIN — METOPROLOL SUCCINATE 25 MG: 25 TABLET, EXTENDED RELEASE ORAL at 08:07

## 2019-07-19 RX ADMIN — PANTOPRAZOLE SODIUM 40 MG: 40 TABLET, DELAYED RELEASE ORAL at 08:07

## 2019-07-19 RX ADMIN — ATORVASTATIN CALCIUM 80 MG: 40 TABLET, FILM COATED ORAL at 08:07

## 2019-07-19 NOTE — PLAN OF CARE
Problem: Adult Inpatient Plan of Care  Goal: Plan of Care Review  Outcome: Ongoing (interventions implemented as appropriate)  No changes during shift. Up to chair most of the day. No c/o pain. Patient resting in bed with tv on. VSS with NADN. Will continue to monitor.

## 2019-07-19 NOTE — PROGRESS NOTES
"[facility]  Orthopedics  Progress Note    Patient Name: Foreign Holland  MRN: [id number]  Admission Date: 7/17/2019  Hospital Length of Stay: 1 days  Attending Provider: Goran Hdez MD  Primary Care Provider: Oziel Mendieta MD  Follow-up For: Procedure(s) (LRB):  APPLICATION, EXTERNAL FIXATION DEVICE (Left)  CLOSED REDUCTION, FRACTURE, FIBULA (Left)    Post-Operative Day: 1 Day Post-Op  Subjective:     Principal Problem:Tibia/fibula fracture, left, closed, initial encounter    Principal Orthopedic Problem:  Status post application external fixator left leg    Interval History:  Patient states pain is well controlled with current medications. Denies chest pain, shortness of breath or numbness and tingling.    Review of patient's allergies indicates:  No Known Allergies    Current Facility-Administered Medications   Medication    aspirin EC tablet 81 mg    atorvastatin tablet 80 mg    HYDROcodone-acetaminophen  mg per tablet 1 tablet    metoprolol succinate (TOPROL-XL) 24 hr tablet 25 mg    morphine injection 2 mg    naloxone 0.4 mg/mL injection 0.4 mg    pantoprazole EC tablet 40 mg    ranolazine 12 hr tablet 1,000 mg    sodium chloride 0.9% flush 10 mL     Objective:     Vital Signs (Most Recent):  Temp: 98.1 °F (36.7 °C) (07/19/19 0757)  Pulse: 61 (07/19/19 0757)  Resp: 12 (07/19/19 0757)  BP: (!) 156/67 (07/19/19 0757)  SpO2: 98 % (07/19/19 0757) Vital Signs (24h Range):  Temp:  [97.5 °F (36.4 °C)-99 °F (37.2 °C)] 98.1 °F (36.7 °C)  Pulse:  [60-70] 61  Resp:  [12-18] 12  SpO2:  [95 %-98 %] 98 %  BP: (116-156)/(60-70) 156/67     Weight: 75 kg (165 lb 5.5 oz)  Height: 5' 7" (170.2 cm)  Body mass index is 25.9 kg/m².      Intake/Output Summary (Last 24 hours) at 7/19/2019 1242  Last data filed at 7/19/2019 0800  Gross per 24 hour   Intake 240 ml   Output 850 ml   Net -610 ml       Ortho/SPM Exam     Left ankle:  All dressings are clean dry and intact. Toes are pink and warm " with good capillary refill.  Sensation intact.  Able to wiggle toes.    Significant Labs: All pertinent labs within the past 24 hours have been reviewed.    Significant Imaging: I have reviewed all pertinent imaging results/findings.    Assessment/Plan:  Postop closed reduction left tibia-fibula fracture with application of external fixator doing well.  Plan:  Mobilize out of bed with walker nonweightbearing left leg. Transfer to rehab facility when stable.     Active Diagnoses:    Diagnosis Date Noted POA    PRINCIPAL PROBLEM:  Tibia/fibula fracture, left, closed, initial encounter [S82.202A, S82.402A] 07/18/2019 Yes    Pre-op evaluation [Z01.818] 07/18/2019 Not Applicable    Chronic diastolic congestive heart failure [I50.32] 07/18/2019 Yes    Renal insufficiency [N28.9] 07/18/2019 Yes    Anemia [D64.9] 04/17/2014 Yes    CAD (coronary artery disease) [I25.10] 04/17/2014 Yes    HTN (hypertension) [I10] 04/17/2014 Yes      Problems Resolved During this Admission:         Philippe Bustamante Jr, MD  Orthopedics  [facility]

## 2019-07-19 NOTE — PT/OT/SLP EVAL
Occupational Therapy   Evaluation    Name: Foreign Holland  MRN: [id number]  Admitting Diagnosis:  Tibia/fibula fracture, left, closed, initial encounter 1 Day Post-Op    Recommendations:     Discharge Recommendations: rehabilitation facility  Discharge Equipment Recommendations:  commode, shower chair, walker, rolling  Barriers to discharge:  None    Assessment:     Foreign Holland is a 73 y.o. male with a medical diagnosis of Tibia/fibula fracture, left, closed, initial encounter.  He presents with IMPAIRED ADL'S , DECREASE FUNCTIONAL MOBILITY AND DECREASE T/F'S. Performance deficits affecting function: impaired self care skills, weakness, impaired balance, decreased safety awareness, impaired endurance, impaired functional mobilty, decreased upper extremity function, gait instability, decreased lower extremity function.      Rehab Prognosis: Fair; patient would benefit from acute skilled OT services to address these deficits and reach maximum level of function.       Plan:     Patient to be seen 3 x/week to address the above listed problems via self-care/home management, therapeutic exercises, therapeutic activities  · Plan of Care Expires: 07/26/19  · Plan of Care Reviewed with: patient    Subjective     Chief Complaint:  IMPAIRED ADL'S , DECREASE FUNCTIONAL MOBILITY AND DECREASE T/F'S  Patient/Family Comments/goals:     Occupational Profile:  Living Environment: LIVES ALONE IN 1 STORY HOUSE 3 STEPS TO ENTER WITH B RAILS  Previous level of function: (I) WITH ADL'S AND FUNCTIONAL MOBILITY. PT REPORTS STILL DRIVES  Roles and Routines: OCCUPATIONAL THERAPY  Equipment Used at Home:  none  Assistance upon Discharge:     Pain/Comfort:  · Pain Rating 1: 0/10    Patients cultural, spiritual, Cheondoism conflicts given the current situation:      Objective:     Communicated with: NURSE AND Epic CHART REVIEW prior to session.  Patient found HOB elevated with telemetry upon OT entry to room.    General Precautions: Standard,  fall   Orthopedic Precautions:N/A   Braces: N/A     Occupational Performance:    Bed Mobility:    · Patient completed Rolling/Turning to Right with minimum assistance  · Patient completed Scooting/Bridging with minimum assistance  · Patient completed Supine to Sit with minimum assistance    Functional Mobility/Transfers:  · Patient completed Sit <> Stand Transfer with minimum assistance  with  rolling walker   · Patient completed Bed <> Chair Transfer using Step Transfer technique with minimum assistance with rolling walker  · Functional Mobility: PT AMBULATED 20 FEET WITH MIN A    Activities of Daily Living:  · Upper Body Dressing: stand by assistance .  · Lower Body Dressing: minimum assistance .    Cognitive/Visual Perceptual:  Cognitive/Psychosocial Skills:     -       Oriented to: Person, Place, Time and Situation   -       Follows Commands/attention:Follows multistep  commands  -       Communication: clear/fluent  -       Memory: No Deficits noted  -       Safety awareness/insight to disability: intact   Visual/Perceptual:      -Intact .    Physical Exam:  Upper Extremity Range of Motion:     -       Right Upper Extremity: WFL  -       Left Upper Extremity: WFL  Upper Extremity Strength:    -       Right Upper Extremity: MMT: 4/5 GROSSLY  -       Left Upper Extremity: MMT: 4/5 GROSSLY   Strength:    -       Right Upper Extremity: MMT: 4/5 GROSSLY  -       Left Upper Extremity: MMT: 4/5 GROSSLY    AMPAC 6 Click ADL:  AMPAC Total Score: 20    Treatment & Education:    Education:    Patient left up in chair with all lines intact, call button in reach and NURSE notified    GOALS:   Multidisciplinary Problems     Occupational Therapy Goals        Problem: Occupational Therapy Goal    Goal Priority Disciplines Outcome Interventions   Occupational Therapy Goal     OT, PT/OT     Description:  OT GOALS TO BE MET BY 7-26-19  PT WILL TOLERATE 1 SET X 20 REPS BB UE ROM EXERCISE WITH MIN RESISTANCE  S WITH BSC  T/F'S  SBA WITH LE DRESSING                    History:     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 OR    BIOPSY-LYMPH NODE Left 5/21/2014    Performed by [facility]un Levine MD at [facility] OR [facility]    CARDIAC CATHETERIZATION      CARDIAC SURGERY  2006    CATARACT EXTRACTION W/  INTRAOCULAR LENS IMPLANT Right 05/24/2017    CATARACT EXTRACTION W/  INTRAOCULAR LENS IMPLANT Left 04/12/2017    CLOSED REDUCTION, FRACTURE, FIBULA Left 7/18/2019    Performed by Philippe Bustamante Jr., MD at [facility] OR    CORONARY ARTERY BYPASS GRAFT      EYE SURGERY      Cataract extraction with intraocular lens implant    [id number] Right 5/28/2014    Performed by Ben Temple MD at [facility] OR    LYMPH NODES, LEFT NECK, EXCISIONAL BIOPSY  5/21/2014    MEDIPORT INSERTION, SINGLE  5/28/14    PCIOL Left 04/12/2017    DR. MCCRAY    PROSTATE SURGERY  02/2014       Time Tracking:     OT Date of Treatment: 07/19/19  OT Start Time: 0850  OT Stop Time: 0914  OT Total Time (min): 24 min    Billable Minutes:Evaluation 10 MINUTES  Self Care/Home Management 14 MINUTES    Stacey Whittaker OT  7/19/2019

## 2019-07-19 NOTE — PLAN OF CARE
Problem: Adult Inpatient Plan of Care  Goal: Plan of Care Review  Outcome: Ongoing (interventions implemented as appropriate)  Pt is AAO, VSS. Safety precautions inplace. No acute distress noted. Denies any pain. POC reviewed with pt, verbalized understanding. Will continue to monitor.

## 2019-07-19 NOTE — SUBJECTIVE & OBJECTIVE
Interval History: Pain is well controlled. PT/OT following. Patient will dc to SNF once auth is obtained - likely Monday. Will continue supportive care    Review of Systems   Constitutional: Negative.  Negative for chills and fever.   HENT: Negative for congestion, postnasal drip, rhinorrhea and sore throat.    Eyes: Negative.  Negative for visual disturbance.   Respiratory: Negative.  Negative for cough, shortness of breath and wheezing.    Cardiovascular: Negative.  Negative for chest pain.   Gastrointestinal: Negative for abdominal pain, diarrhea, nausea and vomiting.   Endocrine: Negative.    Genitourinary: Negative.    Musculoskeletal: Positive for arthralgias. Negative for myalgias and neck stiffness.   Skin: Negative.  Negative for color change and pallor.   Allergic/Immunologic: Negative.    Neurological: Negative.    Hematological: Negative.    Psychiatric/Behavioral: Negative.    All other systems reviewed and are negative.    Objective:     Vital Signs (Most Recent):  Temp: 98.2 °F (36.8 °C) (07/19/19 1626)  Pulse: 62 (07/19/19 1626)  Resp: 18 (07/19/19 1626)  BP: 114/70 (07/19/19 1626)  SpO2: 99 % (07/19/19 1626) Vital Signs (24h Range):  Temp:  [97.5 °F (36.4 °C)-99 °F (37.2 °C)] 98.2 °F (36.8 °C)  Pulse:  [60-70] 62  Resp:  [12-18] 18  SpO2:  [95 %-99 %] 99 %  BP: (114-156)/(60-70) 114/70     Weight: 75 kg (165 lb 5.5 oz)  Body mass index is 25.9 kg/m².    Intake/Output Summary (Last 24 hours) at 7/19/2019 1701  Last data filed at 7/19/2019 1200  Gross per 24 hour   Intake 240 ml   Output 900 ml   Net -660 ml      Physical Exam   Constitutional: He is oriented to person, place, and time. He appears well-developed and well-nourished. No distress.   HENT:   Head: Normocephalic and atraumatic.   Nose: Nose normal.   Eyes: Conjunctivae and EOM are normal. No scleral icterus.   Neck: No JVD present. No thyromegaly present.   Cardiovascular: Normal rate, regular rhythm and normal heart sounds. Exam reveals  no gallop and no friction rub.   No murmur heard.  Pulmonary/Chest: Effort normal and breath sounds normal. No respiratory distress. He has no wheezes. He has no rales.   Abdominal: Soft. Bowel sounds are normal. He exhibits no distension. There is no tenderness. There is no rebound and no guarding.   Genitourinary:   Genitourinary Comments: Urinating independently   Musculoskeletal: Normal range of motion. He exhibits no edema or tenderness.   Right lower leg external fixator in placed.  Dressing clean and intact.   Lymphadenopathy:     He has no cervical adenopathy.   Neurological: He is alert and oriented to person, place, and time. He has normal reflexes. He displays normal reflexes. No cranial nerve deficit.   Skin: Skin is warm and dry. No rash noted. He is not diaphoretic. No erythema.   Psychiatric: He has a normal mood and affect. His behavior is normal. Judgment and thought content normal.       Significant Labs:   Recent Lab Results       07/19/19  0447        Anion Gap 10     Baso # 0.01     Basophil% 0.2     BUN, Bld 30     Calcium 9.1     Chloride 104     CO2 25     Creatinine 1.5     Differential Method Automated     eGFR if  52     eGFR if non  45  Comment:  Calculation used to obtain the estimated glomerular filtration  rate (eGFR) is the CKD-EPI equation.        Eos # 0.0     Eosinophil% 0.0     Glucose 104     Gran # (ANC) 3.2     Gran% 65.0     Hematocrit 21.5     Hemoglobin 7.8     Lymph # 1.0     Lymph% 21.2     MCH 30.2     MCHC 36.3     MCV 83     Mono # 0.7     Mono% 13.8     MPV 9.3     Platelets 133     Potassium 4.5     RBC 2.58     RDW 13.4     Sodium 139     WBC 4.86         All pertinent labs within the past 24 hours have been reviewed.    Significant Imaging: I have reviewed all pertinent imaging results/findings within the past 24 hours.

## 2019-07-19 NOTE — PLAN OF CARE
CM met with patient at the bedside to discuss the discharge plan.  CM updated patient that [facility]in Gonsal[facility] has accepted the referral.  He is agreeable to go to [facility].  Choice form completed and placed in the patient blue folder.  Updated Dustin Gonsalves and requested that they submit for authorization.

## 2019-07-19 NOTE — PLAN OF CARE
Locet called to Gisela @[phone]    [facility] faxed to [phone]    Form 142 received

## 2019-07-19 NOTE — PROGRESS NOTES
[facility] Hospital Medicine  Progress Note    Patient Name: Foreign Holland  MRN: [id number]  Patient Class: IP- Inpatient   Admission Date: 7/17/2019  Length of Stay: 1 days  Attending Physician: Goran Hdez MD  Primary Care Provider: Oziel Mendieta MD        Subjective:     Principal Problem:Tibia/fibula fracture, left, closed, initial encounter      HPI:  Foreign Holland is a 73 y.o. male patient with a PMHx of CA, CAD, HTN, who presents evaluation of L ankle pain which onset this morning after ground level fall.  Pt states that he was mowing the lawn at his home, when he began to feel dizzy and fell to the ground.  Pt denies dizziness at this time.  Pt denies LOC at time of fall. ROM worsen.  No associated sxs reported.  No other complaints. Patient was evaluated in the ER. Cr 1.8, baseline 1.5-1.6. CT lower L ext:Acute fractures of the distal tibia and fibula, as above. Ortho consulted and recommended admission and NPO. HM consulted. Patient admitted.     Overview/Hospital Course:  Admitted for stabilization of left leg fracture.  Successful OREF 18 July.    Interval History: Pain is well controlled. PT/OT following. Patient will dc to SNF once auth is obtained - likely Monday. Will continue supportive care    Review of Systems   Constitutional: Negative.  Negative for chills and fever.   HENT: Negative for congestion, postnasal drip, rhinorrhea and sore throat.    Eyes: Negative.  Negative for visual disturbance.   Respiratory: Negative.  Negative for cough, shortness of breath and wheezing.    Cardiovascular: Negative.  Negative for chest pain.   Gastrointestinal: Negative for abdominal pain, diarrhea, nausea and vomiting.   Endocrine: Negative.    Genitourinary: Negative.    Musculoskeletal: Positive for arthralgias. Negative for myalgias and neck stiffness.   Skin: Negative.  Negative for color change and pallor.   Allergic/Immunologic: Negative.    Neurological: Negative.     Hematological: Negative.    Psychiatric/Behavioral: Negative.    All other systems reviewed and are negative.    Objective:     Vital Signs (Most Recent):  Temp: 98.2 °F (36.8 °C) (07/19/19 1626)  Pulse: 62 (07/19/19 1626)  Resp: 18 (07/19/19 1626)  BP: 114/70 (07/19/19 1626)  SpO2: 99 % (07/19/19 1626) Vital Signs (24h Range):  Temp:  [97.5 °F (36.4 °C)-99 °F (37.2 °C)] 98.2 °F (36.8 °C)  Pulse:  [60-70] 62  Resp:  [12-18] 18  SpO2:  [95 %-99 %] 99 %  BP: (114-156)/(60-70) 114/70     Weight: 75 kg (165 lb 5.5 oz)  Body mass index is 25.9 kg/m².    Intake/Output Summary (Last 24 hours) at 7/19/2019 1701  Last data filed at 7/19/2019 1200  Gross per 24 hour   Intake 240 ml   Output 900 ml   Net -660 ml      Physical Exam   Constitutional: He is oriented to person, place, and time. He appears well-developed and well-nourished. No distress.   HENT:   Head: Normocephalic and atraumatic.   Nose: Nose normal.   Eyes: Conjunctivae and EOM are normal. No scleral icterus.   Neck: No JVD present. No thyromegaly present.   Cardiovascular: Normal rate, regular rhythm and normal heart sounds. Exam reveals no gallop and no friction rub.   No murmur heard.  Pulmonary/Chest: Effort normal and breath sounds normal. No respiratory distress. He has no wheezes. He has no rales.   Abdominal: Soft. Bowel sounds are normal. He exhibits no distension. There is no tenderness. There is no rebound and no guarding.   Genitourinary:   Genitourinary Comments: Urinating independently   Musculoskeletal: Normal range of motion. He exhibits no edema or tenderness.   Right lower leg external fixator in placed.  Dressing clean and intact.   Lymphadenopathy:     He has no cervical adenopathy.   Neurological: He is alert and oriented to person, place, and time. He has normal reflexes. He displays normal reflexes. No cranial nerve deficit.   Skin: Skin is warm and dry. No rash noted. He is not diaphoretic. No erythema.   Psychiatric: He has a normal  mood and affect. His behavior is normal. Judgment and thought content normal.       Significant Labs:   Recent Lab Results       07/19/19  0447        Anion Gap 10     Baso # 0.01     Basophil% 0.2     BUN, Bld 30     Calcium 9.1     Chloride 104     CO2 25     Creatinine 1.5     Differential Method Automated     eGFR if  52     eGFR if non  45  Comment:  Calculation used to obtain the estimated glomerular filtration  rate (eGFR) is the CKD-EPI equation.        Eos # 0.0     Eosinophil% 0.0     Glucose 104     Gran # (ANC) 3.2     Gran% 65.0     Hematocrit 21.5     Hemoglobin 7.8     Lymph # 1.0     Lymph% 21.2     MCH 30.2     MCHC 36.3     MCV 83     Mono # 0.7     Mono% 13.8     MPV 9.3     Platelets 133     Potassium 4.5     RBC 2.58     RDW 13.4     Sodium 139     WBC 4.86         All pertinent labs within the past 24 hours have been reviewed.    Significant Imaging: I have reviewed all pertinent imaging results/findings within the past 24 hours.      Assessment/Plan:      * Tibia/fibula fracture, left, closed, initial encounter  Ortho consulted by ED and to manage  Control pain  Elevate lower ext.  Patient with lower ext blistering.  No jordin/scd.  1x dose heparin for dvt mitigation overnight.   External fixation of fracture by orthopedic surgery 18 July.    Renal insufficiency  Monitor RFP close  Gentle hydration.  Hold ace/diuretic for now.    --at baseline      Chronic diastolic congestive heart failure  Last echo 2 years ago  Does not appear to be overly decompensated.   Repeat  Hold diuretic  Continue home meds  Optimization pending course  Consider cardiology consult pending course.   Further evaluation/diagnostics/interventions/consults pending course         CKD (chronic kidney disease) stage 3, GFR 30-59 ml/min  --at baseline      Anemia  Chronic. Type and screen transfuse if need  Monitor.  Further evaluation/diagnostics/interventions/consults pending course         HTN  (hypertension)  Continue home meds  Monitor trends  Hold ace/arb and HCTZ given acute renal insufficiency on CKD.  Monitor RFP and fluid status with CHF hx.   Consider renal consult pending course  Gentle hydration overnight   Further evaluation/diagnostics/interventions/consults pending course          CAD (coronary artery disease)  Continue asa, statin, bb  Trend troponin   Further evaluation/diagnostics/interventions/consults pending course           VTE Risk Mitigation (From admission, onward)        Ordered     Place sequential compression device  Until discontinued      07/18/19 4970                JESSICA Woods-BLADE  Department of Hospital Medicine   [facility]

## 2019-07-19 NOTE — PT/OT/SLP EVAL
Physical Therapy Evaluation    Patient Name:  Foreign Holland   MRN:  [id number]    Recommendations:     Discharge Recommendations:  rehabilitation facility   Discharge Equipment Recommendations: walker, rolling, commode   Barriers to discharge: Decreased caregiver support    Assessment:     Foreign Holland is a 73 y.o. male admitted with a medical diagnosis of Tibia/fibula fracture, left, closed, initial encounter.  He presents with the following impairments/functional limitations:  weakness, impaired self care skills, impaired endurance, gait instability, decreased coordination, decreased lower extremity function, decreased ROM, edema, orthopedic precautions, decreased upper extremity function, impaired balance, impaired functional mobilty, impaired sensation.    Rehab Prognosis: Good; patient would benefit from acute skilled PT services to address these deficits and reach maximum level of function.    Recent Surgery: Procedure(s) (LRB):  APPLICATION, EXTERNAL FIXATION DEVICE (Left)  CLOSED REDUCTION, FRACTURE, FIBULA (Left) 1 Day Post-Op    Plan:     During this hospitalization, patient to be seen   to address the identified rehab impairments via gait training, therapeutic activities, therapeutic exercises and progress toward the following goals:    · Plan of Care Expires:  07/26/19    Subjective     Chief Complaint: NONE  Patient/Family Comments/goals: INC MOBILITY   Pain/Comfort:  · Pain Rating 1: 0/10  · Pain Rating Post-Intervention 1: 0/10    Patients cultural, spiritual, Judaism conflicts given the current situation:      Living Environment:  PT LIVES AT HOME ALONE AND HAS 3 STEPS WITH B RAILING TO ENTER HOME  Prior to admission, patients level of function was IND AND DRIVES.  Equipment used at home: none.  DME owned (not currently used): none.  Upon discharge, patient will have assistance from UNKNOWN.    Objective:     Communicated with NURSE BIRD AND Epic CHART REVIEW prior to session.  Patient found  supine with telemetry, peripheral IV, external fixator  upon PT entry to room.    General Precautions: Standard, fall   Orthopedic Precautions:LLE non weight bearing   Braces: N/A     Exams:  · Cognitive Exam:  Patient is oriented to Person, Place, Time and Situation  · RLE ROM: WFL  · RLE Strength: WFL  · LLE ROM: LIMITED ROM   · LLE Strength: LIMITED    Functional Mobility:  PT MET IN RM SUP IN BED AND AGREED TO TX PT SUP>SIT EOB WITH SBA. PT SCOOTED TO EOB AND STOOD WITH NWB L LE AND MIN A FOR GT X 20' WITH UNSTEADY GT AND SLOW PACE. PT RETURNED TO RM T/F TO CHAIR WITH RW AND MIN A. PT EDUCATED ON ROLE OF P.T.AND LEFT SEATED WITH ALL NEEDS MET AND L LE ELEVATED.     AM-PAC 6 CLICK MOBILITY  Total Score:18     Patient left up in chair with all lines intact and chair alarm on.    GOALS:   Multidisciplinary Problems     Physical Therapy Goals        Problem: Physical Therapy Goal    Goal Priority Disciplines Outcome Goal Variances Interventions   Physical Therapy Goal     PT, PT/OT      Description:  PT WILL BE SEEN FOR P.T. FOR A MIN OF 5 OUT OF 7 DAYS A WEEK  LT19  1. PT WILL COMPLETE BED MOBILITY IND  2 PT WILL T/F TO CHAIR WITH RW NWB L LE WITH SBA  3. PT WILL GT TRAIN WITH RW AND CGA X 50'   4. 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 OR [facility]    CARDIAC CATHETERIZATION      CARDIAC SURGERY      CATARACT EXTRACTION W/  INTRAOCULAR LENS IMPLANT Right 2017     CATARACT EXTRACTION W/  INTRAOCULAR LENS IMPLANT Left 04/12/2017    CLOSED REDUCTION, FRACTURE, FIBULA Left 7/18/2019    Performed by Philippe [facility]ante Jr., [facility] at [facility] OR    CORONARY ARTERY BYPASS GRAFT      EYE SURGERY      Cataract extraction with intraocular lens implant    [id number] Right 5/28/2014    Performed by Ben Temple MD at [facility] OR    LYMPH NODES, LEFT NECK, EXCISIONAL BIOPSY  5/21/2014    MEDIPORT INSERTION, SINGLE  5/28/14    PCIOL Left 04/12/2017    DR. MCCRAY    PROSTATE SURGERY  02/2014       Time Tracking:     PT Received On: 07/19/19  PT Start Time: 0900     PT Stop Time: 0925  PT Total Time (min): 25 min     Billable Minutes: Evaluation 15 and Gait Training 10      Brook Lucio, PT  07/19/2019

## 2019-07-20 LAB
ANION GAP SERPL CALC-SCNC: 11 MMOL/L (ref 8–16)
BASOPHILS # BLD AUTO: 0.01 K/UL (ref 0–0.2)
BASOPHILS NFR BLD: 0.3 % (ref 0–1.9)
BUN SERPL-MCNC: 36 MG/DL (ref 8–23)
CALCIUM SERPL-MCNC: 8.9 MG/DL (ref 8.7–10.5)
CHLORIDE SERPL-SCNC: 104 MMOL/L (ref 95–110)
CO2 SERPL-SCNC: 26 MMOL/L (ref 23–29)
CREAT SERPL-MCNC: 1.5 MG/DL (ref 0.5–1.4)
DIFFERENTIAL METHOD: ABNORMAL
EOSINOPHIL # BLD AUTO: 0.1 K/UL (ref 0–0.5)
EOSINOPHIL NFR BLD: 1.6 % (ref 0–8)
ERYTHROCYTE [DISTWIDTH] IN BLOOD BY AUTOMATED COUNT: 13.6 % (ref 11.5–14.5)
EST. GFR  (AFRICAN AMERICAN): 52 ML/MIN/1.73 M^2
EST. GFR  (NON AFRICAN AMERICAN): 45 ML/MIN/1.73 M^2
GLUCOSE SERPL-MCNC: 86 MG/DL (ref 70–110)
HCT VFR BLD AUTO: 21.1 % (ref 40–54)
HGB BLD-MCNC: 7.6 G/DL (ref 14–18)
LYMPHOCYTES # BLD AUTO: 1 K/UL (ref 1–4.8)
LYMPHOCYTES NFR BLD: 27.7 % (ref 18–48)
MCH RBC QN AUTO: 30.4 PG (ref 27–31)
MCHC RBC AUTO-ENTMCNC: 36 G/DL (ref 32–36)
MCV RBC AUTO: 84 FL (ref 82–98)
MONOCYTES # BLD AUTO: 0.4 K/UL (ref 0.3–1)
MONOCYTES NFR BLD: 10.5 % (ref 4–15)
NEUTROPHILS # BLD AUTO: 2.2 K/UL (ref 1.8–7.7)
NEUTROPHILS NFR BLD: 60.2 % (ref 38–73)
PLATELET # BLD AUTO: 124 K/UL (ref 150–350)
PMV BLD AUTO: 9.6 FL (ref 9.2–12.9)
POTASSIUM SERPL-SCNC: 4.4 MMOL/L (ref 3.5–5.1)
RBC # BLD AUTO: 2.5 M/UL (ref 4.6–6.2)
SODIUM SERPL-SCNC: 141 MMOL/L (ref 136–145)
WBC # BLD AUTO: 3.72 K/UL (ref 3.9–12.7)

## 2019-07-20 PROCEDURE — 94760 N-INVAS EAR/PLS OXIMETRY 1: CPT

## 2019-07-20 PROCEDURE — 80048 BASIC METABOLIC PNL TOTAL CA: CPT

## 2019-07-20 PROCEDURE — 25000003 PHARM REV CODE 250: Performed by: NURSE PRACTITIONER

## 2019-07-20 PROCEDURE — 11000001 HC ACUTE MED/SURG PRIVATE ROOM

## 2019-07-20 PROCEDURE — 36415 COLL VENOUS BLD VENIPUNCTURE: CPT

## 2019-07-20 PROCEDURE — 85025 COMPLETE CBC W/AUTO DIFF WBC: CPT

## 2019-07-20 PROCEDURE — 94799 UNLISTED PULMONARY SVC/PX: CPT

## 2019-07-20 PROCEDURE — 97116 GAIT TRAINING THERAPY: CPT

## 2019-07-20 RX ORDER — SODIUM CHLORIDE 9 MG/ML
INJECTION, SOLUTION INTRAVENOUS CONTINUOUS
Status: DISCONTINUED | OUTPATIENT
Start: 2019-07-20 | End: 2019-07-22 | Stop reason: HOSPADM

## 2019-07-20 RX ADMIN — RANOLAZINE 1000 MG: 500 TABLET, FILM COATED, EXTENDED RELEASE ORAL at 10:07

## 2019-07-20 RX ADMIN — METOPROLOL SUCCINATE 25 MG: 25 TABLET, EXTENDED RELEASE ORAL at 10:07

## 2019-07-20 RX ADMIN — SODIUM CHLORIDE: 0.9 INJECTION, SOLUTION INTRAVENOUS at 05:07

## 2019-07-20 RX ADMIN — HYDROCODONE BITARTRATE AND ACETAMINOPHEN 1 TABLET: 10; 325 TABLET ORAL at 07:07

## 2019-07-20 RX ADMIN — RANOLAZINE 1000 MG: 500 TABLET, FILM COATED, EXTENDED RELEASE ORAL at 08:07

## 2019-07-20 RX ADMIN — ATORVASTATIN CALCIUM 80 MG: 40 TABLET, FILM COATED ORAL at 08:07

## 2019-07-20 RX ADMIN — PANTOPRAZOLE SODIUM 40 MG: 40 TABLET, DELAYED RELEASE ORAL at 10:07

## 2019-07-20 RX ADMIN — ASPIRIN 81 MG: 81 TABLET, COATED ORAL at 06:07

## 2019-07-20 RX ADMIN — POLYETHYLENE GLYCOL 3350 17 G: 17 POWDER, FOR SOLUTION ORAL at 10:07

## 2019-07-20 NOTE — PT/OT/SLP PROGRESS
Physical Therapy  Treatment    Foreign Holland   MRN: [id number]   Admitting Diagnosis: Tibia/fibula fracture, left, closed, initial encounter       PT Start Time: 0925     PT Stop Time: 0940    PT Total Time (min): 15 min       Billable Minutes:  Gait Training 15    Treatment Type: Treatment  PT/PTA: PTA     PTA Visit Number: 1       General Precautions: Standard, fall  Orthopedic Precautions: LUE non weight bearing   Braces:           Subjective:  Communicated with NSG prior to session.      Pain/Comfort  Pain Rating 1: 0/10  Pain Rating Post-Intervention 1: 0/10    Objective:        Functional Mobility:  Bed Mobility:        Transfers:       Gait:        Stairs:      Gait:     Balance:   Static Sit: GOOD: Takes MODERATE challenges from all directions  Dynamic Sit: GOOD: Maintains balance through MODERATE excursions of active trunk movement  Static Stand: FAIR+: Takes MINIMAL challenges from all directions  Dynamic stand: FAIR: Needs CONTACT GUARD during gait     Therapeutic Activities and Exercises:  REVIEWED WEIGHT BEARING PRECAUTIONS     BED MOB SBA    SIT<-->STAND CG    RW NWB L LE 2X18'      AM-PAC 6 CLICK MOBILITY  How much help from another person does this patient currently need?   1 = Unable, Total/Dependent Assistance  2 = A lot, Maximum/Moderate Assistance  3 = A little, Minimum/Contact Guard/Supervision  4 = None, Modified [place]/Independent         AM-PAC Raw Score CMS G-Code Modifier Level of Impairment Assistance   6 % Total / Unable   7 - 9 CM 80 - 100% Maximal Assist   10 - 14 CL 60 - 80% Moderate Assist   15 - 19 CK 40 - 60% Moderate Assist   20 - 22 CJ 20 - 40% Minimal Assist   23 CI 1-20% SBA / CGA   24 CH 0% Independent/ Mod I     Patient left up in chair with all lines intact, call button in reach and chair alarm on.    Assessment:  Foreign Holland is a 73 y.o. male with a medical diagnosis of Tibia/fibula fracture, left, closed, initial encounter and presents with .    Rehab  identified problem list/impairments: Rehab identified problem list/impairments: weakness, impaired self care skills, impaired balance, impaired endurance, impaired functional mobilty, gait instability, decreased lower extremity function    Rehab potential is good.    Activity tolerance: Good    Discharge recommendations: Discharge Facility/Level of Care Needs: rehabilitation facility     Barriers to discharge:      Equipment recommendations: Equipment Needed After Discharge: walker, rolling     GOALS:   Multidisciplinary Problems     Physical Therapy Goals        Problem: Physical Therapy Goal    Goal Priority Disciplines Outcome Goal Variances Interventions   Physical Therapy Goal     PT, PT/OT Ongoing (interventions implemented as appropriate)     Description:  PT WILL BE SEEN FOR P.T. FOR A MIN OF 5 OUT OF 7 DAYS A WEEK  LT19  1. PT WILL COMPLETE BED MOBILITY IND  2 PT WILL T/F TO CHAIR WITH RW NWB L LE WITH SBA  3. PT WILL GT TRAIN WITH RW AND CGA X 50'   4. PT WILL COMPLETE B LE TE X 20 REPS                    PLAN:    Patient to be seen    to address the above listed problems via gait training, therapeutic activities, therapeutic exercises  Plan of Care expires: 19  Plan of Care reviewed with: patient         Oziel Tuttlelo, PTA  2019

## 2019-07-20 NOTE — ASSESSMENT & PLAN NOTE
Ortho consulted by ED and to manage  Control pain  Elevate lower ext.  Patient with lower ext blistering.  No jordin/scd.  1x dose heparin for dvt mitigation overnight.   External fixation of fracture by orthopedic surgery 18 July.  -PT/OT   -social work consult for SNF placement pending acceptance and authorization

## 2019-07-20 NOTE — PLAN OF CARE
Problem: Physical Therapy Goal  Goal: Physical Therapy Goal  PT WILL BE SEEN FOR P.T. FOR A MIN OF 5 OUT OF 7 DAYS A WEEK  LT19  1. PT WILL COMPLETE BED MOBILITY IND  2 PT WILL T/F TO CHAIR WITH RW NWB L LE WITH SBA  3. PT WILL GT TRAIN WITH RW AND CGA X 50'   4. PT WILL COMPLETE B LE TE X 20 REPS   Outcome: Ongoing (interventions implemented as appropriate)  PATIENT WITH GOOD UNDERSTANDING OF WEIGHT PRECAUTIONS. MOTIVATED. GOOD UPPER BODY STRENGTH

## 2019-07-20 NOTE — ASSESSMENT & PLAN NOTE
Lopressor continued with parameters placed due to soft BP  Monitor trends  Hold ace/arb and HCTZ given acute renal insufficiency on CKD.   Gentle hydration overnight   -will consider renal US if worsened

## 2019-07-20 NOTE — ASSESSMENT & PLAN NOTE
Chronic.   -downward trend noted post procedure   Type and screen and transfuse if need  Monitor.

## 2019-07-20 NOTE — PROGRESS NOTES
"[facility]  Orthopedics  Progress Note    Patient Name: Foreign Holland  MRN: [id number]  Admission Date: 7/17/2019  Hospital Length of Stay: 2 days  Attending Provider: Goran Hdez MD  Primary Care Provider: Oziel Mendieta MD  Follow-up For: Procedure(s) (LRB):  APPLICATION, EXTERNAL FIXATION DEVICE (Left)  CLOSED REDUCTION, FRACTURE, FIBULA (Left)    Post-Operative Day: 2 Days Post-Op  Subjective:     Principal Problem:Tibia/fibula fracture, left, closed, initial encounter    Principal Orthopedic Problem:  Pilon fracture left leg closed    Interval History:  No new problems or concerns.  Started ambulation with PT and making good progress.  Patient states pain is well controlled with current medications. Denies chest pain, shortness of breath or numbness and tingling.    Review of patient's allergies indicates:  No Known Allergies    Current Facility-Administered Medications   Medication    aspirin EC tablet 81 mg    atorvastatin tablet 80 mg    HYDROcodone-acetaminophen  mg per tablet 1 tablet    metoprolol succinate (TOPROL-XL) 24 hr tablet 25 mg    morphine injection 2 mg    naloxone 0.4 mg/mL injection 0.4 mg    pantoprazole EC tablet 40 mg    polyethylene glycol packet 17 g    ranolazine 12 hr tablet 1,000 mg    sodium chloride 0.9% flush 10 mL     Objective:     Vital Signs (Most Recent):  Temp: 98.1 °F (36.7 °C) (07/20/19 0752)  Pulse: 65 (07/20/19 1000)  Resp: 14 (07/20/19 0752)  BP: 119/65 (07/20/19 1000)  SpO2: 96 % (07/20/19 0752) Vital Signs (24h Range):  Temp:  [98.1 °F (36.7 °C)-99.2 °F (37.3 °C)] 98.1 °F (36.7 °C)  Pulse:  [55-69] 65  Resp:  [14-19] 14  SpO2:  [94 %-99 %] 96 %  BP: (107-120)/(54-70) 119/65     Weight: 75 kg (165 lb 5.5 oz)  Height: 5' 7" (170.2 cm)  Body mass index is 25.9 kg/m².      Intake/Output Summary (Last 24 hours) at 7/20/2019 1024  Last data filed at 7/20/2019 0856  Gross per 24 hour   Intake 870 ml   Output 500 ml   Net 370 ml "       Ortho/SPM Exam   Left leg:  Dressings clean dry and intact. Toes pink and warm with good capillary refill.  Sensation intact.  Able to wiggle toes.  No pain with internal or external rotation of the hip and knee.    Significant Labs: All pertinent labs within the past 24 hours have been reviewed.    Significant Imaging: I have reviewed all pertinent imaging results/findings.    Assessment/Plan:  Status post closed reduction and application external fixator left leg profile on fracture doing well.  Plan:  Continue PT.  Should be ready for transfer to SNF on Monday.     Active Diagnoses:    Diagnosis Date Noted POA    PRINCIPAL PROBLEM:  Tibia/fibula fracture, left, closed, initial encounter [S82.202A, S82.402A] 07/18/2019 Yes    Pre-op evaluation [Z01.818] 07/18/2019 Not Applicable    Chronic diastolic congestive heart failure [I50.32] 07/18/2019 Yes    Renal insufficiency [N28.9] 07/18/2019 Yes    CKD (chronic kidney disease) stage 3, GFR 30-59 ml/min [N18.3] 07/24/2018 Yes    Anemia [D64.9] 04/17/2014 Yes    CAD (coronary artery disease) [I25.10] 04/17/2014 Yes    HTN (hypertension) [I10] 04/17/2014 Yes      Problems Resolved During this Admission:         Philippe Bustamante Jr, MD  Orthopedics  [facility]

## 2019-07-20 NOTE — PLAN OF CARE
Problem: Physical Therapy Goal  Goal: Physical Therapy Goal  PT WILL BE SEEN FOR P.T. FOR A MIN OF 5 OUT OF 7 DAYS A WEEK  LT19  1. PT WILL COMPLETE BED MOBILITY IND  2 PT WILL T/F TO CHAIR WITH RW NWB L LE WITH SBA  3. PT WILL GT TRAIN WITH RW AND CGA X 50'   4. PT WILL COMPLETE B LE TE X 20 REPS   Outcome: Ongoing (interventions implemented as appropriate)  PATIENT SAT IN CHAIR BETWEEN FIRST AND SECOND TREATMENTS. WILLING TO GET OUT OF BED AGAIN TO AMBULATE

## 2019-07-20 NOTE — PT/OT/SLP PROGRESS
Physical Therapy  Treatment    Foreign Holland   MRN: [id number]   Admitting Diagnosis: Tibia/fibula fracture, left, closed, initial encounter       PT Start Time: 1110     PT Stop Time: 1125    PT Total Time (min): 15 min       Billable Minutes:  Gait Training 15    Treatment Type: Treatment  PT/PTA: PTA     PTA Visit Number: 1       General Precautions: Standard, fall  Orthopedic Precautions: LUE non weight bearing   Braces:           Subjective:  Communicated with NSG prior to session.      Pain/Comfort  Pain Rating 1: 0/10  Pain Rating Post-Intervention 1: 0/10    Objective:        Functional Mobility:  Bed Mobility:        Transfers:       Gait:        Stairs:      Gait:     Balance:   Static Sit: GOOD: Takes MODERATE challenges from all directions  Dynamic Sit: GOOD+: Maintains balance through MAXIMAL excursions of active trunk motion  Static Stand: GOOD-: Takes MODERATE challenges from all directions inconsistently  Dynamic stand: FAIR: Needs CONTACT GUARD during gait     Therapeutic Activities and Exercises:  BED MOBILITY WITH SBA     SIT<-->STAND SBA VERBAL CUES FOR UE PLACEMENT    NWB L LE 2X20'          AM-PAC 6 CLICK MOBILITY  How much help from another person does this patient currently need?   1 = Unable, Total/Dependent Assistance  2 = A lot, Maximum/Moderate Assistance  3 = A little, Minimum/Contact Guard/Supervision  4 = None, Modified [place]/Independent         AM-PAC Raw Score CMS G-Code Modifier Level of Impairment Assistance   6 % Total / Unable   7 - 9 CM 80 - 100% Maximal Assist   10 - 14 CL 60 - 80% Moderate Assist   15 - 19 CK 40 - 60% Moderate Assist   20 - 22 CJ 20 - 40% Minimal Assist   23 CI 1-20% SBA / CGA   24 CH 0% Independent/ Mod I     Patient left supine with all lines intact, call button in reach and bed alarm on.    Assessment:  Foreign Holland is a 73 y.o. male with a medical diagnosis of Tibia/fibula fracture, left, closed, initial encounter and presents with  .    Rehab identified problem list/impairments: Rehab identified problem list/impairments: weakness, impaired self care skills, decreased lower extremity function, gait instability, impaired endurance, impaired functional mobilty    Rehab potential is good.    Activity tolerance: Good    Discharge recommendations: Discharge Facility/Level of Care Needs: rehabilitation facility     Barriers to discharge:      Equipment recommendations: Equipment Needed After Discharge: walker, rolling     GOALS:   Multidisciplinary Problems     Physical Therapy Goals        Problem: Physical Therapy Goal    Goal Priority Disciplines Outcome Goal Variances Interventions   Physical Therapy Goal     PT, PT/OT Ongoing (interventions implemented as appropriate)     Description:  PT WILL BE SEEN FOR P.T. FOR A MIN OF 5 OUT OF 7 DAYS A WEEK  LT19  1. PT WILL COMPLETE BED MOBILITY IND  2 PT WILL T/F TO CHAIR WITH RW NWB L LE WITH SBA  3. PT WILL GT TRAIN WITH RW AND CGA X 50'   4. PT WILL COMPLETE B LE TE X 20 REPS                    PLAN:    Patient to be seen    to address the above listed problems via gait training, therapeutic activities, therapeutic exercises  Plan of Care expires: 19  Plan of Care reviewed with: patient         Oziel Jose, PTA  2019

## 2019-07-20 NOTE — ASSESSMENT & PLAN NOTE
-chest xray showed no cardiopulmonary process noted  -EKG showed NSR with HR 62  -Echo results showed EF 60% with no diastolic dysfunction and mild MVR  -stabilization of left leg fracture with successful OREF on 7/18/19 per Orthopedic Surgery

## 2019-07-20 NOTE — PROGRESS NOTES
[facility] Hospital Medicine  Progress Note    Patient Name: Foreign Holland  MRN: [id number]  Patient Class: IP- Inpatient   Admission Date: 7/17/2019  Length of Stay: 2 days  Attending Physician: Goran Hdez MD  Primary Care Provider: Oziel Mendieta MD        Subjective:     Principal Problem:Tibia/fibula fracture, left, closed, initial encounter      HPI:  Foreign Holland is a 73 y.o. male patient with a PMHx of CA, CAD, HTN, who presents evaluation of L ankle pain which onset this morning after ground level fall.  Pt states that he was mowing the lawn at his home, when he began to feel dizzy and fell to the ground.  Pt denies dizziness at this time.  Pt denies LOC at time of fall. ROM worsen.  No associated sxs reported.  No other complaints. Patient was evaluated in the ER. Cr 1.8, baseline 1.5-1.6. CT lower L ext:Acute fractures of the distal tibia and fibula, as above. Ortho consulted and recommended admission and NPO.   Patient is a full code and his SDM is his sister, Dionicio Calvillo.    Overview/Hospital Course:  Patient was admitted to Med Surg for LLE fx under the care of [facility]. Ortho surgery was consulted and performed closed reduction of tibia and fibula fracture with application of external fixator on 07/18. Pain is well controlled. PT/OT following. Pt will DC to SNF once auth is obtained - likely Monday.  On 7/20/19, pt seen and examined with no acute distress reported.  H/H trending down- will transfuse as needed. Creatinine 1.5 with gentle hydration given.  SNF placement in progress pending acceptance with authroization.    Interval History: pt lying in bed during exam.  Pt states he is doing well and reports increased activity.  PT/OT continued.  SNF placement in progress pending acceptance/ authorization.  Orthopedic Surgery following.    Review of Systems   Constitutional: Negative.  Negative for chills and fever.   HENT: Negative for congestion, postnasal drip,  rhinorrhea and sore throat.    Eyes: Negative.  Negative for visual disturbance.   Respiratory: Negative.  Negative for cough, shortness of breath and wheezing.    Cardiovascular: Negative.  Negative for chest pain.   Gastrointestinal: Negative for abdominal pain, diarrhea, nausea and vomiting.   Endocrine: Negative.    Genitourinary: Negative.    Musculoskeletal: Positive for arthralgias. Negative for myalgias and neck stiffness.   Skin: Negative.  Negative for color change and pallor.   Allergic/Immunologic: Negative.    Neurological: Negative.    Hematological: Negative.    Psychiatric/Behavioral: Negative.    All other systems reviewed and are negative.    Objective:     Vital Signs (Most Recent):  Temp: 98.1 °F (36.7 °C) (07/20/19 0752)  Pulse: 65 (07/20/19 1000)  Resp: 14 (07/20/19 0752)  BP: 119/65 (07/20/19 1000)  SpO2: 96 % (07/20/19 0752) Vital Signs (24h Range):  Temp:  [98.1 °F (36.7 °C)-99.2 °F (37.3 °C)] 98.1 °F (36.7 °C)  Pulse:  [55-69] 65  Resp:  [14-19] 14  SpO2:  [94 %-99 %] 96 %  BP: (107-120)/(54-70) 119/65     Weight: 75 kg (165 lb 5.5 oz)  Body mass index is 25.9 kg/m².    Intake/Output Summary (Last 24 hours) at 7/20/2019 1615  Last data filed at 7/20/2019 0856  Gross per 24 hour   Intake 870 ml   Output 250 ml   Net 620 ml      Physical Exam   Constitutional: He is oriented to person, place, and time. He appears well-developed and well-nourished. No distress.   HENT:   Head: Normocephalic and atraumatic.   Nose: Nose normal.   Eyes: Conjunctivae and EOM are normal. No scleral icterus.   Neck: No JVD present. No thyromegaly present.   Cardiovascular: Normal rate, regular rhythm and normal heart sounds. Exam reveals no gallop and no friction rub.   No murmur heard.  Pulmonary/Chest: Effort normal and breath sounds normal. No respiratory distress. He has no wheezes. He has no rales.   Abdominal: Soft. Bowel sounds are normal. He exhibits no distension. There is no tenderness. There is no  rebound and no guarding.   Genitourinary:   Genitourinary Comments: deferred   Musculoskeletal: Normal range of motion. He exhibits no edema or tenderness.   Right lower leg external fixator in placed.  Dressing clean and intact.   Lymphadenopathy:     He has no cervical adenopathy.   Neurological: He is alert and oriented to person, place, and time. He has normal reflexes. He displays normal reflexes. No cranial nerve deficit.   Skin: Skin is warm and dry. No rash noted. He is not diaphoretic. No erythema.   Psychiatric: He has a normal mood and affect. His behavior is normal. Judgment and thought content normal.       Significant Labs:   CBC:   Recent Labs   Lab 07/19/19 0447 07/20/19  0436   WBC 4.86 3.72*   HGB 7.8* 7.6*   HCT 21.5* 21.1*   * 124*     CMP:   Recent Labs   Lab 07/19/19 0447 07/20/19  0436    141   K 4.5 4.4    104   CO2 25 26    86   BUN 30* 36*   CREATININE 1.5* 1.5*   CALCIUM 9.1 8.9   ANIONGAP 10 11   EGFRNONAA 45* 45*       Significant Imaging:   Imaging Results          CT Leg (Tibia-Fibula) Without Contrast Left (Final result)  Result time 07/17/19 23:52:44    Final result by Jess De Leon MD (07/17/19 23:52:44)                 Impression:      Acute fractures of the distal tibia and fibula, as above.    Techniques were utilized for this exam to obtain a radiation dose as low as reasonably be achievable.      Electronically signed by: Jess De Leon  Date:    07/17/2019  Time:    23:52             Narrative:    TECHNIQUE:  Noncontrast axial helical images were obtained throughout the left leg.  Coronal axial reformatted images were created.    FINDINGS:  There is a comminuted fracture of the distal tibial diaphysis with approximately 1 cm lateral displacement of the distal fracture fragment and approximately 2 cm cranial overriding of the distal fracture fragment.  The fracture does appear to extend into the tibiotalar joint.  There is also a  minimally displaced fracture of the distal fibular diaphysis.  There is extensive surrounding edema.  There are no other acute fractures.                               X-Ray Chest 1 View (Final result)  Result time 07/17/19 23:27:26    Final result by Jess De Leon MD (07/17/19 23:27:26)                 Impression:      No cardiopulmonary process noted.      Electronically signed by: Jess De Leon  Date:    07/17/2019  Time:    23:27             Narrative:    EXAMINATION:  XR CHEST 1 VIEW    CLINICAL HISTORY:  Encounter for other preprocedural examination    COMPARISON:  [facility]    FINDINGS:  No infiltrate or effusion identified.  Cardiomediastinal silhouette is within normal limits.                                Assessment/Plan:      * Tibia/fibula fracture, left, closed, initial encounter  Ortho consulted by ED and to manage  Control pain  Elevate lower ext.  Patient with lower ext blistering.  No jordin/scd.  1x dose heparin for dvt mitigation overnight.   External fixation of fracture by orthopedic surgery 18 July.  -PT/OT   -social work consult for SNF placement pending acceptance and authorization     Renal insufficiency  Monitor RFP close  Gentle hydration.  Hold ace/diuretic for now.    --at baseline      Chronic diastolic congestive heart failure  Echo showed EF 60% with no diastolic dysfunction and mild MVR  Appears compensated.   Hold diuretic  -beta blocker continued   -supplemental oxygen   -strict I/O's   -daily weight  -troponin trended with mild increased         Pre-op evaluation  -chest xray showed no cardiopulmonary process noted  -EKG showed NSR with HR 62  -Echo results showed EF 60% with no diastolic dysfunction and mild MVR  -stabilization of left leg fracture with successful OREF on 7/18/19 per Orthopedic Surgery      CKD (chronic kidney disease) stage 3, GFR 30-59 ml/min  --at baseline      Anemia  Chronic.   -downward trend noted post procedure   Type and  screen and transfuse if need  Monitor.          HTN (hypertension)  Lopressor continued with parameters placed due to soft BP  Monitor trends  Hold ace/arb and HCTZ given acute renal insufficiency on CKD.   Gentle hydration overnight   -will consider renal US if worsened          CAD (coronary artery disease)  Continue asa, statin, bb  Troponin 0.023>>0.033            VTE Risk Mitigation (From admission, onward)        Ordered     Place sequential compression device  Until discontinued      07/18/19 0879                Meg Hampton NP  Department of Hospital Medicine   [facility]

## 2019-07-20 NOTE — SUBJECTIVE & OBJECTIVE
Interval History: pt lying in bed during exam.  Pt states he is doing well and reports increased activity.  PT/OT continued.  SNF placement in progress pending acceptance/ authorization.  Orthopedic Surgery following.    Review of Systems   Constitutional: Negative.  Negative for chills and fever.   HENT: Negative for congestion, postnasal drip, rhinorrhea and sore throat.    Eyes: Negative.  Negative for visual disturbance.   Respiratory: Negative.  Negative for cough, shortness of breath and wheezing.    Cardiovascular: Negative.  Negative for chest pain.   Gastrointestinal: Negative for abdominal pain, diarrhea, nausea and vomiting.   Endocrine: Negative.    Genitourinary: Negative.    Musculoskeletal: Positive for arthralgias. Negative for myalgias and neck stiffness.   Skin: Negative.  Negative for color change and pallor.   Allergic/Immunologic: Negative.    Neurological: Negative.    Hematological: Negative.    Psychiatric/Behavioral: Negative.    All other systems reviewed and are negative.    Objective:     Vital Signs (Most Recent):  Temp: 98.1 °F (36.7 °C) (07/20/19 0752)  Pulse: 65 (07/20/19 1000)  Resp: 14 (07/20/19 0752)  BP: 119/65 (07/20/19 1000)  SpO2: 96 % (07/20/19 0752) Vital Signs (24h Range):  Temp:  [98.1 °F (36.7 °C)-99.2 °F (37.3 °C)] 98.1 °F (36.7 °C)  Pulse:  [55-69] 65  Resp:  [14-19] 14  SpO2:  [94 %-99 %] 96 %  BP: (107-120)/(54-70) 119/65     Weight: 75 kg (165 lb 5.5 oz)  Body mass index is 25.9 kg/m².    Intake/Output Summary (Last 24 hours) at 7/20/2019 1615  Last data filed at 7/20/2019 0856  Gross per 24 hour   Intake 870 ml   Output 250 ml   Net 620 ml      Physical Exam   Constitutional: He is oriented to person, place, and time. He appears well-developed and well-nourished. No distress.   HENT:   Head: Normocephalic and atraumatic.   Nose: Nose normal.   Eyes: Conjunctivae and EOM are normal. No scleral icterus.   Neck: No JVD present. No thyromegaly present.   Cardiovascular:  Normal rate, regular rhythm and normal heart sounds. Exam reveals no gallop and no friction rub.   No murmur heard.  Pulmonary/Chest: Effort normal and breath sounds normal. No respiratory distress. He has no wheezes. He has no rales.   Abdominal: Soft. Bowel sounds are normal. He exhibits no distension. There is no tenderness. There is no rebound and no guarding.   Genitourinary:   Genitourinary Comments: deferred   Musculoskeletal: Normal range of motion. He exhibits no edema or tenderness.   Right lower leg external fixator in placed.  Dressing clean and intact.   Lymphadenopathy:     He has no cervical adenopathy.   Neurological: He is alert and oriented to person, place, and time. He has normal reflexes. He displays normal reflexes. No cranial nerve deficit.   Skin: Skin is warm and dry. No rash noted. He is not diaphoretic. No erythema.   Psychiatric: He has a normal mood and affect. His behavior is normal. Judgment and thought content normal.       Significant Labs:   CBC:   Recent Labs   Lab 07/19/19 0447 07/20/19  0436   WBC 4.86 3.72*   HGB 7.8* 7.6*   HCT 21.5* 21.1*   * 124*     CMP:   Recent Labs   Lab 07/19/19 0447 07/20/19  0436    141   K 4.5 4.4    104   CO2 25 26    86   BUN 30* 36*   CREATININE 1.5* 1.5*   CALCIUM 9.1 8.9   ANIONGAP 10 11   EGFRNONAA 45* 45*       Significant Imaging:   Imaging Results          CT Leg (Tibia-Fibula) Without Contrast Left (Final result)  Result time 07/17/19 23:52:44    Final result by Jess De Leon MD (07/17/19 23:52:44)                 Impression:      Acute fractures of the distal tibia and fibula, as above.    Techniques were utilized for this exam to obtain a radiation dose as low as reasonably be achievable.      Electronically signed by: Jess De Leon  Date:    07/17/2019  Time:    23:52             Narrative:    TECHNIQUE:  Noncontrast axial helical images were obtained throughout the left leg.  Coronal axial  reformatted images were created.    FINDINGS:  There is a comminuted fracture of the distal tibial diaphysis with approximately 1 cm lateral displacement of the distal fracture fragment and approximately 2 cm cranial overriding of the distal fracture fragment.  The fracture does appear to extend into the tibiotalar joint.  There is also a minimally displaced fracture of the distal fibular diaphysis.  There is extensive surrounding edema.  There are no other acute fractures.                               X-Ray Chest 1 View (Final result)  Result time 07/17/19 23:27:26    Final result by Jess De Leon MD (07/17/19 23:27:26)                 Impression:      No cardiopulmonary process noted.      Electronically signed by: Jess De Leon  Date:    07/17/2019  Time:    23:27             Narrative:    EXAMINATION:  XR CHEST 1 VIEW    CLINICAL HISTORY:  Encounter for other preprocedural examination    COMPARISON:  [facility]    FINDINGS:  No infiltrate or effusion identified.  Cardiomediastinal silhouette is within normal limits.

## 2019-07-20 NOTE — ASSESSMENT & PLAN NOTE
Echo showed EF 60% with no diastolic dysfunction and mild MVR  Appears compensated.   Hold diuretic  -beta blocker continued   -supplemental oxygen   -strict I/O's   -daily weight  -troponin trended with mild increased

## 2019-07-21 LAB
ABO + RH BLD: NORMAL
ANION GAP SERPL CALC-SCNC: 8 MMOL/L (ref 8–16)
BASOPHILS # BLD AUTO: 0 K/UL (ref 0–0.2)
BASOPHILS NFR BLD: 0 % (ref 0–1.9)
BLD GP AB SCN CELLS X3 SERPL QL: NORMAL
BLD PROD TYP BPU: NORMAL
BLD PROD TYP BPU: NORMAL
BLOOD UNIT EXPIRATION DATE: NORMAL
BLOOD UNIT EXPIRATION DATE: NORMAL
BLOOD UNIT TYPE CODE: 9500
BLOOD UNIT TYPE CODE: 9500
BLOOD UNIT TYPE: NORMAL
BLOOD UNIT TYPE: NORMAL
BUN SERPL-MCNC: 34 MG/DL (ref 8–23)
CALCIUM SERPL-MCNC: 8.5 MG/DL (ref 8.7–10.5)
CHLORIDE SERPL-SCNC: 105 MMOL/L (ref 95–110)
CO2 SERPL-SCNC: 26 MMOL/L (ref 23–29)
CODING SYSTEM: NORMAL
CODING SYSTEM: NORMAL
CREAT SERPL-MCNC: 1.3 MG/DL (ref 0.5–1.4)
DIFFERENTIAL METHOD: ABNORMAL
DISPENSE STATUS: NORMAL
DISPENSE STATUS: NORMAL
EOSINOPHIL # BLD AUTO: 0.1 K/UL (ref 0–0.5)
EOSINOPHIL NFR BLD: 3.4 % (ref 0–8)
ERYTHROCYTE [DISTWIDTH] IN BLOOD BY AUTOMATED COUNT: 13.7 % (ref 11.5–14.5)
EST. GFR  (AFRICAN AMERICAN): >60 ML/MIN/1.73 M^2
EST. GFR  (NON AFRICAN AMERICAN): 54 ML/MIN/1.73 M^2
GLUCOSE SERPL-MCNC: 108 MG/DL (ref 70–110)
HCT VFR BLD AUTO: 19.4 % (ref 40–54)
HGB BLD-MCNC: 7 G/DL (ref 14–18)
LYMPHOCYTES # BLD AUTO: 0.9 K/UL (ref 1–4.8)
LYMPHOCYTES NFR BLD: 26.9 % (ref 18–48)
MCH RBC QN AUTO: 30.3 PG (ref 27–31)
MCHC RBC AUTO-ENTMCNC: 36.1 G/DL (ref 32–36)
MCV RBC AUTO: 84 FL (ref 82–98)
MONOCYTES # BLD AUTO: 0.3 K/UL (ref 0.3–1)
MONOCYTES NFR BLD: 9.5 % (ref 4–15)
NEUTROPHILS # BLD AUTO: 2.1 K/UL (ref 1.8–7.7)
NEUTROPHILS NFR BLD: 60.5 % (ref 38–73)
NUM UNITS TRANS PACKED RBC: NORMAL
NUM UNITS TRANS PACKED RBC: NORMAL
PLATELET # BLD AUTO: 131 K/UL (ref 150–350)
PMV BLD AUTO: 9.6 FL (ref 9.2–12.9)
POTASSIUM SERPL-SCNC: 4.7 MMOL/L (ref 3.5–5.1)
RBC # BLD AUTO: 2.31 M/UL (ref 4.6–6.2)
SODIUM SERPL-SCNC: 139 MMOL/L (ref 136–145)
WBC # BLD AUTO: 3.49 K/UL (ref 3.9–12.7)

## 2019-07-21 PROCEDURE — 25000003 PHARM REV CODE 250: Performed by: NURSE PRACTITIONER

## 2019-07-21 PROCEDURE — 97116 GAIT TRAINING THERAPY: CPT

## 2019-07-21 PROCEDURE — 86920 COMPATIBILITY TEST SPIN: CPT

## 2019-07-21 PROCEDURE — 86901 BLOOD TYPING SEROLOGIC RH(D): CPT

## 2019-07-21 PROCEDURE — 36430 TRANSFUSION BLD/BLD COMPNT: CPT

## 2019-07-21 PROCEDURE — 80048 BASIC METABOLIC PNL TOTAL CA: CPT

## 2019-07-21 PROCEDURE — P9040 RBC LEUKOREDUCED IRRADIATED: HCPCS

## 2019-07-21 PROCEDURE — 85025 COMPLETE CBC W/AUTO DIFF WBC: CPT

## 2019-07-21 PROCEDURE — 94760 N-INVAS EAR/PLS OXIMETRY 1: CPT

## 2019-07-21 PROCEDURE — 36415 COLL VENOUS BLD VENIPUNCTURE: CPT

## 2019-07-21 PROCEDURE — 11000001 HC ACUTE MED/SURG PRIVATE ROOM

## 2019-07-21 RX ORDER — HYDROCODONE BITARTRATE AND ACETAMINOPHEN 500; 5 MG/1; MG/1
TABLET ORAL
Status: DISCONTINUED | OUTPATIENT
Start: 2019-07-21 | End: 2019-07-22 | Stop reason: HOSPADM

## 2019-07-21 RX ADMIN — RANOLAZINE 1000 MG: 500 TABLET, FILM COATED, EXTENDED RELEASE ORAL at 08:07

## 2019-07-21 RX ADMIN — PANTOPRAZOLE SODIUM 40 MG: 40 TABLET, DELAYED RELEASE ORAL at 08:07

## 2019-07-21 RX ADMIN — ATORVASTATIN CALCIUM 80 MG: 40 TABLET, FILM COATED ORAL at 08:07

## 2019-07-21 RX ADMIN — METOPROLOL SUCCINATE 25 MG: 25 TABLET, EXTENDED RELEASE ORAL at 08:07

## 2019-07-21 RX ADMIN — ASPIRIN 81 MG: 81 TABLET, COATED ORAL at 06:07

## 2019-07-21 RX ADMIN — SODIUM CHLORIDE: 0.9 INJECTION, SOLUTION INTRAVENOUS at 06:07

## 2019-07-21 RX ADMIN — POLYETHYLENE GLYCOL 3350 17 G: 17 POWDER, FOR SOLUTION ORAL at 08:07

## 2019-07-21 NOTE — PROGRESS NOTES
[facility] Hospital Medicine  Progress Note    Patient Name: Foreign Holland  MRN: [id number]  Patient Class: IP- Inpatient   Admission Date: 7/17/2019  Length of Stay: 3 days  Attending Physician: Goran Hdez MD  Primary Care Provider: Oziel Mendieta MD        Subjective:     Principal Problem:Tibia/fibula fracture, left, closed, initial encounter      HPI:  Foreign Holland is a 73 y.o. male patient with a PMHx of CA, CAD, HTN, who presents evaluation of L ankle pain which onset this morning after ground level fall.  Pt states that he was mowing the lawn at his home, when he began to feel dizzy and fell to the ground.  Pt denies dizziness at this time.  Pt denies LOC at time of fall. ROM worsen.  No associated sxs reported.  No other complaints. Patient was evaluated in the ER. Cr 1.8, baseline 1.5-1.6. CT lower L ext:Acute fractures of the distal tibia and fibula, as above. Ortho consulted and recommended admission and NPO.   Patient is a full code and his SDM is his sister, Dionicio Calvillo.    Overview/Hospital Course:  Patient was admitted to Med Surg for LLE fx under the care of [facility]. Ortho surgery was consulted and performed closed reduction of tibia and fibula fracture with application of external fixator on 07/18. Pain is well controlled. PT/OT following. Pt will DC to SNF once auth is obtained - likely Monday.  On 7/20/19, pt seen and examined with no acute distress reported.  H/H trending down- will transfuse as needed. Creatinine 1.5 with gentle hydration given.  SNF placement in progress pending acceptance with authroization.  7/21/19, H/H 7/19.4 with PRBCs x 1 unit transfused.      Interval History: Pt stable with SOB and fatigue reported. H/H 7/19 with PRBCs x  1 unit transfused. Will follow repeat lab results.      Review of Systems   Constitutional: Positive for fatigue. Negative for chills and fever.   HENT: Negative for congestion, postnasal drip, rhinorrhea and  sore throat.    Eyes: Negative.  Negative for visual disturbance.   Respiratory: Positive for shortness of breath. Negative for cough and wheezing.    Cardiovascular: Negative.  Negative for chest pain.   Gastrointestinal: Negative for abdominal pain, diarrhea, nausea and vomiting.   Endocrine: Negative.    Genitourinary: Negative.    Musculoskeletal: Positive for arthralgias. Negative for myalgias and neck stiffness.   Skin: Negative.  Negative for color change and pallor.   Allergic/Immunologic: Negative.    Neurological: Negative.    Hematological: Negative.    Psychiatric/Behavioral: Negative.    All other systems reviewed and are negative.    Objective:     Vital Signs (Most Recent):  Temp: 98.8 °F (37.1 °C) (07/21/19 1509)  Pulse: 77 (07/21/19 1509)  Resp: 14 (07/21/19 1509)  BP: (!) 140/67 (07/21/19 1509)  SpO2: 97 % (07/21/19 1509) Vital Signs (24h Range):  Temp:  [97.7 °F (36.5 °C)-98.8 °F (37.1 °C)] 98.8 °F (37.1 °C)  Pulse:  [59-77] 77  Resp:  [14-18] 14  SpO2:  [93 %-98 %] 97 %  BP: ()/(52-74) 140/67     Weight: 74.1 kg (163 lb 5.8 oz)  Body mass index is 25.59 kg/m².    Intake/Output Summary (Last 24 hours) at 7/21/2019 1540  Last data filed at 7/21/2019 0604  Gross per 24 hour   Intake 599 ml   Output 900 ml   Net -301 ml      Physical Exam   Constitutional: He is oriented to person, place, and time. He appears well-developed and well-nourished. No distress.   HENT:   Head: Normocephalic and atraumatic.   Nose: Nose normal.   Eyes: Conjunctivae and EOM are normal. No scleral icterus.   Neck: No JVD present. No thyromegaly present.   Cardiovascular: Normal rate, regular rhythm and normal heart sounds. Exam reveals no gallop and no friction rub.   No murmur heard.  Pulmonary/Chest: Effort normal and breath sounds normal. No respiratory distress. He has no wheezes. He has no rales.   Abdominal: Soft. Bowel sounds are normal. He exhibits no distension. There is no tenderness. There is no rebound and  no guarding.   Genitourinary:   Genitourinary Comments: deferred   Musculoskeletal: Normal range of motion. He exhibits no edema or tenderness.   Right lower leg external fixator in placed.  Dressing intact with serous drainage.    Lymphadenopathy:     He has no cervical adenopathy.   Neurological: He is alert and oriented to person, place, and time. He has normal reflexes. He displays normal reflexes. No cranial nerve deficit.   Skin: Skin is warm and dry. No rash noted. He is not diaphoretic. No erythema.   Psychiatric: He has a normal mood and affect. His behavior is normal. Judgment and thought content normal.       Significant Labs:   CBC:   Recent Labs   Lab 07/20/19  0436 07/21/19  0544   WBC 3.72* 3.49*   HGB 7.6* 7.0*   HCT 21.1* 19.4*   * 131*     CMP:   Recent Labs   Lab 07/20/19  0436 07/21/19  0544    139   K 4.4 4.7    105   CO2 26 26   GLU 86 108   BUN 36* 34*   CREATININE 1.5* 1.3   CALCIUM 8.9 8.5*   ANIONGAP 11 8   EGFRNONAA 45* 54*       Significant Imaging:   Imaging Results          CT Leg (Tibia-Fibula) Without Contrast Left (Final result)  Result time 07/17/19 23:52:44    Final result by Jess De Leon MD (07/17/19 23:52:44)                 Impression:      Acute fractures of the distal tibia and fibula, as above.    Techniques were utilized for this exam to obtain a radiation dose as low as reasonably be achievable.      Electronically signed by: Jess De Leon  Date:    07/17/2019  Time:    23:52             Narrative:    TECHNIQUE:  Noncontrast axial helical images were obtained throughout the left leg.  Coronal axial reformatted images were created.    FINDINGS:  There is a comminuted fracture of the distal tibial diaphysis with approximately 1 cm lateral displacement of the distal fracture fragment and approximately 2 cm cranial overriding of the distal fracture fragment.  The fracture does appear to extend into the tibiotalar joint.  There is also a  minimally displaced fracture of the distal fibular diaphysis.  There is extensive surrounding edema.  There are no other acute fractures.                               X-Ray Chest 1 View (Final result)  Result time 07/17/19 23:27:26    Final result by Jess De Leon MD (07/17/19 23:27:26)                 Impression:      No cardiopulmonary process noted.      Electronically signed by: Jess De Leon  Date:    07/17/2019  Time:    23:27             Narrative:    EXAMINATION:  XR CHEST 1 VIEW    CLINICAL HISTORY:  Encounter for other preprocedural examination    COMPARISON:  [facility] thousand fourteen    FINDINGS:  No infiltrate or effusion identified.  Cardiomediastinal silhouette is within normal limits.                                Assessment/Plan:      * Tibia/fibula fracture, left, closed, initial encounter  Ortho consulted by ED and to manage  Control pain  Elevate lower ext.  Patient with lower ext blistering.  No jordin/scd.  1x dose heparin for dvt mitigation overnight.   External fixation of fracture by orthopedic surgery 18 July.  -PT/OT   -social work consult for SNF placement pending acceptance and authorization     Renal insufficiency  -creatinine normalized on 7/21/19  Gentle hydration.  Hold ace/diuretic for now.    --at baseline      Chronic diastolic congestive heart failure  Echo showed EF 60% with no diastolic dysfunction and mild MVR  Appears compensated.   Hold diuretic-will resume when appropriate   -beta blocker continued   -supplemental oxygen   -strict I/O's   -daily weight  -troponin trended with mild increased         Pre-op evaluation  -chest xray showed no cardiopulmonary process noted  -EKG showed NSR with HR 62  -Echo results showed EF 60% with no diastolic dysfunction and mild MVR  -stabilization of left leg fracture with successful OREF on 7/18/19 per Orthopedic Surgery      CKD (chronic kidney disease) stage 3, GFR 30-59 ml/min  --at baseline  --normalized on  7/21/19      Anemia  Chronic.   -downward trend noted post procedure   -H/H 7/19.4 with PRBCs x 1 unit transfused on 7/21/19  -will follow post transfusion  Monitor.          HTN (hypertension)  Lopressor continued with parameters placed due to soft BP  Monitor trends  Hold ace/arb and HCTZ given acute renal insufficiency on CKD.   -creatinine normalized on 7/21/19  Gentle hydration  -will consider renal US if worsened          CAD (coronary artery disease)  Continue asa, statin, bb  Troponin 0.023>>0.033            VTE Risk Mitigation (From admission, onward)        Ordered     Place sequential compression device  Until discontinued      07/18/19 9059                Meg Hampton NP  Department of Hospital Medicine   [facility]

## 2019-07-21 NOTE — ASSESSMENT & PLAN NOTE
Echo showed EF 60% with no diastolic dysfunction and mild MVR  Appears compensated.   Hold diuretic-will resume when appropriate   -beta blocker continued   -supplemental oxygen   -strict I/O's   -daily weight  -troponin trended with mild increased

## 2019-07-21 NOTE — SUBJECTIVE & OBJECTIVE
Interval History: Pt stable with SOB and fatigue reported. H/H 7/19 with PRBCs x  1 unit transfused. Will follow repeat lab results.      Review of Systems   Constitutional: Positive for fatigue. Negative for chills and fever.   HENT: Negative for congestion, postnasal drip, rhinorrhea and sore throat.    Eyes: Negative.  Negative for visual disturbance.   Respiratory: Positive for shortness of breath. Negative for cough and wheezing.    Cardiovascular: Negative.  Negative for chest pain.   Gastrointestinal: Negative for abdominal pain, diarrhea, nausea and vomiting.   Endocrine: Negative.    Genitourinary: Negative.    Musculoskeletal: Positive for arthralgias. Negative for myalgias and neck stiffness.   Skin: Negative.  Negative for color change and pallor.   Allergic/Immunologic: Negative.    Neurological: Negative.    Hematological: Negative.    Psychiatric/Behavioral: Negative.    All other systems reviewed and are negative.    Objective:     Vital Signs (Most Recent):  Temp: 98.8 °F (37.1 °C) (07/21/19 1509)  Pulse: 77 (07/21/19 1509)  Resp: 14 (07/21/19 1509)  BP: (!) 140/67 (07/21/19 1509)  SpO2: 97 % (07/21/19 1509) Vital Signs (24h Range):  Temp:  [97.7 °F (36.5 °C)-98.8 °F (37.1 °C)] 98.8 °F (37.1 °C)  Pulse:  [59-77] 77  Resp:  [14-18] 14  SpO2:  [93 %-98 %] 97 %  BP: ()/(52-74) 140/67     Weight: 74.1 kg (163 lb 5.8 oz)  Body mass index is 25.59 kg/m².    Intake/Output Summary (Last 24 hours) at 7/21/2019 1540  Last data filed at 7/21/2019 0604  Gross per 24 hour   Intake 599 ml   Output 900 ml   Net -301 ml      Physical Exam   Constitutional: He is oriented to person, place, and time. He appears well-developed and well-nourished. No distress.   HENT:   Head: Normocephalic and atraumatic.   Nose: Nose normal.   Eyes: Conjunctivae and EOM are normal. No scleral icterus.   Neck: No JVD present. No thyromegaly present.   Cardiovascular: Normal rate, regular rhythm and normal heart sounds. Exam  reveals no gallop and no friction rub.   No murmur heard.  Pulmonary/Chest: Effort normal and breath sounds normal. No respiratory distress. He has no wheezes. He has no rales.   Abdominal: Soft. Bowel sounds are normal. He exhibits no distension. There is no tenderness. There is no rebound and no guarding.   Genitourinary:   Genitourinary Comments: deferred   Musculoskeletal: Normal range of motion. He exhibits no edema or tenderness.   Right lower leg external fixator in placed.  Dressing intact with serous drainage.    Lymphadenopathy:     He has no cervical adenopathy.   Neurological: He is alert and oriented to person, place, and time. He has normal reflexes. He displays normal reflexes. No cranial nerve deficit.   Skin: Skin is warm and dry. No rash noted. He is not diaphoretic. No erythema.   Psychiatric: He has a normal mood and affect. His behavior is normal. Judgment and thought content normal.       Significant Labs:   CBC:   Recent Labs   Lab 07/20/19  0436 07/21/19  0544   WBC 3.72* 3.49*   HGB 7.6* 7.0*   HCT 21.1* 19.4*   * 131*     CMP:   Recent Labs   Lab 07/20/19  0436 07/21/19  0544    139   K 4.4 4.7    105   CO2 26 26   GLU 86 108   BUN 36* 34*   CREATININE 1.5* 1.3   CALCIUM 8.9 8.5*   ANIONGAP 11 8   EGFRNONAA 45* 54*       Significant Imaging:   Imaging Results          CT Leg (Tibia-Fibula) Without Contrast Left (Final result)  Result time 07/17/19 23:52:44    Final result by Jess De Leon MD (07/17/19 23:52:44)                 Impression:      Acute fractures of the distal tibia and fibula, as above.    Techniques were utilized for this exam to obtain a radiation dose as low as reasonably be achievable.      Electronically signed by: Jess De Leon  Date:    07/17/2019  Time:    23:52             Narrative:    TECHNIQUE:  Noncontrast axial helical images were obtained throughout the left leg.  Coronal axial reformatted images were  created.    FINDINGS:  There is a comminuted fracture of the distal tibial diaphysis with approximately 1 cm lateral displacement of the distal fracture fragment and approximately 2 cm cranial overriding of the distal fracture fragment.  The fracture does appear to extend into the tibiotalar joint.  There is also a minimally displaced fracture of the distal fibular diaphysis.  There is extensive surrounding edema.  There are no other acute fractures.                               X-Ray Chest 1 View (Final result)  Result time 07/17/19 23:27:26    Final result by Jess De Leon MD (07/17/19 23:27:26)                 Impression:      No cardiopulmonary process noted.      Electronically signed by: Jess De Leon  Date:    07/17/2019  Time:    23:27             Narrative:    EXAMINATION:  XR CHEST 1 VIEW    CLINICAL HISTORY:  Encounter for other preprocedural examination    COMPARISON:  Two thousand fourteen    FINDINGS:  No infiltrate or effusion identified.  Cardiomediastinal silhouette is within normal limits.

## 2019-07-21 NOTE — ASSESSMENT & PLAN NOTE
-creatinine normalized on 7/21/19  Gentle hydration.  Hold ace/diuretic for now.    --at baseline

## 2019-07-21 NOTE — PLAN OF CARE
Problem: Adult Inpatient Plan of Care  Goal: Plan of Care Review  Outcome: Ongoing (interventions implemented as appropriate)  Fall precautions maintained. Receiving 1 unit of blood at this time. Pt denies pain. IV fluids maintained. Will continue to monitor.

## 2019-07-21 NOTE — ASSESSMENT & PLAN NOTE
Lopressor continued with parameters placed due to soft BP  Monitor trends  Hold ace/arb and HCTZ given acute renal insufficiency on CKD.   -creatinine normalized on 7/21/19  Gentle hydration  -will consider renal US if worsened

## 2019-07-21 NOTE — ASSESSMENT & PLAN NOTE
Chronic.   -downward trend noted post procedure   -H/H 7/19.4 with PRBCs x 1 unit transfused on 7/21/19  -will follow post transfusion  Monitor.

## 2019-07-21 NOTE — PLAN OF CARE
Problem: Adult Inpatient Plan of Care  Goal: Plan of Care Review  Outcome: Ongoing (interventions implemented as appropriate)  Dx:  Post op external fixation left tib/fib fracture  POC:  Pain closely monitored and treated during shift, patient rarely complains of pain.  Physical therapy made successful visit, assisted patient to chair.  Chair alarm set when patient in chair.  Dressing and splint to LLE remain intact, minimal bloody drainage to dressing. Remains afebrile.  Fluids promoted, activity minimized.  Belongings close to bedside at all times, call bell in reach.

## 2019-07-21 NOTE — PROGRESS NOTES
"[facility]  Orthopedics  Progress Note    Patient Name: Foreign Holland  MRN: [id number]  Admission Date: 7/17/2019  Hospital Length of Stay: 3 days  Attending Provider: Goran Hdez MD  Primary Care Provider: Oziel Mendieta MD  Follow-up For: Procedure(s) (LRB):  APPLICATION, EXTERNAL FIXATION DEVICE (Left)  CLOSED REDUCTION, FRACTURE, FIBULA (Left)    Post-Operative Day: 3 Days Post-Op  Subjective:     Principal Problem:Tibia/fibula fracture, left, closed, initial encounter    Principal Orthopedic Problem: Closed displaced distal tibia/fibula fracture left leg    Interval History: Continues to progress well in PT    Review of patient's allergies indicates:  No Known Allergies    Current Facility-Administered Medications   Medication    0.9%  NaCl infusion (for blood administration)    0.9%  NaCl infusion    aspirin EC tablet 81 mg    atorvastatin tablet 80 mg    HYDROcodone-acetaminophen  mg per tablet 1 tablet    metoprolol succinate (TOPROL-XL) 24 hr tablet 25 mg    morphine injection 2 mg    naloxone 0.4 mg/mL injection 0.4 mg    pantoprazole EC tablet 40 mg    polyethylene glycol packet 17 g    ranolazine 12 hr tablet 1,000 mg    sodium chloride 0.9% flush 10 mL     Objective:     Vital Signs (Most Recent):  Temp: 98.8 °F (37.1 °C) (07/21/19 1213)  Pulse: 67 (07/21/19 1213)  Resp: 18 (07/21/19 1213)  BP: 124/65 (07/21/19 1213)  SpO2: 97 % (07/21/19 1213) Vital Signs (24h Range):  Temp:  [97.7 °F (36.5 °C)-98.8 °F (37.1 °C)] 98.8 °F (37.1 °C)  Pulse:  [59-67] 67  Resp:  [14-18] 18  SpO2:  [93 %-98 %] 97 %  BP: ()/(52-65) 124/65     Weight: 74.1 kg (163 lb 5.8 oz)  Height: 5' 7" (170.2 cm)  Body mass index is 25.59 kg/m².      Intake/Output Summary (Last 24 hours) at 7/21/2019 1322  Last data filed at 7/21/2019 0604  Gross per 24 hour   Intake 599 ml   Output 900 ml   Net -301 ml       Ortho/SPM Exam   Left leg:  Dressings clean dry and intact.  N/V " intact    Significant Labs: All pertinent labs within the past 24 hours have been reviewed.    Significant Imaging: I have reviewed all pertinent imaging results/findings.    Assessment/Plan:  S/P Application external fixator left leg, doing well  Plan:  Should be ready for transfer to SNF tomorrow  Follow-up in our clinic in 7-10 days     Active Diagnoses:    Diagnosis Date Noted POA    PRINCIPAL PROBLEM:  Tibia/fibula fracture, left, closed, initial encounter [S82.202A, S82.402A] 07/18/2019 Yes    Pre-op evaluation [Z01.818] 07/18/2019 Not Applicable    Chronic diastolic congestive heart failure [I50.32] 07/18/2019 Yes    Renal insufficiency [N28.9] 07/18/2019 Yes    CKD (chronic kidney disease) stage 3, GFR 30-59 ml/min [N18.3] 07/24/2018 Yes    Anemia [D64.9] 04/17/2014 Yes    CAD (coronary artery disease) [I25.10] 04/17/2014 Yes    HTN (hypertension) [I10] 04/17/2014 Yes      Problems Resolved During this Admission:         Philippe Bustamante Jr, MD  Orthopedics  [facility]

## 2019-07-21 NOTE — PLAN OF CARE
Problem: Physical Therapy Goal  Goal: Physical Therapy Goal  PT WILL BE SEEN FOR P.T. FOR A MIN OF 5 OUT OF 7 DAYS A WEEK  LT19  1. PT WILL COMPLETE BED MOBILITY IND  2 PT WILL T/F TO CHAIR WITH RW NWB L LE WITH SBA  3. PT WILL GT TRAIN WITH RW AND CGA X 50'   4. PT WILL COMPLETE B LE TE X 20 REPS   Outcome: Ongoing (interventions implemented as appropriate)  PATIENT MOVED MORE SLOWLY TODAY COMPARED TO YESTERDAY DUE TO EZRA BLOOD COUNT,(NSG CLEARED PATIENT FOR THERAPY THIS DATE). PATIENT HOWEVER STAYED IN BED MORE TODAY COMPARED TO YESTERDAY DUE TO LOW BLOOD COUNT. MORE ASSISTANCE WAS REQUIRED TO PATIENT TO T/F SIT-->STAND WITH PATIENT REQUIRING PTA TO ELEVATED BED ZAKIA TO BE ABLE TO COMPLETE TASK.

## 2019-07-22 VITALS
TEMPERATURE: 99 F | SYSTOLIC BLOOD PRESSURE: 127 MMHG | BODY MASS INDEX: 27.16 KG/M2 | HEART RATE: 56 BPM | WEIGHT: 173.06 LBS | DIASTOLIC BLOOD PRESSURE: 68 MMHG | OXYGEN SATURATION: 96 % | RESPIRATION RATE: 16 BRPM | HEIGHT: 67 IN

## 2019-07-22 LAB
ANION GAP SERPL CALC-SCNC: 9 MMOL/L (ref 8–16)
BASOPHILS # BLD AUTO: 0.01 K/UL (ref 0–0.2)
BASOPHILS NFR BLD: 0.2 % (ref 0–1.9)
BUN SERPL-MCNC: 27 MG/DL (ref 8–23)
CALCIUM SERPL-MCNC: 9 MG/DL (ref 8.7–10.5)
CHLORIDE SERPL-SCNC: 105 MMOL/L (ref 95–110)
CO2 SERPL-SCNC: 25 MMOL/L (ref 23–29)
CREAT SERPL-MCNC: 1.3 MG/DL (ref 0.5–1.4)
DIFFERENTIAL METHOD: ABNORMAL
EOSINOPHIL # BLD AUTO: 0.1 K/UL (ref 0–0.5)
EOSINOPHIL NFR BLD: 2.4 % (ref 0–8)
ERYTHROCYTE [DISTWIDTH] IN BLOOD BY AUTOMATED COUNT: 13.7 % (ref 11.5–14.5)
EST. GFR  (AFRICAN AMERICAN): >60 ML/MIN/1.73 M^2
EST. GFR  (NON AFRICAN AMERICAN): 54 ML/MIN/1.73 M^2
GLUCOSE SERPL-MCNC: 90 MG/DL (ref 70–110)
HCT VFR BLD AUTO: 23.8 % (ref 40–54)
HGB BLD-MCNC: 8.5 G/DL (ref 14–18)
LYMPHOCYTES # BLD AUTO: 1 K/UL (ref 1–4.8)
LYMPHOCYTES NFR BLD: 23.6 % (ref 18–48)
MCH RBC QN AUTO: 30.2 PG (ref 27–31)
MCHC RBC AUTO-ENTMCNC: 35.7 G/DL (ref 32–36)
MCV RBC AUTO: 85 FL (ref 82–98)
MONOCYTES # BLD AUTO: 0.4 K/UL (ref 0.3–1)
MONOCYTES NFR BLD: 10 % (ref 4–15)
NEUTROPHILS # BLD AUTO: 2.7 K/UL (ref 1.8–7.7)
NEUTROPHILS NFR BLD: 63.8 % (ref 38–73)
PLATELET # BLD AUTO: 136 K/UL (ref 150–350)
PMV BLD AUTO: 10 FL (ref 9.2–12.9)
POTASSIUM SERPL-SCNC: 5.2 MMOL/L (ref 3.5–5.1)
RBC # BLD AUTO: 2.81 M/UL (ref 4.6–6.2)
SODIUM SERPL-SCNC: 139 MMOL/L (ref 136–145)
WBC # BLD AUTO: 4.19 K/UL (ref 3.9–12.7)

## 2019-07-22 PROCEDURE — 80048 BASIC METABOLIC PNL TOTAL CA: CPT

## 2019-07-22 PROCEDURE — 97530 THERAPEUTIC ACTIVITIES: CPT

## 2019-07-22 PROCEDURE — 97116 GAIT TRAINING THERAPY: CPT

## 2019-07-22 PROCEDURE — 36415 COLL VENOUS BLD VENIPUNCTURE: CPT

## 2019-07-22 PROCEDURE — 97530 THERAPEUTIC ACTIVITIES: CPT | Performed by: PHYSICAL THERAPIST

## 2019-07-22 PROCEDURE — 25000003 PHARM REV CODE 250: Performed by: NURSE PRACTITIONER

## 2019-07-22 PROCEDURE — 94760 N-INVAS EAR/PLS OXIMETRY 1: CPT

## 2019-07-22 PROCEDURE — 85025 COMPLETE CBC W/AUTO DIFF WBC: CPT

## 2019-07-22 RX ORDER — PANTOPRAZOLE SODIUM 40 MG/1
40 TABLET, DELAYED RELEASE ORAL DAILY
Qty: 30 TABLET | Refills: 0 | Status: SHIPPED | OUTPATIENT
Start: 2019-07-23 | End: 2020-08-18

## 2019-07-22 RX ORDER — POLYETHYLENE GLYCOL 3350 17 G/17G
17 POWDER, FOR SOLUTION ORAL DAILY PRN
Qty: 14 PACKET | Refills: 0 | Status: SHIPPED | OUTPATIENT
Start: 2019-07-22 | End: 2019-08-05

## 2019-07-22 RX ORDER — HYDROCODONE BITARTRATE AND ACETAMINOPHEN 10; 325 MG/1; MG/1
1 TABLET ORAL EVERY 6 HOURS PRN
Qty: 20 TABLET | Refills: 0 | Status: SHIPPED | OUTPATIENT
Start: 2019-07-22

## 2019-07-22 RX ADMIN — ASPIRIN 81 MG: 81 TABLET, COATED ORAL at 06:07

## 2019-07-22 RX ADMIN — PANTOPRAZOLE SODIUM 40 MG: 40 TABLET, DELAYED RELEASE ORAL at 08:07

## 2019-07-22 RX ADMIN — POLYETHYLENE GLYCOL 3350 17 G: 17 POWDER, FOR SOLUTION ORAL at 08:07

## 2019-07-22 RX ADMIN — RANOLAZINE 1000 MG: 500 TABLET, FILM COATED, EXTENDED RELEASE ORAL at 08:07

## 2019-07-22 NOTE — DISCHARGE SUMMARY
[facility] Hospital Medicine  Discharge Summary      Patient Name: Foreign Holland  MRN: [id number]  Admission Date: 7/17/2019  Hospital Length of Stay: 4 days  Discharge Date and Time: 7/22/2019  2:12 PM  Attending Physician: Dr. Goran Hdez    Discharging Provider: Meg Hampton NP  Primary Care Provider: Oziel Mendieta MD      HPI:   Foreign Holland is a 73 y.o. male patient with a PMHx of CA, CAD, HTN, who presents evaluation of L ankle pain which onset this morning after ground level fall.  Pt states that he was mowing the lawn at his home, when he began to feel dizzy and fell to the ground.  Pt denies dizziness at this time.  Pt denies LOC at time of fall. ROM worsen.  No associated sxs reported.  No other complaints. Patient was evaluated in the ER. Cr 1.8, baseline 1.5-1.6. CT lower L ext:Acute fractures of the distal tibia and fibula, as above. Ortho consulted and recommended admission and NPO.   Patient is a full code and his SDM is his sister, Dionicio Calvillo.    Procedure(s) (LRB):  APPLICATION, EXTERNAL FIXATION DEVICE (Left)  CLOSED REDUCTION, FRACTURE, FIBULA (Left)      Hospital Course:   Patient was admitted to Med Surg for LLE fx under the care of Hospital Medicine. Ortho surgery was consulted and performed closed reduction of tibia and fibula fracture with application of external fixator on 07/18. Pain is well controlled. PT/OT following. Pt will DC to SNF once auth is obtained - likely Monday.  On 7/20/19, pt seen and examined with no acute distress reported.  H/H trending down- will transfuse as needed. Creatinine 1.5 with gentle hydration given.  SNF placement in progress pending acceptance with authroization.  7/21/19, H/H 7/19.4 with PRBCs x 1 unit transfused.  On 7/22/19, H/H and creatinine stable post transfusion.  Pt stable with no signs of acute distress.  Surgical site intact with dressing secure and external fixator in place.  Pt verbalized symptom improvement and  sitting up to chair.  Pt seen and examined on the date of discharge and deemed suitable for discharge to [facility] for continued care and therapy.  Current medications resumed with Protonix, Miralax, and Norco prescribed.  Accupril and Tramadol held.  Pt instructed to follow up with PCP and Orthopedic Surgery for further evaluation.       Consults:   Consults (From admission, onward)        Status Ordering Provider     Inpatient consult to Cardiology  Once     Provider:  Shaun Hernandez MD    Completed LALO QUILES     Inpatient consult to Orthopedic Surgery  Once     Provider:  Flynn White MD    Completed FELICIA GEORGE          Final Active Diagnoses:    Diagnosis Date Noted POA    PRINCIPAL PROBLEM:  Tibia/fibula fracture, left, closed, initial encounter [S82.202A, S82.402A] 07/18/2019 Yes    Pre-op evaluation [Z01.818] 07/18/2019 Not Applicable    Chronic diastolic congestive heart failure [I50.32] 07/18/2019 Yes    Renal insufficiency [N28.9] 07/18/2019 Yes    CKD (chronic kidney disease) stage 3, GFR 30-59 ml/min [N18.3] 07/24/2018 Yes    Anemia [D64.9] 04/17/2014 Yes    CAD (coronary artery disease) [I25.10] 04/17/2014 Yes    HTN (hypertension) [I10] 04/17/2014 Yes      Problems Resolved During this Admission:       Discharged Condition: stable    Disposition: Skilled Nursing Facility    Follow Up:  Follow-up Information     [facility].    Why:  SNF  Contact information:  [address] [facility] [address]           Oziel Mendieta MD In 3 days.    Specialty:  Internal Medicine  Why:  -hospital follow up   Contact information:  [address] [place] [facility] [address] [phone]             Philippe Bustamante Jr, MD In 1 week.    Specialty:  Orthopedic Surgery  Why:  -hospital follow up   Contact information:  [address] [place] [address] [phone]                 Patient Instructions:      Diet  Cardiac     Notify your health care provider if you experience any of the following:  temperature >100.4     Notify your health care provider if you experience any of the following:  persistent nausea and vomiting or diarrhea     Notify your health care provider if you experience any of the following:  increased confusion or weakness     Notify your health care provider if you experience any of the following:  persistent dizziness, light-headedness, or visual disturbances     Notify your health care provider if you experience any of the following:  difficulty breathing or increased cough     Notify your health care provider if you experience any of the following:  severe uncontrolled pain     Activity as tolerated       Significant Diagnostic Studies: Labs:   CMP   Recent Labs   Lab 07/21/19  0544 07/22/19  0443    139   K 4.7 5.2*    105   CO2 26 25    90   BUN 34* 27*   CREATININE 1.3 1.3   CALCIUM 8.5* 9.0   ANIONGAP 8 9   ESTGFRAFRICA >60 >60   EGFRNONAA 54* 54*    and CBC   Recent Labs   Lab 07/21/19  0544 07/22/19  0443   WBC 3.49* 4.19   HGB 7.0* 8.5*   HCT 19.4* 23.8*   * 136*       Pending Diagnostic Studies:     None         Imaging Results          CT Leg (Tibia-Fibula) Without Contrast Left (Final result)  Result time 07/17/19 23:52:44    Final result by Jess De Leon MD (07/17/19 23:52:44)                 Impression:      Acute fractures of the distal tibia and fibula, as above.    Techniques were utilized for this exam to obtain a radiation dose as low as reasonably be achievable.      Electronically signed by: Jess De Leon  Date:    07/17/2019  Time:    23:52             Narrative:    TECHNIQUE:  Noncontrast axial helical images were obtained throughout the left leg.  Coronal axial reformatted images were created.    FINDINGS:  There is a comminuted fracture of the distal tibial diaphysis with approximately 1 cm lateral displacement of the distal fracture  fragment and approximately 2 cm cranial overriding of the distal fracture fragment.  The fracture does appear to extend into the tibiotalar joint.  There is also a minimally displaced fracture of the distal fibular diaphysis.  There is extensive surrounding edema.  There are no other acute fractures.                               X-Ray Chest 1 View (Final result)  Result time 07/17/19 23:27:26    Final result by Jess De Leon MD (07/17/19 23:27:26)                 Impression:      No cardiopulmonary process noted.      Electronically signed by: Jess De Leon  Date:    07/17/2019  Time:    23:27             Narrative:    EXAMINATION:  XR CHEST 1 VIEW    CLINICAL HISTORY:  Encounter for other preprocedural examination    COMPARISON:  Two thousand fourteen    FINDINGS:  No infiltrate or effusion identified.  Cardiomediastinal silhouette is within normal limits.                              Medications:  Reconciled Home Medications:      Medication List      START taking these medications    HYDROcodone-acetaminophen  mg per tablet  Commonly known as:  NORCO  Take 1 tablet by mouth every 6 (six) hours as needed.     pantoprazole 40 MG tablet  Commonly known as:  PROTONIX  Take 1 tablet (40 mg total) by mouth once daily.  Start taking on:  7/23/2019     polyethylene glycol 17 gram Pwpk  Commonly known as:  GLYCOLAX  Take 17 g by mouth daily as needed.        CONTINUE taking these medications    aspirin 81 MG EC tablet  Commonly known as:  ECOTRIN  Take 81 mg by mouth every morning.     atorvastatin 80 MG tablet  Commonly known as:  LIPITOR  80 mg every evening.     fish oil-omega-3 fatty acids 300-1,000 mg capsule  Take 1 g by mouth 2 (two) times daily.     FLUZONE HIGH-DOSE 2018-19 (PF) 180 mcg/0.5 mL vaccine  Generic drug:  influenza     FOLIC ACID ORAL  Take 2,400 mcg by mouth once daily.     hydroCHLOROthiazide 25 MG tablet  Commonly known as:  HYDRODIURIL  Take by mouth once daily.      metoprolol succinate 25 MG 24 hr tablet  Commonly known as:  TOPROL-XL  Take 25 mg by mouth once daily.     NITROSTAT 0.4 MG SL tablet  Generic drug:  nitroGLYCERIN  Patient states that he takes this medication as needed     RANEXA 1,000 mg Tb12  Generic drug:  ranolazine  TAKE 1 TABLET BY MOUTH TWICE A DAY     triamcinolone acetonide 0.1% 0.1 % cream  Commonly known as:  KENALOG  every morning.     VITAMIN D3 400 unit Tab  Generic drug:  cholecalciferol (vitamin D3)  Take 1 tablet by mouth once daily.        STOP taking these medications    PREVNAR 13 (PF) 0.5 mL Syrg  Generic drug:  pneumoc 13-idania conj-dip cr(PF)     quinapril 10 MG tablet  Commonly known as:  ACCUPRIL     traMADol 50 mg tablet  Commonly known as:  ULTRAM            Indwelling Lines/Drains at time of discharge:   Lines/Drains/Airways          None          Time spent on the discharge of patient: > 30 minutes  Patient was seen and examined on the date of discharge and determined to be suitable for discharge.         Meg Hampton NP  Department of Hospital Medicine  [facility]

## 2019-07-22 NOTE — PLAN OF CARE
CM received a message from [facility], authorization was received from [facility].  CM updated patient on plan and that he would transition to [facility] today.  Updated ELIZABETH Hampton.     07/22/19 0949   Discharge Reassessment   Assessment Type Discharge Planning Reassessment   Provided patient/caregiver education on the expected discharge date and the discharge plan Yes   Do you have any problems affording any of your prescribed medications? No   Discharge Plan A Skilled Nursing Facility   DME Needed Upon Discharge  none   Patient choice form signed by patient/caregiver N/A   Anticipated Discharge Disposition SNF   Can the patient answer the patient profile reliably? Yes, cognitively intact

## 2019-07-22 NOTE — PT/OT/SLP PROGRESS
"Occupational Therapy  Treatment    Foreign Holland   MRN: [id number]   Admitting Diagnosis: Tibia/fibula fracture, left, closed, initial encounter    OT Date of Treatment: 07/22/19   OT Start Time: 1005  OT Stop Time: 1030  OT Total Time (min): 25 min    Billable Minutes:  Therapeutic Activity 25 min    General Precautions: Standard, fall  Orthopedic Precautions: LLE non weight bearing  Braces: N/A         Subjective:  Communicated with Nurse Kimberly and epic chart review prior to session.  Pt found sitting in chair with alarm on, and agreeable to tx at this time.   Pain/Comfort  Pain Rating 1: 4/10  Location - Side 1: Left  Location 1: ankle  Pain Addressed 1: Reposition, Distraction  Pain Rating Post-Intervention 1: 4/10    Objective:  Patient found with: peripheral IV, external fixator, telemetry     Functional Mobility:  Therapeutic Activities and Exercises:  Pt performed sit>stand from chair using RW with Min A, functional mobility using RW ~40ft with Min A, t/f to sitting EOB with Min A using RW, sit>supine with Min A, scooted to HOB with CGA/ Min A.     AM-PAC 6 CLICK ADL   How much help from another person does this patient currently need?   1 = Unable, Total/Dependent Assistance  2 = A lot, Maximum/Moderate Assistance  3 = A little, Minimum/Contact Guard/Supervision  4 = None, Modified [place]/Independent    Putting on and taking off regular lower body clothing? : 3  Bathing (including washing, rinsing, drying)?: 2  Toileting, which includes using toilet, bedpan, or urinal? : 3  Putting on and taking off regular upper body clothing?: 4  Taking care of personal grooming such as brushing teeth?: 4  Eating meals?: 4  Daily Activity Total Score: 20     AM-PAC Raw Score CMS "G-Code Modifier Level of Impairment Assistance   6 % Total / Unable   7 - 8 CM 80 - 100% Maximal Assist   9-13 CL 60 - 80% Moderate Assist   14 - 19 CK 40 - 60% Moderate Assist   20 - 22 CJ 20 - 40% Minimal Assist   23 CI 1-20% SBA / " CGA   24 CH 0% Independent/ Mod I       Patient left supine with HOB elevated and (L) LE elevated on pillow with all lines intact, call button in reach and Nurse Kimberly notified    ASSESSMENT:  Foreign Holland is a 73 y.o. male with a medical diagnosis of Tibia/fibula fracture, left, closed, initial encounter and presents with impaired functional mobility and ADLs. Pt will benefit from continued skilled OT in order to address impairments.     Rehab identified problem list/impairments: Rehab identified problem list/impairments: weakness, impaired endurance, impaired self care skills, impaired functional mobilty, decreased coordination, edema, pain, orthopedic precautions, decreased safety awareness, decreased lower extremity function, impaired balance, gait instability, decreased upper extremity function    Rehab potential is good.    Activity tolerance: Good    Discharge recommendations: Discharge Facility/Level of Care Needs: rehabilitation facility     Barriers to discharge:      Equipment recommendations: walker, rolling     GOALS:   Multidisciplinary Problems     Occupational Therapy Goals        Problem: Occupational Therapy Goal    Goal Priority Disciplines Outcome Interventions   Occupational Therapy Goal     OT, PT/OT Ongoing (interventions implemented as appropriate)    Description:  OT GOALS TO BE MET BY 7-26-19  PT WILL TOLERATE 1 SET X 20 REPS BB UE ROM EXERCISE WITH MIN RESISTANCE  S WITH BSC T/F'S  SBA WITH LE DRESSING                     Plan:  Patient to be seen 3 x/week to address the above listed problems via self-care/home management, therapeutic activities, therapeutic exercises  Plan of Care expires: 07/26/19  Plan of Care reviewed with: patient    OT G-codes  Functional Assessment Tool Used: [facility]  Score: 20    Christiane Lucero, PT/OT  07/22/2019

## 2019-07-22 NOTE — PLAN OF CARE
Problem: Physical Therapy Goal  Goal: Physical Therapy Goal  PT WILL BE SEEN FOR P.T. FOR A MIN OF 5 OUT OF 7 DAYS A WEEK  LT19  1. PT WILL COMPLETE BED MOBILITY IND  2 PT WILL T/F TO CHAIR WITH RW NWB L LE WITH SBA  3. PT WILL GT TRAIN WITH RW AND CGA X 50'   4. PT WILL COMPLETE B LE TE X 20 REPS    Outcome: Ongoing (interventions implemented as appropriate)  PT GT TRAINED X 40' WITH RW AND NWB L LE.

## 2019-07-22 NOTE — PROGRESS NOTES
"[facility]  Orthopedics  Progress Note    Patient Name: Foreign Holland  MRN: [id number]  Admission Date: 7/17/2019  Hospital Length of Stay: 4 days  Attending Provider: Goran Hdez MD  Primary Care Provider: Oziel Mendieta MD  Follow-up For: Procedure(s) (LRB):  APPLICATION, EXTERNAL FIXATION DEVICE (Left)  CLOSED REDUCTION, FRACTURE, FIBULA (Left)    Post-Operative Day: 4 Days Post-Op  Subjective:     Principal Problem:Tibia/fibula fracture, left, closed, initial encounter    Principal Orthopedic Problem:  Closed left tibia fibula fracture    Interval History:  PT in progress.  Continues to make good progress.  Patient states pain is well controlled with current medications. Denies chest pain, shortness of breath or numbness and tingling.    Review of patient's allergies indicates:  No Known Allergies    Current Facility-Administered Medications   Medication    0.9%  NaCl infusion (for blood administration)    0.9%  NaCl infusion    aspirin EC tablet 81 mg    atorvastatin tablet 80 mg    HYDROcodone-acetaminophen  mg per tablet 1 tablet    metoprolol succinate (TOPROL-XL) 24 hr tablet 25 mg    morphine injection 2 mg    naloxone 0.4 mg/mL injection 0.4 mg    pantoprazole EC tablet 40 mg    polyethylene glycol packet 17 g    ranolazine 12 hr tablet 1,000 mg    sodium chloride 0.9% flush 10 mL     Objective:     Vital Signs (Most Recent):  Temp: 98.6 °F (37 °C) (07/22/19 0717)  Pulse: (!) 56 (07/22/19 0717)  Resp: 16 (07/22/19 0717)  BP: 127/68 (07/22/19 0717)  SpO2: 96 % (07/22/19 0717) Vital Signs (24h Range):  Temp:  [98.1 °F (36.7 °C)-98.8 °F (37.1 °C)] 98.6 °F (37 °C)  Pulse:  [56-80] 56  Resp:  [14-17] 16  SpO2:  [95 %-99 %] 96 %  BP: (100-148)/(54-74) 127/68     Weight: 78.5 kg (173 lb 1 oz)  Height: 5' 7" (170.2 cm)  Body mass index is 27.11 kg/m².      Intake/Output Summary (Last 24 hours) at 7/22/2019 1226  Last data filed at 7/22/2019 0800  Gross per 24 hour "   Intake 2648.33 ml   Output 900 ml   Net 1748.33 ml       Ortho/SPM Exam  Left leg:  Dressings clean dry and intact. Neurovascular intact.  Significant Labs: All pertinent labs within the past 24 hours have been reviewed.    Significant Imaging: I have reviewed all pertinent imaging results/findings.    Assessment/Plan:  Closed pilon fracture left leg status post closed reduction and application of external fixator doing well.  Plan continue PT.  Transfer to SNF when bed available.     Active Diagnoses:    Diagnosis Date Noted POA    PRINCIPAL PROBLEM:  Tibia/fibula fracture, left, closed, initial encounter [S82.202A, S82.402A] 07/18/2019 Yes    Pre-op evaluation [Z01.818] 07/18/2019 Not Applicable    Chronic diastolic congestive heart failure [I50.32] 07/18/2019 Yes    Renal insufficiency [N28.9] 07/18/2019 Yes    CKD (chronic kidney disease) stage 3, GFR 30-59 ml/min [N18.3] 07/24/2018 Yes    Anemia [D64.9] 04/17/2014 Yes    CAD (coronary artery disease) [I25.10] 04/17/2014 Yes    HTN (hypertension) [I10] 04/17/2014 Yes      Problems Resolved During this Admission:         Philippe Bustamante Jr, MD  Orthopedics  [facility]

## 2019-07-22 NOTE — PLAN OF CARE
Problem: Occupational Therapy Goal  Goal: Occupational Therapy Goal  OT GOALS TO BE MET BY 7-26-19  PT WILL TOLERATE 1 SET X 20 REPS BB UE ROM EXERCISE WITH MIN RESISTANCE  S WITH BSC T/F'S  SBA WITH LE DRESSING   Outcome: Ongoing (interventions implemented as appropriate)  Sit<>stand t/f using RW with Min A, bed mobility with Min A

## 2019-07-22 NOTE — PLAN OF CARE
Problem: Adult Inpatient Plan of Care  Goal: Plan of Care Review  Outcome: Ongoing (interventions implemented as appropriate)  Iv fluids. D/c today. Up as tolerated. LLE dressing and fixation intact. Free from injury. Pain controlled. NADN. Call light in reach.  Chart check completed.

## 2019-07-22 NOTE — PT/OT/SLP PROGRESS
Physical Therapy  Treatment    Foreign Holland   MRN: [id number]   Admitting Diagnosis: Tibia/fibula fracture, left, closed, initial encounter    PT Received On: 07/22/19  PT Start Time: 1006     PT Stop Time: 1030    PT Total Time (min): 24 min       Billable Minutes:  Gait Training 14 and Therapeutic Activity 10    Treatment Type: Treatment  PT/PTA: PT     PTA Visit Number: 2       General Precautions: Standard, fall  Orthopedic Precautions: LLE non weight bearing   Braces: N/A         Subjective:  Communicated with NURSE AND Epic CHART REVIEW prior to session.   PT AGREED TO TX     Pain/Comfort  Pain Rating 1: 4/10  Pain Rating Post-Intervention 1: 4/10    Objective:   Patient found with: telemetry, peripheral IV, external fixator    Functional Mobility:  PT MET IN RM SEATED IN CHAIR. PT STOOD WITH RW AND NWB L L LE. PT GT TRAINED X 40' WITH RW AND NWB L LE. PT RETURNED TO RM T/F TO BED WITH RW AND MIN A. PT SUP IN BED WITH SBA PT SCOOTED TO HOB AND L LE ELEVATED. PT LEFT WITH ALL NEEDS MET AND CALL BELL IN REACH.     AM-PAC 6 CLICK MOBILITY  How much help from another person does this patient currently need?   1 = Unable, Total/Dependent Assistance  2 = A lot, Maximum/Moderate Assistance  3 = A little, Minimum/Contact Guard/Supervision  4 = None, Modified [place]/Independent    Turning over in bed (including adjusting bedclothes, sheets and blankets)?: 4  Sitting down on and standing up from a chair with arms (e.g., wheelchair, bedside commode, etc.): 3  Moving from lying on back to sitting on the side of the bed?: 4  Moving to and from a bed to a chair (including a wheelchair)?: 3  Need to walk in hospital room?: 3  Climbing 3-5 steps with a railing?: 1  Basic Mobility Total Score: 18    AM-PAC Raw Score CMS G-Code Modifier Level of Impairment Assistance   6 % Total / Unable   7 - 9 CM 80 - 100% Maximal Assist   10 - 14 CL 60 - 80% Moderate Assist   15 - 19 CK 40 - 60% Moderate Assist   20 - 22 CJ 20 -  40% Minimal Assist   23 CI 1-20% SBA / CGA   24 CH 0% Independent/ Mod I     Patient left supine with call button in reach.    Assessment:  PT PROGRESSING WITH GT.     Rehab identified problem list/impairments: Rehab identified problem list/impairments: weakness, gait instability, impaired balance, impaired endurance, impaired functional mobilty, decreased lower extremity function, decreased ROM    Rehab potential is good.    Activity tolerance: Fair    Discharge recommendations: Discharge Facility/Level of Care Needs: rehabilitation facility     Barriers to discharge:      Equipment recommendations: Equipment Needed After Discharge: walker, rolling     GOALS:   Multidisciplinary Problems     Physical Therapy Goals        Problem: Physical Therapy Goal    Goal Priority Disciplines Outcome Goal Variances Interventions   Physical Therapy Goal     PT, PT/OT Ongoing (interventions implemented as appropriate)     Description:  PT WILL BE SEEN FOR P.T. FOR A MIN OF 5 OUT OF 7 DAYS A WEEK  LT19  1. PT WILL COMPLETE BED MOBILITY IND  2 PT WILL T/F TO CHAIR WITH RW NWB L LE WITH SBA  3. PT WILL GT TRAIN WITH RW AND CGA X 50'   4. PT WILL COMPLETE B LE TE X 20 REPS                     PLAN:    Patient to be seen  to address the above listed problems via gait training, therapeutic activities, therapeutic exercises  Plan of Care expires: 19  Plan of Care reviewed with: patient         Brook Lucio, PT  2019

## 2019-07-22 NOTE — PT/OT/SLP PROGRESS
Physical Therapy  Treatment    Foreign Holland   MRN: [id number]   Admitting Diagnosis: Tibia/fibula fracture, left, closed, initial encounter    PT Received On: 07/22/19  PT Start Time: 0755     PT Stop Time: 0810    PT Total Time (min): 15 min       Billable Minutes:  Gait Training 15    Treatment Type: Treatment  PT/PTA: PT     PTA Visit Number: 2       General Precautions: Standard, fall  Orthopedic Precautions: LLE non weight bearing   Braces: N/A         Subjective:  Communicated with NURSE AND Epic CHART REVIEW prior to session.  PT AGREED TO TX     Pain/Comfort  Pain Rating 1: 0/10  Pain Rating Post-Intervention 1: 0/10    Objective:   Patient found with: peripheral IV, telemetry, external fixator    Functional Mobility:  PT MET IN RM SUP IN BED AND SEATED EOB WITH SBA. PT SCOOTED TO EOB AND STOOD WITH RW AND MOD A NWB L LE. PT GT TRAINED X 22' WITH RW NWB AND MIN A. PT RETURNED TO RM T/F TO CHAIR AND SETUP WITH BREAKFAST AND LEFT WITH ALL NEEDS MET     AM-PAC 6 CLICK MOBILITY  How much help from another person does this patient currently need?   1 = Unable, Total/Dependent Assistance  2 = A lot, Maximum/Moderate Assistance  3 = A little, Minimum/Contact Guard/Supervision  4 = None, Modified Burns/Independent    Turning over in bed (including adjusting bedclothes, sheets and blankets)?: 4  Sitting down on and standing up from a chair with arms (e.g., wheelchair, bedside commode, etc.): 3  Moving from lying on back to sitting on the side of the bed?: 4  Moving to and from a bed to a chair (including a wheelchair)?: 3  Need to walk in hospital room?: 3  Climbing 3-5 steps with a railing?: 1  Basic Mobility Total Score: 18    AM-PAC Raw Score CMS G-Code Modifier Level of Impairment Assistance   6 % Total / Unable   7 - 9 CM 80 - 100% Maximal Assist   10 - 14 CL 60 - 80% Moderate Assist   15 - 19 CK 40 - 60% Moderate Assist   20 - 22 CJ 20 - 40% Minimal Assist   23 CI 1-20% SBA / CGA   24 CH 0%  Independent/ Mod I     Patient left up in chair with call button in reach and chair alarm on.    Assessment:  PT DAVID GT TRAINING.    Rehab identified problem list/impairments: Rehab identified problem list/impairments: weakness, gait instability, decreased lower extremity function, impaired balance, impaired endurance, impaired sensation, impaired self care skills, impaired functional mobilty, orthopedic precautions, decreased ROM    Rehab potential is good.    Activity tolerance: Fair    Discharge recommendations: Discharge Facility/Level of Care Needs: rehabilitation facility     Barriers to discharge:      Equipment recommendations: Equipment Needed After Discharge: walker, rolling, commode     GOALS:   Multidisciplinary Problems     Physical Therapy Goals        Problem: Physical Therapy Goal    Goal Priority Disciplines Outcome Goal Variances Interventions   Physical Therapy Goal     PT, PT/OT Ongoing (interventions implemented as appropriate)     Description:  PT WILL BE SEEN FOR P.T. FOR A MIN OF 5 OUT OF 7 DAYS A WEEK  LT19  1. PT WILL COMPLETE BED MOBILITY IND  2 PT WILL T/F TO CHAIR WITH RW NWB L LE WITH SBA  3. PT WILL GT TRAIN WITH RW AND CGA X 50'   4. PT WILL COMPLETE B LE TE X 20 REPS                     PLAN:    Patient to be seen  to address the above listed problems via gait training, therapeutic activities, therapeutic exercises  Plan of Care expires: 19  Plan of Care reviewed with: patient         Brook Lucio, PT  2019

## 2019-07-22 NOTE — PLAN OF CARE
Problem: Physical Therapy Goal  Goal: Physical Therapy Goal  PT WILL BE SEEN FOR P.T. FOR A MIN OF 5 OUT OF 7 DAYS A WEEK  LT19  1. PT WILL COMPLETE BED MOBILITY IND  2 PT WILL T/F TO CHAIR WITH RW NWB L LE WITH SBA  3. PT WILL GT TRAIN WITH RW AND CGA X 50'   4. PT WILL COMPLETE B LE TE X 20 REPS   Outcome: Ongoing (interventions implemented as appropriate)  PT GT TRAINED X 22' WITH RW NWB L LE AND MIN A.

## 2019-07-22 NOTE — PLAN OF CARE
07/22/19 1454   Final Note   Assessment Type Final Discharge Note   Anticipated Discharge Disposition SNF   Right Care Referral Info   Post Acute Recommendation SNF / Sub-Acute Rehab   [facility] Name [facility]

## 2019-07-22 NOTE — PLAN OF CARE
Problem: Adult Inpatient Plan of Care  Goal: Plan of Care Review  Outcome: Ongoing (interventions implemented as appropriate)  Pt remained free of injury during shift, stable condition, denied pain, no acute distress, receiving IV fluids, dressing to LLE intact with dried drainage and external fixator in place, and will continue to monitor. 24hr chart review performed.

## 2019-07-22 NOTE — PROGRESS NOTES
Report called to [facility] @ [phone]. Facility will provide transportation. D/c packet will be given to , included hard script Norco 10/325 mg #20 NR.

## 2019-07-23 ENCOUNTER — TELEPHONE (OUTPATIENT)
Dept: ORTHOPEDICS | Facility: CLINIC | Age: 74
End: 2019-07-23

## 2019-07-25 DIAGNOSIS — M79.605 PAIN OF LEFT LOWER EXTREMITY: ICD-10-CM

## 2019-07-25 DIAGNOSIS — M25.572 LEFT ANKLE PAIN, UNSPECIFIED CHRONICITY: Primary | ICD-10-CM

## 2019-07-29 ENCOUNTER — OFFICE VISIT (OUTPATIENT)
Dept: ORTHOPEDICS | Facility: CLINIC | Age: 74
End: 2019-07-29
Payer: MEDICARE

## 2019-07-29 ENCOUNTER — HOSPITAL ENCOUNTER (OUTPATIENT)
Dept: RADIOLOGY | Facility: HOSPITAL | Age: 74
Discharge: HOME | End: 2019-07-29
Attending: ORTHOPAEDIC SURGERY
Payer: MEDICARE

## 2019-07-29 VITALS
HEART RATE: 60 BPM | SYSTOLIC BLOOD PRESSURE: 125 MMHG | WEIGHT: 173 LBS | HEIGHT: 67 IN | DIASTOLIC BLOOD PRESSURE: 76 MMHG | BODY MASS INDEX: 27.15 KG/M2

## 2019-07-29 DIAGNOSIS — M25.572 LEFT ANKLE PAIN, UNSPECIFIED CHRONICITY: Primary | ICD-10-CM

## 2019-07-29 DIAGNOSIS — M89.8X6 PAIN OF LEFT FIBULA: ICD-10-CM

## 2019-07-29 DIAGNOSIS — M25.572 LEFT ANKLE PAIN, UNSPECIFIED CHRONICITY: ICD-10-CM

## 2019-07-29 DIAGNOSIS — S82.832D OTHER CLOSED FRACTURE OF DISTAL END OF LEFT FIBULA WITH ROUTINE HEALING, SUBSEQUENT ENCOUNTER: ICD-10-CM

## 2019-07-29 DIAGNOSIS — S82.252D CLOSED DISPLACED COMMINUTED FRACTURE OF SHAFT OF LEFT TIBIA WITH ROUTINE HEALING, SUBSEQUENT ENCOUNTER: Primary | ICD-10-CM

## 2019-07-29 DIAGNOSIS — M79.605 PAIN OF LEFT LOWER EXTREMITY: ICD-10-CM

## 2019-07-29 PROCEDURE — 99999 PR PBB SHADOW E&M-EST. PATIENT-LVL V: ICD-10-PCS | Mod: PBBFAC,,,

## 2019-07-29 PROCEDURE — 73590 X-RAY EXAM OF LOWER LEG: CPT | Mod: 26,LT,, | Performed by: RADIOLOGY

## 2019-07-29 PROCEDURE — 99999 PR PBB SHADOW E&M-EST. PATIENT-LVL V: CPT | Mod: PBBFAC,,,

## 2019-07-29 PROCEDURE — 73610 X-RAY EXAM OF ANKLE: CPT | Mod: 26,LT,, | Performed by: RADIOLOGY

## 2019-07-29 PROCEDURE — 73590 XR TIBIA FIBULA 2 VIEW LEFT: ICD-10-PCS | Mod: 26,LT,, | Performed by: RADIOLOGY

## 2019-07-29 PROCEDURE — 73610 XR ANKLE COMPLETE 3 VIEW LEFT: ICD-10-PCS | Mod: 26,LT,, | Performed by: RADIOLOGY

## 2019-07-29 PROCEDURE — 73610 X-RAY EXAM OF ANKLE: CPT | Mod: TC,LT

## 2019-07-29 PROCEDURE — 73590 X-RAY EXAM OF LOWER LEG: CPT | Mod: TC,LT

## 2019-07-29 NOTE — PROGRESS NOTES
Subjective:      Patient ID: Foreign Holland is a 74 y.o. male.    Chief Complaint: Post-op Evaluation of the Left Ankle (external fixation and closed reduction of L fibula 7/18/19 Dr. Bustamante  ) and Post-op Evaluation    This is a 74-year-old male who has been seen with a comminuted distal tibia fracture closed and a distal fibular fracture. He was expertly placed into a delta frame external fixator by Dr. Ward.  Subsequently the patient has edema has resolved and his pain has improved.  He is seen in today's clinic to evaluate for edema as well as the possibility of proceeding with removal of the external fixator and open reduction internal fixation of the distal tibia and fibula.      Review of Systems   Constitution: Negative.   HENT: Negative.    Eyes: Negative.    Cardiovascular: Negative.    Respiratory: Negative.    Endocrine: Negative.    Hematologic/Lymphatic: Negative.    Skin: Negative.    Gastrointestinal: Negative.    Neurological: Negative.    Psychiatric/Behavioral: Negative.    Allergic/Immunologic: Negative.          Objective:            General    Nursing note and vitals reviewed.  Constitutional: He is oriented to person, place, and time. He appears well-developed and well-nourished.   HENT:   Head: Normocephalic and atraumatic.   Eyes: EOM are normal.   Neck: Normal range of motion.   Cardiovascular: Normal rate, regular rhythm and intact distal pulses.    Pulmonary/Chest: Effort normal.   Abdominal: Soft.   Neurological: He is alert and oriented to person, place, and time.   Psychiatric: He has a normal mood and affect. His behavior is normal. Thought content normal.         Right Ankle/Foot Exam   Right ankle exam is normal.    Left Ankle/Foot Exam     Inspection  Deformity: present  Erythema: absent  Effusion: Ankle - present   Atrophy: Ankle - absent     Pain   The patient exhibits pain of the lateral malleolus and medial malleolus.    Swelling   The patient is swollen on the lateral  malleolus and medial malleolus.    Tenderness   The patient is tender to palpation of the lateral malleolus and medial malleolus.    Range of Motion   Ankle Joint  Dorsiflexion: abnormal   Plantar flexion: abnormal     Subtalar Joint   Inversion: abnormal   Eversion: abnormal     Alignment   Knee Alignment: neutral    Comments:  The patient is noted to have a delta frame external fixator to the left lower extremity.  The edema has resolved nicely and creases as well as venous markers are noted.  All pin sites are clean dry and intact.  The patient does have 2 small abrasions 1 over the anterior lateral aspect and 1 over the anterior medial aspect of the distal leg both show good granulation.  The alignment is current lay acceptable.      Vascular Exam       Left Pulses  Dorsalis Pedis:      2+  Posterior Tibial:      2+        Edema  Left Lower Leg: present      X-ray examination shows a comminuted distal tibia fracture with extension into the articulation on the medial aspect.  A distal fibula oblique fracture is also noted at this time. An external fixator pins are noted proximally and in the calcaneus. These appear benign at this time. No evidence of loosening of the pins is noted.  There is no erosion or sign of infection.  Please see the radiology dictation for full diagnosis.      Assessment:       Encounter Diagnoses   Name Primary?    Closed displaced comminuted fracture of shaft of left tibia with routine healing, subsequent encounter Yes    Other closed fracture of distal end of left fibula with routine healing, subsequent encounter           Plan:       Foreign was seen today for post-op evaluation and post-op evaluation.    Diagnoses and all orders for this visit:    Closed displaced comminuted fracture of shaft of left tibia with routine healing, subsequent encounter    Other closed fracture of distal end of left fibula with routine healing, subsequent encounter      I have discussed that I believe  the patient will be ready for removal of the external fixator and open reduction internal fixation by the beginning of August.  I will schedule this with Dr. Ward.  The appropriate orders will be placed in the patient will see Dr. Ward on August 5th for preop evaluation and consents with anticipated surgery on August 6th.  The patient has been explained the procedure of removal of the external fixator as well as plating of the tibia and fibula.  He voices understanding as the risks were explained to include but not be limited to infection, bleeding, stiffness, pain, nerve, artery, vein injury, hardware wear, hardware failure, fracture, nonunion, malunion, dislocation, subluxation, deep venous thrombosis, pulmonary embolus and death.

## 2019-08-05 ENCOUNTER — HOSPITAL ENCOUNTER (OUTPATIENT)
Dept: RADIOLOGY | Facility: HOSPITAL | Age: 74
Discharge: HOME | End: 2019-08-05
Attending: ORTHOPAEDIC SURGERY
Payer: MEDICARE

## 2019-08-05 ENCOUNTER — TELEPHONE (OUTPATIENT)
Dept: ORTHOPEDICS | Facility: CLINIC | Age: 74
End: 2019-08-05

## 2019-08-05 ENCOUNTER — OFFICE VISIT (OUTPATIENT)
Dept: ORTHOPEDICS | Facility: CLINIC | Age: 74
End: 2019-08-05
Payer: MEDICARE

## 2019-08-05 VITALS
DIASTOLIC BLOOD PRESSURE: 67 MMHG | BODY MASS INDEX: 27.15 KG/M2 | HEIGHT: 67 IN | WEIGHT: 173 LBS | SYSTOLIC BLOOD PRESSURE: 108 MMHG | HEART RATE: 59 BPM

## 2019-08-05 DIAGNOSIS — M89.8X6 PAIN OF LEFT FIBULA: ICD-10-CM

## 2019-08-05 DIAGNOSIS — Z01.818 PRE-OP TESTING: ICD-10-CM

## 2019-08-05 DIAGNOSIS — Z01.818 PREOP TESTING: Primary | ICD-10-CM

## 2019-08-05 DIAGNOSIS — S82.202A TIBIA/FIBULA FRACTURE, LEFT, CLOSED, INITIAL ENCOUNTER: Primary | ICD-10-CM

## 2019-08-05 DIAGNOSIS — M25.572 LEFT ANKLE PAIN, UNSPECIFIED CHRONICITY: ICD-10-CM

## 2019-08-05 DIAGNOSIS — S82.402A TIBIA/FIBULA FRACTURE, LEFT, CLOSED, INITIAL ENCOUNTER: Primary | ICD-10-CM

## 2019-08-05 PROCEDURE — 73610 XR ANKLE COMPLETE 3 VIEW LEFT: ICD-10-PCS | Mod: 26,LT,, | Performed by: RADIOLOGY

## 2019-08-05 PROCEDURE — 73590 XR TIBIA FIBULA 2 VIEW LEFT: ICD-10-PCS | Mod: 26,LT,, | Performed by: RADIOLOGY

## 2019-08-05 PROCEDURE — 73590 X-RAY EXAM OF LOWER LEG: CPT | Mod: 26,LT,, | Performed by: RADIOLOGY

## 2019-08-05 PROCEDURE — 99999 PR PBB SHADOW E&M-EST. PATIENT-LVL III: CPT | Mod: PBBFAC,,,

## 2019-08-05 PROCEDURE — 73590 X-RAY EXAM OF LOWER LEG: CPT | Mod: TC,LT

## 2019-08-05 PROCEDURE — 99999 PR PBB SHADOW E&M-EST. PATIENT-LVL III: ICD-10-PCS | Mod: PBBFAC,,,

## 2019-08-05 PROCEDURE — 73610 X-RAY EXAM OF ANKLE: CPT | Mod: 26,LT,, | Performed by: RADIOLOGY

## 2019-08-05 PROCEDURE — 73610 X-RAY EXAM OF ANKLE: CPT | Mod: TC,LT

## 2019-08-05 NOTE — PROGRESS NOTES
Mr. owusu returns.  He is now 17 days status post external fixation for his closed tibial shaft fracture. He was treated with external fixation for fracture blood blisters.  No interim complaints.  He has been compliant with nonweightbearing status.    Community ambulator at baseline.        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               Past Surgical History:   Procedure Laterality Date    BIOPSY-LYMPH NODE Left 5/21/2014     Performed by [facility] at [facility] OR [facility]    CARDIAC CATHETERIZATION        CARDIAC SURGERY   2006    CATARACT EXTRACTION W/  INTRAOCULAR LENS IMPLANT Right 05/24/2017    CATARACT EXTRACTION W/  INTRAOCULAR LENS IMPLANT Left 04/12/2017    CORONARY ARTERY BYPASS GRAFT        JTTVORNLI-NJCP-O-CATH Right 5/28/2014     [facility] by [facility] at [facility] OR    LYMPH NODES, LEFT NECK, EXCISIONAL BIOPSY   5/21/2014    MEDIPORT INSERTION, SINGLE   5/28/14    PCIOL Left 04/12/2017     DR. MCCRAY    PROSTATE SURGERY   02/2014         Review of patient's allergies indicates:  No Known Allergies                 Vitals:    08/05/19 0824   BP: 108/67   Pulse: (!) 59     Exam:  Patient is in good spirits.  He is here with his caregiver.  He appears well dressed well nourished.  No distress. Left lower extremity external fixator pin sites are stable to stress.  Pin skin junction clean dry and intact. Bandages over blister sites are removed. Confluent dermis with resolved blisters.  No erythema.  Moderate edema to foot.    X-ray:  Injury films show moderately comminuted fracture of distal 3rd tibia shaft.  Fracture of distal fibular diaphysis.  No intra-articular extension.    Assessment plan:  Resolved blisters status post ex fix for closed  tibia shaft fracture.    Treatment options and alternatives, operative versus non-operative were discussed with the patient and family in detail.  Risks and benefits of all options were reviewed. Specific surgical risks including non-union, malunion, need for revision surgeries, loss of function, loss of sensation as well as blood clots, infections, heart attack, stroke and even death were emphasized.    Reasonable insights into options in care were achieved.  Verbal and written  consent was obtained to proceed with surgical management.      Plan for removal of external fixator conversion to intramedullary lars fixation possible open reduction internal fixation of distal fibular fracture.

## 2019-08-05 NOTE — TELEPHONE ENCOUNTER
Attempted to contact pt regarding sx that was scheduled for tomorrow. No answer and voicemail full.

## 2019-08-05 NOTE — TELEPHONE ENCOUNTER
Spoke with pts sister who gave [facility] number [facility] is currently at. [phone]     Spoke with [facility] letting them know that patients sx is on hold due to insurance. They verbalized understanding.

## 2019-08-05 NOTE — TELEPHONE ENCOUNTER
Left message for pt to call back. Pt needs to be made aware that surgery that was scheduled for tomorrow has been canceled for now due to insurance reasons.

## 2019-08-08 ENCOUNTER — TELEPHONE (OUTPATIENT)
Dept: ORTHOPEDICS | Facility: CLINIC | Age: 74
End: 2019-08-08

## 2019-08-08 RX ORDER — POLYETHYLENE GLYCOL 3350 17 G/17G
POWDER, FOR SOLUTION ORAL DAILY PRN
COMMUNITY

## 2019-08-08 NOTE — PRE ADMISSION SCREENING
Pre op instructions reviewed with patient's nurse Vita Reed,[facility] per phone: re NPO status, arrival time and medications  Spoke with patient's sister, Dionicio, re: additional information and medical history    To confirm, Your surgeon has instructed you:  Surgery is scheduled 08/09/19at 0945.      Please report to [facility] 1[facility] floor main lobby by 0800.   Pre admit office to call later today only if arrival time changes.  Pt to go to lab prior to pre op.          INSTRUCTIONS IMPORTANT!!!  ¨ Do not eat, drink, or smoke after 12 midnight-including water. OK to brush teeth, no gum, candy or mints!    ¨ Take only these medicines with a small swallow of water-morning of surgery.  Protonix, Metoprolol, Ranexa        Clean pt with anti-bacterial soap, as well as possible. Nurse states pt is unable to get LLE wet due to external fixator        ____  Do not wear makeup, including mascara.  ____  No powder, lotions or creams to surgical area.  ____  Please remove all jewelry, including piercings and leave at home.  ____  No money or valuables needed. Please leave at home.  ____  Please bring identification and insurance information to hospital.  ____  If going home the same day, arrange for a ride home. You will not be able to   drive if Anesthesia was used.  ____  Children, under 12 years old, must remain in the waiting room with an adult.  They are not allowed in patient areas.  ____  Wear loose fitting clothing. Allow for dressings, bandages.  ____  Stop Aspirin, Ibuprofen, Motrin and Aleve at least 5-7 days before surgery, unless otherwise instructed by your doctor, or the nurse.   You MAY use Tylenol/acetaminophen until day of surgery.  ____  If you take diabetic medication, do not take am of surgery unless instructed by   Doctor.  ____ Stop taking any Fish Oil supplement or any Vitamins that contain Vitamin E at least 5 days prior to surgery.          Bathing Instructions-- The  night before surgery and the morning prior to coming to the hospital:   -Do not shave the surgical area.   -Shower and wash your hair and body as usual with anti-bacterial  soap and shampoo.   -Rinse your hair and body completely.   -Use one packet of hibiclens to wash the surgical site (using your hand) gently for 5 minutes.  Do not scrub you skin too hard.   -Do not use hibiclens on your head, face, or genitals.   -Do not wash with anti-bacterial soap after you use the hibiclens.   -Rinse your body thoroughly.   -Dry with clean, soft towel.  Do not use lotion, cream, deodorant, or powders on   the surgical site.    Use antibacterial soap in place of hibiclens if your surgery is on the head, face or genitals.         Surgical Site Infection    Prevention of surgical site infections:     -Keep incisions clean and dry.   -Do not soak/submerge incisions in water until completely healed.   -Do not apply lotions, powders, creams, or deodorants to site.   -Always make sure hands are cleaned with antibacterial soap/ alcohol-based   prior to touching the surgical site.  (This includes doctors, nurses, staff, and yourself.)    Signs and symptoms:   -Redness and pain around the area where you had surgery   -Drainage of cloudy fluid from your surgical wound   -Fever over 100.4  I have read or had read and explained to me, and understand the above information.

## 2019-08-08 NOTE — TELEPHONE ENCOUNTER
Spoke with [facility] informing them of patient's sx that is scheduled for tomorrow. Also spoke with pt's sister informing her of surgery. All verbalized understanding.

## 2019-08-09 ENCOUNTER — ANESTHESIA EVENT (OUTPATIENT)
Dept: SURGERY | Facility: HOSPITAL | Age: 74
DRG: 493 | End: 2019-08-09
Payer: MEDICARE

## 2019-08-09 ENCOUNTER — HOSPITAL ENCOUNTER (INPATIENT)
Facility: HOSPITAL | Age: 74
LOS: 2 days | Discharge: NURSING, REHAB OR LONG-TERM CARE | DRG: 493 | End: 2019-08-11
Attending: ORTHOPAEDIC SURGERY | Admitting: ORTHOPAEDIC SURGERY
Payer: MEDICARE

## 2019-08-09 ENCOUNTER — ANESTHESIA (OUTPATIENT)
Dept: SURGERY | Facility: HOSPITAL | Age: 74
DRG: 493 | End: 2019-08-09
Payer: MEDICARE

## 2019-08-09 DIAGNOSIS — I50.32 CHRONIC DIASTOLIC CONGESTIVE HEART FAILURE: ICD-10-CM

## 2019-08-09 DIAGNOSIS — S82.202G: Primary | ICD-10-CM

## 2019-08-09 DIAGNOSIS — S82.402G: Primary | ICD-10-CM

## 2019-08-09 DIAGNOSIS — I25.10 CORONARY ARTERY DISEASE INVOLVING NATIVE CORONARY ARTERY OF NATIVE HEART WITHOUT ANGINA PECTORIS: ICD-10-CM

## 2019-08-09 DIAGNOSIS — N18.30 CKD (CHRONIC KIDNEY DISEASE) STAGE 3, GFR 30-59 ML/MIN: ICD-10-CM

## 2019-08-09 DIAGNOSIS — S82.202A TIBIA/FIBULA FRACTURE, LEFT, CLOSED, INITIAL ENCOUNTER: ICD-10-CM

## 2019-08-09 DIAGNOSIS — S82.402A TIBIA/FIBULA FRACTURE, LEFT, CLOSED, INITIAL ENCOUNTER: ICD-10-CM

## 2019-08-09 LAB
ABO + RH BLD: NORMAL
ANION GAP SERPL CALC-SCNC: 14 MMOL/L (ref 8–16)
BASOPHILS # BLD AUTO: 0.01 K/UL (ref 0–0.2)
BASOPHILS # BLD AUTO: 0.01 K/UL (ref 0–0.2)
BASOPHILS NFR BLD: 0.1 % (ref 0–1.9)
BASOPHILS NFR BLD: 0.3 % (ref 0–1.9)
BLD GP AB SCN CELLS X3 SERPL QL: NORMAL
BUN SERPL-MCNC: 35 MG/DL (ref 8–23)
CALCIUM SERPL-MCNC: 9.5 MG/DL (ref 8.7–10.5)
CHLORIDE SERPL-SCNC: 101 MMOL/L (ref 95–110)
CO2 SERPL-SCNC: 23 MMOL/L (ref 23–29)
CREAT SERPL-MCNC: 1.5 MG/DL (ref 0.5–1.4)
DIFFERENTIAL METHOD: ABNORMAL
DIFFERENTIAL METHOD: ABNORMAL
EOSINOPHIL # BLD AUTO: 0 K/UL (ref 0–0.5)
EOSINOPHIL # BLD AUTO: 0.1 K/UL (ref 0–0.5)
EOSINOPHIL NFR BLD: 0.4 % (ref 0–8)
EOSINOPHIL NFR BLD: 3 % (ref 0–8)
ERYTHROCYTE [DISTWIDTH] IN BLOOD BY AUTOMATED COUNT: 13.3 % (ref 11.5–14.5)
ERYTHROCYTE [DISTWIDTH] IN BLOOD BY AUTOMATED COUNT: 14.1 % (ref 11.5–14.5)
EST. GFR  (AFRICAN AMERICAN): 52 ML/MIN/1.73 M^2
EST. GFR  (NON AFRICAN AMERICAN): 45 ML/MIN/1.73 M^2
GLUCOSE SERPL-MCNC: 143 MG/DL (ref 70–110)
HCT VFR BLD AUTO: 27.8 % (ref 40–54)
HCT VFR BLD AUTO: 28.8 % (ref 40–54)
HGB BLD-MCNC: 10.2 G/DL (ref 14–18)
HGB BLD-MCNC: 9.7 G/DL (ref 14–18)
LYMPHOCYTES # BLD AUTO: 0.9 K/UL (ref 1–4.8)
LYMPHOCYTES # BLD AUTO: 1 K/UL (ref 1–4.8)
LYMPHOCYTES NFR BLD: 10.5 % (ref 18–48)
LYMPHOCYTES NFR BLD: 22.1 % (ref 18–48)
MCH RBC QN AUTO: 30 PG (ref 27–31)
MCH RBC QN AUTO: 30.1 PG (ref 27–31)
MCHC RBC AUTO-ENTMCNC: 34.9 G/DL (ref 32–36)
MCHC RBC AUTO-ENTMCNC: 35.4 G/DL (ref 32–36)
MCV RBC AUTO: 85 FL (ref 82–98)
MCV RBC AUTO: 86 FL (ref 82–98)
MONOCYTES # BLD AUTO: 0.2 K/UL (ref 0.3–1)
MONOCYTES # BLD AUTO: 0.3 K/UL (ref 0.3–1)
MONOCYTES NFR BLD: 1.9 % (ref 4–15)
MONOCYTES NFR BLD: 6.8 % (ref 4–15)
NEUTROPHILS # BLD AUTO: 2.7 K/UL (ref 1.8–7.7)
NEUTROPHILS # BLD AUTO: 8.6 K/UL (ref 1.8–7.7)
NEUTROPHILS NFR BLD: 68.1 % (ref 38–73)
NEUTROPHILS NFR BLD: 87.1 % (ref 38–73)
PLATELET # BLD AUTO: 220 K/UL (ref 150–350)
PLATELET # BLD AUTO: 293 K/UL (ref 150–350)
PMV BLD AUTO: 8.5 FL (ref 9.2–12.9)
PMV BLD AUTO: 9.3 FL (ref 9.2–12.9)
POTASSIUM SERPL-SCNC: 4.2 MMOL/L (ref 3.5–5.1)
POTASSIUM SERPL-SCNC: 4.5 MMOL/L (ref 3.5–5.1)
RBC # BLD AUTO: 3.22 M/UL (ref 4.6–6.2)
RBC # BLD AUTO: 3.4 M/UL (ref 4.6–6.2)
SODIUM SERPL-SCNC: 138 MMOL/L (ref 136–145)
WBC # BLD AUTO: 3.98 K/UL (ref 3.9–12.7)
WBC # BLD AUTO: 9.85 K/UL (ref 3.9–12.7)

## 2019-08-09 PROCEDURE — 25000003 PHARM REV CODE 250: Performed by: NURSE ANESTHETIST, CERTIFIED REGISTERED

## 2019-08-09 PROCEDURE — 20694 PR REMOVE EXTERN BONE FIX DEV W ANESTH: ICD-10-PCS | Mod: 58,51,, | Performed by: ORTHOPAEDIC SURGERY

## 2019-08-09 PROCEDURE — 86850 RBC ANTIBODY SCREEN: CPT

## 2019-08-09 PROCEDURE — 25000003 PHARM REV CODE 250: Performed by: ORTHOPAEDIC SURGERY

## 2019-08-09 PROCEDURE — 27792 TREATMENT OF ANKLE FRACTURE: CPT | Mod: 58,51,LT, | Performed by: ORTHOPAEDIC SURGERY

## 2019-08-09 PROCEDURE — 80048 BASIC METABOLIC PNL TOTAL CA: CPT

## 2019-08-09 PROCEDURE — 63600175 PHARM REV CODE 636 W HCPCS: Performed by: NURSE ANESTHETIST, CERTIFIED REGISTERED

## 2019-08-09 PROCEDURE — 27759 TREATMENT OF TIBIA FRACTURE: CPT | Mod: 58,LT,, | Performed by: ORTHOPAEDIC SURGERY

## 2019-08-09 PROCEDURE — 71000033 HC RECOVERY, INTIAL HOUR: Performed by: ORTHOPAEDIC SURGERY

## 2019-08-09 PROCEDURE — 27759 PR TREAT TIBIAL SHAFT FX, INTRAMED IMPLANT: ICD-10-PCS | Mod: 58,LT,, | Performed by: ORTHOPAEDIC SURGERY

## 2019-08-09 PROCEDURE — 85025 COMPLETE CBC W/AUTO DIFF WBC: CPT | Mod: 91

## 2019-08-09 PROCEDURE — 63600175 PHARM REV CODE 636 W HCPCS: Performed by: PHYSICIAN ASSISTANT

## 2019-08-09 PROCEDURE — C1769 GUIDE WIRE: HCPCS | Performed by: ORTHOPAEDIC SURGERY

## 2019-08-09 PROCEDURE — 36000711: Performed by: ORTHOPAEDIC SURGERY

## 2019-08-09 PROCEDURE — 36000710: Performed by: ORTHOPAEDIC SURGERY

## 2019-08-09 PROCEDURE — 27792 PR OPEN TX DISTAL FIBULAR FRACTURE LAT MALLEOLUS: ICD-10-PCS | Mod: 58,51,LT, | Performed by: ORTHOPAEDIC SURGERY

## 2019-08-09 PROCEDURE — 86920 COMPATIBILITY TEST SPIN: CPT

## 2019-08-09 PROCEDURE — 84132 ASSAY OF SERUM POTASSIUM: CPT

## 2019-08-09 PROCEDURE — 71000039 HC RECOVERY, EACH ADD'L HOUR: Performed by: ORTHOPAEDIC SURGERY

## 2019-08-09 PROCEDURE — 11000001 HC ACUTE MED/SURG PRIVATE ROOM

## 2019-08-09 PROCEDURE — 64445 NJX AA&/STRD SCIATIC NRV IMG: CPT | Performed by: ANESTHESIOLOGY

## 2019-08-09 PROCEDURE — 63600175 PHARM REV CODE 636 W HCPCS: Performed by: ORTHOPAEDIC SURGERY

## 2019-08-09 PROCEDURE — 37000009 HC ANESTHESIA EA ADD 15 MINS: Performed by: ORTHOPAEDIC SURGERY

## 2019-08-09 PROCEDURE — 25000003 PHARM REV CODE 250: Performed by: NURSE PRACTITIONER

## 2019-08-09 PROCEDURE — 27201423 OPTIME MED/SURG SUP & DEVICES STERILE SUPPLY: Performed by: ORTHOPAEDIC SURGERY

## 2019-08-09 PROCEDURE — 20694 RMVL EXT FIXJ SYS UNDER ANES: CPT | Mod: 58,51,, | Performed by: ORTHOPAEDIC SURGERY

## 2019-08-09 PROCEDURE — 37000008 HC ANESTHESIA 1ST 15 MINUTES: Performed by: ORTHOPAEDIC SURGERY

## 2019-08-09 PROCEDURE — 25000242 PHARM REV CODE 250 ALT 637 W/ HCPCS: Performed by: ANESTHESIOLOGY

## 2019-08-09 PROCEDURE — 63600175 PHARM REV CODE 636 W HCPCS: Performed by: ANESTHESIOLOGY

## 2019-08-09 PROCEDURE — C1713 ANCHOR/SCREW BN/BN,TIS/BN: HCPCS | Performed by: ORTHOPAEDIC SURGERY

## 2019-08-09 DEVICE — IMPLANTABLE DEVICE: Type: IMPLANTABLE DEVICE | Site: LEG | Status: FUNCTIONAL

## 2019-08-09 DEVICE — SCREW STRDRV REC T25 5X36 TTNM: Type: IMPLANTABLE DEVICE | Site: LEG | Status: FUNCTIONAL

## 2019-08-09 DEVICE — SCREW LOCKING 34MM: Type: IMPLANTABLE DEVICE | Site: LEG | Status: FUNCTIONAL

## 2019-08-09 RX ORDER — LIDOCAINE HCL/PF 100 MG/5ML
SYRINGE (ML) INTRAVENOUS
Status: DISCONTINUED | OUTPATIENT
Start: 2019-08-09 | End: 2019-08-09

## 2019-08-09 RX ORDER — SODIUM CHLORIDE 0.9 % (FLUSH) 0.9 %
3 SYRINGE (ML) INJECTION EVERY 8 HOURS
Status: DISCONTINUED | OUTPATIENT
Start: 2019-08-09 | End: 2019-08-09 | Stop reason: HOSPADM

## 2019-08-09 RX ORDER — DEXAMETHASONE SODIUM PHOSPHATE 4 MG/ML
INJECTION, SOLUTION INTRA-ARTICULAR; INTRALESIONAL; INTRAMUSCULAR; INTRAVENOUS; SOFT TISSUE
Status: DISCONTINUED | OUTPATIENT
Start: 2019-08-09 | End: 2019-08-09

## 2019-08-09 RX ORDER — BUPIVACAINE HYDROCHLORIDE AND EPINEPHRINE 2.5; 5 MG/ML; UG/ML
INJECTION, SOLUTION EPIDURAL; INFILTRATION; INTRACAUDAL; PERINEURAL
Status: DISCONTINUED | OUTPATIENT
Start: 2019-08-09 | End: 2019-08-09 | Stop reason: HOSPADM

## 2019-08-09 RX ORDER — RANOLAZINE 500 MG/1
1000 TABLET, EXTENDED RELEASE ORAL 2 TIMES DAILY
Status: DISCONTINUED | OUTPATIENT
Start: 2019-08-09 | End: 2019-08-11 | Stop reason: HOSPADM

## 2019-08-09 RX ORDER — SUCCINYLCHOLINE CHLORIDE 20 MG/ML
INJECTION INTRAMUSCULAR; INTRAVENOUS
Status: DISCONTINUED | OUTPATIENT
Start: 2019-08-09 | End: 2019-08-09

## 2019-08-09 RX ORDER — PROPOFOL 10 MG/ML
VIAL (ML) INTRAVENOUS
Status: DISCONTINUED | OUTPATIENT
Start: 2019-08-09 | End: 2019-08-09

## 2019-08-09 RX ORDER — SODIUM CHLORIDE 0.9 % (FLUSH) 0.9 %
10 SYRINGE (ML) INJECTION
Status: DISCONTINUED | OUTPATIENT
Start: 2019-08-09 | End: 2019-08-09 | Stop reason: SDUPTHER

## 2019-08-09 RX ORDER — CEFAZOLIN SODIUM 2 G/50ML
2 SOLUTION INTRAVENOUS
Status: COMPLETED | OUTPATIENT
Start: 2019-08-09 | End: 2019-08-09

## 2019-08-09 RX ORDER — OXYCODONE HYDROCHLORIDE 5 MG/1
5 TABLET ORAL EVERY 4 HOURS PRN
Status: DISCONTINUED | OUTPATIENT
Start: 2019-08-09 | End: 2019-08-11 | Stop reason: HOSPADM

## 2019-08-09 RX ORDER — ONDANSETRON 2 MG/ML
4 INJECTION INTRAMUSCULAR; INTRAVENOUS EVERY 12 HOURS PRN
Status: DISCONTINUED | OUTPATIENT
Start: 2019-08-09 | End: 2019-08-11 | Stop reason: HOSPADM

## 2019-08-09 RX ORDER — SODIUM CHLORIDE 0.9 % (FLUSH) 0.9 %
10 SYRINGE (ML) INJECTION
Status: DISCONTINUED | OUTPATIENT
Start: 2019-08-09 | End: 2019-08-11 | Stop reason: HOSPADM

## 2019-08-09 RX ORDER — MEPERIDINE HYDROCHLORIDE 50 MG/ML
12.5 INJECTION INTRAMUSCULAR; INTRAVENOUS; SUBCUTANEOUS ONCE AS NEEDED
Status: DISCONTINUED | OUTPATIENT
Start: 2019-08-09 | End: 2019-08-09 | Stop reason: HOSPADM

## 2019-08-09 RX ORDER — ROCURONIUM BROMIDE 10 MG/ML
INJECTION, SOLUTION INTRAVENOUS
Status: DISCONTINUED | OUTPATIENT
Start: 2019-08-09 | End: 2019-08-09

## 2019-08-09 RX ORDER — ACETAMINOPHEN 10 MG/ML
1000 INJECTION, SOLUTION INTRAVENOUS ONCE
Status: DISCONTINUED | OUTPATIENT
Start: 2019-08-09 | End: 2019-08-09 | Stop reason: HOSPADM

## 2019-08-09 RX ORDER — METOCLOPRAMIDE HYDROCHLORIDE 5 MG/ML
10 INJECTION INTRAMUSCULAR; INTRAVENOUS EVERY 10 MIN PRN
Status: DISCONTINUED | OUTPATIENT
Start: 2019-08-09 | End: 2019-08-09 | Stop reason: HOSPADM

## 2019-08-09 RX ORDER — ATORVASTATIN CALCIUM 40 MG/1
80 TABLET, FILM COATED ORAL NIGHTLY
Status: DISCONTINUED | OUTPATIENT
Start: 2019-08-09 | End: 2019-08-11 | Stop reason: HOSPADM

## 2019-08-09 RX ORDER — ACETAMINOPHEN 325 MG/1
650 TABLET ORAL EVERY 4 HOURS PRN
Status: DISCONTINUED | OUTPATIENT
Start: 2019-08-09 | End: 2019-08-11 | Stop reason: HOSPADM

## 2019-08-09 RX ORDER — IPRATROPIUM BROMIDE AND ALBUTEROL SULFATE 2.5; .5 MG/3ML; MG/3ML
3 SOLUTION RESPIRATORY (INHALATION) ONCE
Status: COMPLETED | OUTPATIENT
Start: 2019-08-09 | End: 2019-08-09

## 2019-08-09 RX ORDER — CHLORHEXIDINE GLUCONATE ORAL RINSE 1.2 MG/ML
10 SOLUTION DENTAL 2 TIMES DAILY
Status: DISCONTINUED | OUTPATIENT
Start: 2019-08-09 | End: 2019-08-11 | Stop reason: HOSPADM

## 2019-08-09 RX ORDER — HYDROMORPHONE HYDROCHLORIDE 2 MG/ML
0.2 INJECTION, SOLUTION INTRAMUSCULAR; INTRAVENOUS; SUBCUTANEOUS EVERY 5 MIN PRN
Status: DISCONTINUED | OUTPATIENT
Start: 2019-08-09 | End: 2019-08-09 | Stop reason: HOSPADM

## 2019-08-09 RX ORDER — DIPHENHYDRAMINE HYDROCHLORIDE 50 MG/ML
25 INJECTION INTRAMUSCULAR; INTRAVENOUS EVERY 6 HOURS PRN
Status: DISCONTINUED | OUTPATIENT
Start: 2019-08-09 | End: 2019-08-09 | Stop reason: HOSPADM

## 2019-08-09 RX ORDER — VANCOMYCIN HYDROCHLORIDE 1 G/20ML
INJECTION, POWDER, LYOPHILIZED, FOR SOLUTION INTRAVENOUS
Status: DISCONTINUED | OUTPATIENT
Start: 2019-08-09 | End: 2019-08-09 | Stop reason: HOSPADM

## 2019-08-09 RX ORDER — PANTOPRAZOLE SODIUM 40 MG/1
40 TABLET, DELAYED RELEASE ORAL DAILY
Status: DISCONTINUED | OUTPATIENT
Start: 2019-08-10 | End: 2019-08-11 | Stop reason: HOSPADM

## 2019-08-09 RX ORDER — PHENYLEPHRINE HYDROCHLORIDE 10 MG/ML
INJECTION INTRAVENOUS
Status: DISCONTINUED | OUTPATIENT
Start: 2019-08-09 | End: 2019-08-09

## 2019-08-09 RX ORDER — SODIUM CHLORIDE, SODIUM LACTATE, POTASSIUM CHLORIDE, CALCIUM CHLORIDE 600; 310; 30; 20 MG/100ML; MG/100ML; MG/100ML; MG/100ML
INJECTION, SOLUTION INTRAVENOUS CONTINUOUS PRN
Status: DISCONTINUED | OUTPATIENT
Start: 2019-08-09 | End: 2019-08-09

## 2019-08-09 RX ORDER — ASPIRIN 325 MG
325 TABLET ORAL DAILY
Status: DISCONTINUED | OUTPATIENT
Start: 2019-08-10 | End: 2019-08-11 | Stop reason: HOSPADM

## 2019-08-09 RX ORDER — CEFAZOLIN SODIUM 2 G/50ML
2 SOLUTION INTRAVENOUS
Status: COMPLETED | OUTPATIENT
Start: 2019-08-09 | End: 2019-08-10

## 2019-08-09 RX ORDER — FENTANYL CITRATE 50 UG/ML
INJECTION, SOLUTION INTRAMUSCULAR; INTRAVENOUS
Status: DISCONTINUED | OUTPATIENT
Start: 2019-08-09 | End: 2019-08-09

## 2019-08-09 RX ORDER — ROPIVACAINE HYDROCHLORIDE 5 MG/ML
INJECTION, SOLUTION EPIDURAL; INFILTRATION; PERINEURAL
Status: DISCONTINUED | OUTPATIENT
Start: 2019-08-09 | End: 2019-08-09

## 2019-08-09 RX ORDER — HYDROMORPHONE HYDROCHLORIDE 1 MG/ML
1 INJECTION, SOLUTION INTRAMUSCULAR; INTRAVENOUS; SUBCUTANEOUS EVERY 4 HOURS PRN
Status: DISCONTINUED | OUTPATIENT
Start: 2019-08-09 | End: 2019-08-11 | Stop reason: HOSPADM

## 2019-08-09 RX ORDER — METOPROLOL SUCCINATE 25 MG/1
25 TABLET, EXTENDED RELEASE ORAL DAILY
Status: DISCONTINUED | OUTPATIENT
Start: 2019-08-10 | End: 2019-08-11 | Stop reason: HOSPADM

## 2019-08-09 RX ORDER — ONDANSETRON 2 MG/ML
INJECTION INTRAMUSCULAR; INTRAVENOUS
Status: DISCONTINUED | OUTPATIENT
Start: 2019-08-09 | End: 2019-08-09

## 2019-08-09 RX ORDER — CHLORHEXIDINE GLUCONATE ORAL RINSE 1.2 MG/ML
10 SOLUTION DENTAL
Status: DISCONTINUED | OUTPATIENT
Start: 2019-08-09 | End: 2019-08-09 | Stop reason: HOSPADM

## 2019-08-09 RX ADMIN — SODIUM CHLORIDE, SODIUM LACTATE, POTASSIUM CHLORIDE, AND CALCIUM CHLORIDE: .6; .31; .03; .02 INJECTION, SOLUTION INTRAVENOUS at 01:08

## 2019-08-09 RX ADMIN — IPRATROPIUM BROMIDE AND ALBUTEROL SULFATE 3 ML: .5; 3 SOLUTION RESPIRATORY (INHALATION) at 04:08

## 2019-08-09 RX ADMIN — LIDOCAINE HYDROCHLORIDE 100 MG: 20 INJECTION, SOLUTION INTRAVENOUS at 10:08

## 2019-08-09 RX ADMIN — OXYCODONE HYDROCHLORIDE 5 MG: 5 TABLET ORAL at 08:08

## 2019-08-09 RX ADMIN — CHLORHEXIDINE GLUCONATE 0.12% ORAL RINSE 10 ML: 1.2 LIQUID ORAL at 08:08

## 2019-08-09 RX ADMIN — ONDANSETRON 4 MG: 2 INJECTION, SOLUTION INTRAMUSCULAR; INTRAVENOUS at 02:08

## 2019-08-09 RX ADMIN — CEFAZOLIN SODIUM 2 G: 2 SOLUTION INTRAVENOUS at 11:08

## 2019-08-09 RX ADMIN — CEFAZOLIN SODIUM 2 G: 2 SOLUTION INTRAVENOUS at 10:08

## 2019-08-09 RX ADMIN — ROPIVACAINE HYDROCHLORIDE 30 ML: 5 INJECTION, SOLUTION EPIDURAL; INFILTRATION; PERINEURAL at 03:08

## 2019-08-09 RX ADMIN — FENTANYL CITRATE 100 MCG: 50 INJECTION, SOLUTION INTRAMUSCULAR; INTRAVENOUS at 10:08

## 2019-08-09 RX ADMIN — DEXAMETHASONE SODIUM PHOSPHATE 8 MG: 4 INJECTION, SOLUTION INTRA-ARTICULAR; INTRALESIONAL; INTRAMUSCULAR; INTRAVENOUS; SOFT TISSUE at 02:08

## 2019-08-09 RX ADMIN — ROCURONIUM BROMIDE 5 MG: 10 INJECTION, SOLUTION INTRAVENOUS at 10:08

## 2019-08-09 RX ADMIN — PROPOFOL 100 MG: 10 INJECTION, EMULSION INTRAVENOUS at 10:08

## 2019-08-09 RX ADMIN — RANOLAZINE 1000 MG: 500 TABLET, FILM COATED, EXTENDED RELEASE ORAL at 08:08

## 2019-08-09 RX ADMIN — CEFAZOLIN SODIUM 2 G: 2 SOLUTION INTRAVENOUS at 02:08

## 2019-08-09 RX ADMIN — SODIUM CHLORIDE, SODIUM LACTATE, POTASSIUM CHLORIDE, AND CALCIUM CHLORIDE: .6; .31; .03; .02 INJECTION, SOLUTION INTRAVENOUS at 10:08

## 2019-08-09 RX ADMIN — ATORVASTATIN CALCIUM 80 MG: 40 TABLET, FILM COATED ORAL at 08:08

## 2019-08-09 RX ADMIN — PHENYLEPHRINE HYDROCHLORIDE 100 MCG: 10 INJECTION INTRAVENOUS at 01:08

## 2019-08-09 RX ADMIN — SUCCINYLCHOLINE CHLORIDE 140 MG: 20 INJECTION, SOLUTION INTRAMUSCULAR; INTRAVENOUS at 10:08

## 2019-08-09 NOTE — ANESTHESIA PREPROCEDURE EVALUATION
08/09/2019  Foreign Holland is a 74 y.o., male.    Pre-op Assessment    I have reviewed the Patient Summary Reports.     I have reviewed the Medications.     Review of Systems  Anesthesia Hx:  No problems with previous Anesthesia   Denies Personal Hx of Anesthesia complications.   Hematology/Oncology:  Hematology Normal   Oncology Normal     EENT/Dental:EENT/Dental Normal   Cardiovascular:   Hypertension Past MI CAD  CABG/stent  CHF ECG has been reviewed. Echo July 2019    1 - Normal left ventricular systolic function (EF 60-65%).     2 - Normal left ventricular diastolic function.     3 - Normal right ventricular systolic function .    Pulmonary:   Shortness of breath    Renal/:   Chronic Renal Disease, CRI    Hepatic/GI:  Hepatic/GI Normal    Musculoskeletal:  Musculoskeletal Normal    Neurological:  Neurology Normal    Endocrine:  Endocrine Normal    Dermatological:  Skin Normal    Psych:  Psychiatric Normal           Physical Exam  General:  Well nourished    Airway/Jaw/Neck:  Airway Findings: Mouth Opening: Normal Tongue: Normal  Mallampati: II      Dental:  Dental Findings: Edentulous   Chest/Lungs:  Chest/Lungs Clear    Heart/Vascular:  Heart Findings: Normal Heart murmur: negative       Mental Status:  Mental Status Findings:  Cooperative, Alert and Oriented         Anesthesia Plan  Type of Anesthesia, risks & benefits discussed:  Anesthesia Type:  general, MAC  Patient's Preference:   Intra-op Monitoring Plan: standard ASA monitors  Intra-op Monitoring Plan Comments:   Post Op Pain Control Plan: multimodal analgesia  Post Op Pain Control Plan Comments:   Induction:   IV  Beta Blocker:  Patient is on a Beta-Blocker and has received one dose within the past 24 hours (No further documentation required).       Informed Consent: Patient understands risks and agrees with Anesthesia plan.  Questions  answered. Anesthesia consent signed with patient.  ASA Score: 3     Day of Surgery Review of History & Physical: I have interviewed and examined the patient. I have reviewed the patient's H&P dated:    H&P update referred to the surgeon.

## 2019-08-09 NOTE — ANESTHESIA POSTPROCEDURE EVALUATION
Anesthesia Post Evaluation    Patient: Foreign Holland    Procedure(s) Performed: Procedure(s) (LRB):  REMOVAL, EXTERNAL FIXATION DEVICE (Left)  ORIF, FRACTURE, TIBIA (Left)  OPEN REDUCTION INTERNAL FIXATION (ORIF) Fibula (Left)    Final Anesthesia Type: general  Patient location during evaluation: PACU  Patient participation: Yes- Able to Participate  Level of consciousness: awake and alert, oriented and awake  Post-procedure vital signs: reviewed and stable  Pain management: adequate  Airway patency: patent  PONV status at discharge: No PONV  Anesthetic complications: no      Cardiovascular status: blood pressure returned to baseline  Respiratory status: unassisted and spontaneous ventilation  Hydration status: euvolemic  Follow-up not needed.          Vitals Value Taken Time   /69 8/9/2019  3:55 PM   Temp 36.6 °C (97.9 °F) 8/9/2019  3:18 PM   Pulse 77 8/9/2019  3:59 PM   Resp 31 8/9/2019  3:59 PM   SpO2 98 % 8/9/2019  3:59 PM   Vitals shown include unvalidated device data.      No case tracking events are documented in the log.      Pain/Alvarez Score: Alvarez Score: 8 (8/9/2019  3:45 PM)

## 2019-08-09 NOTE — H&P
Subjective:     Patient is a 74 y.o. male presented with a history of left closed tibia/fibula fracture .  Patient is approximately 4 weeks status post application of external fixator.  In the interim the soft tissues have healed and is now indicated for definitive fixation to include removal of external fixator with open reduction internal fixation of the tibia and fibula fracture. Patient denies fever, chest pain, shortness of breath    Patient Active Problem List    Diagnosis Date Noted    Closed fracture shaft fibula and tibia, left, with delayed healing, subsequent encounter 08/09/2019    Pre-op evaluation 07/18/2019    Chronic diastolic congestive heart failure 07/18/2019    Tibia/fibula fracture, left, closed, initial encounter 07/18/2019    Renal insufficiency 07/18/2019    CKD (chronic kidney disease) stage 3, GFR 30-59 ml/min 07/24/2018    Nuclear sclerosis of left eye 03/30/2017    Cortical senile cataract of left eye 03/30/2017    Cortical senile cataract of both eyes 03/30/2017    Old MI (myocardial infarction) 08/13/2015    Chest pain on exertion 06/25/2015    SOB (shortness of breath) 06/25/2015    Hypokalemia 07/13/2014    Neutropenic fever 07/13/2014    Pancytopenia due to chemotherapy 07/13/2014    Hypokalemia 07/12/2014    Emesis, persistent 07/07/2014    Chemotherapy induced neutropenia 06/11/2014    Lymphoma 06/09/2014    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 06/05/2014    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    Lymphadenopathy 05/15/2014    CAD (coronary artery disease) 04/17/2014    S/P coronary artery bypass graft x 2 04/17/2014    HTN (hypertension) 04/17/2014    Dyslipidemia 04/17/2014    Anemia 04/17/2014     Past Medical History:   Diagnosis Date    Cancer     lung left    Cataract     CHF (congestive heart failure)     Coronary artery disease     Heart failure     Hypertension     Lymphadenopathy 5/15/2014     Myocardial infarction     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      Past Surgical History:   Procedure Laterality Date    APPLICATION, EXTERNAL FIXATION DEVICE Left 7/18/2019    Performed by Philippe [facility]amante Jr., [facility] at [facility] OR    BIOPSY-LYMPH NODE Left 5/21/2014    Performed by [facility]un Levine [facility] at [facility] OR 2ND FLR    CARDIAC CATHETERIZATION      CARDIAC SURGERY  2006    CATARACT EXTRACTION W/  INTRAOCULAR LENS IMPLANT Right 05/24/2017    CATARACT EXTRACTION W/  INTRAOCULAR LENS IMPLANT Left 04/12/2017    CLOSED REDUCTION, FRACTURE, FIBULA Left 7/18/2019    Performed by Philippe B[facility]amante Jr., MD at [facility] OR    CORONARY ARTERY BYPASS GRAFT      EYE SURGERY      Cataract extraction with intraocular lens implant    [id number] Right 5/28/2014    Performed by Ben Temple MD at [facility] OR    LYMPH NODES, LEFT NECK, EXCISIONAL BIOPSY  5/21/2014    MEDIPORT INSERTION, SINGLE  5/28/14    PCIOL Left 04/12/2017    DR. MCCRAY    PROSTATE SURGERY  02/2014      Medications Prior to Admission   Medication Sig Dispense Refill Last Dose    atorvastatin (LIPITOR) 80 MG tablet 80 mg every evening.    8/8/2019 at Unknown time    cholecalciferol, vitamin D3, (VITAMIN D3) 400 unit Tab Take 1 tablet by mouth once daily.   8/8/2019 at Unknown time    FOLIC ACID ORAL Take 2,400 mcg by mouth once daily.    8/8/2019 at Unknown time    hydroCHLOROthiazide (HYDRODIURIL) 25 MG tablet Take by mouth once daily.   8/9/2019 at Unknown time    HYDROcodone-acetaminophen (NORCO)  mg per tablet Take 1 tablet by mouth every 6 (six) hours as needed. 20 tablet 0 8/3/2019    metoprolol succinate (TOPROL-XL) 25 MG 24 hr tablet Take 25 mg by mouth once daily.  2 8/9/2019 at Unknown time    pantoprazole (PROTONIX) 40 MG tablet Take 1 tablet (40 mg total) by mouth once daily. 30 tablet 0 8/9/2019 at Unknown time    RANEXA 1,000 mg Tb12 TAKE 1 TABLET BY MOUTH TWICE A DAY 60 tablet 5 8/9/2019 at  "Unknown time    aspirin (ECOTRIN) 81 MG EC tablet Take 81 mg by mouth every morning.    Unknown at Unknown time    fish oil-omega-3 fatty acids 300-1,000 mg capsule Take 1 g by mouth 2 (two) times daily.    Unknown at Unknown time    FLUZONE HIGH-DOSE , PF, 180 mcg/0.5 mL vaccine    Unknown at Unknown time    NITROSTAT 0.4 mg SL tablet Patient states that he takes this medication as needed  3 Unknown at Unknown time    polyethylene glycol (GLYCOLAX) 17 gram PwPk Take by mouth daily as needed.   Unknown at Unknown time    triamcinolone acetonide 0.1% (KENALOG) 0.1 % cream every morning.    Unknown at Unknown time     Review of patient's allergies indicates:  No Known Allergies   Social History     Tobacco Use    Smoking status: Former Smoker     Packs/day: 1.00     Years: 45.00     Pack years: 45.00     Last attempt to quit: 2005     Years since quittin.6    Smokeless tobacco: Never Used    Tobacco comment: Pt no longer smokes   Substance Use Topics    Alcohol use: No     Comment: former      Family History   Problem Relation Age of Onset    Blindness Father     Cancer Neg Hx     Diabetes Neg Hx     Heart failure Neg Hx     Coronary artery disease Neg Hx     Cataracts Neg Hx     Glaucoma Neg Hx     Hypertension Neg Hx     Macular degeneration Neg Hx     Retinal detachment Neg Hx     Strabismus Neg Hx     Stroke Neg Hx     Thyroid disease Neg Hx       Review of Systems  Pertinent items are noted in HPI.    Objective:     Patient Vitals for the past 8 hrs:   BP Temp Temp src Pulse Resp SpO2 Height Weight   19 0954 130/63 98.8 °F (37.1 °C) Skin (!) 58 16 99 % 5' 7" (1.702 m) 69.6 kg (153 lb 6 oz)     /63 (BP Location: Left arm, Patient Position: Lying)   Pulse (!) 58   Temp 98.8 °F (37.1 °C) (Skin)   Resp 16   Ht 5' 7" (1.702 m)   Wt 69.6 kg (153 lb 6 oz)   SpO2 99%   BMI 24.02 kg/m²     General Appearance:    Alert, cooperative, no distress, appears stated age "   Head:    Normocephalic, without obvious abnormality, atraumatic   Eyes:    PERRL, conjunctiva/corneas clear, EOM's intact, fundi     benign, both eyes        Ears:    Normal TM's and external ear canals, both ears   Nose:   Nares normal, septum midline, mucosa normal, no drainage    or sinus tenderness   Throat:   Lips, mucosa, and tongue normal; teeth and gums normal   Neck:   Supple, symmetrical, trachea midline, no adenopathy;        thyroid:  No enlargement/tenderness/nodules; no carotid    bruit or JVD   Back:     Symmetric, no curvature, ROM normal, no CVA tenderness   Lungs:     Clear to auscultation bilaterally, respirations unlabored   Chest wall:    No tenderness or deformity   Heart:    Regular rate and rhythm, S1 and S2 normal, no murmur, rub   or gallop   Abdomen:     Soft, non-tender, bowel sounds active all four quadrants,     no masses, no organomegaly   Genitalia:    Normal male without lesion, discharge or tenderness   Rectal:    Normal tone, normal prostate, no masses or tenderness;    guaiac negative stool   Extremities:   Extremities normal, atraumatic, no cyanosis or edema   Pulses:   2+ and symmetric all extremities   Skin:   Skin color, texture, turgor normal, no rashes or lesions   Lymph nodes:   Cervical, supraclavicular, and axillary nodes normal   Neurologic:   CNII-XII intact. Normal strength, sensation and reflexes       Throughout  Examination of the left lower extremity:  Soft tissues with no significant wounds.  Mild swelling.  Pin sites are clean dry and intact. No overt signs of infection.  Active flexion extension of all 5 digits. Light touch sensation intact. Brisk cap refill all 5 digits       Imaging Review  Comminuted distal fibula and distal 3rd tibia fracture alignment maintained with a with external fixator    Assessment:  Closed displaced distal 3rd tibia and fibula fracture, left lower extremity     Active Hospital Problems    Diagnosis  POA    Closed fracture shaft  fibula and tibia, left, with delayed healing, subsequent encounter [S82.202G, S82.402G]  Not Applicable      Resolved Hospital Problems   No resolved problems to display.       Plan:  Patient will undergo removal external fixator and open reduction internal fixation of distal fibula and distal 3rd tibia fracture, left lower extremity.  Risks, benefits, alternative treatments and probable post operative course were discussed in detail with patient's family.  They indicated they understood discussion and wanted to proceed.     The various methods of treatment have been discussed with the patient and family.   After consideration of risks, benefits and other options for treatment, the patient has consented to surgical interventions (open reduction internal fixation with removal of external fixator left tibia and fibular fracture).  Questions were answered and Pre-op teaching was done by Flynn White MD.

## 2019-08-09 NOTE — ANESTHESIA PROCEDURE NOTES
Peripheral Block    Patient location during procedure: post-op   Block not for primary anesthetic.  Reason for block: at surgeon's request and post-op pain management   Post-op Pain Location: Left foot  Start time: 8/9/2019 3:19 PM  Timeout: 8/9/2019 3:19 PM   End time: 8/9/2019 3:25 PM    Staffing  Authorizing Provider: Camron Fajardo MD  Performing Provider: Camron Fajardo MD    Preanesthetic Checklist  Completed: patient identified, surgical consent, pre-op evaluation, timeout performed, IV checked, risks and benefits discussed and monitors and equipment checked  Peripheral Block  Patient position: supine  Prep: ChloraPrep  Patient monitoring: heart rate, cardiac monitor, continuous pulse ox, continuous capnometry and frequent blood pressure checks  Block type: popliteal  Laterality: left  Injection technique: single shot  Needle  Needle type: Echogenic   Needle gauge: 21 G  Needle length: 4 in  Needle localization: anatomical landmarks and ultrasound guidance     Assessment  Injection assessment: negative aspiration, negative parasthesia and local visualized surrounding nerve  Heart rate change: no  Slow fractionated injection: yes  Additional Notes  VSS.  [facility] RN monitoring vitals throughout procedure.  Patient tolerated procedure well.

## 2019-08-09 NOTE — BRIEF OP NOTE
[facility] -   General Surgery  Operative Note    SUMMARY     Date of Procedure: 8/9/2019     Procedure: Procedure(s) (LRB):  REMOVAL, EXTERNAL FIXATION DEVICE (Left)  ORIF, FRACTURE, TIBIA (Left)  OPEN REDUCTION INTERNAL FIXATION (ORIF) Fibula (Left)       Surgeon(s) and Role:     * Flynn White MD - Primary    Assisting Surgeon: None    Pre-Operative Diagnosis: Closed displaced comminuted fracture of shaft of left tibia with routine healing, subsequent encounter [S82.252D]  Other closed fracture of distal end of left fibula with routine healing, subsequent encounter [S82.832D]    Post-Operative Diagnosis: Post-Op Diagnosis Codes:     * Closed displaced comminuted fracture of shaft of left tibia with routine healing, subsequent encounter [S82.252D]     * Other closed fracture of distal end of left fibula with routine healing, subsequent encounter [S82.832D]    Anesthesia: General    Technical Procedures Used: ORIF distal 1/3 tibia and fibula fracture    Description of the Findings of the Procedure: As above    Significant Surgical Tasks Conducted by the Assistant(s), if Applicable: David PEDERSON    Complications: Yes - Partial Laceration branch of anterior tibialis    Estimated Blood Loss (EBL): 150 mL           Implants:   Implant Name Type Inv. Item Serial No.  Lot No. LRB No. Used   11mm TI PERRI TIBIAL NAIL-EX   N/A [employer] [id number] Left 1   SCREW 2.7X18MM - [id number]  SCREW 2.7X18MM  [employer]ES N/A Left 2   SCREW CORTEX 3.5MM X 28MM - [id number]  SCREW CORTEX 3.5MM X 28MM  [employer]  Left 2   SCREW 2.7X20MM - [id number]  SCREW 2.7X20MM  [employer]  Left 1   SCREW CORTEX 3.5 32M - [id number]  SCREW CORTEX 3.5 32M  [employer]  Left 1   PLATE BONE 7H 2.7/3.6O205CB L - [id number]  PLATE BONE 7H 2.7/3.5B059TQ L  [employer]  Left 1   SCREW LOCKING 34MM - [id number]  SCREW LOCKING 34MM  [employer]  Left 2   SCREW STRDRV REC T25 5X36 TTNM - [id number]  SCREW STRDRV REC T25 5X36 TTNM  [employer]  Left 3    SCREW PERRI 4.5MM D 72M - [id number]  SCREW PERRI 4.5MM D 72M  [employer]  Left 1   WIRE GUIDE 3.2MM 400MM - [id number]  WIRE GUIDE 3.2MM 400MM  [employer]ES  Left 2       Specimens:   Specimen (12h ago, onward)    None                  Condition: Good    Disposition: PACU - hemodynamically stable.    Attestation: I was present and scrubbed for the entire procedure.

## 2019-08-09 NOTE — PLAN OF CARE
Escorted to preop via wheelchair. Pt from [facility] skilled facility. Dentures and glasses at [facility]. meds per mar in chart.

## 2019-08-09 NOTE — PLAN OF CARE
Problem: Adult Inpatient Plan of Care  Goal: Plan of Care Review  Outcome: Ongoing (interventions implemented as appropriate)  Patient transferred from PACU, admit complete. Patient resting with tv on. VSS with NADN. 12 hr chart check complete. Will continue to monitor.

## 2019-08-10 LAB
ANION GAP SERPL CALC-SCNC: 12 MMOL/L (ref 8–16)
BASOPHILS # BLD AUTO: 0.01 K/UL (ref 0–0.2)
BASOPHILS # BLD AUTO: 0.01 K/UL (ref 0–0.2)
BASOPHILS NFR BLD: 0.2 % (ref 0–1.9)
BASOPHILS NFR BLD: 0.2 % (ref 0–1.9)
BLD PROD TYP BPU: NORMAL
BLOOD UNIT EXPIRATION DATE: NORMAL
BLOOD UNIT TYPE CODE: 5100
BLOOD UNIT TYPE: NORMAL
BNP SERPL-MCNC: 83 PG/ML (ref 0–99)
BUN SERPL-MCNC: 37 MG/DL (ref 8–23)
CALCIUM SERPL-MCNC: 9.2 MG/DL (ref 8.7–10.5)
CHLORIDE SERPL-SCNC: 102 MMOL/L (ref 95–110)
CO2 SERPL-SCNC: 24 MMOL/L (ref 23–29)
CODING SYSTEM: NORMAL
CREAT SERPL-MCNC: 1.5 MG/DL (ref 0.5–1.4)
DIFFERENTIAL METHOD: ABNORMAL
DIFFERENTIAL METHOD: ABNORMAL
DISPENSE STATUS: NORMAL
EOSINOPHIL # BLD AUTO: 0 K/UL (ref 0–0.5)
EOSINOPHIL # BLD AUTO: 0.1 K/UL (ref 0–0.5)
EOSINOPHIL NFR BLD: 0 % (ref 0–8)
EOSINOPHIL NFR BLD: 1.5 % (ref 0–8)
ERYTHROCYTE [DISTWIDTH] IN BLOOD BY AUTOMATED COUNT: 13.9 % (ref 11.5–14.5)
ERYTHROCYTE [DISTWIDTH] IN BLOOD BY AUTOMATED COUNT: 14 % (ref 11.5–14.5)
EST. GFR  (AFRICAN AMERICAN): 52 ML/MIN/1.73 M^2
EST. GFR  (NON AFRICAN AMERICAN): 45 ML/MIN/1.73 M^2
GLUCOSE SERPL-MCNC: 114 MG/DL (ref 70–110)
HCT VFR BLD AUTO: 23.7 % (ref 40–54)
HCT VFR BLD AUTO: 24.6 % (ref 40–54)
HGB BLD-MCNC: 8.4 G/DL (ref 14–18)
HGB BLD-MCNC: 8.7 G/DL (ref 14–18)
LYMPHOCYTES # BLD AUTO: 0.4 K/UL (ref 1–4.8)
LYMPHOCYTES # BLD AUTO: 0.6 K/UL (ref 1–4.8)
LYMPHOCYTES NFR BLD: 8.2 % (ref 18–48)
LYMPHOCYTES NFR BLD: 8.9 % (ref 18–48)
MCH RBC QN AUTO: 29.8 PG (ref 27–31)
MCH RBC QN AUTO: 29.9 PG (ref 27–31)
MCHC RBC AUTO-ENTMCNC: 35.4 G/DL (ref 32–36)
MCHC RBC AUTO-ENTMCNC: 35.4 G/DL (ref 32–36)
MCV RBC AUTO: 84 FL (ref 82–98)
MCV RBC AUTO: 85 FL (ref 82–98)
MONOCYTES # BLD AUTO: 0.4 K/UL (ref 0.3–1)
MONOCYTES # BLD AUTO: 0.5 K/UL (ref 0.3–1)
MONOCYTES NFR BLD: 7.4 % (ref 4–15)
MONOCYTES NFR BLD: 8 % (ref 4–15)
NEUTROPHILS # BLD AUTO: 4.4 K/UL (ref 1.8–7.7)
NEUTROPHILS # BLD AUTO: 5.5 K/UL (ref 1.8–7.7)
NEUTROPHILS NFR BLD: 82.9 % (ref 38–73)
NEUTROPHILS NFR BLD: 83.2 % (ref 38–73)
NUM UNITS TRANS PACKED RBC: NORMAL
PLATELET # BLD AUTO: 184 K/UL (ref 150–350)
PLATELET # BLD AUTO: 246 K/UL (ref 150–350)
PMV BLD AUTO: 8.6 FL (ref 9.2–12.9)
PMV BLD AUTO: 9 FL (ref 9.2–12.9)
POTASSIUM SERPL-SCNC: 4.7 MMOL/L (ref 3.5–5.1)
RBC # BLD AUTO: 2.82 M/UL (ref 4.6–6.2)
RBC # BLD AUTO: 2.91 M/UL (ref 4.6–6.2)
SODIUM SERPL-SCNC: 138 MMOL/L (ref 136–145)
WBC # BLD AUTO: 5.37 K/UL (ref 3.9–12.7)
WBC # BLD AUTO: 6.61 K/UL (ref 3.9–12.7)

## 2019-08-10 PROCEDURE — 25000003 PHARM REV CODE 250: Performed by: NURSE PRACTITIONER

## 2019-08-10 PROCEDURE — 80048 BASIC METABOLIC PNL TOTAL CA: CPT

## 2019-08-10 PROCEDURE — 36415 COLL VENOUS BLD VENIPUNCTURE: CPT

## 2019-08-10 PROCEDURE — 97162 PT EVAL MOD COMPLEX 30 MIN: CPT

## 2019-08-10 PROCEDURE — P9040 RBC LEUKOREDUCED IRRADIATED: HCPCS

## 2019-08-10 PROCEDURE — G8978 MOBILITY CURRENT STATUS: HCPCS | Mod: CL

## 2019-08-10 PROCEDURE — 94760 N-INVAS EAR/PLS OXIMETRY 1: CPT

## 2019-08-10 PROCEDURE — G8979 MOBILITY GOAL STATUS: HCPCS | Mod: CJ

## 2019-08-10 PROCEDURE — 11000001 HC ACUTE MED/SURG PRIVATE ROOM

## 2019-08-10 PROCEDURE — 97116 GAIT TRAINING THERAPY: CPT

## 2019-08-10 PROCEDURE — 85025 COMPLETE CBC W/AUTO DIFF WBC: CPT | Mod: 91

## 2019-08-10 PROCEDURE — 36430 TRANSFUSION BLD/BLD COMPNT: CPT

## 2019-08-10 PROCEDURE — 97530 THERAPEUTIC ACTIVITIES: CPT

## 2019-08-10 PROCEDURE — 97165 OT EVAL LOW COMPLEX 30 MIN: CPT

## 2019-08-10 PROCEDURE — 63600175 PHARM REV CODE 636 W HCPCS: Performed by: ORTHOPAEDIC SURGERY

## 2019-08-10 PROCEDURE — 25000003 PHARM REV CODE 250: Performed by: ORTHOPAEDIC SURGERY

## 2019-08-10 PROCEDURE — 83880 ASSAY OF NATRIURETIC PEPTIDE: CPT

## 2019-08-10 RX ORDER — HYDROCODONE BITARTRATE AND ACETAMINOPHEN 500; 5 MG/1; MG/1
TABLET ORAL
Status: DISCONTINUED | OUTPATIENT
Start: 2019-08-10 | End: 2019-08-11 | Stop reason: HOSPADM

## 2019-08-10 RX ADMIN — ASPIRIN 325 MG ORAL TABLET 325 MG: 325 PILL ORAL at 08:08

## 2019-08-10 RX ADMIN — METOPROLOL SUCCINATE 25 MG: 25 TABLET, EXTENDED RELEASE ORAL at 08:08

## 2019-08-10 RX ADMIN — RANOLAZINE 1000 MG: 500 TABLET, FILM COATED, EXTENDED RELEASE ORAL at 08:08

## 2019-08-10 RX ADMIN — CHLORHEXIDINE GLUCONATE 0.12% ORAL RINSE 10 ML: 1.2 LIQUID ORAL at 08:08

## 2019-08-10 RX ADMIN — PANTOPRAZOLE SODIUM 40 MG: 40 TABLET, DELAYED RELEASE ORAL at 08:08

## 2019-08-10 RX ADMIN — ATORVASTATIN CALCIUM 80 MG: 40 TABLET, FILM COATED ORAL at 08:08

## 2019-08-10 RX ADMIN — CEFAZOLIN SODIUM 2 G: 2 SOLUTION INTRAVENOUS at 05:08

## 2019-08-10 NOTE — CONSULTS
CDU Discharge Summary        Patient:  Pop Sutherland  YOB: 1944    MRN: [id number]   Acct: [de-identified]    Primary Care Physician: Oracio Cohen MD     Admit date:  11/15/2017 11/15/2017  8:21 PM  Discharge date:  11/18/2017 11/18/2017  4:00 PM     Discharge Diagnoses:     Primary Complaint: acute on chronic respiratory distress secondary to COPD exacerbation, improved with steroids and breathing treatments. Patient was evaluated by pulmonary, placed on by mouth antibiotics, and will follow with them as an outpatient. Additional Complaint: Acute elevation in blood sugar likely secondary to uncontrolled diabetes mellitus and treatment with steroids that has improved with insulin and diet. Pt will follow-up with his PCP on discharge. Discharge Medications:       Haseeb Donaldson   Home Medication Instructions PEX:[id number]    Printed on:11/29/17 1131   Medication Information                      acetaminophen (TYLENOL) 325 MG tablet  Take 2 tablets by mouth every 4 hours as needed for Pain             albuterol (PROVENTIL) (2.5 MG/3ML) 0.083% nebulizer solution  Take 1.5 mLs by nebulization 3 times daily as needed for Wheezing or Shortness of Breath             albuterol sulfate HFA (PROAIR HFA) 108 (90 Base) MCG/ACT inhaler  Inhale 2 puffs into the lungs every 6 hours as needed for Wheezing             allopurinol (ZYLOPRIM) 100 MG tablet  Take 100 mg by mouth daily.              buPROPion (WELLBUTRIN SR) 150 MG extended release tablet  Take 1 tablet by mouth nightly             docusate sodium (COLACE) 100 MG capsule  Take 100 mg by mouth daily             famotidine (PEPCID) 20 MG tablet  Take 1 tablet by mouth 2 times daily             fluticasone-salmeterol (ADVAIR DISKUS) 500-50 MCG/DOSE diskus inhaler  Inhale 1 puff into the lungs 2 times daily As directed by your pulmonologist             glimepiride (AMARYL) 4 MG tablet  Take 4 mg by mouth every morning (before breakfast) [facility]  Consult Note    Patient Name: Foreign Holland  MRN: [id number]  Admission Date: 8/9/2019  Hospital Length of Stay: 0 days  Attending Physician: Flynn White MD   Primary Care Provider: Oziel Mendieta MD           Patient information was obtained from patient and ER records.     Consults  Subjective:     Principal Problem: Closed fracture shaft fibula and tibia, left, with delayed healing, subsequent encounter    Chief Complaint: No chief complaint on file.       HPI: Foreign Holland is a 74 year old male with history of hypertension, CHF, CAD, and lung cancer who underwent ORIF of left tibia performed by Dr. White. Surgery without acute complication. Hospital medicine has been consulted for medical management. He is asleep, but easily aroused. States that his pain is well controlled.       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       Past Surgical History:   Procedure Laterality Date    APPLICATION, EXTERNAL FIXATION DEVICE Left 7/18/2019    Performed by [facility] Jr[facility] at [facility] OR    BIOPSY-LYMPH NODE Left 5/21/2014    Performed by [facility] at [facility] OR [address] [place] [address]    CARDIAC CATHETERIZATION      CARDIAC SURGERY  2006    CATARACT EXTRACTION W/  INTRAOCULAR LENS IMPLANT Right 05/24/2017    CATARACT EXTRACTION W/  INTRAOCULAR LENS IMPLANT Left 04/12/2017    CLOSED REDUCTION, FRACTURE, FIBULA Left 7/18/2019    Performed by Philippe [facility] Jr[facility] at [facility] OR    CORONARY ARTERY BYPASS GRAFT      EYE SURGERY      Cataract extraction with intraocular lens implant    [id number] Right 5/28/2014    Performed by Ben Temple MD at [facility] OR    LYMPH NODES, LEFT NECK, EXCISIONAL  BIOPSY  2014    MEDIPORT INSERTION, SINGLE  14    PCIOL Left 2017    DR. MCCRAY    PROSTATE SURGERY  2014       Review of patient's allergies indicates:  No Known Allergies    No current facility-administered medications on file prior to encounter.      Current Outpatient Medications on File Prior to Encounter   Medication Sig    atorvastatin (LIPITOR) 80 MG tablet 80 mg every evening.     cholecalciferol, vitamin D3, (VITAMIN D3) 400 unit Tab Take 1 tablet by mouth once daily.    FOLIC ACID ORAL Take 2,400 mcg by mouth once daily.     hydroCHLOROthiazide (HYDRODIURIL) 25 MG tablet Take by mouth once daily.    HYDROcodone-acetaminophen (NORCO)  mg per tablet Take 1 tablet by mouth every 6 (six) hours as needed.    metoprolol succinate (TOPROL-XL) 25 MG 24 hr tablet Take 25 mg by mouth once daily.    pantoprazole (PROTONIX) 40 MG tablet Take 1 tablet (40 mg total) by mouth once daily.    RANEXA 1,000 mg Tb12 TAKE 1 TABLET BY MOUTH TWICE A DAY    aspirin (ECOTRIN) 81 MG EC tablet Take 81 mg by mouth every morning.     fish oil-omega-3 fatty acids 300-1,000 mg capsule Take 1 g by mouth 2 (two) times daily.     FLUZONE HIGH-DOSE -19, PF, 180 mcg/0.5 mL vaccine     NITROSTAT 0.4 mg SL tablet Patient states that he takes this medication as needed    polyethylene glycol (GLYCOLAX) 17 gram PwPk Take by mouth daily as needed.    triamcinolone acetonide 0.1% (KENALOG) 0.1 % cream every morning.      Family History     Problem Relation (Age of Onset)    Blindness Father        Tobacco Use    Smoking status: Former Smoker     Packs/day: 1.00     Years: 45.00     Pack years: 45.00     Last attempt to quit: 2005     Years since quittin.6    Smokeless tobacco: Never Used    Tobacco comment: Pt no longer smokes   Substance and Sexual Activity    Alcohol use: No     Comment: former    Drug use: No    Sexual activity: Yes     Birth control/protection: None     Review of  levofloxacin (LEVAQUIN) 250 MG tablet  Take 1 tablet by mouth daily for 10 days             metoprolol tartrate (LOPRESSOR) 50 MG tablet  Take 1 tablet by mouth 2 times daily             mineral oil-hydrophilic petrolatum (AQUAPHOR) ointment  Apply topically as needed. montelukast (SINGULAIR) 10 MG tablet  Take 10 mg by mouth nightly. nitroGLYCERIN (NITROSTAT) 0.4 MG SL tablet  Place 0.4 mg under the tongue every 5 minutes as needed. omeprazole (PRILOSEC) 20 MG capsule  Take 20 mg by mouth daily. predniSONE (DELTASONE) 10 MG tablet  Tapered dose 40mg x 3 days. .. 30mg x 3 days. .. 20mg x 3 days . Lova Mean .10mg x3days . ..then off             ranolazine (RANEXA) 1000 MG extended release tablet  Take 1 tablet by mouth 2 times daily             rivaroxaban (XARELTO) 20 MG TABS tablet  Take 20 mg by mouth daily (with breakfast)             Roflumilast (DALIRESP) 500 MCG tablet  Take 500 mcg by mouth daily             simvastatin (ZOCOR) 40 MG tablet  Take 1 tablet by mouth nightly             SITagliptin (JANUVIA) 25 MG tablet  Take 1 tablet by mouth daily             tiotropium (SPIRIVA RESPIMAT) 2.5 MCG/ACT AERS inhaler  Inhale 2 puffs into the lungs daily             tiotropium (SPIRIVA) 18 MCG inhalation capsule  Inhale 1 capsule into the lungs daily             vitamin D (ERGOCALCIFEROL) 01246 units capsule  Take 1 capsule by mouth once a week                 Diet:        Activity:  As tolerated    Follow-up:  Call today/tomorrow for a follow up appointment with Rell Ridley MD     Consultants: IP CONSULT TO RESPIRATORY CARE  IP CONSULT TO PULMONOLOGY  IP CONSULT TO HOME CARE NEEDS    Procedures: not indicated         Physical Exam:    Vitals:  Vitals:    11/17/17 1629 11/17/17 1933 11/17/17 2059 11/18/17 0800   BP:  123/77  106/65   Pulse:  88  95   Resp:  17 18   Temp:  98.2 °F (36.8 °C)  97.9 °F (36.6 °C)   TempSrc:  Oral  Oral   SpO2: 91% 96% 100% 95%   Weight: Systems   Constitutional: Negative for chills, diaphoresis, fatigue and fever.   HENT: Negative for drooling, ear pain, rhinorrhea and sore throat.    Eyes: Negative.    Respiratory: Negative for cough, shortness of breath and wheezing.    Cardiovascular: Negative for palpitations and leg swelling.   Gastrointestinal: Negative for abdominal pain, constipation, diarrhea and nausea.   Endocrine: Negative.    Genitourinary: Negative for dysuria, hematuria and urgency.   Musculoskeletal: Negative.         Left leg pain postoperatively   Skin: Negative for color change and wound.   Allergic/Immunologic: Negative.    Neurological: Negative for dizziness, syncope and speech difficulty.   Hematological: Negative.    Psychiatric/Behavioral: Negative.      Objective:     Vital Signs (Most Recent):  Temp: 98 °F (36.7 °C) (08/09/19 1726)  Pulse: 81 (08/09/19 1726)  Resp: 18 (08/09/19 1726)  BP: (!) 151/78 (08/09/19 1726)  SpO2: 96 % (08/09/19 1726) Vital Signs (24h Range):  Temp:  [97.9 °F (36.6 °C)-98.8 °F (37.1 °C)] 98 °F (36.7 °C)  Pulse:  [58-84] 81  Resp:  [9-22] 18  SpO2:  [96 %-100 %] 96 %  BP: (129-160)/(63-87) 151/78     Weight: 69.6 kg (153 lb 6 oz)  Body mass index is 24.02 kg/m².    Physical Exam   Constitutional: He is oriented to person, place, and time. He appears well-developed and well-nourished. No distress.   Elderly     HENT:   Head: Normocephalic and atraumatic.   Eyes: EOM are normal.   Neck: Normal range of motion. Neck supple.   Cardiovascular: Normal rate, regular rhythm and normal heart sounds.   Pulmonary/Chest: Effort normal and breath sounds normal. No respiratory distress.   Abdominal: Soft. Bowel sounds are normal. He exhibits no distension. There is no tenderness.   Musculoskeletal: Normal range of motion. He exhibits no edema.   Neurological: He is alert and oriented to person, place, and time.   Skin: Skin is dry.   Left leg dressing in place   Nursing note and vitals reviewed.    Significant  Labs:   CBC:   Recent Labs   Lab 08/09/19  0930 08/09/19  1636   WBC 3.98 9.85   HGB 10.2* 9.7*   HCT 28.8* 27.8*    293     CMP:   Recent Labs   Lab 08/09/19  0930 08/09/19  1636   NA  --  138   K 4.2 4.5   CL  --  101   CO2  --  23   GLU  --  143*   BUN  --  35*   CREATININE  --  1.5*   CALCIUM  --  9.5   ANIONGAP  --  14   EGFRNONAA  --  45*       Significant Imaging: I have reviewed all pertinent imaging results/findings within the past 24 hours.    Assessment/Plan:     * Closed fracture shaft fibula and tibia, left, with delayed healing, subsequent encounter  --management per Primary TEAm      Chronic diastolic congestive heart failure  --appears compensated  --avoid volume overload      CKD (chronic kidney disease) stage 3, GFR 30-59 ml/min  --close to baseline  --monitor urine output  --BMP in AM      Anemia  --at baseline, monitor postoperatively      Dyslipidemia  --resume STATIN      CAD (coronary artery disease)  --resume ASA, STATIN, and BB        VTE Risk Mitigation (From admission, onward)        Ordered     Place sequential compression device  Until discontinued      08/09/19 1631     Place sequential compression device  Until discontinued      08/09/19 0929          Thank you for your consult. I will follow-up with patient. Please contact us if you have any additional questions.    Guy Barnhart NP  Department of Hospital Medicine   [facility]

## 2019-08-10 NOTE — SUBJECTIVE & OBJECTIVE
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       Past Surgical History:   Procedure Laterality Date    APPLICATION, EXTERNAL FIXATION DEVICE Left 7/18/2019    Performed by [facility] at [facility] OR    BIOPSY-LYMPH NODE Left 5/21/2014    [facility]    CARDIAC CATHETERIZATION      CARDIAC SURGERY  2006    CATARACT EXTRACTION W/  INTRAOCULAR LENS IMPLANT Right 05/24/2017    CATARACT EXTRACTION W/  INTRAOCULAR LENS IMPLANT Left 04/12/2017    CLOSED REDUCTION, FRACTURE, FIBULA Left 7/18/2019    Performed by Philippe [facility]ante Jr., MD at [facility] OR    CORONARY ARTERY BYPASS GRAFT      EYE SURGERY      Cataract extraction with intraocular lens implant    [id number] Right 5/28/2014    Performed by Ben Temple MD at [facility] OR    LYMPH NODES, LEFT NECK, EXCISIONAL BIOPSY  5/21/2014    MEDIPORT INSERTION, SINGLE  5/28/14    PCIOL Left 04/12/2017    DR. MCCRAY    PROSTATE SURGERY  02/2014       Review of patient's allergies indicates:  No Known Allergies    No current facility-administered medications on file prior to encounter.      Current Outpatient Medications on File Prior to Encounter   Medication Sig    atorvastatin (LIPITOR) 80 MG tablet 80 mg every evening.     cholecalciferol, vitamin D3, (VITAMIN D3) 400 unit Tab Take 1 tablet by mouth once daily.    FOLIC ACID ORAL Take 2,400 mcg by mouth once daily.     hydroCHLOROthiazide (HYDRODIURIL) 25 MG tablet Take by mouth once daily.    HYDROcodone-acetaminophen (NORCO)  mg per tablet Take 1 tablet by mouth every 6 (six) hours as needed.    metoprolol succinate (TOPROL-XL) 25 MG 24 hr tablet Take 25 mg by mouth once daily.     pantoprazole (PROTONIX) 40 MG tablet Take 1 tablet (40 mg total) by mouth once daily.    RANEXA 1,000 mg Tb12 TAKE 1 TABLET BY MOUTH TWICE A DAY    aspirin (ECOTRIN) 81 MG EC tablet Take 81 mg by mouth every morning.     fish oil-omega-3 fatty acids 300-1,000 mg capsule Take 1 g by mouth 2 (two) times daily.     FLUZONE HIGH-DOSE , PF, 180 mcg/0.5 mL vaccine     NITROSTAT 0.4 mg SL tablet Patient states that he takes this medication as needed    polyethylene glycol (GLYCOLAX) 17 gram PwPk Take by mouth daily as needed.    triamcinolone acetonide 0.1% (KENALOG) 0.1 % cream every morning.      Family History     Problem Relation (Age of Onset)    Blindness Father        Tobacco Use    Smoking status: Former Smoker     Packs/day: 1.00     Years: 45.00     Pack years: 45.00     Last attempt to quit: 2005     Years since quittin.6    Smokeless tobacco: Never Used    Tobacco comment: Pt no longer smokes   Substance and Sexual Activity    Alcohol use: No     Comment: former    Drug use: No    Sexual activity: Yes     Birth control/protection: None     Review of Systems   Constitutional: Negative for chills, diaphoresis, fatigue and fever.   HENT: Negative for drooling, ear pain, rhinorrhea and sore throat.    Eyes: Negative.    Respiratory: Negative for cough, shortness of breath and wheezing.    Cardiovascular: Negative for palpitations and leg swelling.   Gastrointestinal: Negative for abdominal pain, constipation, diarrhea and nausea.   Endocrine: Negative.    Genitourinary: Negative for dysuria, hematuria and urgency.   Musculoskeletal: Negative.         Left leg pain postoperatively   Skin: Negative for color change and wound.   Allergic/Immunologic: Negative.    Neurological: Negative for dizziness, syncope and speech difficulty.   Hematological: Negative.    Psychiatric/Behavioral: Negative.      Objective:     Vital Signs (Most Recent):  Temp: 98 °F (36.7 °C) (19 1726)  Pulse: 81  (08/09/19 1726)  Resp: 18 (08/09/19 1726)  BP: (!) 151/78 (08/09/19 1726)  SpO2: 96 % (08/09/19 1726) Vital Signs (24h Range):  Temp:  [97.9 °F (36.6 °C)-98.8 °F (37.1 °C)] 98 °F (36.7 °C)  Pulse:  [58-84] 81  Resp:  [9-22] 18  SpO2:  [96 %-100 %] 96 %  BP: (129-160)/(63-87) 151/78     Weight: 69.6 kg (153 lb 6 oz)  Body mass index is 24.02 kg/m².    Physical Exam   Constitutional: He is oriented to person, place, and time. He appears well-developed and well-nourished. No distress.   Elderly     HENT:   Head: Normocephalic and atraumatic.   Eyes: EOM are normal.   Neck: Normal range of motion. Neck supple.   Cardiovascular: Normal rate, regular rhythm and normal heart sounds.   Pulmonary/Chest: Effort normal and breath sounds normal. No respiratory distress.   Abdominal: Soft. Bowel sounds are normal. He exhibits no distension. There is no tenderness.   Musculoskeletal: Normal range of motion. He exhibits no edema.   Neurological: He is alert and oriented to person, place, and time.   Skin: Skin is dry.   Left leg dressing in place   Nursing note and vitals reviewed.    Significant Labs:   CBC:   Recent Labs   Lab 08/09/19  0930 08/09/19  1636   WBC 3.98 9.85   HGB 10.2* 9.7*   HCT 28.8* 27.8*    293     CMP:   Recent Labs   Lab 08/09/19  0930 08/09/19  1636   NA  --  138   K 4.2 4.5   CL  --  101   CO2  --  23   GLU  --  143*   BUN  --  35*   CREATININE  --  1.5*   CALCIUM  --  9.5   ANIONGAP  --  14   EGFRNONAA  --  45*       Significant Imaging: I have reviewed all pertinent imaging results/findings within the past 24 hours.

## 2019-08-10 NOTE — PLAN OF CARE
Sw met with patient at bedside to complete assessment.Pt is currently as resident at [facility].  Patient denies any post hospital needs or services at this time. Transportation will be provided by [facility] upon discharge. Transitional Care Folder, Discharge Planning Begins on Admission pamphlet, [facility] Bedside Delivery pamphlet, Advance Directive information given to patient along with the contact information given. Sw placed contact information on white board. Sw instructed patient or family to call with any questions or concerns.    Pt's PCP is Oziel Mendieta MD  Pt uses:   [facility]/pharmacy #[id number] - [place], [place] - [address]  [address] [address]  Phone: [phone] Fax: [phone]         08/10/19 1202   Discharge Assessment   Assessment Type Discharge Planning Assessment   Confirmed/corrected address and phone number on facesheet? Yes   Assessment information obtained from? Patient   Communicated expected length of stay with patient/caregiver yes   Prior to hospitilization cognitive status: Alert/Oriented   Prior to hospitalization functional status: Independent   Current cognitive status: Alert/Oriented   Current Functional Status: Independent   Facility Arrived From: [facility]   Lives With alone   Able to Return to Prior Arrangements yes   Is patient able to care for self after discharge? Yes   Who are your caregiver(s) and their phone number(s)? Dionicio Calvillo (sister) [phone]   Patient's perception of discharge disposition rehab facility   Readmission Within the Last 30 Days current reason for admission unrelated to previous admission   If yes, most recent facility name: [facility]   Patient currently being followed by outpatient case management? No   Patient currently receives any other outside agency services? No   Equipment Currently Used at Home none   Do you have any problems affording any of your prescribed  medications? No   Is the patient taking medications as prescribed? yes   Does the patient have transportation home? Yes   Discharge Plan A Rehab   Discharge Plan B Rehab   DME Needed Upon Discharge  none   Patient/Family in Agreement with Plan yes   Readmission Questionnaire   At the time of your discharge, did someone talk to you about what your health problems were? Yes   At the time of discharge, did someone talk to you about what to watch out for regarding worsening of your health problem? Yes   At the time of discharge, did someone talk to you about what to do if you experienced worsening of your health problem? Yes   At the time of discharge, did someone talk to you about when and where to follow up with a doctor after you left the hospital? Yes   What do you believe caused you to be sick enough to be re-admitted? Pt stated that he had to have another procedure on his foot   How often do you need to have someone help you when you read instructions, pamphlets, or other written material from your doctor or pharmacy? Never   Do you have problems taking your medications as prescribed? No   Do you have any problems affording any of  your prescribed medications? No   Do you have problems obtaining/receiving your medications? No   Does the patient have transportation to healthcare appointments? Yes   Living Arrangements house   Does the patient have family/friends to help with healtcare needs after discharge? yes   Does your caregiver provide all the help you need? No   Are you currently feeling confused? No   Are you currently having problems thinking? No   Are you currently having memory problems? No   Have you felt down, depressed, or hopeless? 0   Have you felt little interest or pleasure in doing things? 0   In the last 7 days, my sleep quality was: good

## 2019-08-10 NOTE — PROGRESS NOTES
[facility] Hospital Medicine  Progress Note    Patient Name: Foreign Holland  MRN: [id number]  Patient Class: IP- Inpatient   Admission Date: 8/9/2019  Length of Stay: 1 days  Attending Physician: Flynn White MD  Primary Care Provider: Oziel Mendieta MD    Subjective:     Principal Problem:Closed fracture shaft fibula and tibia, left, with delayed healing, subsequent encounter      HPI:  Foreign Holland is a 74 year old male with history of hypertension, CHF, CAD, and lung cancer who underwent ORIF of left tibia performed by Dr. White. Surgery without acute complication. Hospital medicine has been consulted for medical management. He is asleep, but easily aroused. States that his pain is well controlled.       Overview/Hospital Course:  No notes on file    Interval History: Was feeling fine this AM. Upon transferring back to bed he became dizzy and felt weak. Hemoglobin 8.4 today. Was 10.1 prior to surgery. Hypotension as well while sitting in chair.     Review of Systems   Constitutional: Positive for fatigue. Negative for chills, diaphoresis and fever.   HENT: Negative for drooling, ear pain, rhinorrhea and sore throat.    Eyes: Negative.    Respiratory: Negative for cough, shortness of breath and wheezing.    Cardiovascular: Negative for palpitations and leg swelling.   Gastrointestinal: Negative for abdominal pain, constipation, diarrhea and nausea.   Endocrine: Negative.    Genitourinary: Negative for dysuria, hematuria and urgency.   Musculoskeletal: Negative.         Left leg pain postoperatively   Skin: Negative for color change and wound.   Allergic/Immunologic: Negative.    Neurological: Positive for weakness. Negative for dizziness, syncope and speech difficulty.   Hematological: Negative.    Psychiatric/Behavioral: Negative.       Objective:     Vital Signs (Most Recent):  Temp: 98.3 °F (36.8 °C) (08/10/19 1224)  Pulse: 73 (08/10/19 1224)  Resp: 18 (08/10/19 1224)  BP: 102/64  (08/10/19 1230)  SpO2: 98 % (08/10/19 1224) Vital Signs (24h Range):  Temp:  [97.9 °F (36.6 °C)-98.6 °F (37 °C)] 98.3 °F (36.8 °C)  Pulse:  [62-84] 73  Resp:  [9-22] 18  SpO2:  [93 %-100 %] 98 %  BP: ()/(51-87) 102/64     Weight: 69.6 kg (153 lb 6 oz)  Body mass index is 24.02 kg/m².    Intake/Output Summary (Last 24 hours) at 8/10/2019 1325  Last data filed at 8/10/2019 1200  Gross per 24 hour   Intake 2440 ml   Output 450 ml   Net 1990 ml      Physical Exam   Constitutional: He is oriented to person, place, and time. He appears well-developed and well-nourished. No distress.   Elderly     HENT:   Head: Normocephalic and atraumatic.   Eyes: EOM are normal.   Neck: Normal range of motion. Neck supple.   Cardiovascular: Normal rate, regular rhythm and normal heart sounds.   Pulmonary/Chest: Effort normal and breath sounds normal. No respiratory distress.   Abdominal: Soft. Bowel sounds are normal. He exhibits no distension. There is no tenderness.   Musculoskeletal: Normal range of motion. He exhibits no edema.   Neurological: He is alert and oriented to person, place, and time.   Skin: Skin is dry.   Left leg dressing in place   Nursing note and vitals reviewed.    Significant Labs:   CBC:   Recent Labs   Lab 08/09/19  0930 08/09/19  1636 08/10/19  0526   WBC 3.98 9.85 6.61   HGB 10.2* 9.7* 8.4*   HCT 28.8* 27.8* 23.7*    293 246     CMP:   Recent Labs   Lab 08/09/19  0930 08/09/19  1636 08/10/19  0526   NA  --  138 138   K 4.2 4.5 4.7   CL  --  101 102   CO2  --  23 24   GLU  --  143* 114*   BUN  --  35* 37*   CREATININE  --  1.5* 1.5*   CALCIUM  --  9.5 9.2   ANIONGAP  --  14 12   EGFRNONAA  --  45* 45*       Significant Imaging: I have reviewed all pertinent imaging results/findings within the past 24 hours.      Assessment/Plan:      * Closed fracture shaft fibula and tibia, left, with delayed healing, subsequent encounter  --management per PRIMARY TEAM  --continue PT/OT      Chronic diastolic  congestive heart failure  --appears compensated  --avoid volume overload      CKD (chronic kidney disease) stage 3, GFR 30-59 ml/min  --close to baseline  --monitor urine output  --BMP in AM      Symptomatic anemia  ---drop in hemoglobin postoperatively  --fatigued and dizzy with activity; hypotensive while sitting in chair  --transfuse one unit PRBC  -repeat CBC post transfusion.    Dyslipidemia  --resume STATIN      CAD (coronary artery disease)  --resume ASA, STATIN, and BB        VTE Risk Mitigation (From admission, onward)        Ordered     Place sequential compression device  Until discontinued      08/09/19 1631     Place sequential compression device  Until discontinued      08/09/19 0917          Guy Barnhart NP  Department of Hospital Medicine   [facility]

## 2019-08-10 NOTE — PT/OT/SLP EVAL
Occupational Therapy   Evaluation    Name: Foreign Holland  MRN: [id number]  Admitting Diagnosis:  Closed fracture shaft fibula and tibia, left, with delayed healing, subsequent encounter 1 Day Post-Op    Recommendations:     Discharge Recommendations:    Discharge Equipment Recommendations:  none  Barriers to discharge:       Assessment:     Foreign Holland is a 74 y.o. male with a medical diagnosis of Closed fracture shaft fibula and tibia, left, with delayed healing, subsequent encounter.  He presents with debility and generalized weakness. Performance deficits affecting function: weakness, impaired self care skills, impaired balance, impaired endurance, impaired functional mobilty, gait instability, decreased ROM.      Rehab Prognosis: Good; patient would benefit from acute skilled OT services to address these deficits and reach maximum level of function.       Plan:     Patient to be seen 3 x/week to address the above listed problems via self-care/home management, therapeutic exercises, therapeutic activities  · Plan of Care Expires: 08/17/19  · Plan of Care Reviewed with: patient    Subjective     Chief Complaint: debility and generalized weakness  Patient/Family Comments/goals:     Occupational Profile:  Living Environment: lives in  Local nursing home  Previous level of function: assistance with all adl's and functional t/f's  Roles and Routines: occupational therapy  Equipment Used at Home:  none  Assistance upon Discharge:     Pain/Comfort:  · Pain Rating 1: 0/10    Patients cultural, spiritual, Voodoo conflicts given the current situation:      Objective:     Communicated with: nurse and epic chart review prior to session.  Patient found HOB elevated with   upon OT entry to room.    General Precautions: Standard, fall   Orthopedic Precautions:N/A   Braces: N/A     Occupational Performance:    Bed Mobility:    · Patient completed Rolling/Turning to Left with  minimum assistance  · Patient completed  Scooting/Bridging with minimum assistance  · Patient completed Supine to Sit with minimum assistance    Functional Mobility/Transfers:  · Patient completed Sit <> Stand Transfer with minimum assistance  with  rolling walker   · Patient completed Bed <> Chair Transfer using Step Transfer technique with minimum assistance with rolling walker  · Functional Mobility: pt ambulated 20 feet with rw x r le non weight bearing    Activities of Daily Living:  · Upper Body Dressing: minimum assistance .    Cognitive/Visual Perceptual:  Cognitive/Psychosocial Skills:     -       Oriented to: Person, Place, Time and Situation   -       Follows Commands/attention:Follows multistep  commands  -       Communication: clear/fluent  -       Memory: No Deficits noted  -       Safety awareness/insight to disability: impaired   Visual/Perceptual:      -Intact .    Physical Exam:  Upper Extremity Range of Motion:     -       Right Upper Extremity: WFL  -       Left Upper Extremity: WFL  Upper Extremity Strength:    -       Right Upper Extremity: mmt: 3/5 grossly  -       Left Upper Extremity: mmt: 3/5 grossly   Strength:    -       Right Upper Extremity: mmt: 3/5 grossly  -       Left Upper Extremity: mmt: 3/5 grossly    AMPAC 6 Click ADL:  AMPAC Total Score: 20    Treatment & Education:    Education:    Patient left sitting in w/c with all lines intact, call button in reach and cna Verito notified    GOALS:   Multidisciplinary Problems     Occupational Therapy Goals        Problem: Occupational Therapy Goal    Goal Priority Disciplines Outcome Interventions   Occupational Therapy Goal     OT, PT/OT     Description:  ot goals to be met by 8-17-19   s with ue dressing  s with le dressing  sba with toilet t/f's  1 set x 15 reps b ue rom exercise with min resistance                    History:     Past Medical History:   Diagnosis Date    Cancer     lung left    Cataract     CHF (congestive heart failure)     Coronary artery disease      Heart failure     Hypertension     Lymphadenopathy 5/15/2014    Myocardial infarction     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       Past Surgical History:   Procedure Laterality Date    APPLICATION, EXTERNAL FIXATION DEVICE Left 7/18/2019    Performed by Philippe [facility]ante Jr., [facility] OR    BIOPSY-LYMPH NODE Left 5/21/2014    Performed [facility]un [facility] 2[facility]    CARDIAC CATHETERIZATION      CARDIAC SURGERY  2006    CATARACT EXTRACTION W/  INTRAOCULAR LENS IMPLANT Right 05/24/2017    CATARACT EXTRACTION W/  INTRAOCULAR LENS IMPLANT Left 04/12/2017    CLOSED REDUCTION, FRACTURE, FIBULA Left 7/18/2019    Performed by Philippe [facility]amante Jr., [facility] at [facility] OR    CORONARY ARTERY BYPASS GRAFT      EYE SURGERY      Cataract extraction with intraocular lens implant    [id number]-BRSS-H-CATH Right 5/28/2014    Performed by Ben Temple MD at [facility] OR    LYMPH NODES, LEFT NECK, EXCISIONAL BIOPSY  5/21/2014    MEDIPORT INSERTION, SINGLE  5/28/14    PCIOL Left 04/12/2017    DR. MCCRAY    PROSTATE SURGERY  02/2014       Time Tracking:     OT Date of Treatment: 08/10/19  OT Start Time: 1120  OT Stop Time: 1144  OT Total Time (min): 24 min    Billable Minutes:Evaluation 12 minutes  Therapeutic Activity 12 minutes    Stacey Whittaker, OT  8/10/2019

## 2019-08-10 NOTE — PLAN OF CARE
Problem: Adult Inpatient Plan of Care  Goal: Plan of Care Review  Outcome: Ongoing (interventions implemented as appropriate)  Will continue care plan as indicated. No acute changes overnight. Will continue to monitor.

## 2019-08-10 NOTE — ASSESSMENT & PLAN NOTE
---drop in hemoglobin postoperatively  --fatigued and dizzy with activity; hypotensive while sitting in chair  --transfuse one unit PRBC  -repeat CBC post transfusion.

## 2019-08-10 NOTE — SUBJECTIVE & OBJECTIVE
Interval History: Was feeling fine this AM. Upon transferring back to bed he became dizzy and felt weak. Hemoglobin 8.4 today. Was 10.1 prior to surgery. Hypotension as well while sitting in chair.     Review of Systems   Constitutional: Positive for fatigue. Negative for chills, diaphoresis and fever.   HENT: Negative for drooling, ear pain, rhinorrhea and sore throat.    Eyes: Negative.    Respiratory: Negative for cough, shortness of breath and wheezing.    Cardiovascular: Negative for palpitations and leg swelling.   Gastrointestinal: Negative for abdominal pain, constipation, diarrhea and nausea.   Endocrine: Negative.    Genitourinary: Negative for dysuria, hematuria and urgency.   Musculoskeletal: Negative.         Left leg pain postoperatively   Skin: Negative for color change and wound.   Allergic/Immunologic: Negative.    Neurological: Positive for weakness. Negative for dizziness, syncope and speech difficulty.   Hematological: Negative.    Psychiatric/Behavioral: Negative.       Objective:     Vital Signs (Most Recent):  Temp: 98.3 °F (36.8 °C) (08/10/19 1224)  Pulse: 73 (08/10/19 1224)  Resp: 18 (08/10/19 1224)  BP: 102/64 (08/10/19 1230)  SpO2: 98 % (08/10/19 1224) Vital Signs (24h Range):  Temp:  [97.9 °F (36.6 °C)-98.6 °F (37 °C)] 98.3 °F (36.8 °C)  Pulse:  [62-84] 73  Resp:  [9-22] 18  SpO2:  [93 %-100 %] 98 %  BP: ()/(51-87) 102/64     Weight: 69.6 kg (153 lb 6 oz)  Body mass index is 24.02 kg/m².    Intake/Output Summary (Last 24 hours) at 8/10/2019 1325  Last data filed at 8/10/2019 1200  Gross per 24 hour   Intake 2440 ml   Output 450 ml   Net 1990 ml      Physical Exam   Constitutional: He is oriented to person, place, and time. He appears well-developed and well-nourished. No distress.   Elderly     HENT:   Head: Normocephalic and atraumatic.   Eyes: EOM are normal.   Neck: Normal range of motion. Neck supple.   Cardiovascular: Normal rate, regular rhythm and normal heart sounds.    Pulmonary/Chest: Effort normal and breath sounds normal. No respiratory distress.   Abdominal: Soft. Bowel sounds are normal. He exhibits no distension. There is no tenderness.   Musculoskeletal: Normal range of motion. He exhibits no edema.   Neurological: He is alert and oriented to person, place, and time.   Skin: Skin is dry.   Left leg dressing in place   Nursing note and vitals reviewed.    Significant Labs:   CBC:   Recent Labs   Lab 08/09/19 0930 08/09/19  1636 08/10/19  0526   WBC 3.98 9.85 6.61   HGB 10.2* 9.7* 8.4*   HCT 28.8* 27.8* 23.7*    293 246     CMP:   Recent Labs   Lab 08/09/19 0930 08/09/19  1636 08/10/19  0526   NA  --  138 138   K 4.2 4.5 4.7   CL  --  101 102   CO2  --  23 24   GLU  --  143* 114*   BUN  --  35* 37*   CREATININE  --  1.5* 1.5*   CALCIUM  --  9.5 9.2   ANIONGAP  --  14 12   EGFRNONAA  --  45* 45*       Significant Imaging: I have reviewed all pertinent imaging results/findings within the past 24 hours.

## 2019-08-10 NOTE — OP NOTE
[facility]   General Surgery  Operative Note    SUMMARY     Date of Procedure: 8/9/2019     Procedure: Procedure(s) (LRB):  REMOVAL, EXTERNAL FIXATION DEVICE (Left)  ORIF, FRACTURE, TIBIA (Left)  OPEN REDUCTION INTERNAL FIXATION (ORIF) Fibula (Left)       Surgeon(s) and Role:     * Flynn White MD - Primary    Assisting Surgeon: None    Pre-Operative Diagnosis: Closed displaced comminuted fracture of shaft of left tibia with routine healing, subsequent encounter [S82.252D]  Other closed fracture of distal end of left fibula with routine healing, subsequent encounter [S82.832D]    Post-Operative Diagnosis: Post-Op Diagnosis Codes:     * Closed displaced comminuted fracture of shaft of left tibia with routine healing, subsequent encounter [S82.252D]     * Other closed fracture of distal end of left fibula with routine healing, subsequent encounter [S82.832D]    Anesthesia: General    Technical Procedures Used:  1.  Removal of external fixator 2.  Open reduction internal fixation of a distal fibular fracture with Synthes 4.5 cannulated partially threaded screw 3.  Closed reduction with intramedullary nailing of distal 3rd tibia fracture with Synthes tibial nail, left lower extremity 5. Repair of branck of anterior tibial artery with 5.0 prolene.    Description of the Findings of the Procedure:  The patient was initially identified in the preoperative holding area. At that time we reviewed indications for surgery, risks, alternatives, advantages, potential complications, and likely surgical course.  Patient indicated he understood our discussion wanted to proceed. Consent was obtained surgical site marked.  Patient was then transferred to the operating theater.  Anesthesia successfully administered a general anesthetic and a lower extremity nerve block. Antibiotics were administered. The external fixator was removed and the leg scrubbed with sterile water/hibbicleanse.  The extremity was prepped and  draped in a standard aseptic manner. Time-out performed. Using curettaged the half pin holes were aggressively debrided.  They were irrigated with a solution of Betadine and normal saline.  And then Vanco powder was packed.  Under C-arm guidance closed manipulation of the fracture commenced.  It appeared that soft callus had formed and the tibia and fibula were slightly shortened and angulated.  I decided to make a formal approach to the distal fibular fracture to remove soft callus to help with reduction.  The leg was elevated and exsanguinated and tourniquet insufflated to 250 mm of mercury.  A posterior lateral incision was made about the distal fibula.  The skin incised.  Dissection down to bone. Care was taken to avoid the superficial peroneal nerve. The soft callus about the distal fibula was identified and excised. This allowed movement of both the distal fibula and the distal tibia fracture. Provisional fixation of the distal fibula was made using a fibular plate. Screws were purposely made extra long so they had unicortical purchase in the tibia to help maintain the tibial reduction.  Tourniquet released.  I then proceeded to make a incision proximal to the superior pole of the patella.  Skin incised.  Dissection down to the quadriceps tendon.  Quadriceps tendon incised in line with the overlying skin incision.  Blunt dissection into the patellofemoral joint. The blunt trocar for the suprapatellar guide was inserted and advanced to the proximal tibia.  My starting point was verified on both the AP and lateral view.  The guide pin was then advanced into the proximal tibia. The entry Reamer was then used.  The guidewire was then placed into the proximal tibia and advanced distally past the fracture site. Appropriate nail length was measured.  Sequential reaming commenced starting with a size 8.5 and ending at a size 12.  There is appropriate chatter at size 11 nail.  The tibia was reduced throughout the  entire reaming process. The nail was inserted and advanced to the appropriate level.  Using the aiming arm to static locking screws were placed proximally and 3 static locking screws were placed distally using a freehand technique.  Prior to placement of the distal locking screws rotation was assessed and deemed appropriate.  I attempted to place a 4th screw however during the drilling process a branch of the anterior tibial artery was injured.  A 2 cm incision was made at the level of injury the anterior tibial artery was explored.  The injured branch was repaired using 5 0 Prolene.  After repair with tourniquet down there was no arterial bleeding and good flow distally. The provisional plate was moved off the fibula.  Under C-arm guidance a 4.5 cannulated guide pin was placed at the tip of the distal fibula and advanced within the shaft past the fracture site. The distal fibula was reamed up to the fracture site and a 4.5 partially-threaded cannulated screws placed. All wound beds were thoroughly irrigated. Layered closure commenced.  The deep tissue 0 Vicryl in a buried fashion.  The subcutaneous tissue to micro horizontal mattress fashion.  The skin through nylon horizontal mattress fashion.  Final C-arm views were obtained which showed adequacy of distal fibula and distal tibia fractures.  All screws were of appropriate length and contained within the nail.  Tibial fibula alignment, rotation and length were reestablished.  Sterile dressings were placed.  Patient was returned to recovery without event.  Total tourniquet time was 45 min.  Postop plan:  Patient will be on aspirin for DVT prophylaxis.  He will be partial weight-bearing 25% for 2 weeks.  He will then transition to partial weight-bearing 50% for 2 weeks.  At 4 weeks status post surgery as long as his interval x-rays indicate stability at the fracture site patient will be weight-bearing as tolerated in a Cam boot.  He should wear the Cam boot for an  additional 4 weeks.    Significant Surgical Tasks Conducted by the Assistant(s), if Applicable:  Meng PEDERSON    Complications: Yes - partial laceration branch of anterior tibial artery    Estimated Blood Loss (EBL): 150 mL           Implants:   Implant Name Type Inv. Item Serial No.  Lot No. LRB No. Used   11mm TI PERRI TIBIAL NAIL-EX   N/A [employer]ES [id number] Left 1   SCREW 2.7X18MM - [id number]  SCREW 2.7X18MM  [employer] N/A Left 2   SCREW CORTEX 3.5MM X 28MM - [id number]  SCREW CORTEX 3.5MM X 28MM  [employer]  Left 2   SCREW 2.7X20MM - [id number]  SCREW 2.7X20MM  [employer]ES  Left 1   SCREW CORTEX 3.5 32M - [id number]  SCREW CORTEX 3.5 32M  SYNTHES  Left 1   PLATE BONE 7H 2.7/3.8N836GP L - [id number]  PLATE BONE 7H 2.7/3.0O770WC L  [employer]  Left 1   SCREW LOCKING 34MM - [id number]  SCREW LOCKING 34MM  [employer]ES  Left 2   SCREW STRDRV REC T25 5X36 TTNM - [id number]  SCREW STRDRV REC T25 5X36 TTNM  [employer]  Left 3   SCREW PERRI 4.5MM D 72M - [id number]  SCREW PERRI 4.5MM D 72M  SYN[employer]ES  Left 1   WIRE GUIDE 3.2MM 400MM - [id number]  WIRE GUIDE 3.2MM 400MM  SYNTHES  Left 2       Specimens:   Specimen (12h ago, onward)    None                  Condition: Good    Disposition: PACU - hemodynamically stable.    Attestation: I was present and scrubbed for the entire procedure.

## 2019-08-10 NOTE — PT/OT/SLP EVAL
Physical Therapy Evaluation    Patient Name:  Foreign Holland   MRN:  [id number]    Recommendations:     Discharge Recommendations:  nursing facility, skilled   Discharge Equipment Recommendations: none   Barriers to discharge: None    Assessment:     Foreign Holland is a 74 y.o. male admitted with a medical diagnosis of Closed fracture shaft fibula and tibia, left, with delayed healing, subsequent encounter.  He presents with the following impairments/functional limitations:  weakness, gait instability, impaired functional mobilty, impaired self care skills, orthopedic precautions, decreased lower extremity function Will benefit from PT. Good compliance with TTWB.    Rehab Prognosis: Good; patient would benefit from acute skilled PT services to address these deficits and reach maximum level of function.    Recent Surgery: Procedure(s) (LRB):  REMOVAL, EXTERNAL FIXATION DEVICE (Left)  ORIF, FRACTURE, TIBIA (Left)  OPEN REDUCTION INTERNAL FIXATION (ORIF) Fibula (Left) 1 Day Post-Op    Plan:     During this hospitalization, patient to be seen 5 x/week to address the identified rehab impairments via gait training, therapeutic activities, therapeutic exercises and progress toward the following goals:    · Plan of Care Expires:  08/17/19    Subjective     Chief Complaint: fatigue   Patient/Family Comments/goals: able to walk with RW  Pain/Comfort:  · Pain Rating 1: 0/10    Patients cultural, spiritual, Protestant conflicts given the current situation:      Living Environment:  Pt lives in nursing home. States in  most of day. Needs assist with transfers  Prior to admission, patients level of function was needs device and assist.  Equipment used at home: wheelchair.  DME owned (not currently used): none.  Upon discharge, patient will have assistance from nursing home.    Objective:     Communicated with [facility] prior to session.  Patient found supine with    upon PT entry to room.    General Precautions: Standard, fall    Orthopedic Precautions:LLE toe touch weight bearing   Braces:       Exams:  · RLE ROM: WFL  · RLE Strength: WFL  · LLE ROM: NT  · LLE Strength: NT    Functional Mobility:  · Bed Mobility:     · Supine to Sit: stand by assistance  · Transfers:     · Bed to Chair: contact guard assistance with  rolling walker  using  Stand Pivot  · Gait: 16 feet with RW and Min A      Therapeutic Activities and Exercises:     Gait training- VC for TTWB.  Needs assist with turning      AM-PAC 6 CLICK MOBILITY  Total Score:16     Patient left up in chair with all lines intact, call button in reach and nursing notified.    GOALS:   Multidisciplinary Problems     Physical Therapy Goals        Problem: Physical Therapy Goal    Goal Priority Disciplines Outcome Goal Variances Interventions   Physical Therapy Goal     PT, PT/OT      Description:  PT eval complete.  The following goals should be met in 7 days  1.  Pt IND with bed mobility  2.  Pt amb 85 ft with RW with SBA and TTWB  3. 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       Past Surgical History:   Procedure Laterality Date    APPLICATION, EXTERNAL FIXATION DEVICE Left 7/18/2019    Performed by [facility] Jr[facility] OR    BIOPSY-LYMPH NODE Left 5/21/2014    [facility]    CARDIAC CATHETERIZATION      CARDIAC SURGERY  2006    CATARACT EXTRACTION W/  INTRAOCULAR LENS IMPLANT Right 05/24/2017    CATARACT EXTRACTION W/  INTRAOCULAR LENS IMPLANT Left 04/12/2017    CLOSED REDUCTION, FRACTURE, FIBULA Left 7/18/2019    Performed by [facility] Jr[facility] at [facility] OR    CORONARY ARTERY BYPASS GRAFT       EYE SURGERY      Cataract extraction with intraocular lens implant    [id number]-B-CATH Right 5/28/2014    [facility] OR    LYMPH NODES, LEFT NECK, EXCISIONAL BIOPSY  5/21/2014    MEDIPORT INSERTION, SINGLE  5/28/14    PCIOL Left 04/12/2017    DR. MCCRAY    PROSTATE SURGERY  02/2014       Time Tracking:     PT Received On: 08/10/19  PT Start Time: 1045     PT Stop Time: 1110  PT Total Time (min): 25 min     Billable Minutes: Evaluation 15 and Gait Training 10      Mode Lucio, PT  08/10/2019

## 2019-08-10 NOTE — HPI
Foreign Holland is a 74 year old male with history of hypertension, CHF, CAD, and lung cancer who underwent ORIF of left tibia performed by Dr. White. Surgery without acute complication. Hospital medicine has been consulted for medical management. He is asleep, but easily aroused. States that his pain is well controlled.

## 2019-08-10 NOTE — PROGRESS NOTES
"[facility]  Orthopedics  Progress Note    Patient Name: Foreign Holland  MRN: [id number]  Admission Date: 8/9/2019  Hospital Length of Stay: 1 days  Attending Provider: Flynn White MD  Primary Care Provider: Oziel Mendieta MD  Follow-up For: Procedure(s) (LRB):  REMOVAL, EXTERNAL FIXATION DEVICE (Left)  ORIF, FRACTURE, TIBIA (Left)  OPEN REDUCTION INTERNAL FIXATION (ORIF) Fibula (Left)    Post-Operative Day: 1 Day Post-Op  Subjective:     Principal Problem:Closed fracture shaft fibula and tibia, left, with delayed healing, subsequent encounter    Principal Orthopedic Problem:  Closed displaced tibia-fibula fracture, left lower extremity    Interval History:  Patient case he is doing well. His pain is well controlled.  He still reports numbness and weakness in the left lower extremity secondary to the block. Denies any fever chest pain shortness of breath.    Review of patient's allergies indicates:  No Known Allergies    Current Facility-Administered Medications   Medication    acetaminophen tablet 650 mg    aspirin tablet 325 mg    atorvastatin tablet 80 mg    chlorhexidine 0.12 % solution 10 mL    HYDROmorphone injection 1 mg    metoprolol succinate (TOPROL-XL) 24 hr tablet 25 mg    nozaseptin (NOZIN) nasal     ondansetron injection 4 mg    oxyCODONE immediate release tablet 5 mg    pantoprazole EC tablet 40 mg    ranolazine 12 hr tablet 1,000 mg    sodium chloride 0.9% flush 10 mL     Objective:     Vital Signs (Most Recent):  Temp: 98.1 °F (36.7 °C) (08/10/19 0739)  Pulse: 62 (08/10/19 0739)  Resp: 18 (08/10/19 0739)  BP: 98/62 (08/10/19 0739)  SpO2: 95 % (08/10/19 0739) Vital Signs (24h Range):  Temp:  [97.9 °F (36.6 °C)-98.8 °F (37.1 °C)] 98.1 °F (36.7 °C)  Pulse:  [58-84] 62  Resp:  [9-22] 18  SpO2:  [93 %-100 %] 95 %  BP: ()/(61-87) 98/62     Weight: 69.6 kg (153 lb 6 oz)  Height: 5' 7" (170.2 cm)  Body mass index is 24.02 kg/m².      Intake/Output Summary " (Last 24 hours) at 8/10/2019 0948  Last data filed at 8/10/2019 0800  Gross per 24 hour   Intake 2240 ml   Output 300 ml   Net 1940 ml       Ortho/SPM Exam   Left lower extremity:  Dressing clean dry and intact. Patient has minimal flexion extension of all 5 digits.  His light touch sensation is diminished both in dorsal plantar aspect of foot. His foot is warm and he has brisk cap refill all 5 digits. His compartments are soft.  Significant Labs: All pertinent labs within the past 24 hours have been reviewed.    Significant Imaging: I have reviewed and interpreted all pertinent imaging results/findings.    Assessment/Plan:  Patient doing well postop day 1.  Emphasized to the patient that his pain should experience Adaptic today as the block wears off.  Appreciate medical comanagement.  We will continue to observe his H&H potentially consider transfusion.  He is 25% weight-bearing left lower extremity.  He will be on aspirin and SCDs for DVT prophylaxis.     Active Diagnoses:    Diagnosis Date Noted POA    PRINCIPAL PROBLEM:  Closed fracture shaft fibula and tibia, left, with delayed healing, subsequent encounter [S82.202G, S82.402G] 08/09/2019 Not Applicable    Chronic diastolic congestive heart failure [I50.32] 07/18/2019 Yes    CKD (chronic kidney disease) stage 3, GFR 30-59 ml/min [N18.3] 07/24/2018 Yes    CAD (coronary artery disease) [I25.10] 04/17/2014 Yes    Dyslipidemia [E78.5] 04/17/2014 Yes    Anemia [D64.9] 04/17/2014 Yes      Problems Resolved During this Admission:         Flynn White MD  Orthopedics  [facility]

## 2019-08-11 VITALS
BODY MASS INDEX: 24.07 KG/M2 | WEIGHT: 153.38 LBS | OXYGEN SATURATION: 96 % | HEART RATE: 60 BPM | RESPIRATION RATE: 18 BRPM | TEMPERATURE: 98 F | HEIGHT: 67 IN | SYSTOLIC BLOOD PRESSURE: 112 MMHG | DIASTOLIC BLOOD PRESSURE: 63 MMHG

## 2019-08-11 PROBLEM — S82.402G: Status: RESOLVED | Noted: 2019-08-09 | Resolved: 2019-08-11

## 2019-08-11 PROBLEM — S82.202G: Status: RESOLVED | Noted: 2019-08-09 | Resolved: 2019-08-11

## 2019-08-11 LAB
BASOPHILS # BLD AUTO: 0.01 K/UL (ref 0–0.2)
BASOPHILS NFR BLD: 0.2 % (ref 0–1.9)
DIFFERENTIAL METHOD: ABNORMAL
EOSINOPHIL # BLD AUTO: 0.2 K/UL (ref 0–0.5)
EOSINOPHIL NFR BLD: 4 % (ref 0–8)
ERYTHROCYTE [DISTWIDTH] IN BLOOD BY AUTOMATED COUNT: 14.3 % (ref 11.5–14.5)
HCT VFR BLD AUTO: 24.7 % (ref 40–54)
HGB BLD-MCNC: 8.7 G/DL (ref 14–18)
LYMPHOCYTES # BLD AUTO: 0.8 K/UL (ref 1–4.8)
LYMPHOCYTES NFR BLD: 14.3 % (ref 18–48)
MCH RBC QN AUTO: 29.7 PG (ref 27–31)
MCHC RBC AUTO-ENTMCNC: 35.2 G/DL (ref 32–36)
MCV RBC AUTO: 84 FL (ref 82–98)
MONOCYTES # BLD AUTO: 0.4 K/UL (ref 0.3–1)
MONOCYTES NFR BLD: 7.5 % (ref 4–15)
NEUTROPHILS # BLD AUTO: 4.3 K/UL (ref 1.8–7.7)
NEUTROPHILS NFR BLD: 74.3 % (ref 38–73)
PLATELET # BLD AUTO: 178 K/UL (ref 150–350)
PMV BLD AUTO: 8.5 FL (ref 9.2–12.9)
RBC # BLD AUTO: 2.93 M/UL (ref 4.6–6.2)
WBC # BLD AUTO: 5.75 K/UL (ref 3.9–12.7)

## 2019-08-11 PROCEDURE — 25000003 PHARM REV CODE 250: Performed by: NURSE PRACTITIONER

## 2019-08-11 PROCEDURE — 97116 GAIT TRAINING THERAPY: CPT

## 2019-08-11 PROCEDURE — 25000003 PHARM REV CODE 250: Performed by: ORTHOPAEDIC SURGERY

## 2019-08-11 PROCEDURE — 36415 COLL VENOUS BLD VENIPUNCTURE: CPT

## 2019-08-11 PROCEDURE — 85025 COMPLETE CBC W/AUTO DIFF WBC: CPT

## 2019-08-11 PROCEDURE — 94760 N-INVAS EAR/PLS OXIMETRY 1: CPT

## 2019-08-11 RX ORDER — ASPIRIN 325 MG
325 TABLET ORAL 2 TIMES DAILY
Refills: 0 | COMMUNITY
Start: 2019-08-11 | End: 2020-02-27

## 2019-08-11 RX ORDER — OXYCODONE HYDROCHLORIDE 5 MG/1
5 TABLET ORAL EVERY 4 HOURS PRN
Qty: 30 TABLET | Refills: 0 | Status: SHIPPED | OUTPATIENT
Start: 2019-08-11 | End: 2019-08-11 | Stop reason: HOSPADM

## 2019-08-11 RX ORDER — ACETAMINOPHEN 325 MG/1
650 TABLET ORAL EVERY 4 HOURS PRN
Refills: 0 | COMMUNITY
Start: 2019-08-11

## 2019-08-11 RX ADMIN — METOPROLOL SUCCINATE 25 MG: 25 TABLET, EXTENDED RELEASE ORAL at 09:08

## 2019-08-11 RX ADMIN — PANTOPRAZOLE SODIUM 40 MG: 40 TABLET, DELAYED RELEASE ORAL at 09:08

## 2019-08-11 RX ADMIN — OXYCODONE HYDROCHLORIDE 5 MG: 5 TABLET ORAL at 02:08

## 2019-08-11 RX ADMIN — ASPIRIN 325 MG ORAL TABLET 325 MG: 325 PILL ORAL at 09:08

## 2019-08-11 RX ADMIN — CHLORHEXIDINE GLUCONATE 0.12% ORAL RINSE 10 ML: 1.2 LIQUID ORAL at 09:08

## 2019-08-11 RX ADMIN — RANOLAZINE 1000 MG: 500 TABLET, FILM COATED, EXTENDED RELEASE ORAL at 09:08

## 2019-08-11 NOTE — PLAN OF CARE
Pt has d/c orders. Pt is a resident at [facility] ([phone]). NACHO contacted the NH and spoke with Kelli, faxed pt's paperwork to [phone]. Kelli stated that the RN will review the pt's info and follow-up with the pt's RN to arrange the transfer. NACHO updated Annamaria Sethi RN.

## 2019-08-11 NOTE — PLAN OF CARE
Problem: Adult Inpatient Plan of Care  Goal: Plan of Care Review  Outcome: Outcome(s) achieved Date Met: 08/11/19  Remains injury free. Pain managed with oral meds. Returning to nursing home today. Stable for discharge.

## 2019-08-11 NOTE — PLAN OF CARE
Problem: Adult Inpatient Plan of Care  Goal: Plan of Care Review  Outcome: Ongoing (interventions implemented as appropriate)  Pt ambulated 60 feet x 2 trials with the RW today. Maintained weight off the Left foot.

## 2019-08-11 NOTE — DISCHARGE SUMMARY
[facility]   Discharge Summary      Admit Date: 8/9/2019    Discharge Date and Time:  08/11/2019 10:03 AM    Attending Physician: Flynn White MD     Reason for Admission:  Closed left tibia and fibula fracture    Procedures Performed: Procedure(s) (LRB):  REMOVAL, EXTERNAL FIXATION DEVICE (Left)  ORIF, FRACTURE, TIBIA (Left)  OPEN REDUCTION INTERNAL FIXATION (ORIF) Fibula (Left)    Hospital Course (synopsis of major diagnoses, care, treatment, and services provided during the course of the hospital stay):  Patient was admitted to the hospital after undergoing open reduction internal fixation of his tibia and fibula fracture. His postoperative course was complicated by acute anemia.  Patient was transfused 1 unit of blood and responded appropriately.  While in the hospital he received DVT prophylaxis, 24 hr of IV antibiotics, and physical therapy for gait training.  On discharge examination his incisions were clean dry intact. His alignment is maintained.  His motor, sensory and vascular exam was within normal limits.  His compartments are soft.      Consults:  Hospital Medicine    Significant Diagnostic Studies:  Intraoperative x-rays showed appropriate alignment with hardware fixation     Final Diagnoses:    Principal Problem: Closed fracture shaft fibula and tibia, left, with delayed healing, subsequent encounter   Secondary Diagnoses:   Active Hospital Problems    Diagnosis  POA    Chronic diastolic congestive heart failure [I50.32]  Yes    CKD (chronic kidney disease) stage 3, GFR 30-59 ml/min [N18.3]  Yes    CAD (coronary artery disease) [I25.10]  Yes    Dyslipidemia [E78.5]  Yes    Symptomatic anemia [D64.9]  No      Resolved Hospital Problems    Diagnosis Date Resolved POA    *Closed fracture shaft fibula and tibia, left, with delayed healing, subsequent encounter [S82.202G, S82.402G] 08/11/2019 Not Applicable       Discharged Condition: good    Disposition: Skilled Nursing  Facility    Follow Up/Patient Instructions:     Medications:  Reconciled Home Medications:      Medication List      START taking these medications    acetaminophen 325 MG tablet  Commonly known as:  TYLENOL  Take 2 tablets (650 mg total) by mouth every 4 (four) hours as needed.     aspirin 325 MG tablet  Take 1 tablet (325 mg total) by mouth 2 (two) times daily.  Replaces:  aspirin 81 MG EC tablet     nozaseptin nasal   Commonly known as:  NOZIN  1 each by Each Nostril route 2 (two) times daily.     oxyCODONE 5 MG immediate release tablet  Commonly known as:  ROXICODONE  Take 1 tablet (5 mg total) by mouth every 4 (four) hours as needed.        CONTINUE taking these medications    atorvastatin 80 MG tablet  Commonly known as:  LIPITOR  80 mg every evening.     fish oil-omega-3 fatty acids 300-1,000 mg capsule  Take 1 g by mouth 2 (two) times daily.     FLUZONE HIGH-DOSE 2018-19 (PF) 180 mcg/0.5 mL vaccine  Generic drug:  influenza     FOLIC ACID ORAL  Take 2,400 mcg by mouth once daily.     hydroCHLOROthiazide 25 MG tablet  Commonly known as:  HYDRODIURIL  Take by mouth once daily.     HYDROcodone-acetaminophen  mg per tablet  Commonly known as:  NORCO  Take 1 tablet by mouth every 6 (six) hours as needed.     metoprolol succinate 25 MG 24 hr tablet  Commonly known as:  TOPROL-XL  Take 25 mg by mouth once daily.     NITROSTAT 0.4 MG SL tablet  Generic drug:  nitroGLYCERIN  Patient states that he takes this medication as needed     pantoprazole 40 MG tablet  Commonly known as:  PROTONIX  Take 1 tablet (40 mg total) by mouth once daily.     polyethylene glycol 17 gram Pwpk  Commonly known as:  GLYCOLAX  Take by mouth daily as needed.     RANEXA 1,000 mg Tb12  Generic drug:  ranolazine  TAKE 1 TABLET BY MOUTH TWICE A DAY     triamcinolone acetonide 0.1% 0.1 % cream  Commonly known as:  KENALOG  every morning.     VITAMIN D3 400 unit Tab  Generic drug:  cholecalciferol (vitamin D3)  Take 1 tablet by  mouth once daily.        STOP taking these medications    aspirin 81 MG EC tablet  Commonly known as:  ECOTRIN  Replaced by:  aspirin 325 MG tablet          Discharge Procedure Orders   Diet Adult Regular     Keep surgical extremity elevated     Ice to affected area     Change dressing (specify)   Order Comments: Dressing change: 1 times per day using dry dressing.     Weight bearing restrictions (specify):   Order Comments: Partial WB 25 % Left Lower extremity     Follow-up Information     Flynn White MD. Schedule an appointment as soon as possible for a visit in 12 days.    Specialty:  Orthopedic Surgery  Contact information:  [address] [facility] [address] [phone]

## 2019-08-11 NOTE — ASSESSMENT & PLAN NOTE
---drop in hemoglobin postoperatively  --fatigued and dizzy with activity; hypotensive while sitting in chair  --transfuse one unit PRBC  -repeat CBC post transfusion.    8/11/19  Improved today.   Hemoglobin 8.7 after unit of blood

## 2019-08-11 NOTE — PROGRESS NOTES
[facility] Hospital Medicine  Progress Note    Patient Name: Foreign Holland  MRN: [id number]  Patient Class: IP- Inpatient   Admission Date: 8/9/2019  Length of Stay: 2 days  Attending Physician: Flynn White MD  Primary Care Provider: Oziel Mendieta MD    Subjective:     Principal Problem:Closed fracture shaft fibula and tibia, left, with delayed healing, subsequent encounter      HPI:  Foreign Holland is a 74 year old male with history of hypertension, CHF, CAD, and lung cancer who underwent ORIF of left tibia performed by Dr. White. Surgery without acute complication. Hospital medicine has been consulted for medical management. He is asleep, but easily aroused. States that his pain is well controlled.       Overview/Hospital Course:  No notes on file    Interval History: doing well. He received one unit PRBC yesterday. He feels much better.     Review of Systems   Constitutional: Negative for chills, diaphoresis and fever.   HENT: Negative for drooling, ear pain, rhinorrhea and sore throat.    Eyes: Negative.    Respiratory: Negative for cough, shortness of breath and wheezing.    Cardiovascular: Negative for palpitations and leg swelling.   Gastrointestinal: Negative for abdominal pain, constipation, diarrhea and nausea.   Endocrine: Negative.    Genitourinary: Negative for dysuria, hematuria and urgency.   Musculoskeletal: Negative.         Left leg pain postoperatively   Skin: Negative for color change and wound.   Allergic/Immunologic: Negative.    Neurological: Positive for weakness (improving). Negative for dizziness, syncope and speech difficulty.   Hematological: Negative.    Psychiatric/Behavioral: Negative.       Objective:     Vital Signs (Most Recent):  Temp: 98.1 °F (36.7 °C) (08/11/19 0735)  Pulse: 60 (08/11/19 0735)  Resp: 18 (08/11/19 0735)  BP: 112/63 (08/11/19 0735)  SpO2: 96 % (08/11/19 0735) Vital Signs (24h Range):  Temp:  [97.8 °F (36.6 °C)-98.5 °F (36.9 °C)] 98.1 °F  (36.7 °C)  Pulse:  [60-77] 60  Resp:  [16-18] 18  SpO2:  [96 %-100 %] 96 %  BP: (100-118)/(50-68) 112/63     Weight: 69.6 kg (153 lb 6 oz)  Body mass index is 24.02 kg/m².    Intake/Output Summary (Last 24 hours) at 8/11/2019 1329  Last data filed at 8/11/2019 1300  Gross per 24 hour   Intake 1450 ml   Output 600 ml   Net 850 ml      Physical Exam   Constitutional: He is oriented to person, place, and time. He appears well-developed and well-nourished. No distress.   Elderly     HENT:   Head: Normocephalic and atraumatic.   Eyes: EOM are normal.   Neck: Normal range of motion. Neck supple.   Cardiovascular: Normal rate, regular rhythm and normal heart sounds.   Pulmonary/Chest: Effort normal and breath sounds normal. No respiratory distress.   Abdominal: Soft. Bowel sounds are normal. He exhibits no distension. There is no tenderness.   Musculoskeletal: Normal range of motion. He exhibits no edema.   Neurological: He is alert and oriented to person, place, and time.   Skin: Skin is dry.   Left leg dressing in place   Nursing note and vitals reviewed.    Significant Labs:   CBC:   Recent Labs   Lab 08/10/19  0526 08/10/19  1807 08/11/19  0829   WBC 6.61 5.37 5.75   HGB 8.4* 8.7* 8.7*   HCT 23.7* 24.6* 24.7*    184 178     CMP:   Recent Labs   Lab 08/09/19  1636 08/10/19  0526    138   K 4.5 4.7    102   CO2 23 24   * 114*   BUN 35* 37*   CREATININE 1.5* 1.5*   CALCIUM 9.5 9.2   ANIONGAP 14 12   EGFRNONAA 45* 45*       Significant Imaging: I have reviewed all pertinent imaging results/findings within the past 24 hours.      Assessment/Plan:      Chronic diastolic congestive heart failure  --appears compensated  --avoid volume overload      CKD (chronic kidney disease) stage 3, GFR 30-59 ml/min  --close to baseline  --monitor urine output  --BMP in AM      Symptomatic anemia  ---drop in hemoglobin postoperatively  --fatigued and dizzy with activity; hypotensive while sitting in  chair  --transfuse one unit PRBC  -repeat CBC post transfusion.    8/11/19  Improved today.   Hemoglobin 8.7 after unit of blood    Dyslipidemia  --resume STATIN      CAD (coronary artery disease)  --resume ASA, STATIN, and BB        VTE Risk Mitigation (From admission, onward)        Ordered     Place sequential compression device  Until discontinued      08/09/19 1631     Place sequential compression device  Until discontinued      08/09/19 0929          Expected to return to NH today. Will sign off.     Guy Barnhart NP  Department of Hospital Medicine   [facility]

## 2019-08-11 NOTE — PT/OT/SLP PROGRESS
"Physical Therapy  Treatment    Foreign Holland   MRN: [id number]   Admitting Diagnosis: Closed fracture shaft fibula and tibia, left, with delayed healing, subsequent encounter    PT Received On: 08/11/19  PT Start Time: 1130     PT Stop Time: 1155    PT Total Time (min): 25 min       Billable Minutes:  Gait Training 25    Treatment Type: Treatment  PT/PTA: PTA     PTA Visit Number: 1       General Precautions: Standard, fall  Orthopedic Precautions: LLE toe touch weight bearing   Braces: N/A         Subjective:  Communicated with epic and prior to session.  "They doctor left,  I am not sure if he is done with my leg" ( dressing change)     Pain/Comfort  Pain Rating 1: 0/10  Pain Rating Post-Intervention 1: 0/10    Objective:   Patient found with: peripheral IV    Functional Mobility:  Bed Mobility:    Supine to sit: cga  Sit to supine: cga    Transfers:   Sit to stand: min a  Sit to stand: min a.     Gait:    Pt ambulated 60 feet x 2 with a RW and MIN A. He demonstrated self imposed NWB on the L but was educated on proper TTWB technique. No lob. Occasional unsteadiness. Needed a seated break due to fatigue but recovered quickly.         Therapeutic Activities and Exercises:  Encouraged pt to sit eob for meals    AM-PAC 6 CLICK MOBILITY  How much help from another person does this patient currently need?   1 = Unable, Total/Dependent Assistance  2 = A lot, Maximum/Moderate Assistance  3 = A little, Minimum/Contact Guard/Supervision  4 = None, Modified [place]/Independent    Turning over in bed (including adjusting bedclothes, sheets and blankets)?: 4  Sitting down on and standing up from a chair with arms (e.g., wheelchair, bedside commode, etc.): 3  Moving from lying on back to sitting on the side of the bed?: 4  Moving to and from a bed to a chair (including a wheelchair)?: 3  Need to walk in hospital room?: 3  Climbing 3-5 steps with a railing?: 1  Basic Mobility Total Score: 18    AM-PAC Raw Score CMS G-Code " Modifier Level of Impairment Assistance   6 % Total / Unable   7 - 9 CM 80 - 100% Maximal Assist   10 - 14 CL 60 - 80% Moderate Assist   15 - 19 CK 40 - 60% Moderate Assist   20 - 22 CJ 20 - 40% Minimal Assist   23 CI 1-20% SBA / CGA   24 CH 0% Independent/ Mod I     Patient left HOB elevated with all lines intact, call button in reach and bed alarm on.    Assessment:  Foreign Holland is a 74 y.o. male with a medical diagnosis of Closed fracture shaft fibula and tibia, left, with delayed healing, subsequent encounter and presents with improved mobility today. He demonstrates good strength as he is able to consistently maintain weight bearing restriction.    Rehab identified problem list/impairments: Rehab identified problem list/impairments: weakness, impaired endurance, impaired self care skills, impaired functional mobilty, gait instability, decreased lower extremity function, decreased ROM, impaired joint extensibility, orthopedic precautions    Rehab potential is excellent.    Activity tolerance: Good    Discharge recommendations: Discharge Facility/Level of Care Needs: nursing facility, skilled     Barriers to discharge:      Equipment recommendations: Equipment Needed After Discharge: none     GOALS:   Multidisciplinary Problems     Physical Therapy Goals        Problem: Physical Therapy Goal    Goal Priority Disciplines Outcome Goal Variances Interventions   Physical Therapy Goal     PT, PT/OT      Description:  PT eval complete.  The following goals should be met in 7 days  1.  Pt IND with bed mobility  2.  Pt amb 85 ft with RW with SBA and TTWB  3. Pt transfer bed to chair with RW and SPV                      PLAN:    Patient to be seen 5 x/week  to address the above listed problems via gait training, therapeutic exercises, therapeutic activities  Plan of Care expires: 08/17/19  Plan of Care reviewed with: patient         Betty Peterson, PTA  08/11/2019

## 2019-08-11 NOTE — PLAN OF CARE
Problem: Adult Inpatient Plan of Care  Goal: Plan of Care Review  Outcome: Ongoing (interventions implemented as appropriate)  Fall precautions implemented. Pt remained free from falls/injuries.  Afebrile. Pain adequately controlled.  Left leg dressing remained CDI.  Safety precautions implemented. Chart reviewed. Will continue to monitor.

## 2019-08-11 NOTE — SUBJECTIVE & OBJECTIVE
Interval History: doing well. He received one unit PRBC yesterday. He feels much better.     Review of Systems   Constitutional: Negative for chills, diaphoresis and fever.   HENT: Negative for drooling, ear pain, rhinorrhea and sore throat.    Eyes: Negative.    Respiratory: Negative for cough, shortness of breath and wheezing.    Cardiovascular: Negative for palpitations and leg swelling.   Gastrointestinal: Negative for abdominal pain, constipation, diarrhea and nausea.   Endocrine: Negative.    Genitourinary: Negative for dysuria, hematuria and urgency.   Musculoskeletal: Negative.         Left leg pain postoperatively   Skin: Negative for color change and wound.   Allergic/Immunologic: Negative.    Neurological: Positive for weakness (improving). Negative for dizziness, syncope and speech difficulty.   Hematological: Negative.    Psychiatric/Behavioral: Negative.       Objective:     Vital Signs (Most Recent):  Temp: 98.1 °F (36.7 °C) (08/11/19 0735)  Pulse: 60 (08/11/19 0735)  Resp: 18 (08/11/19 0735)  BP: 112/63 (08/11/19 0735)  SpO2: 96 % (08/11/19 0735) Vital Signs (24h Range):  Temp:  [97.8 °F (36.6 °C)-98.5 °F (36.9 °C)] 98.1 °F (36.7 °C)  Pulse:  [60-77] 60  Resp:  [16-18] 18  SpO2:  [96 %-100 %] 96 %  BP: (100-118)/(50-68) 112/63     Weight: 69.6 kg (153 lb 6 oz)  Body mass index is 24.02 kg/m².    Intake/Output Summary (Last 24 hours) at 8/11/2019 1329  Last data filed at 8/11/2019 1300  Gross per 24 hour   Intake 1450 ml   Output 600 ml   Net 850 ml      Physical Exam   Constitutional: He is oriented to person, place, and time. He appears well-developed and well-nourished. No distress.   Elderly     HENT:   Head: Normocephalic and atraumatic.   Eyes: EOM are normal.   Neck: Normal range of motion. Neck supple.   Cardiovascular: Normal rate, regular rhythm and normal heart sounds.   Pulmonary/Chest: Effort normal and breath sounds normal. No respiratory distress.   Abdominal: Soft. Bowel sounds are  normal. He exhibits no distension. There is no tenderness.   Musculoskeletal: Normal range of motion. He exhibits no edema.   Neurological: He is alert and oriented to person, place, and time.   Skin: Skin is dry.   Left leg dressing in place   Nursing note and vitals reviewed.    Significant Labs:   CBC:   Recent Labs   Lab 08/10/19  0526 08/10/19  1807 08/11/19  0829   WBC 6.61 5.37 5.75   HGB 8.4* 8.7* 8.7*   HCT 23.7* 24.6* 24.7*    184 178     CMP:   Recent Labs   Lab 08/09/19  1636 08/10/19  0526    138   K 4.5 4.7    102   CO2 23 24   * 114*   BUN 35* 37*   CREATININE 1.5* 1.5*   CALCIUM 9.5 9.2   ANIONGAP 14 12   EGFRNONAA 45* 45*       Significant Imaging: I have reviewed all pertinent imaging results/findings within the past 24 hours.

## 2019-08-15 ENCOUNTER — LAB VISIT (OUTPATIENT)
Dept: LAB | Facility: HOSPITAL | Age: 74
End: 2019-08-15
Attending: INTERNAL MEDICINE
Payer: MEDICARE

## 2019-08-15 ENCOUNTER — OFFICE VISIT (OUTPATIENT)
Dept: HEMATOLOGY/ONCOLOGY | Facility: CLINIC | Age: 74
End: 2019-08-15
Payer: MEDICARE

## 2019-08-15 VITALS
WEIGHT: 149.69 LBS | TEMPERATURE: 98 F | DIASTOLIC BLOOD PRESSURE: 72 MMHG | SYSTOLIC BLOOD PRESSURE: 149 MMHG | OXYGEN SATURATION: 97 % | BODY MASS INDEX: 23.49 KG/M2 | HEIGHT: 67 IN | HEART RATE: 61 BPM

## 2019-08-15 DIAGNOSIS — D64.9 SYMPTOMATIC ANEMIA: ICD-10-CM

## 2019-08-15 DIAGNOSIS — N18.30 STAGE 3 CHRONIC KIDNEY DISEASE: ICD-10-CM

## 2019-08-15 DIAGNOSIS — D64.9 NORMOCYTIC ANEMIA: ICD-10-CM

## 2019-08-15 DIAGNOSIS — C84.48 PERIPHERAL T CELL LYMPHOMA OF LYMPH NODES OF MULTIPLE SITES: Primary | ICD-10-CM

## 2019-08-15 DIAGNOSIS — C84.48 PERIPHERAL T CELL LYMPHOMA OF LYMPH NODES OF MULTIPLE SITES: ICD-10-CM

## 2019-08-15 DIAGNOSIS — Z79.899 OTHER LONG TERM (CURRENT) DRUG THERAPY: ICD-10-CM

## 2019-08-15 DIAGNOSIS — R60.9 EDEMA, UNSPECIFIED TYPE: ICD-10-CM

## 2019-08-15 LAB
ALBUMIN SERPL BCP-MCNC: 3.3 G/DL (ref 3.5–5.2)
ALP SERPL-CCNC: 83 U/L (ref 55–135)
ALT SERPL W/O P-5'-P-CCNC: 11 U/L (ref 10–44)
ANION GAP SERPL CALC-SCNC: 12 MMOL/L (ref 8–16)
AST SERPL-CCNC: 22 U/L (ref 10–40)
BASOPHILS # BLD AUTO: 0.01 K/UL (ref 0–0.2)
BASOPHILS NFR BLD: 0.2 % (ref 0–1.9)
BILIRUB SERPL-MCNC: 0.7 MG/DL (ref 0.1–1)
BUN SERPL-MCNC: 32 MG/DL (ref 8–23)
CALCIUM SERPL-MCNC: 9.6 MG/DL (ref 8.7–10.5)
CHLORIDE SERPL-SCNC: 103 MMOL/L (ref 95–110)
CO2 SERPL-SCNC: 26 MMOL/L (ref 23–29)
CREAT SERPL-MCNC: 1.4 MG/DL (ref 0.5–1.4)
CRP SERPL-MCNC: 7.8 MG/L (ref 0–8.2)
DIFFERENTIAL METHOD: ABNORMAL
EOSINOPHIL # BLD AUTO: 0.4 K/UL (ref 0–0.5)
EOSINOPHIL NFR BLD: 8.1 % (ref 0–8)
ERYTHROCYTE [DISTWIDTH] IN BLOOD BY AUTOMATED COUNT: 13.8 % (ref 11.5–14.5)
EST. GFR  (AFRICAN AMERICAN): 56 ML/MIN/1.73 M^2
EST. GFR  (NON AFRICAN AMERICAN): 49 ML/MIN/1.73 M^2
FERRITIN SERPL-MCNC: 1412 NG/ML (ref 20–300)
GLUCOSE SERPL-MCNC: 107 MG/DL (ref 70–110)
HCT VFR BLD AUTO: 27.4 % (ref 40–54)
HGB BLD-MCNC: 9.4 G/DL (ref 14–18)
IMM GRANULOCYTES # BLD AUTO: 0.02 K/UL (ref 0–0.04)
IMM GRANULOCYTES NFR BLD AUTO: 0.4 % (ref 0–0.5)
IRON SERPL-MCNC: 58 UG/DL (ref 45–160)
LDH SERPL L TO P-CCNC: 199 U/L (ref 110–260)
LYMPHOCYTES # BLD AUTO: 0.9 K/UL (ref 1–4.8)
LYMPHOCYTES NFR BLD: 19.4 % (ref 18–48)
MCH RBC QN AUTO: 30.1 PG (ref 27–31)
MCHC RBC AUTO-ENTMCNC: 34.3 G/DL (ref 32–36)
MCV RBC AUTO: 88 FL (ref 82–98)
MONOCYTES # BLD AUTO: 0.3 K/UL (ref 0.3–1)
MONOCYTES NFR BLD: 6.6 % (ref 4–15)
NEUTROPHILS # BLD AUTO: 3 K/UL (ref 1.8–7.7)
NEUTROPHILS NFR BLD: 65.7 % (ref 38–73)
NRBC BLD-RTO: 0 /100 WBC
PLATELET # BLD AUTO: 199 K/UL (ref 150–350)
PMV BLD AUTO: 8.7 FL (ref 9.2–12.9)
POTASSIUM SERPL-SCNC: 4.1 MMOL/L (ref 3.5–5.1)
PROT SERPL-MCNC: 7.8 G/DL (ref 6–8.4)
RBC # BLD AUTO: 3.12 M/UL (ref 4.6–6.2)
RETICS/RBC NFR AUTO: 2.8 % (ref 0.4–2)
SATURATED IRON: 27 % (ref 20–50)
SODIUM SERPL-SCNC: 141 MMOL/L (ref 136–145)
TOTAL IRON BINDING CAPACITY: 218 UG/DL (ref 250–450)
TRANSFERRIN SERPL-MCNC: 147 MG/DL (ref 200–375)
TSH SERPL DL<=0.005 MIU/L-ACNC: 0.72 UIU/ML (ref 0.4–4)
VIT B12 SERPL-MCNC: 1127 PG/ML (ref 210–950)
WBC # BLD AUTO: 4.54 K/UL (ref 3.9–12.7)

## 2019-08-15 PROCEDURE — 84165 PROTEIN E-PHORESIS SERUM: CPT | Mod: 26,,, | Performed by: PATHOLOGY

## 2019-08-15 PROCEDURE — 3077F SYST BP >= 140 MM HG: CPT | Mod: CPTII,S$GLB,, | Performed by: INTERNAL MEDICINE

## 2019-08-15 PROCEDURE — 99214 PR OFFICE/OUTPT VISIT, EST, LEVL IV, 30-39 MIN: ICD-10-PCS | Mod: S$GLB,,, | Performed by: INTERNAL MEDICINE

## 2019-08-15 PROCEDURE — 83540 ASSAY OF IRON: CPT

## 2019-08-15 PROCEDURE — 84443 ASSAY THYROID STIM HORMONE: CPT

## 2019-08-15 PROCEDURE — 84165 PATHOLOGIST INTERPRETATION SPE: ICD-10-PCS | Mod: 26,,, | Performed by: PATHOLOGY

## 2019-08-15 PROCEDURE — 3078F PR MOST RECENT DIASTOLIC BLOOD PRESSURE < 80 MM HG: ICD-10-PCS | Mod: CPTII,S$GLB,, | Performed by: INTERNAL MEDICINE

## 2019-08-15 PROCEDURE — 99999 PR PBB SHADOW E&M-EST. PATIENT-LVL IV: ICD-10-PCS | Mod: PBBFAC,,, | Performed by: INTERNAL MEDICINE

## 2019-08-15 PROCEDURE — 86140 C-REACTIVE PROTEIN: CPT

## 2019-08-15 PROCEDURE — 82607 VITAMIN B-12: CPT

## 2019-08-15 PROCEDURE — 99214 OFFICE O/P EST MOD 30 MIN: CPT | Mod: S$GLB,,, | Performed by: INTERNAL MEDICINE

## 2019-08-15 PROCEDURE — 36415 COLL VENOUS BLD VENIPUNCTURE: CPT

## 2019-08-15 PROCEDURE — 83615 LACTATE (LD) (LDH) ENZYME: CPT

## 2019-08-15 PROCEDURE — 1101F PT FALLS ASSESS-DOCD LE1/YR: CPT | Mod: CPTII,S$GLB,, | Performed by: INTERNAL MEDICINE

## 2019-08-15 PROCEDURE — 3077F PR MOST RECENT SYSTOLIC BLOOD PRESSURE >= 140 MM HG: ICD-10-PCS | Mod: CPTII,S$GLB,, | Performed by: INTERNAL MEDICINE

## 2019-08-15 PROCEDURE — 1101F PR PT FALLS ASSESS DOC 0-1 FALLS W/OUT INJ PAST YR: ICD-10-PCS | Mod: CPTII,S$GLB,, | Performed by: INTERNAL MEDICINE

## 2019-08-15 PROCEDURE — 80053 COMPREHEN METABOLIC PANEL: CPT

## 2019-08-15 PROCEDURE — 84165 PROTEIN E-PHORESIS SERUM: CPT

## 2019-08-15 PROCEDURE — 85025 COMPLETE CBC W/AUTO DIFF WBC: CPT

## 2019-08-15 PROCEDURE — 85045 AUTOMATED RETICULOCYTE COUNT: CPT

## 2019-08-15 PROCEDURE — 82728 ASSAY OF FERRITIN: CPT

## 2019-08-15 PROCEDURE — 99999 PR PBB SHADOW E&M-EST. PATIENT-LVL IV: CPT | Mod: PBBFAC,,, | Performed by: INTERNAL MEDICINE

## 2019-08-15 PROCEDURE — 83520 IMMUNOASSAY QUANT NOS NONAB: CPT

## 2019-08-15 PROCEDURE — 3078F DIAST BP <80 MM HG: CPT | Mod: CPTII,S$GLB,, | Performed by: INTERNAL MEDICINE

## 2019-08-15 NOTE — PROGRESS NOTES
Subjective:       Patient ID: Foreign Holland is a 74 y.o. male.    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 Foreign Holland in Hematology Oncology clinic at [facility] on 08/15/2019.  Mr. Foreign Holland is a 74 y.o. gentleman with Stage III Peripheral T Cell Lymphoma NOS  s/p 6 cycles of CHOP (last cycle 10/7/14) with Complete Response per PET.  Of note there was abnormal T cell population in the bone marrow, however; PCR for T Cell receptor gene rearrangement were negative.  Bone marrow biopsy performed on 6/25/15 was negative for significant pathology. Counts continue to be mildly suppressed but remain relatively stable over the previous 3+ years  He continues to do very well clinically/remains asymptomatic with relatively stable CBC.  We will arrange close follow-up with plans to repeat bone marrow biopsy if anemia/cytopenias worsen        Plan:  Stage III Peripheral T cell lymphoma NOS: with CR after CHOP x 6  --s/p 6 cycles of CHOP completed in 10/2014  --Repeat PET as clinically indicated  --continue active surveillance     Pancytopenia: Secondary to chemotherapy versus low-grade MDS  Anemia likely multifactorial with chronic kidney disease contributing  --short interval follow-up with repeat CBC/iron studies/SPEP/serum free light chains  --continue to monitor closely and plan repeat bone marrow biopsy upon significant progression        Anemia secondary to chronic kidney disease:  --consider initiation of Procrit when hemoglobin falls below 9.5     Renal insufficiency with creatinine 1.7 today:  --referred renal clinic     No history exists.     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     Family History   Problem Relation Age of Onset    Blindness Father     Cancer Neg Hx     Diabetes Neg Hx     Heart failure Neg Hx     Coronary artery disease Neg Hx     Cataracts Neg Hx     Glaucoma Neg Hx     Hypertension Neg Hx     Macular degeneration Neg Hx     Retinal detachment Neg Hx     Strabismus Neg Hx     Stroke Neg Hx     Thyroid disease Neg Hx      Social History     Socioeconomic History    Marital status: Single     Spouse name: Not on file    Number of children: Not on file    Years of education: Not on file    Highest education level: Not on file   Occupational History    Not on file   Social Needs    Financial resource strain: Not on file    Food insecurity:     Worry: Not on file     Inability: Not on file    Transportation needs:     Medical: Not on file     Non-medical: Not on file   Tobacco Use    Smoking status: Former Smoker     Packs/day: 1.00     Years: 45.00     Pack years: 45.00     Last attempt to quit: 2005     Years since quittin.6    Smokeless tobacco: Never Used    Tobacco comment: Pt no longer smokes   Substance and Sexual Activity    Alcohol use: No     Comment: former    Drug use: No    Sexual activity: Yes     Birth control/protection: None   Lifestyle    Physical activity:     Days per week: Not on file     Minutes per session: Not on file    Stress: Not on file   Relationships    Social connections:     Talks on phone: Not on file     Gets together: Not on file     Attends [employer] service: Not on file     Active member of club or organization: Not on file     Attends meetings of clubs or organizations: Not on file     Relationship status: Not on file   Other Topics Concern    Not on file   Social History Narrative    Not on file     Past Surgical History:   Procedure Laterality Date    APPLICATION, EXTERNAL FIXATION DEVICE Left 2019    Performed by Philippe Bustamante Jr., MD at [facility] OR    BIOPSY-LYMPH  NODE Left 5/21/2014    Performed by Shaun Levine MD at [facility] OR 2ND FLR    CARDIAC CATHETERIZATION      CARDIAC SURGERY  2006    CATARACT EXTRACTION W/  INTRAOCULAR LENS IMPLANT Right 05/24/2017    CATARACT EXTRACTION W/  INTRAOCULAR LENS IMPLANT Left 04/12/2017    CLOSED REDUCTION, FRACTURE, FIBULA Left 7/18/2019    Performed by Philippe [facility]amante Jr., MD at [facility] OR    CORONARY ARTERY BYPASS GRAFT      EYE SURGERY      Cataract extraction with intraocular lens implant    [id number] Right 5/28/2014    Performed by Ben Temple MD at [facility] OR    LYMPH NODES, LEFT NECK, EXCISIONAL BIOPSY  5/21/2014    MEDIPORT INSERTION, SINGLE  5/28/14    ORIF, FRACTURE, FIBULA Left 8/9/2019    Performed by Flynn White MD at [facility] OR    ORIF, FRACTURE, TIBIA Left 8/9/2019    Performed by Flynn White MD at [facility] OR    PCIOL Left 04/12/2017    DR. MCCRAY    PROSTATE SURGERY  02/2014    REMOVAL, EXTERNAL FIXATION DEVICE Left 8/9/2019    Performed by Flynn White MD at [facility] OR     Current Outpatient Medications   Medication Sig Dispense Refill    acetaminophen (TYLENOL) 325 MG tablet Take 2 tablets (650 mg total) by mouth every 4 (four) hours as needed.  0    aspirin 325 MG tablet Take 1 tablet (325 mg total) by mouth 2 (two) times daily.  0    atorvastatin (LIPITOR) 80 MG tablet 80 mg every evening.       cholecalciferol, vitamin D3, (VITAMIN D3) 400 unit Tab Take 1 tablet by mouth once daily.      fish oil-omega-3 fatty acids 300-1,000 mg capsule Take 1 g by mouth 2 (two) times daily.       FLUZONE HIGH-DOSE 2018-19, PF, 180 mcg/0.5 mL vaccine       FOLIC ACID ORAL Take 2,400 mcg by mouth once daily.       hydroCHLOROthiazide (HYDRODIURIL) 25 MG tablet Take by mouth once daily.      HYDROcodone-acetaminophen (NORCO)  mg per tablet Take 1 tablet by mouth every 6 (six) hours as needed. 20 tablet 0    metoprolol succinate (TOPROL-XL) 25 MG 24 hr tablet Take 25 mg by mouth once  daily.  2    nozaseptin (NOZIN) nasal  1 each by Each Nostril route 2 (two) times daily. 1 applicator 0    pantoprazole (PROTONIX) 40 MG tablet Take 1 tablet (40 mg total) by mouth once daily. 30 tablet 0    polyethylene glycol (GLYCOLAX) 17 gram PwPk Take by mouth daily as needed.      RANEXA 1,000 mg Tb12 TAKE 1 TABLET BY MOUTH TWICE A DAY 60 tablet 5    triamcinolone acetonide 0.1% (KENALOG) 0.1 % cream every morning.       NITROSTAT 0.4 mg SL tablet Patient states that he takes this medication as needed  3     No current facility-administered medications for this visit.        Labs:  Lab Results   Component Value Date    WBC 4.54 08/15/2019    HGB 9.4 (L) 08/15/2019    HCT 27.4 (L) 08/15/2019    MCV 88 08/15/2019     08/15/2019     BMP  Lab Results   Component Value Date     08/15/2019    K 4.1 08/15/2019     08/15/2019    CO2 26 08/15/2019    BUN 32 (H) 08/15/2019    CREATININE 1.4 08/15/2019    CALCIUM 9.6 08/15/2019    ANIONGAP 12 08/15/2019    ESTGFRAFRICA 56 (A) 08/15/2019    EGFRNONAA 49 (A) 08/15/2019     Lab Results   Component Value Date    ALT 11 08/15/2019    AST 22 08/15/2019    ALKPHOS 83 08/15/2019    BILITOT 0.7 08/15/2019       Lab Results   Component Value Date    IRON 72 05/17/2019    TIBC 262 05/17/2019    FERRITIN 670 (H) 05/17/2019     Lab Results   Component Value Date    YUBSZUIH23 744 05/17/2019     Lab Results   Component Value Date    FOLATE >40.0 (H) 09/21/2017     Lab Results   Component Value Date    TSH 0.319 (L) 07/18/2019       I have reviewed the radiology reports and examined the scan/xray images.    Review of Systems   Constitutional: Negative.    HENT: Negative.    Eyes: Negative.    Respiratory: Negative.    Cardiovascular: Negative.    Gastrointestinal: Negative.    Endocrine: Negative.    Genitourinary: Negative.    Musculoskeletal: Negative.    Skin: Negative.    Allergic/Immunologic: Negative.    Neurological: Negative.    Hematological:  Negative.    Psychiatric/Behavioral: Negative.      ECOG SCORE              Objective:     Vitals:    08/15/19 0902   BP: (!) 149/72   Pulse: 61   Temp: 97.8 °F (36.6 °C)   Body mass index is 23.45 kg/m².  Physical Exam   Constitutional: He is oriented to person, place, and time. He appears well-developed and well-nourished.   HENT:   Head: Normocephalic and atraumatic.   Eyes: Conjunctivae and EOM are normal.   Neck: Normal range of motion. Neck supple.   Cardiovascular: Normal rate and regular rhythm.   Pulmonary/Chest: Effort normal and breath sounds normal.   Abdominal: Soft. Bowel sounds are normal.   Musculoskeletal: Normal range of motion.   Neurological: He is alert and oriented to person, place, and time.   Skin: Skin is warm and dry.   Psychiatric: He has a normal mood and affect. His behavior is normal. Judgment and thought content normal.   Nursing note and vitals reviewed.        Assessment:      1. 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 Edema, unspecified type    3. 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

## 2019-08-16 ENCOUNTER — HOSPITAL ENCOUNTER (OUTPATIENT)
Dept: RADIOLOGY | Facility: HOSPITAL | Age: 74
Discharge: HOME | End: 2019-08-16
Attending: INTERNAL MEDICINE
Payer: MEDICARE

## 2019-08-16 DIAGNOSIS — R60.9 EDEMA, UNSPECIFIED TYPE: ICD-10-CM

## 2019-08-16 LAB
ALBUMIN SERPL ELPH-MCNC: 3.56 G/DL (ref 3.35–5.55)
ALPHA1 GLOB SERPL ELPH-MCNC: 0.47 G/DL (ref 0.17–0.41)
ALPHA2 GLOB SERPL ELPH-MCNC: 0.91 G/DL (ref 0.43–0.99)
B-GLOBULIN SERPL ELPH-MCNC: 0.91 G/DL (ref 0.5–1.1)
GAMMA GLOB SERPL ELPH-MCNC: 1.47 G/DL (ref 0.67–1.58)
KAPPA LC SER QL IA: 4.1 MG/DL (ref 0.33–1.94)
KAPPA LC/LAMBDA SER IA: 1.48 (ref 0.26–1.65)
LAMBDA LC SER QL IA: 2.77 MG/DL (ref 0.57–2.63)
PROT SERPL-MCNC: 7.3 G/DL (ref 6–8.4)

## 2019-08-16 PROCEDURE — 93971 US LOWER EXTREMITY VEINS LEFT: ICD-10-PCS | Mod: 26,LT,, | Performed by: RADIOLOGY

## 2019-08-16 PROCEDURE — 93971 EXTREMITY STUDY: CPT | Mod: 26,LT,, | Performed by: RADIOLOGY

## 2019-08-16 PROCEDURE — 93971 EXTREMITY STUDY: CPT | Mod: TC,LT

## 2019-08-19 LAB — PATHOLOGIST INTERPRETATION SPE: NORMAL

## 2019-08-22 DIAGNOSIS — M89.8X6 PAIN IN LEFT TIBIA: Primary | ICD-10-CM

## 2019-08-23 ENCOUNTER — OFFICE VISIT (OUTPATIENT)
Dept: ORTHOPEDICS | Facility: CLINIC | Age: 74
End: 2019-08-23
Payer: MEDICARE

## 2019-08-23 ENCOUNTER — HOSPITAL ENCOUNTER (OUTPATIENT)
Dept: RADIOLOGY | Facility: HOSPITAL | Age: 74
Discharge: HOME | End: 2019-08-23
Attending: ORTHOPAEDIC SURGERY
Payer: MEDICARE

## 2019-08-23 VITALS
HEIGHT: 67 IN | HEART RATE: 60 BPM | SYSTOLIC BLOOD PRESSURE: 135 MMHG | DIASTOLIC BLOOD PRESSURE: 84 MMHG | BODY MASS INDEX: 23.39 KG/M2 | TEMPERATURE: 97 F | WEIGHT: 149 LBS

## 2019-08-23 DIAGNOSIS — M89.8X6 PAIN IN LEFT TIBIA: ICD-10-CM

## 2019-08-23 DIAGNOSIS — S82.252D CLOSED DISPLACED COMMINUTED FRACTURE OF SHAFT OF LEFT TIBIA WITH ROUTINE HEALING, SUBSEQUENT ENCOUNTER: Primary | ICD-10-CM

## 2019-08-23 DIAGNOSIS — M89.8X6 PAIN IN LEFT TIBIA: Primary | ICD-10-CM

## 2019-08-23 PROCEDURE — 73590 XR TIBIA FIBULA 2 VIEW LEFT: ICD-10-PCS | Mod: 26,LT,, | Performed by: RADIOLOGY

## 2019-08-23 PROCEDURE — 73590 X-RAY EXAM OF LOWER LEG: CPT | Mod: 26,LT,, | Performed by: RADIOLOGY

## 2019-08-23 PROCEDURE — 99999 PR PBB SHADOW E&M-EST. PATIENT-LVL IV: ICD-10-PCS | Mod: PBBFAC,,, | Performed by: SPECIALIST

## 2019-08-23 PROCEDURE — 99999 PR PBB SHADOW E&M-EST. PATIENT-LVL IV: CPT | Mod: PBBFAC,,, | Performed by: SPECIALIST

## 2019-08-23 PROCEDURE — 73590 X-RAY EXAM OF LOWER LEG: CPT | Mod: TC,LT

## 2019-08-24 NOTE — PROGRESS NOTES
2 week post op   follow up   Procedure: Procedure(s) (LRB):  REMOVAL, EXTERNAL FIXATION DEVICE (Left)  ORIF, FRACTURE, TIBIA (Left)  OPEN REDUCTION INTERNAL FIXATION (ORIF) Fibula (Left)        Surgeon(s) and Role:     * Flynn White MD - Primary       No interim complaints.  Has been no weight bearing on leg.      Exam:  Good spirits.      Left leg incisions well healed.  Moderate edema ankle  Calf soft  Toes warm    Xray: well placed hardware without backout or breakage.  Distal tibial fragment 20% anterior translation with no angulation.  Visible early callus formation.  Mortise symmetric.    A/P: Stable progress.      Advance to weight bearing as tolerated    May bathe    Follow up 4 weeks recheck xray

## 2019-09-19 ENCOUNTER — TELEPHONE (OUTPATIENT)
Dept: RADIOLOGY | Facility: HOSPITAL | Age: 74
End: 2019-09-19

## 2019-09-23 ENCOUNTER — HOSPITAL ENCOUNTER (OUTPATIENT)
Dept: RADIOLOGY | Facility: HOSPITAL | Age: 74
Discharge: HOME | End: 2019-09-23
Attending: INTERNAL MEDICINE
Payer: MEDICARE

## 2019-09-23 DIAGNOSIS — C84.48 PERIPHERAL T CELL LYMPHOMA OF LYMPH NODES OF MULTIPLE SITES: ICD-10-CM

## 2019-09-23 PROCEDURE — 78815 PET IMAGE W/CT SKULL-THIGH: CPT | Mod: 26,PS,, | Performed by: RADIOLOGY

## 2019-09-23 PROCEDURE — 78815 PET IMAGE W/CT SKULL-THIGH: CPT | Mod: TC,PS

## 2019-09-23 PROCEDURE — A9552 F18 FDG: HCPCS

## 2019-09-23 PROCEDURE — 78815 NM PET CT ROUTINE: ICD-10-PCS | Mod: 26,PS,, | Performed by: RADIOLOGY

## 2019-10-03 ENCOUNTER — OFFICE VISIT (OUTPATIENT)
Dept: HEMATOLOGY/ONCOLOGY | Facility: CLINIC | Age: 74
End: 2019-10-03
Payer: MEDICARE

## 2019-10-03 VITALS
HEIGHT: 67 IN | HEART RATE: 61 BPM | WEIGHT: 154.75 LBS | DIASTOLIC BLOOD PRESSURE: 67 MMHG | BODY MASS INDEX: 24.29 KG/M2 | SYSTOLIC BLOOD PRESSURE: 121 MMHG | TEMPERATURE: 98 F | OXYGEN SATURATION: 98 % | RESPIRATION RATE: 20 BRPM

## 2019-10-03 DIAGNOSIS — D64.9 SYMPTOMATIC ANEMIA: ICD-10-CM

## 2019-10-03 DIAGNOSIS — C84.48 PERIPHERAL T CELL LYMPHOMA OF LYMPH NODES OF MULTIPLE SITES: Primary | ICD-10-CM

## 2019-10-03 PROCEDURE — 99999 PR PBB SHADOW E&M-EST. PATIENT-LVL IV: CPT | Mod: PBBFAC,,, | Performed by: INTERNAL MEDICINE

## 2019-10-03 PROCEDURE — 3078F DIAST BP <80 MM HG: CPT | Mod: CPTII,S$GLB,, | Performed by: INTERNAL MEDICINE

## 2019-10-03 PROCEDURE — 3078F PR MOST RECENT DIASTOLIC BLOOD PRESSURE < 80 MM HG: ICD-10-PCS | Mod: CPTII,S$GLB,, | Performed by: INTERNAL MEDICINE

## 2019-10-03 PROCEDURE — 1101F PR PT FALLS ASSESS DOC 0-1 FALLS W/OUT INJ PAST YR: ICD-10-PCS | Mod: CPTII,S$GLB,, | Performed by: INTERNAL MEDICINE

## 2019-10-03 PROCEDURE — 99999 PR PBB SHADOW E&M-EST. PATIENT-LVL IV: ICD-10-PCS | Mod: PBBFAC,,, | Performed by: INTERNAL MEDICINE

## 2019-10-03 PROCEDURE — 3074F SYST BP LT 130 MM HG: CPT | Mod: CPTII,S$GLB,, | Performed by: INTERNAL MEDICINE

## 2019-10-03 PROCEDURE — 99215 OFFICE O/P EST HI 40 MIN: CPT | Mod: S$GLB,,, | Performed by: INTERNAL MEDICINE

## 2019-10-03 PROCEDURE — 1101F PT FALLS ASSESS-DOCD LE1/YR: CPT | Mod: CPTII,S$GLB,, | Performed by: INTERNAL MEDICINE

## 2019-10-03 PROCEDURE — 3074F PR MOST RECENT SYSTOLIC BLOOD PRESSURE < 130 MM HG: ICD-10-PCS | Mod: CPTII,S$GLB,, | Performed by: INTERNAL MEDICINE

## 2019-10-03 PROCEDURE — 99215 PR OFFICE/OUTPT VISIT, EST, LEVL V, 40-54 MIN: ICD-10-PCS | Mod: S$GLB,,, | Performed by: INTERNAL MEDICINE

## 2019-10-03 NOTE — PROGRESS NOTES
Subjective:       Patient ID: Foreign Holland is a 74 y.o. male.    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 Foreign Holland in Hematology Oncology clinic at [facility] on 10/03/2019.  Mr. Foreign Holland is a 74 y.o.  gentleman with Stage III Peripheral T Cell Lymphoma NOS  s/p 6 cycles of CHOP (last cycle 10/7/14) with Complete Response per PET.  Of note there was abnormal T cell population in the bone marrow, however; PCR for T Cell receptor gene rearrangement were negative.  Bone marrow biopsy performed on 6/25/15 was negative for significant pathology. Counts continue to be mildly suppressed but remain relatively stable over the previous 3+ years  He continues to do very well clinically/remains asymptomatic with relatively stable CBC.  We will arrange close follow-up with plans to repeat bone marrow biopsy if anemia/cytopenias worsen        Plan:  Stage III Peripheral T cell lymphoma NOS: with CR after CHOP x 6  --s/p 6 cycles of CHOP completed in 10/2014  --Repeat PET as clinically indicated  --continue active surveillance     Pancytopenia: Secondary to chemotherapy versus low-grade MDS  Anemia likely multifactorial with chronic kidney disease contributing  --short interval follow-up with repeat CBC/iron studies/SPEP/serum free light chains  --continue to monitor closely and plan repeat bone marrow biopsy upon significant progression        Anemia secondary to chronic kidney disease:  --consider initiation of Procrit when hemoglobin falls below 9.5     No history exists.     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     Family History   Problem Relation Age of Onset     Blindness Father     Cancer Neg Hx     Diabetes Neg Hx     Heart failure Neg Hx     Coronary artery disease Neg Hx     Cataracts Neg Hx     Glaucoma Neg Hx     Hypertension Neg Hx     Macular degeneration Neg Hx     Retinal detachment Neg Hx     Strabismus Neg Hx     Stroke Neg Hx     Thyroid disease Neg Hx      Social History     Socioeconomic History    Marital status: Single     Spouse name: Not on file    Number of children: Not on file    Years of education: Not on file    Highest education level: Not on file   Occupational History    Not on file   Social Needs    Financial resource strain: Not on file    Food insecurity:     Worry: Not on file     Inability: Not on file    Transportation needs:     Medical: Not on file     Non-medical: Not on file   Tobacco Use    Smoking status: Former Smoker     Packs/day: 1.00     Years: 45.00     Pack years: 45.00     Last attempt to quit: 2005     Years since quittin.7    Smokeless tobacco: Never Used    Tobacco comment: Pt no longer smokes   Substance and Sexual Activity    Alcohol use: No     Comment: former    Drug use: No    Sexual activity: Yes     Birth control/protection: None   Lifestyle    Physical activity:     Days per week: Not on file     Minutes per session: Not on file    Stress: Not on file   Relationships    Social connections:     Talks on phone: Not on file     Gets together: Not on file     Attends [employer] service: Not on file     Active member of club or organization: Not on file     Attends meetings of clubs or organizations: Not on file     Relationship status: Not on file   Other Topics Concern    Not on file   Social History Narrative    Not on file     Past Surgical History:   Procedure Laterality Date    CARDIAC CATHETERIZATION      CARDIAC SURGERY  2006    CATARACT EXTRACTION W/  INTRAOCULAR LENS IMPLANT Right 2017    CATARACT EXTRACTION W/  INTRAOCULAR LENS IMPLANT Left 2017     CLOSED REDUCTION OF FRACTURE OF FIBULA Left 7/18/2019    Procedure: CLOSED REDUCTION, FRACTURE, FIBULA;  Surgeon: Philippe Bustamante Jr., MD;  Location: [facility] OR;  Service: Orthopedics;  Laterality: Left;  tibia/fibula fracture    CORONARY ARTERY BYPASS GRAFT      EYE SURGERY      Cataract extraction with intraocular lens implant    LYMPH NODES, LEFT NECK, EXCISIONAL BIOPSY  5/21/2014    MEDIPORT INSERTION, SINGLE  5/28/14    ORIF FIBULA FRACTURE Left 8/9/2019    Procedure: ORIF, FRACTURE, FIBULA;  Surgeon: Flynn White MD;  Location: [facility] OR;  Service: Orthopedics;  Laterality: Left;    ORIF TIBIA FRACTURE Left 8/9/2019    Procedure: ORIF, FRACTURE, TIBIA;  Surgeon: Flynn White MD;  Location: [facility] OR;  Service: Orthopedics;  Laterality: Left;    PCIOL Left 04/12/2017    DR. MCCRAY    PROSTATE SURGERY  02/2014    REMOVAL OF EXTERNAL FIXATION DEVICE Left 8/9/2019    Procedure: REMOVAL, EXTERNAL FIXATION DEVICE;  Surgeon: Flynn White MD;  Location: [facility] OR;  Service: Orthopedics;  Laterality: Left;     Current Outpatient Medications   Medication Sig Dispense Refill    acetaminophen (TYLENOL) 325 MG tablet Take 2 tablets (650 mg total) by mouth every 4 (four) hours as needed.  0    aspirin 325 MG tablet Take 1 tablet (325 mg total) by mouth 2 (two) times daily.  0    atorvastatin (LIPITOR) 80 MG tablet 80 mg every evening.       cholecalciferol, vitamin D3, (VITAMIN D3) 400 unit Tab Take 1 tablet by mouth once daily.      fish oil-omega-3 fatty acids 300-1,000 mg capsule Take 1 g by mouth 2 (two) times daily.       FLUZONE HIGH-DOSE 2018-19, PF, 180 mcg/0.5 mL vaccine       FOLIC ACID ORAL Take 2,400 mcg by mouth once daily.       hydroCHLOROthiazide (HYDRODIURIL) 25 MG tablet Take by mouth once daily.      HYDROcodone-acetaminophen (NORCO)  mg per tablet Take 1 tablet by mouth every 6 (six) hours as needed. 20 tablet 0    metoprolol succinate (TOPROL-XL) 25 MG 24 hr tablet Take 25  mg by mouth once daily.  2    NITROSTAT 0.4 mg SL tablet Patient states that he takes this medication as needed  3    nozaseptin (NOZIN) nasal  1 each by Each Nostril route 2 (two) times daily. 1 applicator 0    pantoprazole (PROTONIX) 40 MG tablet Take 1 tablet (40 mg total) by mouth once daily. 30 tablet 0    polyethylene glycol (GLYCOLAX) 17 gram PwPk Take by mouth daily as needed.      RANEXA 1,000 mg Tb12 TAKE 1 TABLET BY MOUTH TWICE A DAY 60 tablet 5    triamcinolone acetonide 0.1% (KENALOG) 0.1 % cream every morning.        No current facility-administered medications for this visit.        Labs:  Lab Results   Component Value Date    WBC 4.54 08/15/2019    HGB 9.4 (L) 08/15/2019    HCT 27.4 (L) 08/15/2019    MCV 88 08/15/2019     08/15/2019     BMP  Lab Results   Component Value Date     08/15/2019    K 4.1 08/15/2019     08/15/2019    CO2 26 08/15/2019    BUN 32 (H) 08/15/2019    CREATININE 1.4 08/15/2019    CALCIUM 9.6 08/15/2019    ANIONGAP 12 08/15/2019    ESTGFRAFRICA 56 (A) 08/15/2019    EGFRNONAA 49 (A) 08/15/2019     Lab Results   Component Value Date    ALT 11 08/15/2019    AST 22 08/15/2019    ALKPHOS 83 08/15/2019    BILITOT 0.7 08/15/2019       Lab Results   Component Value Date    IRON 58 08/15/2019    TIBC 218 (L) 08/15/2019    FERRITIN 1,412 (H) 08/15/2019     Lab Results   Component Value Date    [id number] 1127 (H) 08/15/2019     Lab Results   Component Value Date    FOLATE >40.0 (H) 09/21/2017     Lab Results   Component Value Date    TSH 0.722 08/15/2019       I have reviewed the radiology reports and examined the scan/xray images.    Review of Systems   Constitutional: Negative.    HENT: Negative.    Eyes: Negative.    Respiratory: Negative.    Cardiovascular: Negative.    Gastrointestinal: Negative.    Endocrine: Negative.    Genitourinary: Negative.    Musculoskeletal: Negative.    Skin: Negative.    Allergic/Immunologic: Negative.    Neurological:  Negative.    Hematological: Negative.    Psychiatric/Behavioral: Negative.      ECOG SCORE              Objective:   There were no vitals filed for this visit.There is no height or weight on file to calculate BMI.  Physical Exam   Constitutional: He is oriented to person, place, and time. He appears well-developed and well-nourished.   HENT:   Head: Normocephalic and atraumatic.   Eyes: Conjunctivae and EOM are normal.   Neck: Normal range of motion. Neck supple.   Cardiovascular: Normal rate and regular rhythm.   Pulmonary/Chest: Effort normal and breath sounds normal.   Abdominal: Soft. Bowel sounds are normal.   Musculoskeletal: Normal range of motion.   Neurological: He is alert and oriented to person, place, and time.   Skin: Skin is warm and dry.   Psychiatric: He has a normal mood and affect. His behavior is normal. Judgment and thought content normal.   Nursing note and vitals reviewed.        Assessment:      1. 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  -     CBC auto differential; Future; Expected date: 10/03/2019  -     CBC auto differential; Future; Expected date: 11/07/2019  -     CT Biopsy Bone Marrow (xpd); Future; Expected date: 10/03/2019  -     Bone Marrow Prep and Stain; Future; Expected date: 10/03/2019  -     Chromosome Analysis, Bone Marrow; Future; Expected date: 10/03/2019  -     Iron Stain, Bone Marrow; Future; Expected date: 10/03/2019  -     Leukemia/Lymphoma Screen - Bone Marrow Right Posterior Iliac Crest; Future; Expected date: 10/03/2019  -     Myelodysplastic Syndrome (MDS), FISH, Bone Marrow; Future; Expected date: 10/03/2019  -     Tissue Specimen to Pathology, Bone Marrow Aspiration/Biopsy Procedure    Symptomatic anemia  Will give retacrit for Hgb < 10.  Will get bone marrow biopsy for workup ? MDS  -     CBC auto differential; Future;  Expected date: 10/03/2019  -     CBC auto differential; Future; Expected date: 11/07/2019  -     CT Biopsy Bone Marrow (xpd); Future; Expected date: 10/03/2019  -     Bone Marrow Prep and Stain; Future; Expected date: 10/03/2019  -     Chromosome Analysis, Bone Marrow; Future; Expected date: 10/03/2019  -     Iron Stain, Bone Marrow; Future; Expected date: 10/03/2019  -     Leukemia/Lymphoma Screen - Bone Marrow Right Posterior Iliac Crest; Future; Expected date: 10/03/2019  -     Myelodysplastic Syndrome (MDS), FISH, Bone Marrow; Future; Expected date: 10/03/2019  -     Tissue Specimen to Pathology, Bone Marrow Aspiration/Biopsy Procedure

## 2019-10-10 ENCOUNTER — LAB VISIT (OUTPATIENT)
Dept: LAB | Facility: HOSPITAL | Age: 74
End: 2019-10-10
Attending: INTERNAL MEDICINE
Payer: MEDICARE

## 2019-10-10 DIAGNOSIS — D64.9 SYMPTOMATIC ANEMIA: ICD-10-CM

## 2019-10-10 DIAGNOSIS — C84.48 PERIPHERAL T CELL LYMPHOMA OF LYMPH NODES OF MULTIPLE SITES: ICD-10-CM

## 2019-10-10 LAB
BASOPHILS # BLD AUTO: 0.02 K/UL (ref 0–0.2)
BASOPHILS NFR BLD: 0.5 % (ref 0–1.9)
DIFFERENTIAL METHOD: ABNORMAL
EOSINOPHIL # BLD AUTO: 0.4 K/UL (ref 0–0.5)
EOSINOPHIL NFR BLD: 9.7 % (ref 0–8)
ERYTHROCYTE [DISTWIDTH] IN BLOOD BY AUTOMATED COUNT: 13.4 % (ref 11.5–14.5)
HCT VFR BLD AUTO: 27.7 % (ref 40–54)
HGB BLD-MCNC: 9.7 G/DL (ref 14–18)
IMM GRANULOCYTES # BLD AUTO: 0 K/UL (ref 0–0.04)
IMM GRANULOCYTES NFR BLD AUTO: 0 % (ref 0–0.5)
LYMPHOCYTES # BLD AUTO: 1.5 K/UL (ref 1–4.8)
LYMPHOCYTES NFR BLD: 35.9 % (ref 18–48)
MCH RBC QN AUTO: 30.4 PG (ref 27–31)
MCHC RBC AUTO-ENTMCNC: 35 G/DL (ref 32–36)
MCV RBC AUTO: 87 FL (ref 82–98)
MONOCYTES # BLD AUTO: 0.3 K/UL (ref 0.3–1)
MONOCYTES NFR BLD: 7.8 % (ref 4–15)
NEUTROPHILS # BLD AUTO: 1.9 K/UL (ref 1.8–7.7)
NEUTROPHILS NFR BLD: 46.1 % (ref 38–73)
NRBC BLD-RTO: 0 /100 WBC
PLATELET # BLD AUTO: 171 K/UL (ref 150–350)
PMV BLD AUTO: 9.2 FL (ref 9.2–12.9)
RBC # BLD AUTO: 3.19 M/UL (ref 4.6–6.2)
WBC # BLD AUTO: 4.12 K/UL (ref 3.9–12.7)

## 2019-10-10 PROCEDURE — 85025 COMPLETE CBC W/AUTO DIFF WBC: CPT

## 2019-10-10 PROCEDURE — 36415 COLL VENOUS BLD VENIPUNCTURE: CPT

## 2019-10-17 ENCOUNTER — TELEPHONE (OUTPATIENT)
Dept: RADIOLOGY | Facility: HOSPITAL | Age: 74
End: 2019-10-17

## 2019-10-17 DIAGNOSIS — R59.1 LYMPHADENOPATHY: ICD-10-CM

## 2019-10-17 DIAGNOSIS — N28.9 RENAL INSUFFICIENCY: ICD-10-CM

## 2019-10-17 DIAGNOSIS — D70.9 NEUTROPENIC FEVER: ICD-10-CM

## 2019-10-17 DIAGNOSIS — C84.48 PERIPHERAL T CELL LYMPHOMA OF LYMPH NODES OF MULTIPLE SITES: ICD-10-CM

## 2019-10-17 DIAGNOSIS — I50.32 CHRONIC DIASTOLIC CONGESTIVE HEART FAILURE: ICD-10-CM

## 2019-10-17 DIAGNOSIS — D61.810 PANCYTOPENIA DUE TO CHEMOTHERAPY: ICD-10-CM

## 2019-10-17 DIAGNOSIS — T45.1X5A CHEMOTHERAPY INDUCED NEUTROPENIA: ICD-10-CM

## 2019-10-17 DIAGNOSIS — I25.2 OLD MI (MYOCARDIAL INFARCTION): ICD-10-CM

## 2019-10-17 DIAGNOSIS — E87.6 HYPOKALEMIA: ICD-10-CM

## 2019-10-17 DIAGNOSIS — I10 ESSENTIAL HYPERTENSION: ICD-10-CM

## 2019-10-17 DIAGNOSIS — N18.30 CKD (CHRONIC KIDNEY DISEASE) STAGE 3, GFR 30-59 ML/MIN: Primary | ICD-10-CM

## 2019-10-17 DIAGNOSIS — D70.1 CHEMOTHERAPY INDUCED NEUTROPENIA: ICD-10-CM

## 2019-10-17 DIAGNOSIS — C85.90 LYMPHOMA, UNSPECIFIED BODY REGION, UNSPECIFIED LYMPHOMA TYPE: ICD-10-CM

## 2019-10-17 DIAGNOSIS — R50.81 NEUTROPENIC FEVER: ICD-10-CM

## 2019-10-17 DIAGNOSIS — D64.9 SYMPTOMATIC ANEMIA: ICD-10-CM

## 2019-10-22 ENCOUNTER — INFUSION (OUTPATIENT)
Dept: INFUSION THERAPY | Facility: HOSPITAL | Age: 74
End: 2019-10-22
Attending: INTERNAL MEDICINE
Payer: MEDICARE

## 2019-10-22 VITALS
DIASTOLIC BLOOD PRESSURE: 75 MMHG | HEIGHT: 67 IN | WEIGHT: 154.75 LBS | BODY MASS INDEX: 24.29 KG/M2 | RESPIRATION RATE: 18 BRPM | SYSTOLIC BLOOD PRESSURE: 170 MMHG | OXYGEN SATURATION: 99 % | TEMPERATURE: 97 F | HEART RATE: 66 BPM

## 2019-10-22 DIAGNOSIS — C85.90 LYMPHOMA, UNSPECIFIED BODY REGION, UNSPECIFIED LYMPHOMA TYPE: ICD-10-CM

## 2019-10-22 DIAGNOSIS — N18.30 CKD (CHRONIC KIDNEY DISEASE) STAGE 3, GFR 30-59 ML/MIN: Primary | ICD-10-CM

## 2019-10-22 DIAGNOSIS — R11.15 EMESIS, PERSISTENT: ICD-10-CM

## 2019-10-22 PROCEDURE — 96372 THER/PROPH/DIAG INJ SC/IM: CPT

## 2019-10-22 PROCEDURE — 63600175 PHARM REV CODE 636 W HCPCS: Performed by: INTERNAL MEDICINE

## 2019-10-22 RX ADMIN — EPOETIN ALFA-EPBX 3500 UNITS: 10000 INJECTION, SOLUTION INTRAVENOUS; SUBCUTANEOUS at 09:10

## 2019-10-22 NOTE — PATIENT INSTRUCTIONS
Epoetin Landon injection  What is this medicine?  EPOETIN LANDON (e KRISTOFER e tin AL fa) helps your body make more red blood cells. This medicine is used to treat anemia caused by chronic kidney failure, cancer chemotherapy, or HIV-therapy. It may also be used before surgery if you have anemia.  How should I use this medicine?  This medicine is for injection into a vein or under the skin. It is usually given by a health care professional in a hospital or clinic setting.  If you get this medicine at home, you will be taught how to prepare and give this medicine. Use exactly as directed. Take your medicine at regular intervals. Do not take your medicine more often than directed.  It is important that you put your used needles and syringes in a special sharps container. Do not put them in a trash can. If you do not have a sharps container, call your pharmacist or healthcare provider to get one.  Talk to your pediatrician regarding the use of this medicine in children. While this drug may be prescribed for selected conditions, precautions do apply.  What side effects may I notice from receiving this medicine?  Side effects that you should report to your doctor or health care professional as soon as possible:  · allergic reactions like skin rash, itching or hives, swelling of the face, lips, or tongue  · breathing problems  · changes in vision  · chest pain  · confusion, trouble speaking or understanding  · feeling faint or lightheaded, falls  · high blood pressure  · muscle aches or pains  · pain, swelling, warmth in the leg  · rapid weight gain  · severe headaches  · sudden numbness or weakness of the face, arm or leg  · trouble walking, dizziness, loss of balance or coordination  · seizures (convulsions)  · swelling of the ankles, feet, hands  · unusually weak or tired  Side effects that usually do not require medical attention (report to your doctor or health care professional if they continue or are  bothersome):  · diarrhea  · fever, chills (flu-like symptoms)  · headaches  · nausea, vomiting  · redness, stinging, or swelling at site where injected  What may interact with this medicine?  Do not take this medicine with any of the following medications:  · darbepoetin corinna  What if I miss a dose?  If you miss a dose, take it as soon as you can. If it is almost time for your next dose, take only that dose. Do not take double or extra doses.  Where should I keep my medicine?  Keep out of the reach of children.  Store in a refrigerator between 2 and 8 degrees C (36 and 46 degrees F). Do not freeze or shake. Throw away any unused portion if using a single-dose vial. Multi-dose vials can be kept in the refrigerator for up to 21 days after the initial dose. Throw away unused medicine.  What should I tell my health care provider before I take this medicine?  They need to know if you have any of these conditions:  · blood clotting disorders  · cancer patient not on chemotherapy  · cystic fibrosis  · heart disease, such as angina or heart failure  · hemoglobin level of 12 g/dL or greater  · high blood pressure  · low levels of folate, iron, or vitamin B12  · seizures  · an unusual or allergic reaction to erythropoietin, albumin, benzyl alcohol, hamster proteins, other medicines, foods, dyes, or preservatives  · pregnant or trying to get pregnant  · breast-feeding  What should I watch for while using this medicine?  Visit your prescriber or health care professional for regular checks on your progress and for the needed blood tests and blood pressure measurements. It is especially important for the doctor to make sure your hemoglobin level is in the desired range, to limit the risk of potential side effects and to give you the best benefit. Keep all appointments for any recommended tests. Check your blood pressure as directed. Ask your doctor what your blood pressure should be and when you should contact him or her.  As  your body makes more red blood cells, you may need to take iron, folic acid, or vitamin B supplements. Ask your doctor or health care provider which products are right for you. If you have kidney disease continue dietary restrictions, even though this medication can make you feel better. Talk with your doctor or health care professional about the foods you eat and the vitamins that you take.  NOTE:This sheet is a summary. It may not cover all possible information. If you have questions about this medicine, talk to your doctor, pharmacist, or health care provider. Copyright© 2017 [employer]

## 2019-10-22 NOTE — DISCHARGE INSTRUCTIONS
[facility]  [address] [facility]  [address]  [phone] phone     [phone] fax  Hours of Operation: Monday- Friday 8:00am- 5:00pm  After hours phone  [phone]  Hematology / Oncology Physicians on call      Dr. Luis Pimentel    Please call with any concerns regarding your appointment today.FALL PREVENTION   Falls often occur due to slipping, tripping or losing your balance. Here are ways to reduce your risk of falling again.   Was there anything that caused your fall that can be fixed, removed or replaced?   Make your home safe by keeping walkways clear of objects you may trip over.   Use non-slip pads under rugs.   Do not walk in poorly lit areas.   Do not stand on chairs or wobbly ladders.   Use caution when reaching overhead or looking upward. This position can cause a loss of balance.   Be sure your shoes fit properly, have non-slip bottoms and are in good condition.   Be cautious when going up and down stairs, curbs, and when walking on uneven sidewalks.   If your balance is poor, consider using a cane or walker.   If your fall was related to alcohol use, stop or limit alcohol intake.   If your fall was related to use of sleeping medicines, talk to your doctor about this. You may need to reduce your dosage at bedtime if you awaken during the night to go to the bathroom.   To reduce the need for nighttime bathroom trips:   Avoid drinking fluids for several hours before going to bed   Empty your bladder before going to bed   Men can keep a urinal at the bedside   © 4960-5856 [employer] Levin, [address] [place] [address], [place] [address]. All rights reserved. This information is not intended as a substitute for professional medical care. Always follow your healthcare professional's instructions.

## 2019-10-22 NOTE — NURSING
Dr. Smith made aware of pt's BP of 170/75. MD romano with giving injection. Dose of 3500 units verified with Dr. Pimentel. Ok to proceed with injection.

## 2019-10-31 ENCOUNTER — OFFICE VISIT (OUTPATIENT)
Dept: HEMATOLOGY/ONCOLOGY | Facility: CLINIC | Age: 74
End: 2019-10-31
Payer: MEDICARE

## 2019-10-31 ENCOUNTER — LAB VISIT (OUTPATIENT)
Dept: LAB | Facility: HOSPITAL | Age: 74
End: 2019-10-31
Attending: INTERNAL MEDICINE
Payer: MEDICARE

## 2019-10-31 VITALS
TEMPERATURE: 98 F | HEIGHT: 67 IN | SYSTOLIC BLOOD PRESSURE: 152 MMHG | WEIGHT: 160.94 LBS | BODY MASS INDEX: 25.26 KG/M2 | HEART RATE: 56 BPM | OXYGEN SATURATION: 99 % | DIASTOLIC BLOOD PRESSURE: 77 MMHG

## 2019-10-31 DIAGNOSIS — D64.9 SYMPTOMATIC ANEMIA: ICD-10-CM

## 2019-10-31 DIAGNOSIS — D53.9 NUTRITIONAL ANEMIA, UNSPECIFIED: ICD-10-CM

## 2019-10-31 DIAGNOSIS — C84.48 PERIPHERAL T CELL LYMPHOMA OF LYMPH NODES OF MULTIPLE SITES: ICD-10-CM

## 2019-10-31 DIAGNOSIS — D53.9 NUTRITIONAL ANEMIA: ICD-10-CM

## 2019-10-31 DIAGNOSIS — D61.818 PANCYTOPENIA: Primary | ICD-10-CM

## 2019-10-31 LAB
BASOPHILS # BLD AUTO: 0.01 K/UL (ref 0–0.2)
BASOPHILS NFR BLD: 0.3 % (ref 0–1.9)
DIFFERENTIAL METHOD: ABNORMAL
EOSINOPHIL # BLD AUTO: 0.2 K/UL (ref 0–0.5)
EOSINOPHIL NFR BLD: 6 % (ref 0–8)
ERYTHROCYTE [DISTWIDTH] IN BLOOD BY AUTOMATED COUNT: 13.3 % (ref 11.5–14.5)
HCT VFR BLD AUTO: 29.7 % (ref 40–54)
HGB BLD-MCNC: 10.1 G/DL (ref 14–18)
IMM GRANULOCYTES # BLD AUTO: 0 K/UL (ref 0–0.04)
IMM GRANULOCYTES NFR BLD AUTO: 0 % (ref 0–0.5)
LYMPHOCYTES # BLD AUTO: 1.2 K/UL (ref 1–4.8)
LYMPHOCYTES NFR BLD: 38.6 % (ref 18–48)
MCH RBC QN AUTO: 30 PG (ref 27–31)
MCHC RBC AUTO-ENTMCNC: 34 G/DL (ref 32–36)
MCV RBC AUTO: 88 FL (ref 82–98)
MONOCYTES # BLD AUTO: 0.3 K/UL (ref 0.3–1)
MONOCYTES NFR BLD: 8.2 % (ref 4–15)
NEUTROPHILS # BLD AUTO: 1.5 K/UL (ref 1.8–7.7)
NEUTROPHILS NFR BLD: 46.9 % (ref 38–73)
NRBC BLD-RTO: 0 /100 WBC
PLATELET # BLD AUTO: 174 K/UL (ref 150–350)
PMV BLD AUTO: 9.6 FL (ref 9.2–12.9)
RBC # BLD AUTO: 3.37 M/UL (ref 4.6–6.2)
WBC # BLD AUTO: 3.16 K/UL (ref 3.9–12.7)

## 2019-10-31 PROCEDURE — 3077F SYST BP >= 140 MM HG: CPT | Mod: CPTII,S$GLB,, | Performed by: INTERNAL MEDICINE

## 2019-10-31 PROCEDURE — 1101F PR PT FALLS ASSESS DOC 0-1 FALLS W/OUT INJ PAST YR: ICD-10-PCS | Mod: CPTII,S$GLB,, | Performed by: INTERNAL MEDICINE

## 2019-10-31 PROCEDURE — 99214 PR OFFICE/OUTPT VISIT, EST, LEVL IV, 30-39 MIN: ICD-10-PCS | Mod: S$GLB,,, | Performed by: INTERNAL MEDICINE

## 2019-10-31 PROCEDURE — 85025 COMPLETE CBC W/AUTO DIFF WBC: CPT

## 2019-10-31 PROCEDURE — 99999 PR PBB SHADOW E&M-EST. PATIENT-LVL IV: CPT | Mod: PBBFAC,,, | Performed by: INTERNAL MEDICINE

## 2019-10-31 PROCEDURE — 1101F PT FALLS ASSESS-DOCD LE1/YR: CPT | Mod: CPTII,S$GLB,, | Performed by: INTERNAL MEDICINE

## 2019-10-31 PROCEDURE — 36415 COLL VENOUS BLD VENIPUNCTURE: CPT

## 2019-10-31 PROCEDURE — 3077F PR MOST RECENT SYSTOLIC BLOOD PRESSURE >= 140 MM HG: ICD-10-PCS | Mod: CPTII,S$GLB,, | Performed by: INTERNAL MEDICINE

## 2019-10-31 PROCEDURE — 99999 PR PBB SHADOW E&M-EST. PATIENT-LVL IV: ICD-10-PCS | Mod: PBBFAC,,, | Performed by: INTERNAL MEDICINE

## 2019-10-31 PROCEDURE — 99214 OFFICE O/P EST MOD 30 MIN: CPT | Mod: S$GLB,,, | Performed by: INTERNAL MEDICINE

## 2019-10-31 PROCEDURE — 3078F PR MOST RECENT DIASTOLIC BLOOD PRESSURE < 80 MM HG: ICD-10-PCS | Mod: CPTII,S$GLB,, | Performed by: INTERNAL MEDICINE

## 2019-10-31 PROCEDURE — 3078F DIAST BP <80 MM HG: CPT | Mod: CPTII,S$GLB,, | Performed by: INTERNAL MEDICINE

## 2019-10-31 RX ORDER — QUINAPRIL 10 MG/1
10 TABLET ORAL DAILY
Refills: 2 | COMMUNITY
Start: 2019-10-18

## 2019-10-31 NOTE — PROGRESS NOTES
Subjective:       Patient ID: Foreign Holland is a 74 y.o. male.    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 Foreign Holland in Hematology Oncology clinic at [facility] on 10/31/2019.  Mr. Foreign Holland is a 74 y.o. gentleman with Stage III Peripheral T Cell Lymphoma NOS  s/p 6 cycles of CHOP (last cycle 10/7/14) with Complete Response per PET.  Of note there was abnormal T cell population in the bone marrow, however; PCR for T Cell receptor gene rearrangement were negative.  Bone marrow biopsy performed on 6/25/15 was negative for significant pathology. Counts continue to be mildly suppressed but remain relatively stable over the previous 3+ years  He continues to do very well clinically/remains asymptomatic with relatively stable CBC.  We will arrange close follow-up with plans to repeat bone marrow biopsy if anemia/cytopenias worsen        Plan:  Stage III Peripheral T cell lymphoma NOS: with CR after CHOP x 6  --s/p 6 cycles of CHOP completed in 10/2014  --Repeat PET as clinically indicated  --continue active surveillance     Pancytopenia: Secondary to chemotherapy versus low-grade MDS  Anemia likely multifactorial with chronic kidney disease contributing  --short interval follow-up with repeat CBC/iron studies/SPEP/serum free light chains  --continue to monitor closely and plan repeat bone marrow biopsy upon significant progression        Anemia secondary to chronic kidney disease:  --consider initiation of Procrit when hemoglobin falls below 9.5     No history exists.     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     Family History   Problem Relation Age of Onset     Blindness Father     Cancer Neg Hx     Diabetes Neg Hx     Heart failure Neg Hx     Coronary artery disease Neg Hx     Cataracts Neg Hx     Glaucoma Neg Hx     Hypertension Neg Hx     Macular degeneration Neg Hx     Retinal detachment Neg Hx     Strabismus Neg Hx     Stroke Neg Hx     Thyroid disease Neg Hx      Social History     Socioeconomic History    Marital status: Single     Spouse name: Not on file    Number of children: Not on file    Years of education: Not on file    Highest education level: Not on file   Occupational History    Not on file   Social Needs    Financial resource strain: Not on file    Food insecurity:     Worry: Not on file     Inability: Not on file    Transportation needs:     Medical: Not on file     Non-medical: Not on file   Tobacco Use    Smoking status: Former Smoker     Packs/day: 1.00     Years: 45.00     Pack years: 45.00     Last attempt to quit: 2005     Years since quittin.8    Smokeless tobacco: Never Used    Tobacco comment: Pt no longer smokes   Substance and Sexual Activity    Alcohol use: No     Comment: former    Drug use: No    Sexual activity: Yes     Birth control/protection: None   Lifestyle    Physical activity:     Days per week: Not on file     Minutes per session: Not on file    Stress: Not on file   Relationships    Social connections:     Talks on phone: Not on file     Gets together: Not on file     Attends [employer] service: Not on file     Active member of club or organization: Not on file     Attends meetings of clubs or organizations: Not on file     Relationship status: Not on file   Other Topics Concern    Not on file   Social History Narrative    Not on file     Past Surgical History:   Procedure Laterality Date    CARDIAC CATHETERIZATION      CARDIAC SURGERY  2006    CATARACT EXTRACTION W/  INTRAOCULAR LENS IMPLANT Right 2017    CATARACT EXTRACTION W/  INTRAOCULAR LENS IMPLANT Left 2017     CLOSED REDUCTION OF FRACTURE OF FIBULA Left 7/18/2019    Procedure: CLOSED REDUCTION, FRACTURE, FIBULA;  Surgeon: Philippe Bustamante Jr., MD;  Location: [facility] OR;  Service: Orthopedics;  Laterality: Left;  tibia/fibula fracture    CORONARY ARTERY BYPASS GRAFT      EYE SURGERY      Cataract extraction with intraocular lens implant    LYMPH NODES, LEFT NECK, EXCISIONAL BIOPSY  5/21/2014    MEDIPORT INSERTION, SINGLE  5/28/14    ORIF FIBULA FRACTURE Left 8/9/2019    Procedure: ORIF, FRACTURE, FIBULA;  Surgeon: Flynn White MD;  Location: [facility] OR;  Service: Orthopedics;  Laterality: Left;    ORIF TIBIA FRACTURE Left 8/9/2019    Procedure: ORIF, FRACTURE, TIBIA;  Surgeon: Flynn White MD;  Location: [facility] OR;  Service: Orthopedics;  Laterality: Left;    PCIOL Left 04/12/2017    DR. MCRCAY    PROSTATE SURGERY  02/2014    REMOVAL OF EXTERNAL FIXATION DEVICE Left 8/9/2019    Procedure: REMOVAL, EXTERNAL FIXATION DEVICE;  Surgeon: Flynn White MD;  Location: [facility];  Service: Orthopedics;  Laterality: Left;     Current Outpatient Medications   Medication Sig Dispense Refill    acetaminophen (TYLENOL) 325 MG tablet Take 2 tablets (650 mg total) by mouth every 4 (four) hours as needed.  0    atorvastatin (LIPITOR) 80 MG tablet 80 mg every evening.       cholecalciferol, vitamin D3, (VITAMIN D3) 400 unit Tab Take 1 tablet by mouth once daily.      fish oil-omega-3 fatty acids 300-1,000 mg capsule Take 1 g by mouth 2 (two) times daily.       FOLIC ACID ORAL Take 2,400 mcg by mouth once daily.       hydroCHLOROthiazide (HYDRODIURIL) 25 MG tablet Take by mouth once daily.      HYDROcodone-acetaminophen (NORCO)  mg per tablet Take 1 tablet by mouth every 6 (six) hours as needed. 20 tablet 0    metoprolol succinate (TOPROL-XL) 25 MG 24 hr tablet Take 25 mg by mouth once daily.  2    NITROSTAT 0.4 mg SL tablet Patient states that he takes this medication as needed  3    nozaseptin (NOZIN) nasal   1 each by Each Nostril route 2 (two) times daily. 1 applicator 0    polyethylene glycol (GLYCOLAX) 17 gram PwPk Take by mouth daily as needed.      quinapril (ACCUPRIL) 10 MG tablet Take 10 mg by mouth once daily.  2    RANEXA 1,000 mg Tb12 TAKE 1 TABLET BY MOUTH TWICE A DAY 60 tablet 5    triamcinolone acetonide 0.1% (KENALOG) 0.1 % cream every morning.       aspirin 325 MG tablet Take 1 tablet (325 mg total) by mouth 2 (two) times daily.  0    FLUZONE HIGH-DOSE 2018-19, PF, 180 mcg/0.5 mL vaccine       pantoprazole (PROTONIX) 40 MG tablet Take 1 tablet (40 mg total) by mouth once daily. 30 tablet 0     No current facility-administered medications for this visit.        Labs:  Lab Results   Component Value Date    WBC 3.16 (L) 10/31/2019    HGB 10.1 (L) 10/31/2019    HCT 29.7 (L) 10/31/2019    MCV 88 10/31/2019     10/31/2019     BMP  Lab Results   Component Value Date     08/15/2019    K 4.1 08/15/2019     08/15/2019    CO2 26 08/15/2019    BUN 32 (H) 08/15/2019    CREATININE 1.4 08/15/2019    CALCIUM 9.6 08/15/2019    ANIONGAP 12 08/15/2019    ESTGFRAFRICA 56 (A) 08/15/2019    EGFRNONAA 49 (A) 08/15/2019     Lab Results   Component Value Date    ALT 11 08/15/2019    AST 22 08/15/2019    ALKPHOS 83 08/15/2019    BILITOT 0.7 08/15/2019       Lab Results   Component Value Date    IRON 58 08/15/2019    TIBC 218 (L) 08/15/2019    FERRITIN 1,412 (H) 08/15/2019     Lab Results   Component Value Date    KCGDHBUE20 1127 (H) 08/15/2019     Lab Results   Component Value Date    FOLATE >40.0 (H) 09/21/2017     Lab Results   Component Value Date    TSH 0.722 08/15/2019       I have reviewed the radiology reports and examined the scan/xray images.    Review of Systems  ECOG SCORE    0 - Fully active-able to carry on all pre-disease performance without restriction            Objective:     Vitals:    10/31/19 0956   BP: (!) 152/77   Pulse: (!) 56   Temp: 98.4 °F (36.9 °C)   Body mass index is  25.21 kg/m².  Physical Exam   Constitutional: He is oriented to person, place, and time. He appears well-developed and well-nourished.   HENT:   Head: Normocephalic and atraumatic.   Eyes: Conjunctivae and EOM are normal.   Neck: Normal range of motion. Neck supple.   Cardiovascular: Normal rate and regular rhythm.   Pulmonary/Chest: Effort normal and breath sounds normal.   Abdominal: Soft. Bowel sounds are normal.   Musculoskeletal: Normal range of motion.   Neurological: He is alert and oriented to person, place, and time.   Skin: Skin is warm and dry.   Psychiatric: He has a normal mood and affect. His behavior is normal. Judgment and thought content normal.   Nursing note and vitals reviewed.        Assessment:      1. Pancytopenia    2. 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 Nutritional anemia    4. Nutritional anemia, unspecified            Plan:     Pancytopenia    Stable.  Will hold off bone marrow biopsy.  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    Nutritional anemia  -     TSH; Future; Expected date: 01/31/2020  -     Folate; Future; Expected date: 01/31/2020

## 2019-11-12 ENCOUNTER — TELEPHONE (OUTPATIENT)
Dept: CARDIOLOGY | Facility: CLINIC | Age: 74
End: 2019-11-12

## 2019-11-20 ENCOUNTER — OFFICE VISIT (OUTPATIENT)
Dept: CARDIOLOGY | Facility: CLINIC | Age: 74
End: 2019-11-20
Payer: MEDICARE

## 2019-11-20 VITALS
SYSTOLIC BLOOD PRESSURE: 126 MMHG | BODY MASS INDEX: 24.15 KG/M2 | DIASTOLIC BLOOD PRESSURE: 68 MMHG | WEIGHT: 153.88 LBS | OXYGEN SATURATION: 98 % | HEIGHT: 67 IN | HEART RATE: 58 BPM

## 2019-11-20 DIAGNOSIS — I50.32 CHRONIC DIASTOLIC CONGESTIVE HEART FAILURE: ICD-10-CM

## 2019-11-20 DIAGNOSIS — I25.2 OLD MI (MYOCARDIAL INFARCTION): ICD-10-CM

## 2019-11-20 DIAGNOSIS — Z95.1 S/P CORONARY ARTERY BYPASS GRAFT X 2: ICD-10-CM

## 2019-11-20 DIAGNOSIS — E78.5 DYSLIPIDEMIA: ICD-10-CM

## 2019-11-20 DIAGNOSIS — I10 ESSENTIAL HYPERTENSION: ICD-10-CM

## 2019-11-20 DIAGNOSIS — I25.10 CORONARY ARTERY DISEASE INVOLVING NATIVE CORONARY ARTERY OF NATIVE HEART WITHOUT ANGINA PECTORIS: Primary | ICD-10-CM

## 2019-11-20 PROCEDURE — 1126F PR PAIN SEVERITY QUANTIFIED, NO PAIN PRESENT: ICD-10-PCS | Mod: S$GLB,,, | Performed by: INTERNAL MEDICINE

## 2019-11-20 PROCEDURE — 3074F SYST BP LT 130 MM HG: CPT | Mod: CPTII,S$GLB,, | Performed by: INTERNAL MEDICINE

## 2019-11-20 PROCEDURE — 1100F PR PT FALLS ASSESS DOC 2+ FALLS/FALL W/INJURY/YR: ICD-10-PCS | Mod: CPTII,S$GLB,, | Performed by: INTERNAL MEDICINE

## 2019-11-20 PROCEDURE — 1100F PTFALLS ASSESS-DOCD GE2>/YR: CPT | Mod: CPTII,S$GLB,, | Performed by: INTERNAL MEDICINE

## 2019-11-20 PROCEDURE — 1126F AMNT PAIN NOTED NONE PRSNT: CPT | Mod: S$GLB,,, | Performed by: INTERNAL MEDICINE

## 2019-11-20 PROCEDURE — 99214 OFFICE O/P EST MOD 30 MIN: CPT | Mod: S$GLB,,, | Performed by: INTERNAL MEDICINE

## 2019-11-20 PROCEDURE — 3074F PR MOST RECENT SYSTOLIC BLOOD PRESSURE < 130 MM HG: ICD-10-PCS | Mod: CPTII,S$GLB,, | Performed by: INTERNAL MEDICINE

## 2019-11-20 PROCEDURE — 1159F MED LIST DOCD IN RCRD: CPT | Mod: S$GLB,,, | Performed by: INTERNAL MEDICINE

## 2019-11-20 PROCEDURE — 3288F PR FALLS RISK ASSESSMENT DOCUMENTED: ICD-10-PCS | Mod: CPTII,S$GLB,, | Performed by: INTERNAL MEDICINE

## 2019-11-20 PROCEDURE — 99999 PR PBB SHADOW E&M-EST. PATIENT-LVL III: CPT | Mod: PBBFAC,,, | Performed by: INTERNAL MEDICINE

## 2019-11-20 PROCEDURE — 99214 PR OFFICE/OUTPT VISIT, EST, LEVL IV, 30-39 MIN: ICD-10-PCS | Mod: S$GLB,,, | Performed by: INTERNAL MEDICINE

## 2019-11-20 PROCEDURE — 3288F FALL RISK ASSESSMENT DOCD: CPT | Mod: CPTII,S$GLB,, | Performed by: INTERNAL MEDICINE

## 2019-11-20 PROCEDURE — 99999 PR PBB SHADOW E&M-EST. PATIENT-LVL III: ICD-10-PCS | Mod: PBBFAC,,, | Performed by: INTERNAL MEDICINE

## 2019-11-20 PROCEDURE — 1159F PR MEDICATION LIST DOCUMENTED IN MEDICAL RECORD: ICD-10-PCS | Mod: S$GLB,,, | Performed by: INTERNAL MEDICINE

## 2019-11-20 PROCEDURE — 3078F PR MOST RECENT DIASTOLIC BLOOD PRESSURE < 80 MM HG: ICD-10-PCS | Mod: CPTII,S$GLB,, | Performed by: INTERNAL MEDICINE

## 2019-11-20 PROCEDURE — 3078F DIAST BP <80 MM HG: CPT | Mod: CPTII,S$GLB,, | Performed by: INTERNAL MEDICINE

## 2019-11-20 RX ORDER — UBIDECARENONE 75 MG
500 CAPSULE ORAL DAILY
COMMUNITY
End: 2020-08-25

## 2019-11-20 NOTE — PROGRESS NOTES
Subjective:   Patient ID:  Foreign Holland is a 74 y.o. male who presents for follow-up of Coronary Artery Disease (yearly f/u)  Patient denies CP, angina or anginal equivalent.Pt active evryday without sx. Echo is nml.    Hypertension   This is a chronic problem. The current episode started more than 1 year ago. The problem has been gradually improving since onset. The problem is controlled. Pertinent negatives include no chest pain, palpitations or shortness of breath. Past treatments include ACE inhibitors and beta blockers. The current treatment provides moderate improvement. There are no compliance problems.    Hyperlipidemia   This is a chronic problem. The current episode started more than 1 year ago. The problem is controlled. Recent lipid tests were reviewed and are variable. Pertinent negatives include no chest pain or shortness of breath. Current antihyperlipidemic treatment includes statins. The current treatment provides moderate improvement of lipids. There are no compliance problems.    Coronary Artery Disease   Presents for follow-up visit. Pertinent negatives include no chest pain, chest pressure, chest tightness, dizziness, leg swelling, muscle weakness, palpitations, shortness of breath or weight gain. Risk factors include hyperlipidemia. The symptoms have been stable. Compliance with diet is variable. Compliance with exercise is variable. Compliance with medications is good.       Review of Systems   Constitution: Negative. Negative for weight gain.   HENT: Negative.    Eyes: Negative.    Cardiovascular: Negative.  Negative for chest pain, leg swelling and palpitations.   Respiratory: Negative.  Negative for chest tightness and shortness of breath.    Endocrine: Negative.    Hematologic/Lymphatic: Negative.    Skin: Negative.    Musculoskeletal: Negative for muscle weakness.   Gastrointestinal: Negative.    Genitourinary: Negative.    Neurological: Negative.  Negative for dizziness.    Psychiatric/Behavioral: Negative.    Allergic/Immunologic: Negative.      Family History   Problem Relation Age of Onset    Blindness Father     Cancer Neg Hx     Diabetes Neg Hx     Heart failure Neg Hx     Coronary artery disease Neg Hx     Cataracts Neg Hx     Glaucoma Neg Hx     Hypertension Neg Hx     Macular degeneration Neg Hx     Retinal detachment Neg Hx     Strabismus Neg Hx     Stroke Neg Hx     Thyroid disease Neg Hx      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     Social History     Socioeconomic History    Marital status: Single     Spouse name: Not on file    Number of children: Not on file    Years of education: Not on file    Highest education level: Not on file   Occupational History    Not on file   Social Needs    Financial resource strain: Not on file    Food insecurity:     Worry: Not on file     Inability: Not on file    Transportation needs:     Medical: Not on file     Non-medical: Not on file   Tobacco Use    Smoking status: Former Smoker     Packs/day: 1.00     Years: 45.00     Pack years: 45.00     Last attempt to quit: 2005     Years since quittin.8    Smokeless tobacco: Never Used    Tobacco comment: Pt no longer smokes   Substance and Sexual Activity    Alcohol use: No     Comment: former    Drug use: No    Sexual activity: Yes     Birth control/protection: None   Lifestyle    Physical activity:     Days per week: Not on file     Minutes per session: Not on file    Stress: Not on file   Relationships    Social connections:     Talks on phone: Not on file     Gets together: Not on file     Attends [employer]ond[employer]ominational service: Not on file     Active member of club or organization: Not on file     Attends  meetings of clubs or organizations: Not on file     Relationship status: Not on file   Other Topics Concern    Not on file   Social History Narrative    Not on file     Current Outpatient Medications on File Prior to Visit   Medication Sig Dispense Refill    acetaminophen (TYLENOL) 325 MG tablet Take 2 tablets (650 mg total) by mouth every 4 (four) hours as needed.  0    aspirin 325 MG tablet Take 1 tablet (325 mg total) by mouth 2 (two) times daily.  0    atorvastatin (LIPITOR) 80 MG tablet 80 mg every evening.       cyanocobalamin (VITAMIN B-12) 500 MCG tablet Take 500 mcg by mouth once daily.      fish oil-omega-3 fatty acids 300-1,000 mg capsule Take 1 g by mouth 2 (two) times daily.       FOLIC ACID ORAL Take 2,400 mcg by mouth once daily.       hydroCHLOROthiazide (HYDRODIURIL) 25 MG tablet Take by mouth once daily.      metoprolol succinate (TOPROL-XL) 25 MG 24 hr tablet Take 25 mg by mouth once daily.  2    NITROSTAT 0.4 mg SL tablet Patient states that he takes this medication as needed  3    pantoprazole (PROTONIX) 40 MG tablet Take 1 tablet (40 mg total) by mouth once daily. 30 tablet 0    quinapril (ACCUPRIL) 10 MG tablet Take 10 mg by mouth once daily.  2    RANEXA 1,000 mg Tb12 TAKE 1 TABLET BY MOUTH TWICE A DAY 60 tablet 5    triamcinolone acetonide 0.1% (KENALOG) 0.1 % cream every morning.       cholecalciferol, vitamin D3, (VITAMIN D3) 400 unit Tab Take 1 tablet by mouth once daily.      FLUZONE HIGH-DOSE 2018-19, PF, 180 mcg/0.5 mL vaccine       HYDROcodone-acetaminophen (NORCO)  mg per tablet Take 1 tablet by mouth every 6 (six) hours as needed. (Patient not taking: Reported on 11/20/2019) 20 tablet 0    nozaseptin (NOZIN) nasal  1 each by Each Nostril route 2 (two) times daily. (Patient not taking: Reported on 11/20/2019) 1 applicator 0    polyethylene glycol (GLYCOLAX) 17 gram PwPk Take by mouth daily as needed.       No current facility-administered  medications on file prior to visit.      Review of patient's allergies indicates:  No Known Allergies    Objective:     Physical Exam   Constitutional: He is oriented to person, place, and time. He appears well-developed and well-nourished.   HENT:   Head: Normocephalic and atraumatic.   Eyes: Pupils are equal, round, and reactive to light. Conjunctivae are normal.   Neck: Normal range of motion. Neck supple.   Cardiovascular: Normal rate, regular rhythm, normal heart sounds and intact distal pulses.   Pulmonary/Chest: Effort normal and breath sounds normal.   Abdominal: Soft. Bowel sounds are normal.   Neurological: He is alert and oriented to person, place, and time.   Skin: Skin is warm and dry.   Psychiatric: He has a normal mood and affect.   Nursing note and vitals reviewed.      Assessment:     1. Coronary artery disease involving native coronary artery of native heart without angina pectoris    2. S/P coronary artery bypass graft x 2    3. Essential hypertension    4. Dyslipidemia    5. Old MI (myocardial infarction)    6. Chronic diastolic congestive heart failure        Plan:     Coronary artery disease involving native coronary artery of native heart without angina pectoris    S/P coronary artery bypass graft x 2    Essential hypertension    Dyslipidemia    Old MI (myocardial infarction)    Chronic diastolic congestive heart failure      continue statin-hlp  Continue asa- cad  Continue quinapril, metoprolol-htn  Continue ranexa- angina

## 2019-12-09 RX ORDER — RANOLAZINE 1000 MG/1
TABLET, EXTENDED RELEASE ORAL
Qty: 60 TABLET | Refills: 5 | Status: SHIPPED | OUTPATIENT
Start: 2019-12-09 | End: 2020-08-25

## 2020-02-26 NOTE — PROGRESS NOTES
Subjective:       Patient ID: Foreign Holland is a 74 y.o. male.    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 Foreign Holland in Hematology Oncology clinic at [facility] on 02/26/2020.  Mr. Foreign Holland is a 74 y.o. gentleman with Stage III Peripheral T Cell Lymphoma NOS  s/p 6 cycles of CHOP (last cycle 10/7/14) with Complete Response per PET.  Of note there was abnormal T cell population in the bone marrow, however; PCR for T Cell receptor gene rearrangement were negative.  Bone marrow biopsy performed on 6/25/15 was negative for significant pathology. Counts continue to be mildly suppressed but remain relatively stable over the previous 3+ years  He continues to do very well clinically/remains asymptomatic with relatively stable CBC.  We will arrange close follow-up with plans to repeat bone marrow biopsy if anemia/cytopenias worsen        Plan:  Stage III Peripheral T cell lymphoma NOS: with CR after CHOP x 6  --s/p 6 cycles of CHOP completed in 10/2014  --Repeat PET as clinically indicated  --continue active surveillance     Pancytopenia: Secondary to chemotherapy versus low-grade MDS  Anemia likely multifactorial with chronic kidney disease contributing  --short interval follow-up with repeat CBC/iron studies/SPEP/serum free light chains  --continue to monitor closely and plan repeat bone marrow biopsy upon significant progression        Anemia secondary to chronic kidney disease:  --consider initiation of Procrit when hemoglobin falls below 9.5        No history exists.     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     Family History   Problem Relation Age of Onset     Blindness Father     Cancer Neg Hx     Diabetes Neg Hx     Heart failure Neg Hx     Coronary artery disease Neg Hx     Cataracts Neg Hx     Glaucoma Neg Hx     Hypertension Neg Hx     Macular degeneration Neg Hx     Retinal detachment Neg Hx     Strabismus Neg Hx     Stroke Neg Hx     Thyroid disease Neg Hx      Social History     Socioeconomic History    Marital status: Single     Spouse name: Not on file    Number of children: Not on file    Years of education: Not on file    Highest education level: Not on file   Occupational History    Not on file   Social Needs    Financial resource strain: Not on file    Food insecurity:     Worry: Not on file     Inability: Not on file    Transportation needs:     Medical: Not on file     Non-medical: Not on file   Tobacco Use    Smoking status: Former Smoker     Packs/day: 1.00     Years: 45.00     Pack years: 45.00     Last attempt to quit: 1/1/2005     Years since quitting: 15.1    Smokeless tobacco: Never Used    Tobacco comment: Pt no longer smokes   Substance and Sexual Activity    Alcohol use: No     Comment: former    Drug use: No    Sexual activity: Yes     Birth control/protection: None   Lifestyle    Physical activity:     Days per week: Not on file     Minutes per session: Not on file    Stress: Not on file   Relationships    Social connections:     Talks on phone: Not on file     Gets together: Not on file     Attends Cheondoism service: Not on file     Active member of club or organization: Not on file     Attends meetings of clubs or organizations: Not on file     Relationship status: Not on file   Other Topics Concern    Not on file   Social History Narrative    Not on file     Past Surgical History:   Procedure Laterality Date    CARDIAC CATHETERIZATION      CARDIAC SURGERY  2006    CATARACT EXTRACTION W/  INTRAOCULAR LENS IMPLANT Right 05/24/2017    CATARACT EXTRACTION W/  INTRAOCULAR LENS IMPLANT Left 04/12/2017     CLOSED REDUCTION OF FRACTURE OF FIBULA Left 7/18/2019    Procedure: CLOSED REDUCTION, FRACTURE, FIBULA;  Surgeon: Philippe Bustamante Jr., MD;  Location: [facility] OR;  Service: Orthopedics;  Laterality: Left;  tibia/fibula fracture    CORONARY ARTERY BYPASS GRAFT      EYE SURGERY      Cataract extraction with intraocular lens implant    LYMPH NODES, LEFT NECK, EXCISIONAL BIOPSY  5/21/2014    MEDIPORT INSERTION, SINGLE  5/28/14    ORIF FIBULA FRACTURE Left 8/9/2019    Procedure: ORIF, FRACTURE, FIBULA;  Surgeon: Flynn White MD;  Location: [facility] OR;  Service: Orthopedics;  Laterality: Left;    ORIF TIBIA FRACTURE Left 8/9/2019    Procedure: ORIF, FRACTURE, TIBIA;  Surgeon: Flynn White MD;  Location: [facility] OR;  Service: Orthopedics;  Laterality: Left;    PCIOL Left 04/12/2017    DR. MCCRAY    PROSTATE SURGERY  02/2014    REMOVAL OF EXTERNAL FIXATION DEVICE Left 8/9/2019    Procedure: REMOVAL, EXTERNAL FIXATION DEVICE;  Surgeon: Flynn White MD;  Location: [facility] OR;  Service: Orthopedics;  Laterality: Left;     Current Outpatient Medications   Medication Sig Dispense Refill    acetaminophen (TYLENOL) 325 MG tablet Take 2 tablets (650 mg total) by mouth every 4 (four) hours as needed.  0    aspirin 325 MG tablet Take 1 tablet (325 mg total) by mouth 2 (two) times daily.  0    atorvastatin (LIPITOR) 80 MG tablet 80 mg every evening.       cholecalciferol, vitamin D3, (VITAMIN D3) 400 unit Tab Take 1 tablet by mouth once daily.      cyanocobalamin (VITAMIN B-12) 500 MCG tablet Take 500 mcg by mouth once daily.      fish oil-omega-3 fatty acids 300-1,000 mg capsule Take 1 g by mouth 2 (two) times daily.       FLUZONE HIGH-DOSE 2018-19, PF, 180 mcg/0.5 mL vaccine       FOLIC ACID ORAL Take 2,400 mcg by mouth once daily.       hydroCHLOROthiazide (HYDRODIURIL) 25 MG tablet Take by mouth once daily.      HYDROcodone-acetaminophen (NORCO)  mg per tablet Take 1 tablet by mouth every 6 (six) hours as  needed. (Patient not taking: Reported on 11/20/2019) 20 tablet 0    metoprolol succinate (TOPROL-XL) 25 MG 24 hr tablet Take 25 mg by mouth once daily.  2    NITROSTAT 0.4 mg SL tablet Patient states that he takes this medication as needed  3    nozaseptin (NOZIN) nasal  1 each by Each Nostril route 2 (two) times daily. (Patient not taking: Reported on 11/20/2019) 1 applicator 0    pantoprazole (PROTONIX) 40 MG tablet Take 1 tablet (40 mg total) by mouth once daily. 30 tablet 0    polyethylene glycol (GLYCOLAX) 17 gram PwPk Take by mouth daily as needed.      quinapril (ACCUPRIL) 10 MG tablet Take 10 mg by mouth once daily.  2    ranolazine (RANEXA) 1,000 mg Tb12 TAKE 1 TABLET BY MOUTH TWICE A DAY 60 tablet 5    triamcinolone acetonide 0.1% (KENALOG) 0.1 % cream every morning.        No current facility-administered medications for this visit.        Labs:  Lab Results   Component Value Date    WBC 3.16 (L) 10/31/2019    HGB 10.1 (L) 10/31/2019    HCT 29.7 (L) 10/31/2019    MCV 88 10/31/2019     10/31/2019     BMP  Lab Results   Component Value Date     08/15/2019    K 4.1 08/15/2019     08/15/2019    CO2 26 08/15/2019    BUN 32 (H) 08/15/2019    CREATININE 1.4 08/15/2019    CALCIUM 9.6 08/15/2019    ANIONGAP 12 08/15/2019    ESTGFRAFRICA 56 (A) 08/15/2019    EGFRNONAA 49 (A) 08/15/2019     Lab Results   Component Value Date    ALT 11 08/15/2019    AST 22 08/15/2019    ALKPHOS 83 08/15/2019    BILITOT 0.7 08/15/2019       Lab Results   Component Value Date    IRON 58 08/15/2019    TIBC 218 (L) 08/15/2019    FERRITIN 1,412 (H) 08/15/2019     Lab Results   Component Value Date    GCBBFFOH57 1127 (H) 08/15/2019     Lab Results   Component Value Date    FOLATE >40.0 (H) 09/21/2017     Lab Results   Component Value Date    TSH 0.722 08/15/2019       I have reviewed the radiology reports and examined the scan/xray images.    Review of Systems   Constitutional: Negative.    HENT:  Negative.    Eyes: Negative.    Respiratory: Negative.    Cardiovascular: Negative.    Gastrointestinal: Negative.    Endocrine: Negative.    Genitourinary: Negative.    Musculoskeletal: Negative.    Skin: Negative.    Allergic/Immunologic: Negative.    Neurological: Negative.    Hematological: Negative.    Psychiatric/Behavioral: Negative.      ECOG SCORE    0 - Fully active-able to carry on all pre-disease performance without restriction            Objective:     Vitals:    02/27/20 1315   BP: (!) 158/78   Pulse: (!) 55   Resp: 18   Temp: 98.1 °F (36.7 °C)   Body mass index is 25.21 kg/m².  Physical Exam   Constitutional: He is oriented to person, place, and time. He appears well-developed and well-nourished.   HENT:   Head: Normocephalic and atraumatic.   Eyes: Conjunctivae and EOM are normal.   Neck: Normal range of motion. Neck supple.   Cardiovascular: Normal rate and regular rhythm.   Pulmonary/Chest: Effort normal and breath sounds normal.   Abdominal: Soft. Bowel sounds are normal.   Musculoskeletal: Normal range of motion.   Neurological: He is alert and oriented to person, place, and time.   Skin: Skin is warm and dry.   Psychiatric: He has a normal mood and affect. His behavior is normal. Judgment and thought content normal.   Nursing note and vitals reviewed.        Assessment:      1. 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 Pancytopenia due to chemotherapy    3. 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  -     CBC auto differential; Future; Expected date: 02/27/2020  -     Comprehensive metabolic panel; Future; Expected date: 02/27/2020  -     Immunofixation electrophoresis; Future; Expected date: 02/27/2020  -     Protein electrophoresis, serum; Future; Expected date: 02/27/2020  -     Folate; Future; Expected date: 02/27/2020  -     Ferritin; Future; Expected date: 02/27/2020  -     Iron and TIBC; Future;  Expected date: 02/27/2020  -     TSH; Future; Expected date: 02/27/2020  -     Immunoglobulin free LT chains blood; Future; Expected date: 02/27/2020    Pancytopenia due to chemotherapy  -     CBC auto differential; Future; Expected date: 02/27/2020  -     Comprehensive metabolic panel; Future; Expected date: 02/27/2020  -     Immunofixation electrophoresis; Future; Expected date: 02/27/2020  -     Protein electrophoresis, serum; Future; Expected date: 02/27/2020  -     Folate; Future; Expected date: 02/27/2020  -     Ferritin; Future; Expected date: 02/27/2020  -     Iron and TIBC; Future; Expected date: 02/27/2020  -     TSH; Future; Expected date: 02/27/2020  -     Immunoglobulin free LT chains blood; Future; Expected date: 02/27/2020    Nutritional anemia, unspecified   -     Folate; Future; Expected date: 02/27/2020  -     TSH; Future; Expected date: 02/27/2020

## 2020-02-27 ENCOUNTER — OFFICE VISIT (OUTPATIENT)
Dept: HEMATOLOGY/ONCOLOGY | Facility: CLINIC | Age: 75
End: 2020-02-27
Payer: MEDICARE

## 2020-02-27 ENCOUNTER — LAB VISIT (OUTPATIENT)
Dept: LAB | Facility: HOSPITAL | Age: 75
End: 2020-02-27
Attending: INTERNAL MEDICINE
Payer: MEDICARE

## 2020-02-27 VITALS
TEMPERATURE: 98 F | DIASTOLIC BLOOD PRESSURE: 78 MMHG | SYSTOLIC BLOOD PRESSURE: 158 MMHG | OXYGEN SATURATION: 96 % | RESPIRATION RATE: 18 BRPM | WEIGHT: 160.94 LBS | HEIGHT: 67 IN | BODY MASS INDEX: 25.26 KG/M2 | HEART RATE: 55 BPM

## 2020-02-27 DIAGNOSIS — D61.810 PANCYTOPENIA DUE TO CHEMOTHERAPY: ICD-10-CM

## 2020-02-27 DIAGNOSIS — D53.9 NUTRITIONAL ANEMIA, UNSPECIFIED: ICD-10-CM

## 2020-02-27 DIAGNOSIS — D61.818 PANCYTOPENIA: ICD-10-CM

## 2020-02-27 DIAGNOSIS — C84.48 PERIPHERAL T CELL LYMPHOMA OF LYMPH NODES OF MULTIPLE SITES: Primary | ICD-10-CM

## 2020-02-27 DIAGNOSIS — D53.9 NUTRITIONAL ANEMIA: ICD-10-CM

## 2020-02-27 LAB
ALBUMIN SERPL BCP-MCNC: 3.8 G/DL (ref 3.5–5.2)
ALP SERPL-CCNC: 64 U/L (ref 55–135)
ALT SERPL W/O P-5'-P-CCNC: 7 U/L (ref 10–44)
ANION GAP SERPL CALC-SCNC: 12 MMOL/L (ref 8–16)
AST SERPL-CCNC: 15 U/L (ref 10–40)
BASOPHILS # BLD AUTO: 0.01 K/UL (ref 0–0.2)
BASOPHILS NFR BLD: 0.4 % (ref 0–1.9)
BILIRUB SERPL-MCNC: 0.7 MG/DL (ref 0.1–1)
BUN SERPL-MCNC: 28 MG/DL (ref 8–23)
CALCIUM SERPL-MCNC: 9.2 MG/DL (ref 8.7–10.5)
CHLORIDE SERPL-SCNC: 106 MMOL/L (ref 95–110)
CO2 SERPL-SCNC: 21 MMOL/L (ref 23–29)
CREAT SERPL-MCNC: 1.6 MG/DL (ref 0.5–1.4)
DIFFERENTIAL METHOD: ABNORMAL
EOSINOPHIL # BLD AUTO: 0.1 K/UL (ref 0–0.5)
EOSINOPHIL NFR BLD: 2.6 % (ref 0–8)
ERYTHROCYTE [DISTWIDTH] IN BLOOD BY AUTOMATED COUNT: 12.7 % (ref 11.5–14.5)
EST. GFR  (AFRICAN AMERICAN): 48 ML/MIN/1.73 M^2
EST. GFR  (NON AFRICAN AMERICAN): 42 ML/MIN/1.73 M^2
GLUCOSE SERPL-MCNC: 96 MG/DL (ref 70–110)
HCT VFR BLD AUTO: 28.2 % (ref 40–54)
HGB BLD-MCNC: 9.9 G/DL (ref 14–18)
IMM GRANULOCYTES # BLD AUTO: 0.01 K/UL (ref 0–0.04)
IMM GRANULOCYTES NFR BLD AUTO: 0.4 % (ref 0–0.5)
LYMPHOCYTES # BLD AUTO: 0.9 K/UL (ref 1–4.8)
LYMPHOCYTES NFR BLD: 34.3 % (ref 18–48)
MCH RBC QN AUTO: 29.9 PG (ref 27–31)
MCHC RBC AUTO-ENTMCNC: 35.1 G/DL (ref 32–36)
MCV RBC AUTO: 85 FL (ref 82–98)
MONOCYTES # BLD AUTO: 0.3 K/UL (ref 0.3–1)
MONOCYTES NFR BLD: 9.1 % (ref 4–15)
NEUTROPHILS # BLD AUTO: 1.5 K/UL (ref 1.8–7.7)
NEUTROPHILS NFR BLD: 53.6 % (ref 38–73)
NRBC BLD-RTO: 0 /100 WBC
PLATELET # BLD AUTO: 180 K/UL (ref 150–350)
PMV BLD AUTO: 9.4 FL (ref 9.2–12.9)
POTASSIUM SERPL-SCNC: 4.9 MMOL/L (ref 3.5–5.1)
PROT SERPL-MCNC: 8 G/DL (ref 6–8.4)
RBC # BLD AUTO: 3.31 M/UL (ref 4.6–6.2)
SODIUM SERPL-SCNC: 139 MMOL/L (ref 136–145)
WBC # BLD AUTO: 2.74 K/UL (ref 3.9–12.7)

## 2020-02-27 PROCEDURE — 1126F AMNT PAIN NOTED NONE PRSNT: CPT | Mod: S$GLB,,, | Performed by: INTERNAL MEDICINE

## 2020-02-27 PROCEDURE — 99213 PR OFFICE/OUTPT VISIT, EST, LEVL III, 20-29 MIN: ICD-10-PCS | Mod: S$GLB,,, | Performed by: INTERNAL MEDICINE

## 2020-02-27 PROCEDURE — 84443 ASSAY THYROID STIM HORMONE: CPT

## 2020-02-27 PROCEDURE — 36415 COLL VENOUS BLD VENIPUNCTURE: CPT

## 2020-02-27 PROCEDURE — 99999 PR PBB SHADOW E&M-EST. PATIENT-LVL IV: CPT | Mod: PBBFAC,,, | Performed by: INTERNAL MEDICINE

## 2020-02-27 PROCEDURE — 1159F MED LIST DOCD IN RCRD: CPT | Mod: S$GLB,,, | Performed by: INTERNAL MEDICINE

## 2020-02-27 PROCEDURE — 3077F SYST BP >= 140 MM HG: CPT | Mod: CPTII,S$GLB,, | Performed by: INTERNAL MEDICINE

## 2020-02-27 PROCEDURE — 3078F DIAST BP <80 MM HG: CPT | Mod: CPTII,S$GLB,, | Performed by: INTERNAL MEDICINE

## 2020-02-27 PROCEDURE — 82746 ASSAY OF FOLIC ACID SERUM: CPT

## 2020-02-27 PROCEDURE — 1159F PR MEDICATION LIST DOCUMENTED IN MEDICAL RECORD: ICD-10-PCS | Mod: S$GLB,,, | Performed by: INTERNAL MEDICINE

## 2020-02-27 PROCEDURE — 1101F PR PT FALLS ASSESS DOC 0-1 FALLS W/OUT INJ PAST YR: ICD-10-PCS | Mod: CPTII,S$GLB,, | Performed by: INTERNAL MEDICINE

## 2020-02-27 PROCEDURE — 82728 ASSAY OF FERRITIN: CPT

## 2020-02-27 PROCEDURE — 3077F PR MOST RECENT SYSTOLIC BLOOD PRESSURE >= 140 MM HG: ICD-10-PCS | Mod: CPTII,S$GLB,, | Performed by: INTERNAL MEDICINE

## 2020-02-27 PROCEDURE — 83540 ASSAY OF IRON: CPT

## 2020-02-27 PROCEDURE — 99213 OFFICE O/P EST LOW 20 MIN: CPT | Mod: S$GLB,,, | Performed by: INTERNAL MEDICINE

## 2020-02-27 PROCEDURE — 80053 COMPREHEN METABOLIC PANEL: CPT

## 2020-02-27 PROCEDURE — 85025 COMPLETE CBC W/AUTO DIFF WBC: CPT

## 2020-02-27 PROCEDURE — 3078F PR MOST RECENT DIASTOLIC BLOOD PRESSURE < 80 MM HG: ICD-10-PCS | Mod: CPTII,S$GLB,, | Performed by: INTERNAL MEDICINE

## 2020-02-27 PROCEDURE — 1101F PT FALLS ASSESS-DOCD LE1/YR: CPT | Mod: CPTII,S$GLB,, | Performed by: INTERNAL MEDICINE

## 2020-02-27 PROCEDURE — 99999 PR PBB SHADOW E&M-EST. PATIENT-LVL IV: ICD-10-PCS | Mod: PBBFAC,,, | Performed by: INTERNAL MEDICINE

## 2020-02-27 PROCEDURE — 1126F PR PAIN SEVERITY QUANTIFIED, NO PAIN PRESENT: ICD-10-PCS | Mod: S$GLB,,, | Performed by: INTERNAL MEDICINE

## 2020-02-28 LAB
FERRITIN SERPL-MCNC: 698 NG/ML (ref 20–300)
FOLATE SERPL-MCNC: >40 NG/ML (ref 4–24)
IRON SERPL-MCNC: 75 UG/DL (ref 45–160)
SATURATED IRON: 28 % (ref 20–50)
TOTAL IRON BINDING CAPACITY: 272 UG/DL (ref 250–450)
TRANSFERRIN SERPL-MCNC: 184 MG/DL (ref 200–375)
TSH SERPL DL<=0.005 MIU/L-ACNC: 0.65 UIU/ML (ref 0.4–4)

## 2020-03-12 ENCOUNTER — OFFICE VISIT (OUTPATIENT)
Dept: OPHTHALMOLOGY | Facility: CLINIC | Age: 75
End: 2020-03-12
Payer: MEDICARE

## 2020-03-12 DIAGNOSIS — Z86.69 HISTORY OF IRITIS: ICD-10-CM

## 2020-03-12 DIAGNOSIS — Z96.1 PSEUDOPHAKIA OF BOTH EYES: Primary | ICD-10-CM

## 2020-03-12 DIAGNOSIS — H04.129 DRY EYE: ICD-10-CM

## 2020-03-12 PROBLEM — H25.013 CORTICAL SENILE CATARACT OF BOTH EYES: Status: RESOLVED | Noted: 2017-03-30 | Resolved: 2020-03-12

## 2020-03-12 PROBLEM — R53.81 DEBILITY: Status: ACTIVE | Noted: 2019-08-03

## 2020-03-12 PROBLEM — H25.012 CORTICAL SENILE CATARACT OF LEFT EYE: Status: RESOLVED | Noted: 2017-03-30 | Resolved: 2020-03-12

## 2020-03-12 PROCEDURE — 92014 PR EYE EXAM, EST PATIENT,COMPREHESV: ICD-10-PCS | Mod: S$GLB,,, | Performed by: OPHTHALMOLOGY

## 2020-03-12 PROCEDURE — 99999 PR PBB SHADOW E&M-EST. PATIENT-LVL II: CPT | Mod: PBBFAC,,, | Performed by: OPHTHALMOLOGY

## 2020-03-12 PROCEDURE — 92014 COMPRE OPH EXAM EST PT 1/>: CPT | Mod: S$GLB,,, | Performed by: OPHTHALMOLOGY

## 2020-03-12 PROCEDURE — 99999 PR PBB SHADOW E&M-EST. PATIENT-LVL II: ICD-10-PCS | Mod: PBBFAC,,, | Performed by: OPHTHALMOLOGY

## 2020-03-12 RX ORDER — ASPIRIN 81 MG/1
81 TABLET ORAL DAILY
COMMUNITY
End: 2020-08-25

## 2020-03-12 NOTE — PROGRESS NOTES
SUBJECTIVE  Coneazer Brown is 74 y.o. male  Corrected distance visual acuity was 20/20 in the right eye and 20/20 in the left eye.   Chief Complaint   Patient presents with    Eye Exam     yearly           HPI     Eye Exam      Additional comments: yearly               Comments     Wearing glasses but don't need them most of the time.    Referred by [facility]    1.PCIOL OS +22.5 SN60WF (distance) 04/12/17  2. PCIOL OD +22.5 SN60WF (distance) 5-24-17  3. HX Iritis OD  4. Dry eye    Refresh prn OU          Last edited by Pierce Montgomery MD on 3/12/2020  9:26 AM.   (History)         Assessment /Plan :  1. Pseudophakia of both eyes  -- Condition stable, no therapeutic change required. Monitoring routinely.     2. Dry eye  -- Condition stable, no therapeutic change required. Monitoring routinely.     3. History of iritis quiet today     RTC in 1 year or prn any changes

## 2020-06-23 ENCOUNTER — OFFICE VISIT (OUTPATIENT)
Dept: HEMATOLOGY/ONCOLOGY | Facility: CLINIC | Age: 75
End: 2020-06-23
Payer: MEDICARE

## 2020-06-23 ENCOUNTER — LAB VISIT (OUTPATIENT)
Dept: LAB | Facility: HOSPITAL | Age: 75
End: 2020-06-23
Attending: INTERNAL MEDICINE
Payer: MEDICARE

## 2020-06-23 VITALS
HEIGHT: 67 IN | RESPIRATION RATE: 18 BRPM | OXYGEN SATURATION: 100 % | BODY MASS INDEX: 23.81 KG/M2 | WEIGHT: 151.69 LBS | HEART RATE: 77 BPM | DIASTOLIC BLOOD PRESSURE: 52 MMHG | SYSTOLIC BLOOD PRESSURE: 96 MMHG | TEMPERATURE: 98 F

## 2020-06-23 DIAGNOSIS — D53.9 NUTRITIONAL ANEMIA, UNSPECIFIED: ICD-10-CM

## 2020-06-23 DIAGNOSIS — D61.810 PANCYTOPENIA DUE TO CHEMOTHERAPY: ICD-10-CM

## 2020-06-23 DIAGNOSIS — D64.9 SYMPTOMATIC ANEMIA: ICD-10-CM

## 2020-06-23 DIAGNOSIS — C84.48 PERIPHERAL T CELL LYMPHOMA OF LYMPH NODES OF MULTIPLE SITES: ICD-10-CM

## 2020-06-23 DIAGNOSIS — C84.48 PERIPHERAL T CELL LYMPHOMA OF LYMPH NODES OF MULTIPLE SITES: Primary | ICD-10-CM

## 2020-06-23 LAB
ALBUMIN SERPL BCP-MCNC: 3.2 G/DL (ref 3.5–5.2)
ALP SERPL-CCNC: 56 U/L (ref 55–135)
ALT SERPL W/O P-5'-P-CCNC: 19 U/L (ref 10–44)
ANION GAP SERPL CALC-SCNC: 9 MMOL/L (ref 8–16)
AST SERPL-CCNC: 22 U/L (ref 10–40)
BASOPHILS # BLD AUTO: 0.02 K/UL (ref 0–0.2)
BASOPHILS NFR BLD: 0.7 % (ref 0–1.9)
BILIRUB SERPL-MCNC: 0.6 MG/DL (ref 0.1–1)
BUN SERPL-MCNC: 57 MG/DL (ref 8–23)
CALCIUM SERPL-MCNC: 8.5 MG/DL (ref 8.7–10.5)
CHLORIDE SERPL-SCNC: 109 MMOL/L (ref 95–110)
CO2 SERPL-SCNC: 20 MMOL/L (ref 23–29)
CREAT SERPL-MCNC: 2.5 MG/DL (ref 0.5–1.4)
DIFFERENTIAL METHOD: ABNORMAL
EOSINOPHIL # BLD AUTO: 0.1 K/UL (ref 0–0.5)
EOSINOPHIL NFR BLD: 3.7 % (ref 0–8)
ERYTHROCYTE [DISTWIDTH] IN BLOOD BY AUTOMATED COUNT: 13.1 % (ref 11.5–14.5)
EST. GFR  (AFRICAN AMERICAN): 28 ML/MIN/1.73 M^2
EST. GFR  (NON AFRICAN AMERICAN): 24 ML/MIN/1.73 M^2
GLUCOSE SERPL-MCNC: 142 MG/DL (ref 70–110)
HCT VFR BLD AUTO: 21.5 % (ref 40–54)
HGB BLD-MCNC: 7.4 G/DL (ref 14–18)
IMM GRANULOCYTES # BLD AUTO: 0.01 K/UL (ref 0–0.04)
IMM GRANULOCYTES NFR BLD AUTO: 0.4 % (ref 0–0.5)
LYMPHOCYTES # BLD AUTO: 0.8 K/UL (ref 1–4.8)
LYMPHOCYTES NFR BLD: 28.1 % (ref 18–48)
MCH RBC QN AUTO: 29.6 PG (ref 27–31)
MCHC RBC AUTO-ENTMCNC: 34.4 G/DL (ref 32–36)
MCV RBC AUTO: 86 FL (ref 82–98)
MONOCYTES # BLD AUTO: 0.3 K/UL (ref 0.3–1)
MONOCYTES NFR BLD: 9.6 % (ref 4–15)
NEUTROPHILS # BLD AUTO: 1.6 K/UL (ref 1.8–7.7)
NEUTROPHILS NFR BLD: 57.9 % (ref 38–73)
NRBC BLD-RTO: 0 /100 WBC
PLATELET # BLD AUTO: 131 K/UL (ref 150–350)
PMV BLD AUTO: 8.3 FL (ref 9.2–12.9)
POTASSIUM SERPL-SCNC: 5.2 MMOL/L (ref 3.5–5.1)
PROT SERPL-MCNC: 7.8 G/DL (ref 6–8.4)
RBC # BLD AUTO: 2.5 M/UL (ref 4.6–6.2)
SODIUM SERPL-SCNC: 138 MMOL/L (ref 136–145)
WBC # BLD AUTO: 2.7 K/UL (ref 3.9–12.7)

## 2020-06-23 PROCEDURE — 1101F PR PT FALLS ASSESS DOC 0-1 FALLS W/OUT INJ PAST YR: ICD-10-PCS | Mod: CPTII,S$GLB,, | Performed by: INTERNAL MEDICINE

## 2020-06-23 PROCEDURE — 83540 ASSAY OF IRON: CPT

## 2020-06-23 PROCEDURE — 36415 COLL VENOUS BLD VENIPUNCTURE: CPT

## 2020-06-23 PROCEDURE — 84165 PROTEIN E-PHORESIS SERUM: CPT

## 2020-06-23 PROCEDURE — 84165 PROTEIN E-PHORESIS SERUM: CPT | Mod: 26,,, | Performed by: PATHOLOGY

## 2020-06-23 PROCEDURE — 84443 ASSAY THYROID STIM HORMONE: CPT

## 2020-06-23 PROCEDURE — 86334 IMMUNOFIX E-PHORESIS SERUM: CPT | Mod: 26,,, | Performed by: PATHOLOGY

## 2020-06-23 PROCEDURE — 1126F AMNT PAIN NOTED NONE PRSNT: CPT | Mod: S$GLB,,, | Performed by: INTERNAL MEDICINE

## 2020-06-23 PROCEDURE — 82746 ASSAY OF FOLIC ACID SERUM: CPT

## 2020-06-23 PROCEDURE — 3078F DIAST BP <80 MM HG: CPT | Mod: CPTII,S$GLB,, | Performed by: INTERNAL MEDICINE

## 2020-06-23 PROCEDURE — 3078F PR MOST RECENT DIASTOLIC BLOOD PRESSURE < 80 MM HG: ICD-10-PCS | Mod: CPTII,S$GLB,, | Performed by: INTERNAL MEDICINE

## 2020-06-23 PROCEDURE — 84439 ASSAY OF FREE THYROXINE: CPT

## 2020-06-23 PROCEDURE — 1159F MED LIST DOCD IN RCRD: CPT | Mod: S$GLB,,, | Performed by: INTERNAL MEDICINE

## 2020-06-23 PROCEDURE — 86334 IMMUNOFIX E-PHORESIS SERUM: CPT

## 2020-06-23 PROCEDURE — 99999 PR PBB SHADOW E&M-EST. PATIENT-LVL V: CPT | Mod: PBBFAC,,, | Performed by: INTERNAL MEDICINE

## 2020-06-23 PROCEDURE — 86334 PATHOLOGIST INTERPRETATION IFE: ICD-10-PCS | Mod: 26,,, | Performed by: PATHOLOGY

## 2020-06-23 PROCEDURE — 99214 PR OFFICE/OUTPT VISIT, EST, LEVL IV, 30-39 MIN: ICD-10-PCS | Mod: S$GLB,,, | Performed by: INTERNAL MEDICINE

## 2020-06-23 PROCEDURE — 1101F PT FALLS ASSESS-DOCD LE1/YR: CPT | Mod: CPTII,S$GLB,, | Performed by: INTERNAL MEDICINE

## 2020-06-23 PROCEDURE — 3074F PR MOST RECENT SYSTOLIC BLOOD PRESSURE < 130 MM HG: ICD-10-PCS | Mod: CPTII,S$GLB,, | Performed by: INTERNAL MEDICINE

## 2020-06-23 PROCEDURE — 3074F SYST BP LT 130 MM HG: CPT | Mod: CPTII,S$GLB,, | Performed by: INTERNAL MEDICINE

## 2020-06-23 PROCEDURE — 83520 IMMUNOASSAY QUANT NOS NONAB: CPT

## 2020-06-23 PROCEDURE — 1159F PR MEDICATION LIST DOCUMENTED IN MEDICAL RECORD: ICD-10-PCS | Mod: S$GLB,,, | Performed by: INTERNAL MEDICINE

## 2020-06-23 PROCEDURE — 85025 COMPLETE CBC W/AUTO DIFF WBC: CPT

## 2020-06-23 PROCEDURE — 82728 ASSAY OF FERRITIN: CPT

## 2020-06-23 PROCEDURE — 80053 COMPREHEN METABOLIC PANEL: CPT

## 2020-06-23 PROCEDURE — 84165 PATHOLOGIST INTERPRETATION SPE: ICD-10-PCS | Mod: 26,,, | Performed by: PATHOLOGY

## 2020-06-23 PROCEDURE — 99214 OFFICE O/P EST MOD 30 MIN: CPT | Mod: S$GLB,,, | Performed by: INTERNAL MEDICINE

## 2020-06-23 PROCEDURE — 99999 PR PBB SHADOW E&M-EST. PATIENT-LVL V: ICD-10-PCS | Mod: PBBFAC,,, | Performed by: INTERNAL MEDICINE

## 2020-06-23 PROCEDURE — 1126F PR PAIN SEVERITY QUANTIFIED, NO PAIN PRESENT: ICD-10-PCS | Mod: S$GLB,,, | Performed by: INTERNAL MEDICINE

## 2020-06-23 RX ORDER — BENZONATATE 100 MG/1
100 CAPSULE ORAL 3 TIMES DAILY PRN
COMMUNITY
End: 2020-08-25

## 2020-06-23 RX ORDER — LORATADINE 10 MG/1
10 TABLET ORAL DAILY PRN
COMMUNITY

## 2020-06-23 NOTE — H&P (VIEW-ONLY)
Subjective:       Patient ID: Foreign Holland is a 74 y.o. male.    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 Foreign Holland in Hematology Oncology clinic at [facility] on 06/22/2020.  Mr. Foreign Holland is a 74 y.o. gentleman with Stage III Peripheral T Cell Lymphoma NOS  s/p 6 cycles of CHOP (last cycle 10/7/14) with Complete Response per PET.  Of note there was abnormal T cell population in the bone marrow, however; PCR for T Cell receptor gene rearrangement were negative.  Bone marrow biopsy performed on 6/25/15 was negative for significant pathology. Counts continue to be mildly suppressed but remain relatively stable over the previous 3+ years  He continues to do very well clinically/remains asymptomatic with relatively stable CBC.  We will arrange close follow-up with plans to repeat bone marrow biopsy if anemia/cytopenias worsen        Plan:  Stage III Peripheral T cell lymphoma NOS: with CR after CHOP x 6  --s/p 6 cycles of CHOP completed in 10/2014  --Repeat PET as clinically indicated  --continue active surveillance     Pancytopenia: Secondary to chemotherapy versus low-grade MDS  Anemia likely multifactorial with chronic kidney disease contributing  --short interval follow-up with repeat CBC/iron studies/SPEP/serum free light chains  --continue to monitor closely and plan repeat bone marrow biopsy upon significant progression        Anemia secondary to chronic kidney disease:  --consider initiation of Procrit when hemoglobin falls below 9.5    Reports fell last week and hit his hip, was evaluated and admitted at [facility].  Has had hoarseness over the past month.       Oncology History    No history exists.     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     Family History   Problem Relation Age of Onset    Blindness Father     Cancer Neg Hx     Diabetes Neg Hx     Heart failure Neg Hx     Coronary artery disease Neg Hx     Cataracts Neg Hx     Glaucoma Neg Hx     Hypertension Neg Hx     Macular degeneration Neg Hx     Retinal detachment Neg Hx     Strabismus Neg Hx     Stroke Neg Hx     Thyroid disease Neg Hx      Social History     Socioeconomic History    Marital status: Single     Spouse name: Not on file    Number of children: Not on file    Years of education: Not on file    Highest education level: Not on file   Occupational History    Not on file   Social Needs    Financial resource strain: Not on file    Food insecurity     Worry: Not on file     Inability: Not on file    Transportation needs     Medical: Not on file     Non-medical: Not on file   Tobacco Use    Smoking status: Former Smoker     Packs/day: 1.00     Years: 45.00     Pack years: 45.00     Quit date: 1/1/2005     Years since quitting: 15.4    Smokeless tobacco: Never Used    Tobacco comment: Pt no longer smokes   Substance and Sexual Activity    Alcohol use: No     Comment: former    Drug use: No    Sexual activity: Yes     Birth control/protection: None   Lifestyle    Physical activity     Days per week: Not on file     Minutes per session: Not on file    Stress: Not on file   Relationships    Social connections     Talks on phone: Not on file     Gets together: Not on file     Attends Alevism service: Not on file     Active member of club or organization: Not on file     Attends meetings of clubs or organizations: Not on file     Relationship status: Not on file   Other Topics Concern    Not on file   Social History Narrative    Not on file     Past Surgical History:   Procedure Laterality Date    CARDIAC CATHETERIZATION      CARDIAC SURGERY  2006    CATARACT EXTRACTION  W/  INTRAOCULAR LENS IMPLANT Right 05/24/2017    CATARACT EXTRACTION W/  INTRAOCULAR LENS IMPLANT Left 04/12/2017    CLOSED REDUCTION OF FRACTURE OF FIBULA Left 7/18/2019    Procedure: CLOSED REDUCTION, FRACTURE, FIBULA;  Surgeon: Philippe Bustamante Jr., MD;  Location: [facility] OR;  Service: Orthopedics;  Laterality: Left;  tibia/fibula fracture    CORONARY ARTERY BYPASS GRAFT      EYE SURGERY      Cataract extraction with intraocular lens implant    LYMPH NODES, LEFT NECK, EXCISIONAL BIOPSY  5/21/2014    MEDIPORT INSERTION, SINGLE  5/28/14    ORIF FIBULA FRACTURE Left 8/9/2019    Procedure: ORIF, FRACTURE, FIBULA;  Surgeon: Flynn White MD;  Location: [facility] OR;  Service: Orthopedics;  Laterality: Left;    ORIF TIBIA FRACTURE Left 8/9/2019    Procedure: ORIF, FRACTURE, TIBIA;  Surgeon: Flynn White MD;  Location: [facility] OR;  Service: Orthopedics;  Laterality: Left;    PCIOL Left 04/12/2017    DR. MCCRAY    PROSTATE SURGERY  02/2014    REMOVAL OF EXTERNAL FIXATION DEVICE Left 8/9/2019    Procedure: REMOVAL, EXTERNAL FIXATION DEVICE;  Surgeon: Flynn White MD;  Location: [facility] OR;  Service: Orthopedics;  Laterality: Left;     Current Outpatient Medications   Medication Sig Dispense Refill    acetaminophen (TYLENOL) 325 MG tablet Take 2 tablets (650 mg total) by mouth every 4 (four) hours as needed. (Patient not taking: Reported on 3/12/2020)  0    aspirin (ECOTRIN) 81 MG EC tablet Take 81 mg by mouth once daily.      atorvastatin (LIPITOR) 80 MG tablet 80 mg every evening.       cholecalciferol, vitamin D3, (VITAMIN D3) 400 unit Tab Take 1 tablet by mouth once daily.      cyanocobalamin (VITAMIN B-12) 500 MCG tablet Take 500 mcg by mouth once daily.      fish oil-omega-3 fatty acids 300-1,000 mg capsule Take 1 g by mouth 2 (two) times daily.       FLUZONE HIGH-DOSE 2018-19, PF, 180 mcg/0.5 mL vaccine       FOLIC ACID ORAL Take 2,400 mcg by mouth once daily.       hydroCHLOROthiazide (HYDRODIURIL)  25 MG tablet Take by mouth once daily.      HYDROcodone-acetaminophen (NORCO)  mg per tablet Take 1 tablet by mouth every 6 (six) hours as needed. (Patient not taking: Reported on 3/12/2020) 20 tablet 0    metoprolol succinate (TOPROL-XL) 25 MG 24 hr tablet Take 25 mg by mouth once daily.  2    NITROSTAT 0.4 mg SL tablet Patient states that he takes this medication as needed  3    nozaseptin (NOZIN) nasal  1 each by Each Nostril route 2 (two) times daily. (Patient not taking: Reported on 11/20/2019) 1 applicator 0    pantoprazole (PROTONIX) 40 MG tablet Take 1 tablet (40 mg total) by mouth once daily. 30 tablet 0    polyethylene glycol (GLYCOLAX) 17 gram PwPk Take by mouth daily as needed.      quinapril (ACCUPRIL) 10 MG tablet Take 10 mg by mouth once daily.  2    ranolazine (RANEXA) 1,000 mg Tb12 TAKE 1 TABLET BY MOUTH TWICE A DAY 60 tablet 5    triamcinolone acetonide 0.1% (KENALOG) 0.1 % cream every morning.        No current facility-administered medications for this visit.        Labs:  Lab Results   Component Value Date    WBC 2.74 (L) 02/27/2020    HGB 9.9 (L) 02/27/2020    HCT 28.2 (L) 02/27/2020    MCV 85 02/27/2020     02/27/2020     BMP  Lab Results   Component Value Date     02/27/2020    K 4.9 02/27/2020     02/27/2020    CO2 21 (L) 02/27/2020    BUN 28 (H) 02/27/2020    CREATININE 1.6 (H) 02/27/2020    CALCIUM 9.2 02/27/2020    ANIONGAP 12 02/27/2020    ESTGFRAFRICA 48 (A) 02/27/2020    EGFRNONAA 42 (A) 02/27/2020     Lab Results   Component Value Date    ALT 7 (L) 02/27/2020    AST 15 02/27/2020    ALKPHOS 64 02/27/2020    BILITOT 0.7 02/27/2020       Lab Results   Component Value Date    IRON 75 02/27/2020    TIBC 272 02/27/2020    FERRITIN 698 (H) 02/27/2020     Lab Results   Component Value Date    RYEUGAQJ98 1127 (H) 08/15/2019     Lab Results   Component Value Date    FOLATE >40.0 (H) 02/27/2020     Lab Results   Component Value Date    TSH 0.645  02/27/2020       I have reviewed the radiology reports and examined the scan/xray images.    Review of Systems   Constitutional: Negative.    HENT: Positive for voice change.    Eyes: Negative.    Respiratory: Negative.    Cardiovascular: Negative.    Gastrointestinal: Negative.    Endocrine: Negative.    Genitourinary: Negative.    Musculoskeletal: Negative.    Skin: Negative.    Allergic/Immunologic: Negative.    Neurological: Negative.    Hematological: Negative.    Psychiatric/Behavioral: Negative.      ECOG SCORE    0 - Fully active-able to carry on all pre-disease performance without restriction            Objective:     Vitals:    06/23/20 1331   BP: (!) 96/52   Pulse: 77   Resp: 18   Temp: 98.2 °F (36.8 °C)   Body mass index is 23.76 kg/m².  Physical Exam  Vitals signs and nursing note reviewed.   Constitutional:       Appearance: He is well-developed.   HENT:      Head: Normocephalic and atraumatic.   Eyes:      Conjunctiva/sclera: Conjunctivae normal.   Neck:      Musculoskeletal: Normal range of motion and neck supple.   Cardiovascular:      Rate and Rhythm: Normal rate and regular rhythm.   Pulmonary:      Effort: Pulmonary effort is normal.      Breath sounds: Normal breath sounds.   Abdominal:      General: Bowel sounds are normal.      Palpations: Abdomen is soft.   Musculoskeletal: Normal range of motion.   Skin:     General: Skin is warm and dry.   Neurological:      Mental Status: He is alert and oriented to person, place, and time.   Psychiatric:         Behavior: Behavior normal.         Thought Content: Thought content normal.         Judgment: Judgment normal.           Assessment:      1. 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  -     CT Soft Tissue Neck WO Contrast; Future; Expected date: 06/23/2020  -     CT CHEST WITHOUT CONTRAST; Future; Expected date: 06/23/2020  -      Specimen to Pathology, Bone Marrow Aspiration/Biopsy; Future; Expected date: 06/23/2020  -     CT Biopsy Bone Marrow (xpd); Future; Expected date: 06/23/2020  -     Chromosome Analysis, Bone Marrow; Future; Expected date: 06/23/2020  -     Leukemia/Lymphoma Screen - Bone Marrow Right Posterior Iliac Crest; Future; Expected date: 06/23/2020  -     Myelodysplastic Syndrome (MDS), FISH, Bone Marrow; Future; Expected date: 06/23/2020    Symptomatic anemia    Due to MDS and CKD  Will set up for retacrit.  Will give iv iron if iron deficient.

## 2020-06-23 NOTE — PROGRESS NOTES
Subjective:       Patient ID: Foreign Holland is a 74 y.o. male.    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 Foreign Holland in Hematology Oncology clinic at [facility] on 06/22/2020.  Mr. Foreign Holland is a 74 y.o. gentleman with Stage III Peripheral T Cell Lymphoma NOS  s/p 6 cycles of CHOP (last cycle 10/7/14) with Complete Response per PET.  Of note there was abnormal T cell population in the bone marrow, however; PCR for T Cell receptor gene rearrangement were negative.  Bone marrow biopsy performed on 6/25/15 was negative for significant pathology. Counts continue to be mildly suppressed but remain relatively stable over the previous 3+ years  He continues to do very well clinically/remains asymptomatic with relatively stable CBC.  We will arrange close follow-up with plans to repeat bone marrow biopsy if anemia/cytopenias worsen        Plan:  Stage III Peripheral T cell lymphoma NOS: with CR after CHOP x 6  --s/p 6 cycles of CHOP completed in 10/2014  --Repeat PET as clinically indicated  --continue active surveillance     Pancytopenia: Secondary to chemotherapy versus low-grade MDS  Anemia likely multifactorial with chronic kidney disease contributing  --short interval follow-up with repeat CBC/iron studies/SPEP/serum free light chains  --continue to monitor closely and plan repeat bone marrow biopsy upon significant progression        Anemia secondary to chronic kidney disease:  --consider initiation of Procrit when hemoglobin falls below 9.5    Reports fell last week and hit his hip, was evaluated and admitted at [facility].  Has had hoarseness over the past month.       Oncology History    No history exists.     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     Family History   Problem Relation Age of Onset    Blindness Father     Cancer Neg Hx     Diabetes Neg Hx     Heart failure Neg Hx     Coronary artery disease Neg Hx     Cataracts Neg Hx     Glaucoma Neg Hx     Hypertension Neg Hx     Macular degeneration Neg Hx     Retinal detachment Neg Hx     Strabismus Neg Hx     Stroke Neg Hx     Thyroid disease Neg Hx      Social History     Socioeconomic History    Marital status: Single     Spouse name: Not on file    Number of children: Not on file    Years of education: Not on file    Highest education level: Not on file   Occupational History    Not on file   Social Needs    Financial resource strain: Not on file    Food insecurity     Worry: Not on file     Inability: Not on file    Transportation needs     Medical: Not on file     Non-medical: Not on file   Tobacco Use    Smoking status: Former Smoker     Packs/day: 1.00     Years: 45.00     Pack years: 45.00     Quit date: 1/1/2005     Years since quitting: 15.4    Smokeless tobacco: Never Used    Tobacco comment: Pt no longer smokes   Substance and Sexual Activity    Alcohol use: No     Comment: former    Drug use: No    Sexual activity: Yes     Birth control/protection: None   Lifestyle    Physical activity     Days per week: Not on file     Minutes per session: Not on file    Stress: Not on file   Relationships    Social connections     Talks on phone: Not on file     Gets together: Not on file     Attends Restorationist service: Not on file     Active member of club or organization: Not on file     Attends meetings of clubs or organizations: Not on file     Relationship status: Not on file   Other Topics Concern    Not on file   Social History Narrative    Not on file     Past Surgical History:   Procedure Laterality Date    CARDIAC CATHETERIZATION      CARDIAC SURGERY  2006    CATARACT EXTRACTION  W/  INTRAOCULAR LENS IMPLANT Right 05/24/2017    CATARACT EXTRACTION W/  INTRAOCULAR LENS IMPLANT Left 04/12/2017    CLOSED REDUCTION OF FRACTURE OF FIBULA Left 7/18/2019    Procedure: CLOSED REDUCTION, FRACTURE, FIBULA;  Surgeon: Philippe Bustamante Jr., MD;  Location: [facility] OR;  Service: Orthopedics;  Laterality: Left;  tibia/fibula fracture    CORONARY ARTERY BYPASS GRAFT      EYE SURGERY      Cataract extraction with intraocular lens implant    LYMPH NODES, LEFT NECK, EXCISIONAL BIOPSY  5/21/2014    MEDIPORT INSERTION, SINGLE  5/28/14    ORIF FIBULA FRACTURE Left 8/9/2019    Procedure: ORIF, FRACTURE, FIBULA;  Surgeon: Flynn White MD;  Location: [facility] OR;  Service: Orthopedics;  Laterality: Left;    ORIF TIBIA FRACTURE Left 8/9/2019    Procedure: ORIF, FRACTURE, TIBIA;  Surgeon: Flynn White MD;  Location: [facility] OR;  Service: Orthopedics;  Laterality: Left;    PCIOL Left 04/12/2017    DR. MCCRAY    PROSTATE SURGERY  02/2014    REMOVAL OF EXTERNAL FIXATION DEVICE Left 8/9/2019    Procedure: REMOVAL, EXTERNAL FIXATION DEVICE;  Surgeon: Flynn White MD;  Location: [facility] OR;  Service: Orthopedics;  Laterality: Left;     Current Outpatient Medications   Medication Sig Dispense Refill    acetaminophen (TYLENOL) 325 MG tablet Take 2 tablets (650 mg total) by mouth every 4 (four) hours as needed. (Patient not taking: Reported on 3/12/2020)  0    aspirin (ECOTRIN) 81 MG EC tablet Take 81 mg by mouth once daily.      atorvastatin (LIPITOR) 80 MG tablet 80 mg every evening.       cholecalciferol, vitamin D3, (VITAMIN D3) 400 unit Tab Take 1 tablet by mouth once daily.      cyanocobalamin (VITAMIN B-12) 500 MCG tablet Take 500 mcg by mouth once daily.      fish oil-omega-3 fatty acids 300-1,000 mg capsule Take 1 g by mouth 2 (two) times daily.       FLUZONE HIGH-DOSE 2018-19, PF, 180 mcg/0.5 mL vaccine       FOLIC ACID ORAL Take 2,400 mcg by mouth once daily.       hydroCHLOROthiazide (HYDRODIURIL)  25 MG tablet Take by mouth once daily.      HYDROcodone-acetaminophen (NORCO)  mg per tablet Take 1 tablet by mouth every 6 (six) hours as needed. (Patient not taking: Reported on 3/12/2020) 20 tablet 0    metoprolol succinate (TOPROL-XL) 25 MG 24 hr tablet Take 25 mg by mouth once daily.  2    NITROSTAT 0.4 mg SL tablet Patient states that he takes this medication as needed  3    nozaseptin (NOZIN) nasal  1 each by Each Nostril route 2 (two) times daily. (Patient not taking: Reported on 11/20/2019) 1 applicator 0    pantoprazole (PROTONIX) 40 MG tablet Take 1 tablet (40 mg total) by mouth once daily. 30 tablet 0    polyethylene glycol (GLYCOLAX) 17 gram PwPk Take by mouth daily as needed.      quinapril (ACCUPRIL) 10 MG tablet Take 10 mg by mouth once daily.  2    ranolazine (RANEXA) 1,000 mg Tb12 TAKE 1 TABLET BY MOUTH TWICE A DAY 60 tablet 5    triamcinolone acetonide 0.1% (KENALOG) 0.1 % cream every morning.        No current facility-administered medications for this visit.        Labs:  Lab Results   Component Value Date    WBC 2.74 (L) 02/27/2020    HGB 9.9 (L) 02/27/2020    HCT 28.2 (L) 02/27/2020    MCV 85 02/27/2020     02/27/2020     BMP  Lab Results   Component Value Date     02/27/2020    K 4.9 02/27/2020     02/27/2020    CO2 21 (L) 02/27/2020    BUN 28 (H) 02/27/2020    CREATININE 1.6 (H) 02/27/2020    CALCIUM 9.2 02/27/2020    ANIONGAP 12 02/27/2020    ESTGFRAFRICA 48 (A) 02/27/2020    EGFRNONAA 42 (A) 02/27/2020     Lab Results   Component Value Date    ALT 7 (L) 02/27/2020    AST 15 02/27/2020    ALKPHOS 64 02/27/2020    BILITOT 0.7 02/27/2020       Lab Results   Component Value Date    IRON 75 02/27/2020    TIBC 272 02/27/2020    FERRITIN 698 (H) 02/27/2020     Lab Results   Component Value Date    UINGMZLK77 1127 (H) 08/15/2019     Lab Results   Component Value Date    FOLATE >40.0 (H) 02/27/2020     Lab Results   Component Value Date    TSH 0.645  02/27/2020       I have reviewed the radiology reports and examined the scan/xray images.    Review of Systems   Constitutional: Negative.    HENT: Positive for voice change.    Eyes: Negative.    Respiratory: Negative.    Cardiovascular: Negative.    Gastrointestinal: Negative.    Endocrine: Negative.    Genitourinary: Negative.    Musculoskeletal: Negative.    Skin: Negative.    Allergic/Immunologic: Negative.    Neurological: Negative.    Hematological: Negative.    Psychiatric/Behavioral: Negative.      ECOG SCORE    0 - Fully active-able to carry on all pre-disease performance without restriction            Objective:     Vitals:    06/23/20 1331   BP: (!) 96/52   Pulse: 77   Resp: 18   Temp: 98.2 °F (36.8 °C)   Body mass index is 23.76 kg/m².  Physical Exam  Vitals signs and nursing note reviewed.   Constitutional:       Appearance: He is well-developed.   HENT:      Head: Normocephalic and atraumatic.   Eyes:      Conjunctiva/sclera: Conjunctivae normal.   Neck:      Musculoskeletal: Normal range of motion and neck supple.   Cardiovascular:      Rate and Rhythm: Normal rate and regular rhythm.   Pulmonary:      Effort: Pulmonary effort is normal.      Breath sounds: Normal breath sounds.   Abdominal:      General: Bowel sounds are normal.      Palpations: Abdomen is soft.   Musculoskeletal: Normal range of motion.   Skin:     General: Skin is warm and dry.   Neurological:      Mental Status: He is alert and oriented to person, place, and time.   Psychiatric:         Behavior: Behavior normal.         Thought Content: Thought content normal.         Judgment: Judgment normal.           Assessment:      1. 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  -     CT Soft Tissue Neck WO Contrast; Future; Expected date: 06/23/2020  -     CT CHEST WITHOUT CONTRAST; Future; Expected date: 06/23/2020  -      Specimen to Pathology, Bone Marrow Aspiration/Biopsy; Future; Expected date: 06/23/2020  -     CT Biopsy Bone Marrow (xpd); Future; Expected date: 06/23/2020  -     Chromosome Analysis, Bone Marrow; Future; Expected date: 06/23/2020  -     Leukemia/Lymphoma Screen - Bone Marrow Right Posterior Iliac Crest; Future; Expected date: 06/23/2020  -     Myelodysplastic Syndrome (MDS), FISH, Bone Marrow; Future; Expected date: 06/23/2020    Symptomatic anemia    Due to MDS and CKD  Will set up for retacrit.  Will give iv iron if iron deficient.

## 2020-06-24 LAB
ALBUMIN SERPL ELPH-MCNC: 3.63 G/DL (ref 3.35–5.55)
ALPHA1 GLOB SERPL ELPH-MCNC: 0.4 G/DL (ref 0.17–0.41)
ALPHA2 GLOB SERPL ELPH-MCNC: 0.69 G/DL (ref 0.43–0.99)
B-GLOBULIN SERPL ELPH-MCNC: 0.73 G/DL (ref 0.5–1.1)
FERRITIN SERPL-MCNC: 897 NG/ML (ref 20–300)
FOLATE SERPL-MCNC: 17.3 NG/ML (ref 4–24)
GAMMA GLOB SERPL ELPH-MCNC: 2.06 G/DL (ref 0.67–1.58)
INTERPRETATION SERPL IFE-IMP: NORMAL
IRON SERPL-MCNC: 66 UG/DL (ref 45–160)
KAPPA LC SER QL IA: 7.97 MG/DL (ref 0.33–1.94)
KAPPA LC/LAMBDA SER IA: 1.56 (ref 0.26–1.65)
LAMBDA LC SER QL IA: 5.12 MG/DL (ref 0.57–2.63)
PATHOLOGIST INTERPRETATION IFE: NORMAL
PATHOLOGIST INTERPRETATION SPE: NORMAL
PROT SERPL-MCNC: 7.5 G/DL (ref 6–8.4)
SATURATED IRON: 30 % (ref 20–50)
T4 FREE SERPL-MCNC: 1.01 NG/DL (ref 0.71–1.51)
TOTAL IRON BINDING CAPACITY: 222 UG/DL (ref 250–450)
TRANSFERRIN SERPL-MCNC: 150 MG/DL (ref 200–375)
TSH SERPL DL<=0.005 MIU/L-ACNC: 0.36 UIU/ML (ref 0.4–4)

## 2020-06-26 DIAGNOSIS — C84.48 PERIPHERAL T CELL LYMPHOMA OF LYMPH NODES OF MULTIPLE SITES: Primary | ICD-10-CM

## 2020-06-29 ENCOUNTER — HOSPITAL ENCOUNTER (OUTPATIENT)
Dept: RADIOLOGY | Facility: HOSPITAL | Age: 75
Discharge: HOME | End: 2020-06-29
Attending: INTERNAL MEDICINE
Payer: MEDICARE

## 2020-06-29 DIAGNOSIS — C84.48 PERIPHERAL T CELL LYMPHOMA OF LYMPH NODES OF MULTIPLE SITES: ICD-10-CM

## 2020-06-29 PROCEDURE — 71250 CT CHEST WITHOUT CONTRAST: ICD-10-PCS | Mod: 26,,, | Performed by: RADIOLOGY

## 2020-06-29 PROCEDURE — 71250 CT THORAX DX C-: CPT | Mod: TC

## 2020-06-29 PROCEDURE — 70490 CT SOFT TISSUE NECK WITHOUT CONTRAST: ICD-10-PCS | Mod: 26,,, | Performed by: RADIOLOGY

## 2020-06-29 PROCEDURE — 70490 CT SOFT TISSUE NECK W/O DYE: CPT | Mod: TC

## 2020-06-29 PROCEDURE — 70490 CT SOFT TISSUE NECK W/O DYE: CPT | Mod: 26,,, | Performed by: RADIOLOGY

## 2020-06-29 PROCEDURE — 71250 CT THORAX DX C-: CPT | Mod: 26,,, | Performed by: RADIOLOGY

## 2020-07-06 ENCOUNTER — TELEPHONE (OUTPATIENT)
Dept: FAMILY MEDICINE | Facility: CLINIC | Age: 75
End: 2020-07-06

## 2020-07-06 ENCOUNTER — TELEPHONE (OUTPATIENT)
Dept: RADIOLOGY | Facility: HOSPITAL | Age: 75
End: 2020-07-06

## 2020-07-06 DIAGNOSIS — C84.48 PERIPHERAL T CELL LYMPHOMA OF LYMPH NODES OF MULTIPLE SITES: Primary | ICD-10-CM

## 2020-07-06 DIAGNOSIS — I10 ESSENTIAL HYPERTENSION: ICD-10-CM

## 2020-07-06 DIAGNOSIS — D61.810 PANCYTOPENIA DUE TO CHEMOTHERAPY: ICD-10-CM

## 2020-07-06 DIAGNOSIS — D61.818 PANCYTOPENIA: ICD-10-CM

## 2020-07-06 DIAGNOSIS — D64.9 SYMPTOMATIC ANEMIA: ICD-10-CM

## 2020-07-06 DIAGNOSIS — N18.30 CKD (CHRONIC KIDNEY DISEASE) STAGE 3, GFR 30-59 ML/MIN: ICD-10-CM

## 2020-07-06 DIAGNOSIS — R60.9 EDEMA, UNSPECIFIED TYPE: ICD-10-CM

## 2020-07-06 DIAGNOSIS — E87.6 HYPOKALEMIA: ICD-10-CM

## 2020-07-06 DIAGNOSIS — C85.90 LYMPHOMA, UNSPECIFIED BODY REGION, UNSPECIFIED LYMPHOMA TYPE: ICD-10-CM

## 2020-07-06 DIAGNOSIS — N28.9 RENAL INSUFFICIENCY: ICD-10-CM

## 2020-07-06 DIAGNOSIS — I50.32 CHRONIC DIASTOLIC CONGESTIVE HEART FAILURE: ICD-10-CM

## 2020-07-06 DIAGNOSIS — R59.1 LYMPHADENOPATHY: ICD-10-CM

## 2020-07-06 NOTE — TELEPHONE ENCOUNTER
Pre-procedure phone call made at this time. Informed pt to be NPO after midnight, show up to [facility] on [address] between 9:00-9:15am to registration, then go get blood work done, then check in at radiology waiting room, either have a ride with them or have a phone number for the person driving them home so that we can get in contact with them to keep them updated. Answered all questions that the pt had and pt verbalized understanding of all discussed.    Pt wrote everything down that we discussed and also gave him the phone number he can reach us at if any more questions arise.

## 2020-07-06 NOTE — TELEPHONE ENCOUNTER
----- Message from Amparo Fajardo sent at 7/6/2020  1:08 PM CDT -----  Contact: pt  Type:  Sooner Apoointment Request    Caller is requesting a sooner appointment.  Caller declined first available appointment listed below.  Caller will not accept being placed on the waitlist and is requesting a message be sent to doctor.  Name of Caller:pt  When is the first available appointment?   Symptoms: hospital f/u  Would the patient rather a call back or a response via [facility]? Call back  Best Call Back Number: [phone]  Additional Information: n/a

## 2020-07-07 ENCOUNTER — HOSPITAL ENCOUNTER (OUTPATIENT)
Dept: RADIOLOGY | Facility: HOSPITAL | Age: 75
Discharge: HOME | End: 2020-07-07
Attending: INTERNAL MEDICINE
Payer: MEDICARE

## 2020-07-07 VITALS
HEIGHT: 67 IN | BODY MASS INDEX: 23.7 KG/M2 | SYSTOLIC BLOOD PRESSURE: 162 MMHG | OXYGEN SATURATION: 100 % | WEIGHT: 151 LBS | DIASTOLIC BLOOD PRESSURE: 84 MMHG | RESPIRATION RATE: 16 BRPM | HEART RATE: 67 BPM

## 2020-07-07 DIAGNOSIS — C84.48 PERIPHERAL T CELL LYMPHOMA OF LYMPH NODES OF MULTIPLE SITES: ICD-10-CM

## 2020-07-07 PROCEDURE — 88305 TISSUE EXAM BY PATHOLOGIST: CPT | Mod: 26,,, | Performed by: PATHOLOGY

## 2020-07-07 PROCEDURE — 88271 CYTOGENETICS DNA PROBE: CPT

## 2020-07-07 PROCEDURE — 30000890 MAYO MISCELLANEOUS TEST (REFLEX)

## 2020-07-07 PROCEDURE — 88341 IMHCHEM/IMCYTCHM EA ADD ANTB: CPT | Mod: 26,,, | Performed by: PATHOLOGY

## 2020-07-07 PROCEDURE — 88311 PR  DECALCIFY TISSUE: ICD-10-PCS | Mod: 26,,, | Performed by: PATHOLOGY

## 2020-07-07 PROCEDURE — 63600175 PHARM REV CODE 636 W HCPCS: Performed by: RADIOLOGY

## 2020-07-07 PROCEDURE — 88189 PR  FLOWCYTOMETRY/READ, 16 & > MARKERS: ICD-10-PCS | Mod: ,,, | Performed by: PATHOLOGY

## 2020-07-07 PROCEDURE — 88365 PR  TISSUE HYBRIDIZATION: ICD-10-PCS | Mod: 26,,, | Performed by: PATHOLOGY

## 2020-07-07 PROCEDURE — 85097 BONE MARROW INTERPRETATION: CPT | Mod: 59,,, | Performed by: PATHOLOGY

## 2020-07-07 PROCEDURE — 88313 SPECIAL STAINS GROUP 2: CPT | Mod: 26,,, | Performed by: PATHOLOGY

## 2020-07-07 PROCEDURE — 85097 PR  BONE MARROW,SMEAR INTERPRETATION: ICD-10-PCS | Mod: 59,,, | Performed by: PATHOLOGY

## 2020-07-07 PROCEDURE — 88342 IMHCHEM/IMCYTCHM 1ST ANTB: CPT | Performed by: PATHOLOGY

## 2020-07-07 PROCEDURE — 88313 PR  SPECIAL STAINS,GROUP II: ICD-10-PCS | Mod: 26,,, | Performed by: PATHOLOGY

## 2020-07-07 PROCEDURE — 81340 TRB@ GENE REARRANGE AMPLIFY: CPT | Performed by: PATHOLOGY

## 2020-07-07 PROCEDURE — 88305 TISSUE EXAM BY PATHOLOGIST: ICD-10-PCS | Mod: 26,,, | Performed by: PATHOLOGY

## 2020-07-07 PROCEDURE — 88305 TISSUE EXAM BY PATHOLOGIST: CPT | Mod: 59 | Performed by: PATHOLOGY

## 2020-07-07 PROCEDURE — 88364 INSITU HYBRIDIZATION (FISH): CPT | Performed by: PATHOLOGY

## 2020-07-07 PROCEDURE — 88311 DECALCIFY TISSUE: CPT | Mod: 26,,, | Performed by: PATHOLOGY

## 2020-07-07 PROCEDURE — 88365 INSITU HYBRIDIZATION (FISH): CPT | Performed by: PATHOLOGY

## 2020-07-07 PROCEDURE — 88342 IMHCHEM/IMCYTCHM 1ST ANTB: CPT | Mod: 26,,, | Performed by: PATHOLOGY

## 2020-07-07 PROCEDURE — 81342 TRG GENE REARRANGEMENT ANAL: CPT | Performed by: PATHOLOGY

## 2020-07-07 PROCEDURE — 88237 TISSUE CULTURE BONE MARROW: CPT

## 2020-07-07 PROCEDURE — 88341 IMHCHEM/IMCYTCHM EA ADD ANTB: CPT | Performed by: PATHOLOGY

## 2020-07-07 PROCEDURE — 88313 SPECIAL STAINS GROUP 2: CPT | Mod: 59 | Performed by: PATHOLOGY

## 2020-07-07 PROCEDURE — C1830 POWER BONE MARROW BX NEEDLE: HCPCS

## 2020-07-07 PROCEDURE — 88341 PR IHC OR ICC EACH ADD'L SINGLE ANTIBODY  STAINPR: ICD-10-PCS | Mod: 26,,, | Performed by: PATHOLOGY

## 2020-07-07 PROCEDURE — 88189 FLOWCYTOMETRY/READ 16 & >: CPT | Mod: ,,, | Performed by: PATHOLOGY

## 2020-07-07 PROCEDURE — 88342 CHG IMMUNOCYTOCHEMISTRY: ICD-10-PCS | Mod: 26,,, | Performed by: PATHOLOGY

## 2020-07-07 PROCEDURE — 88184 FLOWCYTOMETRY/ TC 1 MARKER: CPT | Performed by: PATHOLOGY

## 2020-07-07 PROCEDURE — 77012 CT SCAN FOR NEEDLE BIOPSY: CPT | Mod: TC

## 2020-07-07 PROCEDURE — 88311 DECALCIFY TISSUE: CPT | Performed by: PATHOLOGY

## 2020-07-07 PROCEDURE — 88185 FLOWCYTOMETRY/TC ADD-ON: CPT | Performed by: PATHOLOGY

## 2020-07-07 PROCEDURE — 88365 INSITU HYBRIDIZATION (FISH): CPT | Mod: 26,,, | Performed by: PATHOLOGY

## 2020-07-07 RX ORDER — MIDAZOLAM HYDROCHLORIDE 1 MG/ML
INJECTION INTRAMUSCULAR; INTRAVENOUS CODE/TRAUMA/SEDATION MEDICATION
Status: COMPLETED | OUTPATIENT
Start: 2020-07-07 | End: 2020-07-07

## 2020-07-07 RX ORDER — FENTANYL CITRATE 50 UG/ML
INJECTION, SOLUTION INTRAMUSCULAR; INTRAVENOUS CODE/TRAUMA/SEDATION MEDICATION
Status: COMPLETED | OUTPATIENT
Start: 2020-07-07 | End: 2020-07-07

## 2020-07-07 RX ADMIN — MIDAZOLAM HYDROCHLORIDE 1 MG: 1 INJECTION, SOLUTION INTRAMUSCULAR; INTRAVENOUS at 10:07

## 2020-07-07 RX ADMIN — FENTANYL CITRATE 50 MCG: 50 INJECTION, SOLUTION INTRAMUSCULAR; INTRAVENOUS at 10:07

## 2020-07-07 NOTE — SEDATION DOCUMENTATION
Pt placed on CT table prone with arms above his head at this time. CM in place, VSS, no acute distress noted. Pt verbalizes understanding of procedure at this time.

## 2020-07-07 NOTE — DISCHARGE INSTRUCTIONS
Bone Marrow Aspiration and Biopsy  Does this test have other names?  Bone marrow exam  What is this test?  This is a two-part test that looks at the blood cells in a sample of bone marrow, the spongy tissue within certain bones. This test may help your healthcare provider diagnose or monitor a blood disease or health condition affecting your marrow.  Your bone marrow has a liquid part and a solid part. Aspiration uses a needle to remove a sample of the liquid part of bone marrow. Biopsy uses a larger needle to remove a small amount of bone with its marrow.  Part of the job of bone marrow is to make blood cells. This test can find out how well your bone marrow is working. This test is also done to find some types of cancer.  Why do I need this test?  You might have this test if your healthcare provider wants to find out the health of your bone marrow or to check on how well your marrow is making blood cells.  You may have an aspiration to check for:  · The health of your bone marrow for a transplant  · Acute leukemia  · Multiple myeloma  In some cases, bone marrow aspiration is used to confirm chromosome disorders in newborns.  You may have an aspiration followed by a biopsy if you could have:  · Bacterial, fungal, or parasitic infection  · Unexplained anemia, leucopenia, or thrombocytopenia  · Metastatic cancer or many other diseases  What other tests might I have along with this test?  Your healthcare provider may also order these tests:  · Complete blood count, or CBC  · Reticulocyte count to find out your red blood cell survival rate  What do my test results mean?  Many things may affect your lab test results. These include the method each lab uses to do the test. Even if your test results are different from the normal value, you may not have a problem. To learn what the results mean for you, talk with your healthcare provider.  The lab will look at different aspects of your bone marrow to help find certain  diseases or conditions. These aspects include:  · Type and number of blood cells  · Any abnormalities in the size, shape, or look of cells  · Level of iron in the bone marrow  · Abnormal amount of young white blood cells, called blasts  · Any chromosomal abnormalities  Depending on what is seen, your results may mean you have an infection, a blood disease, leukemia, or cancer that has spread to the bone marrow from another site.  Your healthcare provider will take your results and combine this information with information from your physical exam, health history, and other types of tests to make a diagnosis.   If your results are negative, your provider may order other tests to diagnose your condition.   How is this test done?  These tests require a sample of bone marrow. A number of sites on your body can be used for marrow aspiration, but the hip bone is a common spot. You will likely lie on your side or stomach on an exam table. Your healthcare provider will numb the area of the test. You may feel a slight prick from the needle that the provider uses to give the numbing agent.  Does this test pose any risks?  It's not possible to numb the bone, so you may feel slight pain during the procedure. But you shouldn't feel any pain afterward. Risks from a bone marrow test are rare, but you could have bleeding or an infection.  What might affect my test results?  Other factors aren't likely to affect your results.  How do I get ready for this test?  Tell your healthcare provider if you take aspirin or have any allergies. Also tell your provider if you are pregnant, take any blood-thinner medicines, or have a history of bleeding problems.  Be sure your provider knows about all other medicines, herbs, vitamins, and supplements you are taking. This includes medicines that don't need a prescription and any illicit drugs you may use.     © 2828-8028 [employer]. [address] [place] [address], [place] [address]. All  rights reserved. This information is not intended as a substitute for professional medical care. Always follow your healthcare professional's instructions.        Recovery After Procedural Sedation (Adult)   You have been given medicine by vein to make you sleep during your surgery. This may have included both a pain medicine and sleeping medicine. Most of the effects have worn off. But you may still have some drowsiness for the next 6 to 8 hours.  Home care  Follow these guidelines when you get home:  · For the next 8 hours, you should be watched by a responsible adult. This person should make sure your condition is not getting worse.  · Don't drink any alcohol for the next 24 hours.  · Don't drive, operate dangerous machinery, or make important business or personal decisions during the next 24 hours.  · To prevent injury or falls, use caution when standing and walking for at least 24 hours after your procedure.  Note: Your healthcare provider may tell you not to take any medicine by mouth for pain or sleep in the next 4 hours. These medicines may react with the medicines you were given in the hospital. This could cause a much stronger response than usual.  Follow-up care  Follow up with your healthcare provider if you are not alert and back to your usual level of activity within 12 hours.  When to seek medical advice  Call your healthcare provider right away if any of these occur:  · Drowsiness gets worse  · Weakness or dizziness gets worse  · Repeated vomiting  · You can't be awakened  · Fever  · New rash  [employer] last reviewed this educational content on 9/1/2019  © 6635-8101 [employer]. [address] [place] [address], [place] [address]. All rights reserved. This information is not intended as a substitute for professional medical care. Always follow your healthcare professional's instructions.

## 2020-07-07 NOTE — SEDATION DOCUMENTATION
Procedure completed at this time. VSS, no acute distress noted, pt tolerated procedure well. Band aid placed to right posterior iliac crest puncture site C/D/I with no bleeding noted. Pt transferred to stretcher and transported back to recovery bay at this time. Will continue to monitor pt.

## 2020-07-07 NOTE — DISCHARGE SUMMARY
Pre Op Diagnosis: Lymphoma     Post Op Diagnosis: same     Procedure:  Bone marrow biopsy     Procedure performed by: Ines MUSA, Porfirio CHACKO     Written Informed Consent Obtained: Yes     Specimen Removed:  yes     Estimated Blood Loss:  minimal     Findings: Local anesthesia and moderate sedation were used.     The patient tolerated the procedure well and there were no complications.      Sterile technique was performed in the posterior right iliac, lidocaine was used as a local anesthetic.  Multiple samples taken from the right iliac bone.  Pt tolerated the procedure well without immediate complications.  Please see radiologist report for details. F/u with PCP and/or ordering physician.

## 2020-07-07 NOTE — PLAN OF CARE
Band aid to right posterior iliac crest puncture site C/D/I with no bleeding/redness/swelling noted. Discharge instructions given to and reviewed with pt and pt verbalized understanding of all. Pt discharged to home, taken out via wheelchair and driven home by niece.

## 2020-07-08 LAB
BODY SITE - BONE MARROW: NORMAL
CLINICAL DIAGNOSIS - BONE MARROW: NORMAL
FLOW CYTOMETRY ANTIBODIES ANALYZED - BONE MARROW: NORMAL
FLOW CYTOMETRY COMMENT - BONE MARROW: NORMAL
FLOW CYTOMETRY INTERPRETATION - BONE MARROW: NORMAL

## 2020-07-09 LAB
CHROM BANDING METHOD: NORMAL
CHROMOSOME ANALYSIS BM ADDITIONAL INFORMATION: NORMAL
CHROMOSOME ANALYSIS BM RELEASED BY: NORMAL
CHROMOSOME ANALYSIS BM RESULT SUMMARY: NORMAL
CLINICAL CYTOGENETICIST REVIEW: NORMAL
KARYOTYP MAR: NORMAL
REASON FOR REFERRAL (NARRATIVE): NORMAL
REF LAB TEST METHOD: NORMAL
SPECIMEN SOURCE: NORMAL
SPECIMEN: NORMAL

## 2020-07-16 LAB
COMMENT: NORMAL
FINAL PATHOLOGIC DIAGNOSIS: NORMAL
GROSS: NORMAL
Lab: NORMAL
MICROSCOPIC EXAM: NORMAL
SUPPLEMENTAL DIAGNOSIS: NORMAL

## 2020-08-03 ENCOUNTER — OFFICE VISIT (OUTPATIENT)
Dept: OTOLARYNGOLOGY | Facility: CLINIC | Age: 75
End: 2020-08-03
Payer: MEDICARE

## 2020-08-03 VITALS
WEIGHT: 140.63 LBS | TEMPERATURE: 99 F | BODY MASS INDEX: 22.03 KG/M2 | DIASTOLIC BLOOD PRESSURE: 73 MMHG | SYSTOLIC BLOOD PRESSURE: 126 MMHG | HEART RATE: 96 BPM

## 2020-08-03 DIAGNOSIS — R22.0 LIP SWELLING: Primary | ICD-10-CM

## 2020-08-03 PROCEDURE — 1126F PR PAIN SEVERITY QUANTIFIED, NO PAIN PRESENT: ICD-10-PCS | Mod: S$GLB,,, | Performed by: ORTHOPAEDIC SURGERY

## 2020-08-03 PROCEDURE — 3078F DIAST BP <80 MM HG: CPT | Mod: CPTII,S$GLB,, | Performed by: ORTHOPAEDIC SURGERY

## 2020-08-03 PROCEDURE — 3078F PR MOST RECENT DIASTOLIC BLOOD PRESSURE < 80 MM HG: ICD-10-PCS | Mod: CPTII,S$GLB,, | Performed by: ORTHOPAEDIC SURGERY

## 2020-08-03 PROCEDURE — 99999 PR PBB SHADOW E&M-EST. PATIENT-LVL IV: CPT | Mod: PBBFAC,,, | Performed by: ORTHOPAEDIC SURGERY

## 2020-08-03 PROCEDURE — 1126F AMNT PAIN NOTED NONE PRSNT: CPT | Mod: S$GLB,,, | Performed by: ORTHOPAEDIC SURGERY

## 2020-08-03 PROCEDURE — 1101F PR PT FALLS ASSESS DOC 0-1 FALLS W/OUT INJ PAST YR: ICD-10-PCS | Mod: CPTII,S$GLB,, | Performed by: ORTHOPAEDIC SURGERY

## 2020-08-03 PROCEDURE — 1159F PR MEDICATION LIST DOCUMENTED IN MEDICAL RECORD: ICD-10-PCS | Mod: S$GLB,,, | Performed by: ORTHOPAEDIC SURGERY

## 2020-08-03 PROCEDURE — 99999 PR PBB SHADOW E&M-EST. PATIENT-LVL IV: ICD-10-PCS | Mod: PBBFAC,,, | Performed by: ORTHOPAEDIC SURGERY

## 2020-08-03 PROCEDURE — 3074F PR MOST RECENT SYSTOLIC BLOOD PRESSURE < 130 MM HG: ICD-10-PCS | Mod: CPTII,S$GLB,, | Performed by: ORTHOPAEDIC SURGERY

## 2020-08-03 PROCEDURE — 99203 PR OFFICE/OUTPT VISIT, NEW, LEVL III, 30-44 MIN: ICD-10-PCS | Mod: S$GLB,,, | Performed by: ORTHOPAEDIC SURGERY

## 2020-08-03 PROCEDURE — 1159F MED LIST DOCD IN RCRD: CPT | Mod: S$GLB,,, | Performed by: ORTHOPAEDIC SURGERY

## 2020-08-03 PROCEDURE — 1101F PT FALLS ASSESS-DOCD LE1/YR: CPT | Mod: CPTII,S$GLB,, | Performed by: ORTHOPAEDIC SURGERY

## 2020-08-03 PROCEDURE — 99203 OFFICE O/P NEW LOW 30 MIN: CPT | Mod: S$GLB,,, | Performed by: ORTHOPAEDIC SURGERY

## 2020-08-03 PROCEDURE — 3074F SYST BP LT 130 MM HG: CPT | Mod: CPTII,S$GLB,, | Performed by: ORTHOPAEDIC SURGERY

## 2020-08-03 NOTE — PROGRESS NOTES
Subjective:       Patient ID: Foreign Holland is a 75 y.o. male.    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 Foreign Holland in Hematology Oncology clinic at [facility] on 08/03/2020.  Mr. Foreign Holland is a 75 y.o. gentleman with Stage III Peripheral T Cell Lymphoma NOS  s/p 6 cycles of CHOP (last cycle 10/7/14) with Complete Response per PET.  Of note there was abnormal T cell population in the bone marrow, however; PCR for T Cell receptor gene rearrangement were negative.  Bone marrow biopsy performed on 6/25/15 was negative for significant pathology. Counts continue to be mildly suppressed but remain relatively stable over the previous 3+ years  He continues to do very well clinically/remains asymptomatic with relatively stable CBC.  We will arrange close follow-up with plans to repeat bone marrow biopsy if anemia/cytopenias worsen        Plan:  Stage III Peripheral T cell lymphoma NOS: with CR after CHOP x 6  --s/p 6 cycles of CHOP completed in 10/2014  --Repeat PET as clinically indicated  --continue active surveillance     Pancytopenia: Secondary to chemotherapy versus low-grade MDS  Anemia likely multifactorial with chronic kidney disease contributing  --short interval follow-up with repeat CBC/iron studies/SPEP/serum free light chains  --continue to monitor closely and plan repeat bone marrow biopsy upon significant progression        Anemia secondary to chronic kidney disease:  --consider initiation of Procrit when hemoglobin falls below 9.5         Oncology History    No history exists.     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     Family History   Problem  Relation Age of Onset    Blindness Father     Cancer Neg Hx     Diabetes Neg Hx     Heart failure Neg Hx     Coronary artery disease Neg Hx     Cataracts Neg Hx     Glaucoma Neg Hx     Hypertension Neg Hx     Macular degeneration Neg Hx     Retinal detachment Neg Hx     Strabismus Neg Hx     Stroke Neg Hx     Thyroid disease Neg Hx      Social History     Socioeconomic History    Marital status: Single     Spouse name: Not on file    Number of children: Not on file    Years of education: Not on file    Highest education level: Not on file   Occupational History    Not on file   Social Needs    Financial resource strain: Not on file    Food insecurity     Worry: Not on file     Inability: Not on file    Transportation needs     Medical: Not on file     Non-medical: Not on file   Tobacco Use    Smoking status: Former Smoker     Packs/day: 1.00     Years: 45.00     Pack years: 45.00     Quit date: 1/1/2005     Years since quitting: 15.5    Smokeless tobacco: Never Used    Tobacco comment: Pt no longer smokes   Substance and Sexual Activity    Alcohol use: No     Comment: former    Drug use: No    Sexual activity: Yes     Birth control/protection: None   Lifestyle    Physical activity     Days per week: Not on file     Minutes per session: Not on file    Stress: Not on file   Relationships    Social connections     Talks on phone: Not on file     Gets together: Not on file     Attends Christian service: Not on file     Active member of club or organization: Not on file     Attends meetings of clubs or organizations: Not on file     Relationship status: Not on file   Other Topics Concern    Not on file   Social History Narrative    Not on file     Past Surgical History:   Procedure Laterality Date    CARDIAC CATHETERIZATION      CARDIAC SURGERY  2006    CATARACT EXTRACTION W/  INTRAOCULAR LENS IMPLANT Right 05/24/2017    CATARACT EXTRACTION W/  INTRAOCULAR LENS IMPLANT Left 04/12/2017     CLOSED REDUCTION OF FRACTURE OF FIBULA Left 7/18/2019    Procedure: CLOSED REDUCTION, FRACTURE, FIBULA;  Surgeon: Philippe Bustamante Jr., MD;  Location: [facility] OR;  Service: Orthopedics;  Laterality: Left;  tibia/fibula fracture    CORONARY ARTERY BYPASS GRAFT      EYE SURGERY      Cataract extraction with intraocular lens implant    LYMPH NODES, LEFT NECK, EXCISIONAL BIOPSY  5/21/2014    MEDIPORT INSERTION, SINGLE  5/28/14    ORIF FIBULA FRACTURE Left 8/9/2019    Procedure: ORIF, FRACTURE, FIBULA;  Surgeon: Flynn White MD;  Location: [facility] OR;  Service: Orthopedics;  Laterality: Left;    ORIF TIBIA FRACTURE Left 8/9/2019    Procedure: ORIF, FRACTURE, TIBIA;  Surgeon: Flynn White MD;  Location: [facility] OR;  Service: Orthopedics;  Laterality: Left;    PCIOL Left 04/12/2017    DR. MCCRAY    PROSTATE SURGERY  02/2014    REMOVAL OF EXTERNAL FIXATION DEVICE Left 8/9/2019    Procedure: REMOVAL, EXTERNAL FIXATION DEVICE;  Surgeon: Flynn White MD;  Location: [facility] OR;  Service: Orthopedics;  Laterality: Left;     Current Outpatient Medications   Medication Sig Dispense Refill    acetaminophen (TYLENOL) 325 MG tablet Take 2 tablets (650 mg total) by mouth every 4 (four) hours as needed.  0    aspirin (ECOTRIN) 81 MG EC tablet Take 81 mg by mouth once daily.      atorvastatin (LIPITOR) 80 MG tablet 80 mg every evening.       benzonatate (TESSALON) 100 MG capsule Take 100 mg by mouth 3 (three) times daily as needed for Cough.      cholecalciferol, vitamin D3, (VITAMIN D3) 400 unit Tab Take 1 tablet by mouth once daily.      cyanocobalamin (VITAMIN B-12) 500 MCG tablet Take 500 mcg by mouth once daily.      fish oil-omega-3 fatty acids 300-1,000 mg capsule Take 1 g by mouth 2 (two) times daily.       FLUZONE HIGH-DOSE 2018-19, PF, 180 mcg/0.5 mL vaccine       FOLIC ACID ORAL Take 2,400 mcg by mouth once daily.       hydroCHLOROthiazide (HYDRODIURIL) 25 MG tablet Take by mouth once daily.       HYDROcodone-acetaminophen (NORCO)  mg per tablet Take 1 tablet by mouth every 6 (six) hours as needed. 20 tablet 0    loratadine (CLARITIN) 10 mg tablet Take 10 mg by mouth daily as needed for Allergies.      metoprolol succinate (TOPROL-XL) 25 MG 24 hr tablet Take 25 mg by mouth once daily.  2    NITROSTAT 0.4 mg SL tablet Patient states that he takes this medication as needed  3    nozaseptin (NOZIN) nasal  1 each by Each Nostril route 2 (two) times daily. 1 applicator 0    pantoprazole (PROTONIX) 40 MG tablet Take 1 tablet (40 mg total) by mouth once daily. 30 tablet 0    polyethylene glycol (GLYCOLAX) 17 gram PwPk Take by mouth daily as needed.      quinapril (ACCUPRIL) 10 MG tablet Take 10 mg by mouth once daily.  2    ranolazine (RANEXA) 1,000 mg Tb12 TAKE 1 TABLET BY MOUTH TWICE A DAY 60 tablet 5     No current facility-administered medications for this visit.        Labs:  Lab Results   Component Value Date    WBC 3.91 07/07/2020    HGB 7.4 (L) 07/07/2020    HCT 22.9 (L) 07/07/2020    MCV 88 07/07/2020     07/07/2020     BMP  Lab Results   Component Value Date     06/23/2020    K 5.2 (H) 06/23/2020     06/23/2020    CO2 20 (L) 06/23/2020    BUN 57 (H) 06/23/2020    CREATININE 2.5 (H) 06/23/2020    CALCIUM 8.5 (L) 06/23/2020    ANIONGAP 9 06/23/2020    ESTGFRAFRICA 28 (A) 06/23/2020    EGFRNONAA 24 (A) 06/23/2020     Lab Results   Component Value Date    ALT 19 06/23/2020    AST 22 06/23/2020    ALKPHOS 56 06/23/2020    BILITOT 0.6 06/23/2020       Lab Results   Component Value Date    IRON 66 06/23/2020    TIBC 222 (L) 06/23/2020    FERRITIN 897 (H) 06/23/2020     Lab Results   Component Value Date    SRNERWVT56 1127 (H) 08/15/2019     Lab Results   Component Value Date    FOLATE 17.3 06/23/2020     Lab Results   Component Value Date    TSH 0.362 (L) 06/23/2020       I have reviewed the radiology reports and examined the scan/xray images.    Review of Systems    Constitutional: Negative.    HENT: Negative.    Eyes: Negative.    Respiratory: Negative.    Cardiovascular: Negative.    Gastrointestinal: Negative.    Endocrine: Negative.    Genitourinary: Negative.    Musculoskeletal: Negative.    Skin: Negative.    Allergic/Immunologic: Negative.    Neurological: Negative.    Hematological: Negative.    Psychiatric/Behavioral: Negative.      ECOG SCORE    0 - Fully active-able to carry on all pre-disease performance without restriction            Objective:     Vitals:    08/04/20 1307   BP: 102/62   Pulse: 78   Resp: 18   Temp: 97.3 °F (36.3 °C)   Body mass index is 22.03 kg/m².  Physical Exam  Vitals signs and nursing note reviewed.   Constitutional:       Appearance: He is well-developed.   HENT:      Head: Normocephalic and atraumatic.   Eyes:      Conjunctiva/sclera: Conjunctivae normal.   Neck:      Musculoskeletal: Normal range of motion and neck supple.   Cardiovascular:      Rate and Rhythm: Normal rate and regular rhythm.   Pulmonary:      Effort: Pulmonary effort is normal.      Breath sounds: Normal breath sounds.   Abdominal:      General: Bowel sounds are normal.      Palpations: Abdomen is soft.   Musculoskeletal: Normal range of motion.   Skin:     General: Skin is warm and dry.   Neurological:      Mental Status: He is alert and oriented to person, place, and time.   Psychiatric:         Behavior: Behavior normal.         Thought Content: Thought content normal.         Judgment: Judgment normal.     CT neck chest  Scattered subcentimeter and shotty nodes bilaterally without bulky or matted adenopathy.     Pharyngeal and laryngeal soft tissues normal in appearance with no mass lesion identified.     Calcified mediastinal nodes incidentally noted.      Assessment:      1. 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  CT neck chest showed shotty nodes  Physical exam showed 1.5 cm node in right axilla.  Will consult surgery for excisional biopsy of right axillary node  -     Ambulatory referral/consult to General Surgery; Future; Expected date: 08/11/2020  -     CBC auto differential; Future; Expected date: 08/04/2020  -     Comprehensive metabolic panel; Future; Expected date: 08/04/2020    Symptomatic anemia    will do retacrit today

## 2020-08-03 NOTE — LETTER
August 4, 2020      Oziel Mendieta MD  [address] [address]           [facility]  [address] [facility] [address]  Phone: [phone]  Fax: [phone]          Patient: Foreign Holland   MR Number: [id number]   YOB: 1945   Date of Visit: 8/3/2020       Dear Dr. Oziel Mendieta:    Thank you for referring Foreign Holland to me for evaluation. Attached you will find relevant portions of my assessment and plan of care.    If you have questions, please do not hesitate to call me. I look forward to following Foreign Holland along with you.    Sincerely,    Ewa Cantu MD    Enclosure  CC:  No Recipients    If you would like to receive this communication electronically, please contact [email or web address] or [phone] to request more information on [facility] access.    For providers and/or their staff who would like to refer a patient to [facility], please contact us through our one-stop-shop provider referral line, [facility], at [phone].    If you feel you have received this communication in error or would no longer like to receive these types of communications, please e-mail [email or web address]

## 2020-08-03 NOTE — PROGRESS NOTES
"Subjective:      Patient ID: Foreign Holland is a 75 y.o. male.    Chief Complaint: Lip swelling    Patient is a 75 year old gentleman here to see me today for evaluation of swelling of his lips.  He says that it started about a month ago, and has improved recently.  He says that he had swelling of both the top and bottom lip, bottom more so.  He denies any pain. He has not had any trauma to the area, and has not had any recent dental work.  He was recently admitted to [facility], and at that time he was noted to have a "wound" to his lip.  He has seen an outside ENT doctor (he is unsure who), and his lip has improved with aquaphor.        Review of Systems   Constitutional: Negative for fatigue, fever and unexpected weight change.   HENT: Negative for congestion, ear discharge, ear pain, facial swelling, hearing loss, nosebleeds, postnasal drip, rhinorrhea, sinus pressure, sneezing, sore throat, tinnitus, trouble swallowing and voice change.    Eyes: Negative for discharge, redness and itching.   Respiratory: Negative for cough, choking, shortness of breath and wheezing.    Cardiovascular: Negative for chest pain and palpitations.   Gastrointestinal: Negative for abdominal pain.        No reflux.   Musculoskeletal: Negative for neck pain.   Neurological: Negative for dizziness, facial asymmetry, light-headedness and headaches.   Hematological: Negative for adenopathy. Does not bruise/bleed easily.   Psychiatric/Behavioral: Negative for agitation, behavioral problems, confusion and decreased concentration.       Objective:       Physical Exam  Constitutional:       General: He is not in acute distress.     Appearance: He is well-developed.   HENT:      Head: Normocephalic and atraumatic.      Jaw: No trismus.      Comments: Slight edema of lower lip, no obvious ulceration or lesion seen, nontender, no masses     Right Ear: Hearing, tympanic membrane, ear canal and external ear normal.      Left Ear: Hearing, tympanic " membrane, ear canal and external ear normal.      Nose: Nose normal. No nasal deformity, septal deviation, mucosal edema or rhinorrhea.      Mouth/Throat:      Dentition: Normal dentition.      Pharynx: Uvula midline. No oropharyngeal exudate or uvula swelling.   Eyes:      General: No scleral icterus.     Conjunctiva/sclera: Conjunctivae normal.      Right eye: Right conjunctiva is not injected. No chemosis.     Left eye: Left conjunctiva is not injected. No chemosis.     Pupils: Pupils are equal, round, and reactive to light.   Neck:      Thyroid: No thyroid mass or thyromegaly.      Trachea: Trachea and phonation normal. No tracheal tenderness or tracheal deviation.   Pulmonary:      Effort: Pulmonary effort is normal. No accessory muscle usage or respiratory distress.      Breath sounds: No stridor.   Lymphadenopathy:      Head:      Right side of head: No submental, submandibular, preauricular or posterior auricular adenopathy.      Left side of head: No submental, submandibular, preauricular or posterior auricular adenopathy.      Cervical: No cervical adenopathy.      Right cervical: No superficial or deep cervical adenopathy.     Left cervical: No superficial or deep cervical adenopathy.   Skin:     General: Skin is warm and dry.      Findings: No erythema or rash.   Neurological:      Mental Status: He is alert and oriented to person, place, and time.      Cranial Nerves: No cranial nerve deficit.   Psychiatric:         Behavior: Behavior normal.         Thought Content: Thought content normal.         Assessment:       1. Lip swelling        Plan:     Lip swelling      Improving, patient denies trauma.  Continue with Aquaphor, call for return visit for any worsening of symptoms.

## 2020-08-04 ENCOUNTER — INFUSION (OUTPATIENT)
Dept: INFUSION THERAPY | Facility: HOSPITAL | Age: 75
End: 2020-08-04
Attending: NURSE PRACTITIONER
Payer: MEDICARE

## 2020-08-04 ENCOUNTER — OFFICE VISIT (OUTPATIENT)
Dept: HEMATOLOGY/ONCOLOGY | Facility: CLINIC | Age: 75
End: 2020-08-04
Payer: MEDICARE

## 2020-08-04 VITALS
RESPIRATION RATE: 18 BRPM | DIASTOLIC BLOOD PRESSURE: 62 MMHG | WEIGHT: 140.63 LBS | HEIGHT: 67 IN | SYSTOLIC BLOOD PRESSURE: 102 MMHG | TEMPERATURE: 97 F | BODY MASS INDEX: 22.07 KG/M2 | HEART RATE: 78 BPM | OXYGEN SATURATION: 99 %

## 2020-08-04 VITALS
SYSTOLIC BLOOD PRESSURE: 134 MMHG | OXYGEN SATURATION: 99 % | HEART RATE: 72 BPM | RESPIRATION RATE: 18 BRPM | DIASTOLIC BLOOD PRESSURE: 76 MMHG | TEMPERATURE: 97 F

## 2020-08-04 DIAGNOSIS — D64.9 SYMPTOMATIC ANEMIA: ICD-10-CM

## 2020-08-04 DIAGNOSIS — C84.48 PERIPHERAL T CELL LYMPHOMA OF LYMPH NODES OF MULTIPLE SITES: Primary | ICD-10-CM

## 2020-08-04 DIAGNOSIS — D64.9 SYMPTOMATIC ANEMIA: Primary | ICD-10-CM

## 2020-08-04 PROCEDURE — 3078F PR MOST RECENT DIASTOLIC BLOOD PRESSURE < 80 MM HG: ICD-10-PCS | Mod: CPTII,S$GLB,, | Performed by: INTERNAL MEDICINE

## 2020-08-04 PROCEDURE — 99999 PR PBB SHADOW E&M-EST. PATIENT-LVL IV: CPT | Mod: PBBFAC,,, | Performed by: INTERNAL MEDICINE

## 2020-08-04 PROCEDURE — 3078F DIAST BP <80 MM HG: CPT | Mod: CPTII,S$GLB,, | Performed by: INTERNAL MEDICINE

## 2020-08-04 PROCEDURE — 3074F SYST BP LT 130 MM HG: CPT | Mod: CPTII,S$GLB,, | Performed by: INTERNAL MEDICINE

## 2020-08-04 PROCEDURE — 1126F AMNT PAIN NOTED NONE PRSNT: CPT | Mod: S$GLB,,, | Performed by: INTERNAL MEDICINE

## 2020-08-04 PROCEDURE — 1126F PR PAIN SEVERITY QUANTIFIED, NO PAIN PRESENT: ICD-10-PCS | Mod: S$GLB,,, | Performed by: INTERNAL MEDICINE

## 2020-08-04 PROCEDURE — 1101F PR PT FALLS ASSESS DOC 0-1 FALLS W/OUT INJ PAST YR: ICD-10-PCS | Mod: CPTII,S$GLB,, | Performed by: INTERNAL MEDICINE

## 2020-08-04 PROCEDURE — 1159F MED LIST DOCD IN RCRD: CPT | Mod: S$GLB,,, | Performed by: INTERNAL MEDICINE

## 2020-08-04 PROCEDURE — 1159F PR MEDICATION LIST DOCUMENTED IN MEDICAL RECORD: ICD-10-PCS | Mod: S$GLB,,, | Performed by: INTERNAL MEDICINE

## 2020-08-04 PROCEDURE — 99215 PR OFFICE/OUTPT VISIT, EST, LEVL V, 40-54 MIN: ICD-10-PCS | Mod: 25,S$GLB,, | Performed by: INTERNAL MEDICINE

## 2020-08-04 PROCEDURE — 3074F PR MOST RECENT SYSTOLIC BLOOD PRESSURE < 130 MM HG: ICD-10-PCS | Mod: CPTII,S$GLB,, | Performed by: INTERNAL MEDICINE

## 2020-08-04 PROCEDURE — 96372 THER/PROPH/DIAG INJ SC/IM: CPT

## 2020-08-04 PROCEDURE — 99999 PR PBB SHADOW E&M-EST. PATIENT-LVL IV: ICD-10-PCS | Mod: PBBFAC,,, | Performed by: INTERNAL MEDICINE

## 2020-08-04 PROCEDURE — 99215 OFFICE O/P EST HI 40 MIN: CPT | Mod: 25,S$GLB,, | Performed by: INTERNAL MEDICINE

## 2020-08-04 PROCEDURE — 63600175 PHARM REV CODE 636 W HCPCS: Performed by: INTERNAL MEDICINE

## 2020-08-04 PROCEDURE — 1101F PT FALLS ASSESS-DOCD LE1/YR: CPT | Mod: CPTII,S$GLB,, | Performed by: INTERNAL MEDICINE

## 2020-08-04 RX ADMIN — EPOETIN ALFA-EPBX 40000 UNITS: 40000 INJECTION, SOLUTION INTRAVENOUS; SUBCUTANEOUS at 02:08

## 2020-08-07 ENCOUNTER — OFFICE VISIT (OUTPATIENT)
Dept: SURGERY | Facility: CLINIC | Age: 75
End: 2020-08-07
Payer: MEDICARE

## 2020-08-07 VITALS
DIASTOLIC BLOOD PRESSURE: 57 MMHG | BODY MASS INDEX: 22.07 KG/M2 | WEIGHT: 140.63 LBS | HEART RATE: 85 BPM | SYSTOLIC BLOOD PRESSURE: 99 MMHG | HEIGHT: 67 IN

## 2020-08-07 DIAGNOSIS — C85.93: ICD-10-CM

## 2020-08-07 DIAGNOSIS — C85.90 LYMPHOMA: ICD-10-CM

## 2020-08-07 DIAGNOSIS — R22.30 AXILLARY MASS, UNSPECIFIED LATERALITY: Primary | ICD-10-CM

## 2020-08-07 PROCEDURE — 1126F PR PAIN SEVERITY QUANTIFIED, NO PAIN PRESENT: ICD-10-PCS | Mod: S$GLB,,, | Performed by: SURGERY

## 2020-08-07 PROCEDURE — 99999 PR PBB SHADOW E&M-EST. PATIENT-LVL V: ICD-10-PCS | Mod: PBBFAC,,, | Performed by: SURGERY

## 2020-08-07 PROCEDURE — 1159F MED LIST DOCD IN RCRD: CPT | Mod: S$GLB,,, | Performed by: SURGERY

## 2020-08-07 PROCEDURE — 99204 OFFICE O/P NEW MOD 45 MIN: CPT | Mod: S$GLB,,, | Performed by: SURGERY

## 2020-08-07 PROCEDURE — 99999 PR PBB SHADOW E&M-EST. PATIENT-LVL V: CPT | Mod: PBBFAC,,, | Performed by: SURGERY

## 2020-08-07 PROCEDURE — 1101F PT FALLS ASSESS-DOCD LE1/YR: CPT | Mod: CPTII,S$GLB,, | Performed by: SURGERY

## 2020-08-07 PROCEDURE — 1101F PR PT FALLS ASSESS DOC 0-1 FALLS W/OUT INJ PAST YR: ICD-10-PCS | Mod: CPTII,S$GLB,, | Performed by: SURGERY

## 2020-08-07 PROCEDURE — 99204 PR OFFICE/OUTPT VISIT, NEW, LEVL IV, 45-59 MIN: ICD-10-PCS | Mod: S$GLB,,, | Performed by: SURGERY

## 2020-08-07 PROCEDURE — 1126F AMNT PAIN NOTED NONE PRSNT: CPT | Mod: S$GLB,,, | Performed by: SURGERY

## 2020-08-07 PROCEDURE — 3074F SYST BP LT 130 MM HG: CPT | Mod: CPTII,S$GLB,, | Performed by: SURGERY

## 2020-08-07 PROCEDURE — 1159F PR MEDICATION LIST DOCUMENTED IN MEDICAL RECORD: ICD-10-PCS | Mod: S$GLB,,, | Performed by: SURGERY

## 2020-08-07 PROCEDURE — 3078F DIAST BP <80 MM HG: CPT | Mod: CPTII,S$GLB,, | Performed by: SURGERY

## 2020-08-07 PROCEDURE — 3078F PR MOST RECENT DIASTOLIC BLOOD PRESSURE < 80 MM HG: ICD-10-PCS | Mod: CPTII,S$GLB,, | Performed by: SURGERY

## 2020-08-07 PROCEDURE — 3074F PR MOST RECENT SYSTOLIC BLOOD PRESSURE < 130 MM HG: ICD-10-PCS | Mod: CPTII,S$GLB,, | Performed by: SURGERY

## 2020-08-07 RX ORDER — SODIUM CHLORIDE 9 MG/ML
INJECTION, SOLUTION INTRAVENOUS CONTINUOUS
Status: CANCELLED | OUTPATIENT
Start: 2020-08-07

## 2020-08-07 NOTE — H&P
History & Physical    SUBJECTIVE:     History of Present Illness:  Patient is a 75 y.o. male presents with palpable and visible mass on both axilla.. Onset of symptoms was abrupt starting several weeks ago with unchanged course since that time. Patient denies of night sweats were fever.  He has recent history being treated for lymphoma.  The oncologist is afraid that the patient may have recurrence of lymphoma and need tissue diagnosis.    No chief complaint on file.      Review of patient's allergies indicates:  No Known Allergies    Current Outpatient Medications   Medication Sig Dispense Refill    acetaminophen (TYLENOL) 325 MG tablet Take 2 tablets (650 mg total) by mouth every 4 (four) hours as needed.  0    fish oil-omega-3 fatty acids 300-1,000 mg capsule Take 1 g by mouth 2 (two) times daily.       FLUZONE HIGH-DOSE 2018-19, PF, 180 mcg/0.5 mL vaccine       FOLIC ACID ORAL Take 2,400 mcg by mouth once daily.       loratadine (CLARITIN) 10 mg tablet Take 10 mg by mouth daily as needed for Allergies.      metoprolol succinate (TOPROL-XL) 25 MG 24 hr tablet Take 25 mg by mouth once daily.  2    NITROSTAT 0.4 mg SL tablet Patient states that he takes this medication as needed  3    aspirin (ECOTRIN) 81 MG EC tablet Take 81 mg by mouth once daily.      atorvastatin (LIPITOR) 80 MG tablet 80 mg every evening.       benzonatate (TESSALON) 100 MG capsule Take 100 mg by mouth 3 (three) times daily as needed for Cough.      cholecalciferol, vitamin D3, (VITAMIN D3) 400 unit Tab Take 1 tablet by mouth once daily.      cyanocobalamin (VITAMIN B-12) 500 MCG tablet Take 500 mcg by mouth once daily.      hydroCHLOROthiazide (HYDRODIURIL) 25 MG tablet Take by mouth once daily.      HYDROcodone-acetaminophen (NORCO)  mg per tablet Take 1 tablet by mouth every 6 (six) hours as needed. (Patient not taking: Reported on 8/7/2020) 20 tablet 0    nozaseptin (NOZIN) nasal  1 each by Each Nostril route  2 (two) times daily. (Patient not taking: Reported on 8/7/2020) 1 applicator 0    pantoprazole (PROTONIX) 40 MG tablet Take 1 tablet (40 mg total) by mouth once daily. 30 tablet 0    polyethylene glycol (GLYCOLAX) 17 gram PwPk Take by mouth daily as needed.      quinapril (ACCUPRIL) 10 MG tablet Take 10 mg by mouth once daily.  2    ranolazine (RANEXA) 1,000 mg Tb12 TAKE 1 TABLET BY MOUTH TWICE A DAY (Patient not taking: Reported on 8/4/2020) 60 tablet 5     No current facility-administered medications for this visit.        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     Past Surgical History:   Procedure Laterality Date    CARDIAC CATHETERIZATION      CARDIAC SURGERY  2006    CATARACT EXTRACTION W/  INTRAOCULAR LENS IMPLANT Right 05/24/2017    CATARACT EXTRACTION W/  INTRAOCULAR LENS IMPLANT Left 04/12/2017    CLOSED REDUCTION OF FRACTURE OF FIBULA Left 7/18/2019    Procedure: CLOSED REDUCTION, FRACTURE, FIBULA;  Surgeon: Philippe Bustamante Jr., MD;  Location: [facility] OR;  Service: Orthopedics;  Laterality: Left;  tibia/fibula fracture    CORONARY ARTERY BYPASS GRAFT      EYE SURGERY      Cataract extraction with intraocular lens implant    LYMPH NODES, LEFT NECK, EXCISIONAL BIOPSY  5/21/2014    MEDIPORT INSERTION, SINGLE  5/28/14    ORIF FIBULA FRACTURE Left 8/9/2019    Procedure: ORIF, FRACTURE, FIBULA;  Surgeon: Flynn White MD;  Location: [facility] OR;  Service: Orthopedics;  Laterality: Left;    ORIF TIBIA FRACTURE Left 8/9/2019    Procedure: ORIF, FRACTURE, TIBIA;  Surgeon: Flynn White MD;  Location: [facility] OR;  Service: Orthopedics;  Laterality: Left;    PCIOL Left 04/12/2017    DR. MCCRAY    PROSTATE SURGERY  02/2014    REMOVAL OF EXTERNAL  "FIXATION DEVICE Left 8/9/2019    Procedure: REMOVAL, EXTERNAL FIXATION DEVICE;  Surgeon: Flynn White MD;  Location: [facility];  Service: Orthopedics;  Laterality: Left;     Family History   Problem Relation Age of Onset    Blindness Father     Cancer Neg Hx     Diabetes Neg Hx     Heart failure Neg Hx     Coronary artery disease Neg Hx     Cataracts Neg Hx     Glaucoma Neg Hx     Hypertension Neg Hx     Macular degeneration Neg Hx     Retinal detachment Neg Hx     Strabismus Neg Hx     Stroke Neg Hx     Thyroid disease Neg Hx      Social History     Tobacco Use    Smoking status: Former Smoker     Packs/day: 1.00     Years: 45.00     Pack years: 45.00     Quit date: 1/1/2005     Years since quitting: 15.6    Smokeless tobacco: Never Used    Tobacco comment: Pt no longer smokes   Substance Use Topics    Alcohol use: No     Comment: former    Drug use: No        Review of Systems:  Review of Systems   Constitutional: Positive for activity change. Negative for chills and fever.   HENT: Negative for sore throat and trouble swallowing.    Eyes: Negative.    Respiratory: Negative for cough and shortness of breath.    Cardiovascular: Negative.    Gastrointestinal: Negative for abdominal distention, abdominal pain, nausea and vomiting.   Endocrine: Negative.    Genitourinary: Negative.    Musculoskeletal: Negative.    Skin: Negative.    Allergic/Immunologic: Negative.    Neurological: Negative.    Hematological: Does not bruise/bleed easily.   Psychiatric/Behavioral: Negative.        OBJECTIVE:     Vital Signs (Most Recent)  Pulse: 85 (08/07/20 1022)  BP: (!) 99/57 (08/07/20 1022)  5' 7" (1.702 m)  63.8 kg (140 lb 10.5 oz)     Physical Exam:  Physical Exam  Constitutional:       Appearance: He is well-developed.   HENT:      Head: Normocephalic.      Right Ear: External ear normal.      Left Ear: External ear normal.      Nose: Nose normal.   Eyes:      General: No scleral icterus.     Pupils: Pupils are " equal, round, and reactive to light.   Neck:      Musculoskeletal: Normal range of motion and neck supple.      Thyroid: No thyromegaly.   Cardiovascular:      Rate and Rhythm: Normal rate and regular rhythm.      Heart sounds: Normal heart sounds. No murmur.   Pulmonary:      Effort: Pulmonary effort is normal.      Breath sounds: Normal breath sounds.   Abdominal:      General: Bowel sounds are normal.      Palpations: Abdomen is soft.      Tenderness: There is no abdominal tenderness. There is no guarding.   Musculoskeletal: Normal range of motion.      Comments: Right axillary mass, 2 cm in diameter.  Left axillary mass, 1 cm in diameter.  The findings are consistent for lipoma but cannot rule out lymphoma.   Lymphadenopathy:      Cervical: No cervical adenopathy.   Skin:     General: Skin is warm and dry.   Neurological:      Mental Status: He is alert and oriented to person, place, and time.         Laboratory  Lab Results   Component Value Date    WBC 3.67 (L) 08/04/2020    HGB 8.6 (L) 08/04/2020    HCT 25.6 (L) 08/04/2020     08/04/2020    CHOL 123 07/18/2019    TRIG 77 07/18/2019    HDL 42 07/18/2019    ALT 17 08/04/2020    AST 20 08/04/2020     08/04/2020    K 4.3 08/04/2020     08/04/2020    CREATININE 1.8 (H) 08/04/2020    BUN 37 (H) 08/04/2020    CO2 20 (L) 08/04/2020    TSH 0.362 (L) 06/23/2020    INR 1.0 07/07/2020    HGBA1C 4.9 07/18/2019       Results for orders placed during the hospital encounter of 04/22/14   CT Abdomen Pelvis W Wo Contrast    Narrative Comparison: None     75 cc intravenous Omnipaque 240 and oral Redicat utilized.    See also report from CT of the neck soft tissues performed on the same date.    History: Lymphadenopathy, prostate CA and possible lung CA.      Findings:    Bilateral nodes identified at the neck base and the supraclavicular regions.  Calcified nodes identified in the right paratracheal and precarinal location.  Status post sternotomy.  Thyroid  is unremarkable.  No significant hilar adenopathy.  Air fluid   levels noted within the thoracic esophagus suggesting reflux.  Minimal prominent soft tissues at the GE junction and gastric cardia with note made of only partially distended stomach.  Right probable scarring and a few tiny blebs identified posteriorly   in the apex.  No pulmonary nodule, mass or effusion.  Bibasilar pleural parenchymal scarring, greater on the left.    Several celiac axis nodes identified largest measuring 2.0 x 1.7 cm.  Shotty and subcentimeter retroperitoneal nodes noted.   No significant adenopathy within the pelvis.  Several small bilateral inguinal nodes identified.  Fat noted extending into the   inguinal canals bilaterally without herniation.  Liver is fatty replaced but otherwise unremarkable.  Gallbladder, pancreas, spleen and adrenal glands are within normal limits.  No hydronephrosis or nephrolithiasis.  1.4-cm cortical cyst noted upper lobe   left kidney. Moderate air, feces and contrast noted throughout the colon and rectum.  Diverticulosis without diverticulitis and small bowel loops unremarkable.  No free or loculated fluid.  Atherosclerotic calcification throughout the aorta and its   branches. Well distended and partially opacified urinary bladder is unremarkable.  Prostate appears normal in size with probable postsurgical changes prior TURP.  Correlate clinically.  Degenerative change throughout the spine without lytic or blastic   lesion noted.    Impression       1.  Bilateral adenopathy in the supraclavicular regions and neck base.  See report from CT soft tissues neck performed same date.    2.  Calcified mediastinal nodes.    3.  Prominent celiac axis node and subcentimeter retroperitoneal nodes.    4.  Air fluid levels within the non-distended esophagus suggesting reflux.    5.  Additional findings as above.  Follow-up and/or further evaluation as warranted.      Electronically signed by: ANNA NICHOLS III,  MD  Date:     04/23/14  Time:    08:58          Diagnostic Results:  Labs: Reviewed    None    ASSESSMENT/PLAN:     Bilateral axillary mass, consistent with lymphoma versus lipoma.    PLAN:Plan     The recommendation proceed for excisional biopsy under general anesthesia.  Patient and I had agreed to proceed with excision of the right axillary mass which is about 2 cm in diameter.  The surgery scheduled for April 17, 2020 at 9:00 a.m..    Ray Rojo

## 2020-08-07 NOTE — LETTER
August 7, 2020      Javier Pimentel MD  [address]  [place] [address]           [facility]  [address] [facility] [address]  Phone: [phone]  Fax: [phone]          Patient: Foreign Holland   MR Number: [id number]   YOB: 1945   Date of Visit: 8/7/2020       Dear Dr. Javier Pimentel:    Thank you for referring Foreign Holland to me for evaluation. Attached you will find relevant portions of my assessment and plan of care.    If you have questions, please do not hesitate to call me. I look forward to following Foreign Holland along with you.    Sincerely,    Ray Rojo MD    Enclosure  CC:  No Recipients    If you would like to receive this communication electronically, please contact [email or web address] or [phone] to request more information on [employer] Link access.    For providers and/or their staff who would like to refer a patient to [facility], please contact us through our one-stop-shop provider referral line, [facility] , at [phone].    If you feel you have received this communication in error or would no longer like to receive these types of communications, please e-mail [email or web address]

## 2020-08-11 ENCOUNTER — TELEPHONE (OUTPATIENT)
Dept: SURGERY | Facility: CLINIC | Age: 75
End: 2020-08-11

## 2020-08-11 DIAGNOSIS — Z01.818 PRE-OP TESTING: Primary | ICD-10-CM

## 2020-08-11 NOTE — TELEPHONE ENCOUNTER
Called patient per Pre Admit. Patient is scheduled for surgery on Monday and is complaining of Chest pain and SOB. Patient needs to be cleared by Cardiology. Left message that it is urgent that he return our call.

## 2020-08-11 NOTE — PROGRESS NOTES
Subjective:   Patient ID:  Foreign Holland is a 75 y.o. male who presents for evaluation of Pre-op Exam      HPI     Foreign Holland is a 75 year old male who presents to clinic for preop cardiac risk evaluation for right axillary mass excision per Dr Rojo.    His current medical conditions include CAD h/o CABG, HTN, HLP, orthostatic hypotension. He was treated at [facility] in July due to EAGLE and orthostatic hypotension with associated dizziness. He received IVF's and improved after stopping his BB and ACEi/ARB. He returns today and states he is doing well today.     Denies any further dizziness, palpitations or chest pain today in office. BP well controlled. Denies chest pain or anginal equivalents. No shortness of breath, EDGE or palpitations. Denies orthopnea, PND or abdominal bloating. Reports regular walking without any issues lately. NO leg swelling or claudications. No recent falls, syncope or near syncopal events. Reports compliance with medications and dietary restrictions. NO CNS complaints to suggest TIA or CVA today. No signs of abnormal bleeding.     He has chronic fatigue with exertion for the past 3+ years which is unchanged per patient and chart review. He reports that he has to take breaks when he uses a push mower.     We discussed importance of compliance with medications and dietary restrictions in detail. We will see him in follow up after procedure to add cardiac meds as BP and renal function will allow.       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       Past Surgical History:   Procedure Laterality Date    CARDIAC CATHETERIZATION      CARDIAC SURGERY  2006    CATARACT EXTRACTION W/  INTRAOCULAR LENS IMPLANT Right 05/24/2017    CATARACT  EXTRACTION W/  INTRAOCULAR LENS IMPLANT Left 04/12/2017    CLOSED REDUCTION OF FRACTURE OF FIBULA Left 7/18/2019    Procedure: CLOSED REDUCTION, FRACTURE, FIBULA;  Surgeon: Philippe Bustamante Jr., MD;  Location: [facility] OR;  Service: Orthopedics;  Laterality: Left;  tibia/fibula fracture    CORONARY ARTERY BYPASS GRAFT      EYE SURGERY      Cataract extraction with intraocular lens implant    LYMPH NODES, LEFT NECK, EXCISIONAL BIOPSY  5/21/2014    MEDIPORT INSERTION, SINGLE  5/28/14    ORIF FIBULA FRACTURE Left 8/9/2019    Procedure: ORIF, FRACTURE, FIBULA;  Surgeon: Flynn White MD;  Location: [facility] OR;  Service: Orthopedics;  Laterality: Left;    ORIF TIBIA FRACTURE Left 8/9/2019    Procedure: ORIF, FRACTURE, TIBIA;  Surgeon: Flynn White MD;  Location: [facility] OR;  Service: Orthopedics;  Laterality: Left;    PCIOL Left 04/12/2017    DR. MCCRAY    PROSTATE SURGERY  02/2014    REMOVAL OF EXTERNAL FIXATION DEVICE Left 8/9/2019    Procedure: REMOVAL, EXTERNAL FIXATION DEVICE;  Surgeon: Flynn White MD;  Location: [facility] OR;  Service: Orthopedics;  Laterality: Left;       Social History     Tobacco Use    Smoking status: Former Smoker     Packs/day: 1.00     Years: 45.00     Pack years: 45.00     Quit date: 1/1/2005     Years since quitting: 15.6    Smokeless tobacco: Never Used    Tobacco comment: Pt no longer smokes   Substance Use Topics    Alcohol use: Not Currently     Comment: former    Drug use: No       Family History   Problem Relation Age of Onset    Blindness Father     Cancer Neg Hx     Diabetes Neg Hx     Heart failure Neg Hx     Coronary artery disease Neg Hx     Cataracts Neg Hx     Glaucoma Neg Hx     Hypertension Neg Hx     Macular degeneration Neg Hx     Retinal detachment Neg Hx     Strabismus Neg Hx     Stroke Neg Hx     Thyroid disease Neg Hx      Wt Readings from Last 3 Encounters:   08/12/20 62.6 kg (138 lb 0.1 oz)   08/07/20 63.8 kg (140 lb 10.5 oz)   08/04/20 63.8  kg (140 lb 10.5 oz)     Temp Readings from Last 3 Encounters:   08/04/20 97.3 °F (36.3 °C)   08/04/20 97.3 °F (36.3 °C) (Oral)   08/03/20 98.6 °F (37 °C) (Tympanic)     BP Readings from Last 3 Encounters:   08/12/20 102/68   08/07/20 (!) 99/57   08/04/20 134/76     Pulse Readings from Last 3 Encounters:   08/12/20 77   08/07/20 85   08/04/20 72     Current Outpatient Medications on File Prior to Visit   Medication Sig Dispense Refill    fish oil-omega-3 fatty acids 300-1,000 mg capsule Take 1 g by mouth 2 (two) times daily.       loratadine (CLARITIN) 10 mg tablet Take 10 mg by mouth daily as needed for Allergies.      acetaminophen (TYLENOL) 325 MG tablet Take 2 tablets (650 mg total) by mouth every 4 (four) hours as needed. (Patient not taking: Reported on 8/12/2020)  0    aspirin (ECOTRIN) 81 MG EC tablet Take 81 mg by mouth once daily.      atorvastatin (LIPITOR) 80 MG tablet 80 mg every evening.       benzonatate (TESSALON) 100 MG capsule Take 100 mg by mouth 3 (three) times daily as needed for Cough.      cholecalciferol, vitamin D3, (VITAMIN D3) 400 unit Tab Take 1 tablet by mouth once daily.      cyanocobalamin (VITAMIN B-12) 500 MCG tablet Take 500 mcg by mouth once daily.      FLUZONE HIGH-DOSE 2018-19, PF, 180 mcg/0.5 mL vaccine       FOLIC ACID ORAL Take 2,400 mcg by mouth once daily.       hydroCHLOROthiazide (HYDRODIURIL) 25 MG tablet Take by mouth once daily.      HYDROcodone-acetaminophen (NORCO)  mg per tablet Take 1 tablet by mouth every 6 (six) hours as needed. (Patient not taking: Reported on 8/7/2020) 20 tablet 0    metoprolol succinate (TOPROL-XL) 25 MG 24 hr tablet Take 25 mg by mouth once daily.  2    NITROSTAT 0.4 mg SL tablet Patient states that he takes this medication as needed  3    nozaseptin (NOZIN) nasal  1 each by Each Nostril route 2 (two) times daily. (Patient not taking: Reported on 8/7/2020) 1 applicator 0    pantoprazole (PROTONIX) 40 MG tablet  Take 1 tablet (40 mg total) by mouth once daily. 30 tablet 0    polyethylene glycol (GLYCOLAX) 17 gram PwPk Take by mouth daily as needed.      quinapril (ACCUPRIL) 10 MG tablet Take 10 mg by mouth once daily.  2    ranolazine (RANEXA) 1,000 mg Tb12 TAKE 1 TABLET BY MOUTH TWICE A DAY (Patient not taking: Reported on 8/4/2020) 60 tablet 5     No current facility-administered medications on file prior to visit.        Review of Systems   Constitution: Positive for malaise/fatigue.   HENT: Negative for hearing loss and hoarse voice.    Eyes: Negative for blurred vision and visual disturbance.   Cardiovascular: Positive for dyspnea on exertion (chronic, stable). Negative for chest pain, claudication, irregular heartbeat, leg swelling, near-syncope, orthopnea, palpitations, paroxysmal nocturnal dyspnea and syncope.   Respiratory: Negative for cough, hemoptysis, shortness of breath, sleep disturbances due to breathing, snoring and wheezing.    Endocrine: Negative for cold intolerance and heat intolerance.   Hematologic/Lymphatic: Does not bruise/bleed easily.   Skin: Positive for suspicious lesions. Negative for color change, dry skin and nail changes.   Musculoskeletal: Positive for arthritis. Negative for back pain, joint pain and myalgias.        +cane for fall prevention   Gastrointestinal: Negative for bloating, abdominal pain, constipation, nausea and vomiting.   Genitourinary: Negative for dysuria, flank pain, hematuria and hesitancy.   Neurological: Negative for headaches, light-headedness, loss of balance, numbness, paresthesias and weakness.   Psychiatric/Behavioral: Negative for altered mental status.   Allergic/Immunologic: Negative for environmental allergies.     /68 (BP Location: Right arm, Patient Position: Sitting, BP Method: Medium (Automatic))   Pulse 77   Wt 62.6 kg (138 lb 0.1 oz)   SpO2 99%   BMI 21.62 kg/m²     Objective:   Physical Exam   Constitutional: He is oriented to person,  place, and time. He appears well-developed and well-nourished. No distress.   HENT:   Head: Normocephalic and atraumatic.   Eyes: Pupils are equal, round, and reactive to light.   Neck: Normal range of motion and full passive range of motion without pain. Neck supple. No JVD present.   Cardiovascular: Normal rate, regular rhythm, S1 normal, S2 normal and intact distal pulses. PMI is not displaced. Exam reveals no distant heart sounds.   No murmur heard.  Pulses:       Radial pulses are 2+ on the right side and 2+ on the left side.        Dorsalis pedis pulses are 2+ on the right side and 2+ on the left side.   CHEST PAIN FREE   Pulmonary/Chest: Effort normal and breath sounds normal. No accessory muscle usage. No respiratory distress. He has no decreased breath sounds. He has no wheezes. He has no rales.   Abdominal: Soft. Bowel sounds are normal. He exhibits no distension. There is no abdominal tenderness.   Musculoskeletal: Normal range of motion.         General: No edema.      Right ankle: He exhibits no swelling.      Left ankle: He exhibits no swelling.   Neurological: He is alert and oriented to person, place, and time.   Skin: Skin is warm and dry. He is not diaphoretic. No cyanosis. Nails show no clubbing.   Psychiatric: He has a normal mood and affect. His speech is normal and behavior is normal. Judgment and thought content normal. Cognition and memory are normal.   Nursing note and vitals reviewed.      Lab Results   Component Value Date    CHOL 123 07/18/2019     Lab Results   Component Value Date    HDL 42 07/18/2019     Lab Results   Component Value Date    LDLCALC 65.6 07/18/2019     Lab Results   Component Value Date    TRIG 77 07/18/2019     Lab Results   Component Value Date    CHOLHDL 34.1 07/18/2019       Chemistry        Component Value Date/Time     08/04/2020 1232    K 4.3 08/04/2020 1232     08/04/2020 1232    CO2 20 (L) 08/04/2020 1232    BUN 37 (H) 08/04/2020 1232    CREATININE  1.8 (H) 08/04/2020 1232    GLU 98 08/04/2020 1232        Component Value Date/Time    CALCIUM 8.9 08/04/2020 1232    ALKPHOS 58 08/04/2020 1232    AST 20 08/04/2020 1232    ALT 17 08/04/2020 1232    BILITOT 0.6 08/04/2020 1232    ESTGFRAFRICA 41 (A) 08/04/2020 1232    EGFRNONAA 36 (A) 08/04/2020 1232          Lab Results   Component Value Date    TSH 0.362 (L) 06/23/2020     Lab Results   Component Value Date    INR 1.0 07/07/2020    INR 1.0 07/18/2019    INR 1.0 07/17/2019     Lab Results   Component Value Date    WBC 3.67 (L) 08/04/2020    HGB 8.6 (L) 08/04/2020    HCT 25.6 (L) 08/04/2020    MCV 87 08/04/2020     08/04/2020   Nuclear Quantitative Functional Analysis:   LVEF: 60 %     Impression: NORMAL MYOCARDIAL PERFUSION   1. The perfusion scan is free of evidence for myocardial ischemia or injury.   2. Resting wall motion is physiologic.   3. Resting LV function is normal.   4. The ventricular volumes are normal at rest and stress.   5. The extracardiac distribution of radioactivity is normal.       This document has been electronically    SIGNED BY: Prasanth Peter MD On: 07/06/2015 15:40    2D ECHO  CONCLUSIONS     1 - Normal left ventricular systolic function (EF 60-65%).     2 - Normal left ventricular diastolic function.     3 - Normal right ventricular systolic function .             This document has been electronically    SIGNED BY: Shaun Hernandez MD On: 07/18/2019 12:48     Assessment:      1. Coronary artery disease involving native coronary artery of native heart without angina pectoris    2. Preop cardiovascular exam    3. Chronic diastolic congestive heart failure    4. Dyslipidemia    5. Essential hypertension    6. CKD (chronic kidney disease) stage 3, GFR 30-59 ml/min      Patient presents to clinic for preop cardiac risk evaluation. Denies any chest pain or SOB today in office. He does endorse chronic, stable pattern of EDGE which is unchanged from last 3+ years.   Plan:     OK to  proceed with procedure at Moderate CV risk for jailene/post op complications  Continue to hold ASA until after procedure and resume afterwards  Will have patient follow up with me or Dr Burroughs in 2-3 weeks after procedure and can attempt to resume cardiac meds at that time pending Bp and Renal function    D[facility]

## 2020-08-12 ENCOUNTER — LAB VISIT (OUTPATIENT)
Dept: LAB | Facility: HOSPITAL | Age: 75
End: 2020-08-12
Attending: SURGERY
Payer: MEDICARE

## 2020-08-12 ENCOUNTER — OFFICE VISIT (OUTPATIENT)
Dept: CARDIOLOGY | Facility: CLINIC | Age: 75
End: 2020-08-12
Payer: MEDICARE

## 2020-08-12 VITALS
OXYGEN SATURATION: 99 % | BODY MASS INDEX: 21.62 KG/M2 | SYSTOLIC BLOOD PRESSURE: 102 MMHG | DIASTOLIC BLOOD PRESSURE: 68 MMHG | WEIGHT: 138 LBS | HEART RATE: 77 BPM

## 2020-08-12 DIAGNOSIS — I25.10 CORONARY ARTERY DISEASE INVOLVING NATIVE CORONARY ARTERY OF NATIVE HEART WITHOUT ANGINA PECTORIS: Primary | ICD-10-CM

## 2020-08-12 DIAGNOSIS — N18.30 CKD (CHRONIC KIDNEY DISEASE) STAGE 3, GFR 30-59 ML/MIN: ICD-10-CM

## 2020-08-12 DIAGNOSIS — Z01.818 PRE-OP TESTING: ICD-10-CM

## 2020-08-12 DIAGNOSIS — Z01.810 PREOP CARDIOVASCULAR EXAM: ICD-10-CM

## 2020-08-12 DIAGNOSIS — I50.32 CHRONIC DIASTOLIC CONGESTIVE HEART FAILURE: ICD-10-CM

## 2020-08-12 DIAGNOSIS — E78.5 DYSLIPIDEMIA: ICD-10-CM

## 2020-08-12 DIAGNOSIS — I10 ESSENTIAL HYPERTENSION: ICD-10-CM

## 2020-08-12 LAB
BASOPHILS # BLD AUTO: 0.02 K/UL (ref 0–0.2)
BASOPHILS NFR BLD: 0.7 % (ref 0–1.9)
DIFFERENTIAL METHOD: ABNORMAL
EOSINOPHIL # BLD AUTO: 0.1 K/UL (ref 0–0.5)
EOSINOPHIL NFR BLD: 4.6 % (ref 0–8)
ERYTHROCYTE [DISTWIDTH] IN BLOOD BY AUTOMATED COUNT: 18.2 % (ref 11.5–14.5)
HCT VFR BLD AUTO: 30.1 % (ref 40–54)
HGB BLD-MCNC: 9.6 G/DL (ref 14–18)
IMM GRANULOCYTES # BLD AUTO: 0.01 K/UL (ref 0–0.04)
IMM GRANULOCYTES NFR BLD AUTO: 0.3 % (ref 0–0.5)
LYMPHOCYTES # BLD AUTO: 1 K/UL (ref 1–4.8)
LYMPHOCYTES NFR BLD: 32.2 % (ref 18–48)
MCH RBC QN AUTO: 29.1 PG (ref 27–31)
MCHC RBC AUTO-ENTMCNC: 31.9 G/DL (ref 32–36)
MCV RBC AUTO: 91 FL (ref 82–98)
MONOCYTES # BLD AUTO: 0.3 K/UL (ref 0.3–1)
MONOCYTES NFR BLD: 10.4 % (ref 4–15)
NEUTROPHILS # BLD AUTO: 1.6 K/UL (ref 1.8–7.7)
NEUTROPHILS NFR BLD: 51.8 % (ref 38–73)
NRBC BLD-RTO: 0 /100 WBC
PLATELET # BLD AUTO: 155 K/UL (ref 150–350)
PMV BLD AUTO: 10 FL (ref 9.2–12.9)
RBC # BLD AUTO: 3.3 M/UL (ref 4.6–6.2)
WBC # BLD AUTO: 3.07 K/UL (ref 3.9–12.7)

## 2020-08-12 PROCEDURE — 1101F PT FALLS ASSESS-DOCD LE1/YR: CPT | Mod: CPTII,S$GLB,, | Performed by: NURSE PRACTITIONER

## 2020-08-12 PROCEDURE — 99214 OFFICE O/P EST MOD 30 MIN: CPT | Mod: S$GLB,,, | Performed by: NURSE PRACTITIONER

## 2020-08-12 PROCEDURE — 1159F MED LIST DOCD IN RCRD: CPT | Mod: S$GLB,,, | Performed by: NURSE PRACTITIONER

## 2020-08-12 PROCEDURE — 85025 COMPLETE CBC W/AUTO DIFF WBC: CPT

## 2020-08-12 PROCEDURE — 3074F PR MOST RECENT SYSTOLIC BLOOD PRESSURE < 130 MM HG: ICD-10-PCS | Mod: CPTII,S$GLB,, | Performed by: NURSE PRACTITIONER

## 2020-08-12 PROCEDURE — 1159F PR MEDICATION LIST DOCUMENTED IN MEDICAL RECORD: ICD-10-PCS | Mod: S$GLB,,, | Performed by: NURSE PRACTITIONER

## 2020-08-12 PROCEDURE — 1101F PR PT FALLS ASSESS DOC 0-1 FALLS W/OUT INJ PAST YR: ICD-10-PCS | Mod: CPTII,S$GLB,, | Performed by: NURSE PRACTITIONER

## 2020-08-12 PROCEDURE — 3078F PR MOST RECENT DIASTOLIC BLOOD PRESSURE < 80 MM HG: ICD-10-PCS | Mod: CPTII,S$GLB,, | Performed by: NURSE PRACTITIONER

## 2020-08-12 PROCEDURE — 3074F SYST BP LT 130 MM HG: CPT | Mod: CPTII,S$GLB,, | Performed by: NURSE PRACTITIONER

## 2020-08-12 PROCEDURE — 99214 PR OFFICE/OUTPT VISIT, EST, LEVL IV, 30-39 MIN: ICD-10-PCS | Mod: S$GLB,,, | Performed by: NURSE PRACTITIONER

## 2020-08-12 PROCEDURE — 99999 PR PBB SHADOW E&M-EST. PATIENT-LVL IV: CPT | Mod: PBBFAC,,, | Performed by: NURSE PRACTITIONER

## 2020-08-12 PROCEDURE — 36415 COLL VENOUS BLD VENIPUNCTURE: CPT

## 2020-08-12 PROCEDURE — 3078F DIAST BP <80 MM HG: CPT | Mod: CPTII,S$GLB,, | Performed by: NURSE PRACTITIONER

## 2020-08-12 PROCEDURE — 99999 PR PBB SHADOW E&M-EST. PATIENT-LVL IV: ICD-10-PCS | Mod: PBBFAC,,, | Performed by: NURSE PRACTITIONER

## 2020-08-14 ENCOUNTER — LAB VISIT (OUTPATIENT)
Dept: URGENT CARE | Facility: CLINIC | Age: 75
End: 2020-08-14
Payer: MEDICARE

## 2020-08-14 ENCOUNTER — TELEPHONE (OUTPATIENT)
Dept: SURGERY | Facility: CLINIC | Age: 75
End: 2020-08-14

## 2020-08-14 VITALS — OXYGEN SATURATION: 98 % | TEMPERATURE: 97 F | HEART RATE: 118 BPM

## 2020-08-14 DIAGNOSIS — C85.90 LYMPHOMA: ICD-10-CM

## 2020-08-14 DIAGNOSIS — C85.93: ICD-10-CM

## 2020-08-14 PROCEDURE — U0003 INFECTIOUS AGENT DETECTION BY NUCLEIC ACID (DNA OR RNA); SEVERE ACUTE RESPIRATORY SYNDROME CORONAVIRUS 2 (SARS-COV-2) (CORONAVIRUS DISEASE [COVID-19]), AMPLIFIED PROBE TECHNIQUE, MAKING USE OF HIGH THROUGHPUT TECHNOLOGIES AS DESCRIBED BY CMS-2020-01-R: HCPCS

## 2020-08-14 NOTE — TELEPHONE ENCOUNTER
Spoke with pt to let him know to let him know he missed his appt for covid test this morning and now  he will need a rapid covid test morning of surgery and he will need to show up a hour early which would be 6:30.

## 2020-08-16 LAB — SARS-COV-2 RNA RESP QL NAA+PROBE: NOT DETECTED

## 2020-08-17 ENCOUNTER — HOSPITAL ENCOUNTER (OUTPATIENT)
Facility: HOSPITAL | Age: 75
Discharge: HOME | End: 2020-08-18
Attending: EMERGENCY MEDICINE | Admitting: INTERNAL MEDICINE
Payer: MEDICARE

## 2020-08-17 ENCOUNTER — ANESTHESIA (OUTPATIENT)
Dept: SURGERY | Facility: HOSPITAL | Age: 75
End: 2020-08-17
Payer: MEDICARE

## 2020-08-17 ENCOUNTER — ANESTHESIA EVENT (OUTPATIENT)
Dept: SURGERY | Facility: HOSPITAL | Age: 75
End: 2020-08-17
Payer: MEDICARE

## 2020-08-17 DIAGNOSIS — L72.3 SEBACEOUS CYST OF LEFT AXILLA: ICD-10-CM

## 2020-08-17 DIAGNOSIS — F05 POSTOPERATIVE DELIRIUM: Primary | ICD-10-CM
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
## 2020-08-17 DIAGNOSIS — R50.9 ELEVATED TEMPERATURE: ICD-10-CM

## 2020-08-17 PROCEDURE — 99285 EMERGENCY DEPT VISIT HI MDM: CPT | Mod: 25

## 2020-08-17 PROCEDURE — 63600175 PHARM REV CODE 636 W HCPCS: Performed by: NURSE ANESTHETIST, CERTIFIED REGISTERED

## 2020-08-17 PROCEDURE — 25000003 PHARM REV CODE 250: Performed by: NURSE ANESTHETIST, CERTIFIED REGISTERED

## 2020-08-17 PROCEDURE — 63600175 PHARM REV CODE 636 W HCPCS: Performed by: SURGERY

## 2020-08-17 RX ORDER — ROCURONIUM BROMIDE 10 MG/ML
INJECTION, SOLUTION INTRAVENOUS
Status: DISCONTINUED | OUTPATIENT
Start: 2020-08-17 | End: 2020-08-17

## 2020-08-17 RX ORDER — FENTANYL CITRATE 50 UG/ML
INJECTION, SOLUTION INTRAMUSCULAR; INTRAVENOUS
Status: DISCONTINUED | OUTPATIENT
Start: 2020-08-17 | End: 2020-08-17

## 2020-08-17 RX ORDER — SUCCINYLCHOLINE CHLORIDE 20 MG/ML
INJECTION INTRAMUSCULAR; INTRAVENOUS
Status: DISCONTINUED | OUTPATIENT
Start: 2020-08-17 | End: 2020-08-17

## 2020-08-17 RX ORDER — ONDANSETRON 2 MG/ML
INJECTION INTRAMUSCULAR; INTRAVENOUS
Status: DISCONTINUED | OUTPATIENT
Start: 2020-08-17 | End: 2020-08-17

## 2020-08-17 RX ORDER — PROPOFOL 10 MG/ML
VIAL (ML) INTRAVENOUS
Status: DISCONTINUED | OUTPATIENT
Start: 2020-08-17 | End: 2020-08-17

## 2020-08-17 RX ORDER — SODIUM CHLORIDE, SODIUM LACTATE, POTASSIUM CHLORIDE, CALCIUM CHLORIDE 600; 310; 30; 20 MG/100ML; MG/100ML; MG/100ML; MG/100ML
INJECTION, SOLUTION INTRAVENOUS CONTINUOUS PRN
Status: DISCONTINUED | OUTPATIENT
Start: 2020-08-17 | End: 2020-08-17

## 2020-08-17 RX ORDER — LIDOCAINE HYDROCHLORIDE 20 MG/ML
INJECTION INTRAVENOUS
Status: DISCONTINUED | OUTPATIENT
Start: 2020-08-17 | End: 2020-08-17

## 2020-08-17 RX ADMIN — SUCCINYLCHOLINE CHLORIDE 140 MG: 20 INJECTION, SOLUTION INTRAMUSCULAR; INTRAVENOUS at 10:08

## 2020-08-17 RX ADMIN — CEFAZOLIN SODIUM 2 G: 2 SOLUTION INTRAVENOUS at 10:08

## 2020-08-17 RX ADMIN — PROPOFOL 100 MG: 10 INJECTION, EMULSION INTRAVENOUS at 10:08

## 2020-08-17 RX ADMIN — SODIUM CHLORIDE, SODIUM LACTATE, POTASSIUM CHLORIDE, AND CALCIUM CHLORIDE: 600; 310; 30; 20 INJECTION, SOLUTION INTRAVENOUS at 10:08

## 2020-08-17 RX ADMIN — FENTANYL CITRATE 100 MCG: 50 INJECTION, SOLUTION INTRAMUSCULAR; INTRAVENOUS at 10:08

## 2020-08-17 RX ADMIN — ONDANSETRON 4 MG: 2 INJECTION, SOLUTION INTRAMUSCULAR; INTRAVENOUS at 10:08

## 2020-08-17 RX ADMIN — Medication 100 MG: at 10:08

## 2020-08-17 RX ADMIN — ROCURONIUM BROMIDE 5 MG: 10 INJECTION, SOLUTION INTRAVENOUS at 10:08

## 2020-08-17 NOTE — TRANSFER OF CARE
"Anesthesia Transfer of Care Note    Patient: Foreign Holland    Procedure(s) Performed: Procedure(s) (LRB):  EXCISION, MASS, AXILLARY (Bilateral)    Patient location: PACU    Anesthesia Type: general    Transport from OR: Transported from OR on room air with adequate spontaneous ventilation    Post pain: adequate analgesia    Post assessment: no apparent anesthetic complications and tolerated procedure well    Post vital signs: stable    Level of consciousness: awake    Nausea/Vomiting: no nausea/vomiting    Complications: none    Transfer of care protocol was followed      Last vitals:   Visit Vitals  BP (!) 150/78 (BP Location: Right arm, Patient Position: Sitting)   Pulse 76   Temp 36.5 °C (97.7 °F) (Skin)   Resp 18   Ht 5' 7" (1.702 m)   Wt 60.8 kg (134 lb 0.6 oz)   SpO2 99%   BMI 20.99 kg/m²     "

## 2020-08-17 NOTE — ANESTHESIA POSTPROCEDURE EVALUATION
Anesthesia Post Evaluation    Patient: Foreign Holland    Procedure(s) Performed: Procedure(s) (LRB):  EXCISION, MASS, AXILLARY (Bilateral)    Final Anesthesia Type: general    Patient location during evaluation: PACU  Patient participation: Yes- Able to Participate  Level of consciousness: awake  Post-procedure vital signs: reviewed and stable  Pain management: adequate  Airway patency: patent    PONV status at discharge: No PONV  Anesthetic complications: no      Cardiovascular status: blood pressure returned to baseline and hemodynamically stable  Respiratory status: unassisted and spontaneous ventilation  Hydration status: euvolemic  Follow-up not needed.          Vitals Value Taken Time   /94 08/17/20 1114   Temp  08/17/20 1115   Pulse 77 08/17/20 1115   Resp 29 08/17/20 1115   SpO2  08/17/20 1115   Vitals shown include unvalidated device data.      No case tracking events are documented in the log.      Pain/Alvarez Score: No data recorded

## 2020-08-17 NOTE — ANESTHESIA PREPROCEDURE EVALUATION
08/17/2020  Foreign Holland is a 75 y.o., male.    Anesthesia Evaluation    I have reviewed the Patient Summary Reports.    I have reviewed the Nursing Notes. I have reviewed the NPO Status.   I have reviewed the Medications.     Review of Systems  Anesthesia Hx:  No problems with previous Anesthesia Denies Hx of Anesthetic complications  Denies Family Hx of Anesthesia complications.   Denies Personal Hx of Anesthesia complications.   Social:  Non-Smoker, No Alcohol Use    Cardiovascular:   Hypertension Past MI CAD   CHF ECG has been reviewed.    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    Lymphoma    Chemotherapy induced neutropenia    Emesis, persistent    Hypokalemia    Hypokalemia    Neutropenic fever    Pancytopenia due to chemotherapy    Chest pain on exertion    SOB (shortness of breath)    Old MI (myocardial infarction)    Nuclear sclerosis of left eye    CKD (chronic kidney disease) stage 3, GFR 30-59 ml/min    Pre-op evaluation    Chronic diastolic congestive heart failure    Tibia/fibula fracture, left, closed, initial encounter    Renal insufficiency    Debility    Preop cardiovascular exam     No current facility-administered medications on file prior to encounter.      Current Outpatient Medications on File Prior to Encounter   Medication Sig Dispense Refill    acetaminophen (TYLENOL) 325 MG tablet  Take 2 tablets (650 mg total) by mouth every 4 (four) hours as needed.  0    aspirin (ECOTRIN) 81 MG EC tablet Take 81 mg by mouth once daily.      fish oil-omega-3 fatty acids 300-1,000 mg capsule Take 1 g by mouth 2 (two) times daily.       loratadine (CLARITIN) 10 mg tablet Take 10 mg by mouth daily as needed for Allergies.      pantoprazole (PROTONIX) 40 MG tablet Take 1 tablet (40 mg total) by mouth once daily. 30 tablet 0    polyethylene glycol (GLYCOLAX) 17 gram PwPk Take by mouth daily as needed.      atorvastatin (LIPITOR) 80 MG tablet 80 mg every evening.       benzonatate (TESSALON) 100 MG capsule Take 100 mg by mouth 3 (three) times daily as needed for Cough.      cholecalciferol, vitamin D3, (VITAMIN D3) 400 unit Tab Take 1 tablet by mouth once daily.      cyanocobalamin (VITAMIN B-12) 500 MCG tablet Take 500 mcg by mouth once daily.      FLUZONE HIGH-DOSE 2018-19, PF, 180 mcg/0.5 mL vaccine       FOLIC ACID ORAL Take 2,400 mcg by mouth once daily.       hydroCHLOROthiazide (HYDRODIURIL) 25 MG tablet Take by mouth once daily.      HYDROcodone-acetaminophen (NORCO)  mg per tablet Take 1 tablet by mouth every 6 (six) hours as needed. (Patient not taking: Reported on 8/7/2020) 20 tablet 0    metoprolol succinate (TOPROL-XL) 25 MG 24 hr tablet Take 25 mg by mouth once daily.  2    NITROSTAT 0.4 mg SL tablet Patient states that he takes this medication as needed  3    nozaseptin (NOZIN) nasal  1 each by Each Nostril route 2 (two) times daily. (Patient not taking: Reported on 8/7/2020) 1 applicator 0    quinapril (ACCUPRIL) 10 MG tablet Take 10 mg by mouth once daily.  2    ranolazine (RANEXA) 1,000 mg Tb12 TAKE 1 TABLET BY MOUTH TWICE A DAY (Patient not taking: Reported on 8/4/2020) 60 tablet 5     Past Surgical History:   Procedure Laterality Date    CARDIAC CATHETERIZATION      CARDIAC SURGERY  2006    CATARACT EXTRACTION W/  INTRAOCULAR LENS IMPLANT Right 05/24/2017     CATARACT EXTRACTION W/  INTRAOCULAR LENS IMPLANT Left 04/12/2017    CLOSED REDUCTION OF FRACTURE OF FIBULA Left 7/18/2019    Procedure: CLOSED REDUCTION, FRACTURE, FIBULA;  Surgeon: Philippe Bustamante Jr., MD;  Location: [facility];  Service: Orthopedics;  Laterality: Left;  tibia/fibula fracture    CORONARY ARTERY BYPASS GRAFT      EYE SURGERY      Cataract extraction with intraocular lens implant    LYMPH NODES, LEFT NECK, EXCISIONAL BIOPSY  5/21/2014    MEDIPORT INSERTION, SINGLE  5/28/14    ORIF FIBULA FRACTURE Left 8/9/2019    Procedure: ORIF, FRACTURE, FIBULA;  Surgeon: Flynn White MD;  Location: [facility] OR;  Service: Orthopedics;  Laterality: Left;    ORIF TIBIA FRACTURE Left 8/9/2019    Procedure: ORIF, FRACTURE, TIBIA;  Surgeon: Flynn White MD;  Location: [facility];  Service: Orthopedics;  Laterality: Left;    PCIOL Left 04/12/2017    DR. MCCRAY    PROSTATE SURGERY  02/2014    REMOVAL OF EXTERNAL FIXATION DEVICE Left 8/9/2019    Procedure: REMOVAL, EXTERNAL FIXATION DEVICE;  Surgeon: Flynn White MD;  Location: [facility];  Service: Orthopedics;  Laterality: Left;           Physical Exam  General:  Well nourished    Airway/Jaw/Neck:  Airway Findings: Mouth Opening: Normal Tongue: Normal  General Airway Assessment: Adult  Mallampati: II  TM Distance: 4 - 6 cm  Jaw/Neck Findings:  Neck ROM: Normal ROM      Dental:  Dental Findings: In tact   Chest/Lungs:  Chest/Lungs Findings: Clear to auscultation, Normal Respiratory Rate     Heart/Vascular:  Heart Findings: Rate: Normal  Rhythm: Regular Rhythm  Sounds: Normal        Mental Status:  Mental Status Findings:  Cooperative, Alert and Oriented       Lab Results   Component Value Date    WBC 3.07 (L) 08/12/2020    HGB 9.6 (L) 08/12/2020    HCT 30.1 (L) 08/12/2020    MCV 91 08/12/2020     08/12/2020         Chemistry        Component Value Date/Time     08/04/2020 1232    K 4.3 08/04/2020 1232     08/04/2020 1232    CO2 20 (L)  08/04/2020 1232    BUN 37 (H) 08/04/2020 1232    CREATININE 1.8 (H) 08/04/2020 1232    GLU 98 08/04/2020 1232        Component Value Date/Time    CALCIUM 8.9 08/04/2020 1232    ALKPHOS 58 08/04/2020 1232    AST 20 08/04/2020 1232    ALT 17 08/04/2020 1232    BILITOT 0.6 08/04/2020 1232    ESTGFRAFRICA 41 (A) 08/04/2020 1232    EGFRNONAA 36 (A) 08/04/2020 1232        Echo:7/18/2019  CONCLUSIONS     1 - Normal left ventricular systolic function (EF 60-65%).     2 - Normal left ventricular diastolic function.     3 - Normal right ventricular systolic function .       Anesthesia Plan  Type of Anesthesia, risks & benefits discussed:  Anesthesia Type:  general  Patient's Preference:   Intra-op Monitoring Plan: standard ASA monitors  Intra-op Monitoring Plan Comments:   Post Op Pain Control Plan: per primary service following discharge from PACU  Post Op Pain Control Plan Comments:   Induction:   IV  Beta Blocker:  Patient is not currently on a Beta-Blocker (No further documentation required).       Informed Consent: Patient understands risks and agrees with Anesthesia plan.  Questions answered. Anesthesia consent signed with patient.  ASA Score: 3     Day of Surgery Review of History & Physical: I have interviewed and examined the patient. I have reviewed the patient's H&P dated:  There are no significant changes.  H&P update referred to the surgeon.         Ready For Surgery From Anesthesia Perspective.

## 2020-08-17 NOTE — ANESTHESIA PROCEDURE NOTES
Intubation  Performed by: Amilcar Kaur CRNA  Authorized by: Natalie Ragsdale MD     Intubation:     Induction:  Intravenous    Intubated:  Postinduction    Mask Ventilation:  Easy with oral airway    Attempts:  1    Attempted By:  CRNA    Method of Intubation:  Direct    Blade:  Gonsales 2    Laryngeal View Grade: Grade I - full view of chords      Difficult Airway Encountered?: No      Complications:  Soft tissue trauma    Airway Device:  Oral endotracheal tube    Airway Device Size:  7.5    Style/Cuff Inflation:  Cuffed    Inflation Amount (mL):  7    Tube secured:  22    Secured at:  The lips    Placement Verified By:  Capnometry    Complicating Factors:  None    Findings Post-Intubation:  BS equal bilateral and atraumatic/condition of teeth unchanged

## 2020-08-18 VITALS
SYSTOLIC BLOOD PRESSURE: 118 MMHG | OXYGEN SATURATION: 96 % | HEART RATE: 69 BPM | RESPIRATION RATE: 18 BRPM | DIASTOLIC BLOOD PRESSURE: 69 MMHG | HEIGHT: 67 IN | WEIGHT: 136.69 LBS | BODY MASS INDEX: 21.45 KG/M2 | TEMPERATURE: 97 F
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
## 2020-08-18 PROBLEM — R41.0 DELIRIUM: Status: ACTIVE | Noted: 2020-08-18

## 2020-08-18 PROBLEM — R50.9 FEVER: Status: ACTIVE | Noted: 2020-08-18

## 2020-08-18 LAB
ALBUMIN SERPL BCP-MCNC: 2.7 G/DL (ref 3.5–5.2)
ALP SERPL-CCNC: 77 U/L (ref 55–135)
ALT SERPL W/O P-5'-P-CCNC: 18 U/L (ref 10–44)
ANION GAP SERPL CALC-SCNC: 12 MMOL/L (ref 8–16)
ANISOCYTOSIS BLD QL SMEAR: SLIGHT
AST SERPL-CCNC: 26 U/L (ref 10–40)
BASOPHILS # BLD AUTO: 0.02 K/UL (ref 0–0.2)
BASOPHILS # BLD AUTO: 0.03 K/UL (ref 0–0.2)
BASOPHILS NFR BLD: 0.4 % (ref 0–1.9)
BASOPHILS NFR BLD: 0.8 % (ref 0–1.9)
BILIRUB SERPL-MCNC: 0.9 MG/DL (ref 0.1–1)
BILIRUB UR QL STRIP: NEGATIVE
BUN SERPL-MCNC: 39 MG/DL (ref 8–23)
CALCIUM SERPL-MCNC: 8.8 MG/DL (ref 8.7–10.5)
CHLORIDE SERPL-SCNC: 103 MMOL/L (ref 95–110)
CLARITY UR: CLEAR
CO2 SERPL-SCNC: 20 MMOL/L (ref 23–29)
COLOR UR: YELLOW
CREAT SERPL-MCNC: 1.8 MG/DL (ref 0.5–1.4)
DIFFERENTIAL METHOD: ABNORMAL
DIFFERENTIAL METHOD: ABNORMAL
EOSINOPHIL # BLD AUTO: 0 K/UL (ref 0–0.5)
EOSINOPHIL # BLD AUTO: 0 K/UL (ref 0–0.5)
EOSINOPHIL NFR BLD: 0.2 % (ref 0–8)
EOSINOPHIL NFR BLD: 0.3 % (ref 0–8)
ERYTHROCYTE [DISTWIDTH] IN BLOOD BY AUTOMATED COUNT: 15.9 % (ref 11.5–14.5)
ERYTHROCYTE [DISTWIDTH] IN BLOOD BY AUTOMATED COUNT: 16.1 % (ref 11.5–14.5)
EST. GFR  (AFRICAN AMERICAN): 41 ML/MIN/1.73 M^2
EST. GFR  (NON AFRICAN AMERICAN): 36 ML/MIN/1.73 M^2
GLUCOSE SERPL-MCNC: 101 MG/DL (ref 70–110)
GLUCOSE UR QL STRIP: NEGATIVE
HCT VFR BLD AUTO: 27.9 % (ref 40–54)
HCT VFR BLD AUTO: 29.8 % (ref 40–54)
HGB BLD-MCNC: 9.3 G/DL (ref 14–18)
HGB BLD-MCNC: 9.8 G/DL (ref 14–18)
HGB UR QL STRIP: NEGATIVE
IMM GRANULOCYTES # BLD AUTO: 0.01 K/UL (ref 0–0.04)
IMM GRANULOCYTES # BLD AUTO: 0.02 K/UL (ref 0–0.04)
IMM GRANULOCYTES NFR BLD AUTO: 0.3 % (ref 0–0.5)
IMM GRANULOCYTES NFR BLD AUTO: 0.4 % (ref 0–0.5)
KETONES UR QL STRIP: NEGATIVE
LEUKOCYTE ESTERASE UR QL STRIP: NEGATIVE
LYMPHOCYTES # BLD AUTO: 0.4 K/UL (ref 1–4.8)
LYMPHOCYTES # BLD AUTO: 0.5 K/UL (ref 1–4.8)
LYMPHOCYTES NFR BLD: 13.6 % (ref 18–48)
LYMPHOCYTES NFR BLD: 8.7 % (ref 18–48)
MCH RBC QN AUTO: 28.9 PG (ref 27–31)
MCH RBC QN AUTO: 29.1 PG (ref 27–31)
MCHC RBC AUTO-ENTMCNC: 32.9 G/DL (ref 32–36)
MCHC RBC AUTO-ENTMCNC: 33.3 G/DL (ref 32–36)
MCV RBC AUTO: 87 FL (ref 82–98)
MCV RBC AUTO: 88 FL (ref 82–98)
MONOCYTES # BLD AUTO: 0.3 K/UL (ref 0.3–1)
MONOCYTES # BLD AUTO: 0.4 K/UL (ref 0.3–1)
MONOCYTES NFR BLD: 7.4 % (ref 4–15)
MONOCYTES NFR BLD: 9.1 % (ref 4–15)
NEUTROPHILS # BLD AUTO: 3 K/UL (ref 1.8–7.7)
NEUTROPHILS # BLD AUTO: 3.7 K/UL (ref 1.8–7.7)
NEUTROPHILS NFR BLD: 75.9 % (ref 38–73)
NEUTROPHILS NFR BLD: 82.9 % (ref 38–73)
NITRITE UR QL STRIP: NEGATIVE
NRBC BLD-RTO: 0 /100 WBC
NRBC BLD-RTO: 0 /100 WBC
PH UR STRIP: 5 [PH] (ref 5–8)
PLATELET # BLD AUTO: 221 K/UL (ref 150–350)
PLATELET # BLD AUTO: 242 K/UL (ref 150–350)
PLATELET BLD QL SMEAR: ABNORMAL
PMV BLD AUTO: 9.3 FL (ref 9.2–12.9)
PMV BLD AUTO: 9.4 FL (ref 9.2–12.9)
POIKILOCYTOSIS BLD QL SMEAR: SLIGHT
POTASSIUM SERPL-SCNC: 4.7 MMOL/L (ref 3.5–5.1)
PROT SERPL-MCNC: 7.1 G/DL (ref 6–8.4)
PROT UR QL STRIP: NEGATIVE
RBC # BLD AUTO: 3.2 M/UL (ref 4.6–6.2)
RBC # BLD AUTO: 3.39 M/UL (ref 4.6–6.2)
SCHISTOCYTES BLD QL SMEAR: PRESENT
SODIUM SERPL-SCNC: 135 MMOL/L (ref 136–145)
SP GR UR STRIP: >=1.03 (ref 1–1.03)
TROPONIN I SERPL DL<=0.01 NG/ML-MCNC: 0.06 NG/ML (ref 0–0.03)
URN SPEC COLLECT METH UR: ABNORMAL
UROBILINOGEN UR STRIP-ACNC: NEGATIVE EU/DL
WBC # BLD AUTO: 3.97 K/UL (ref 3.9–12.7)
WBC # BLD AUTO: 4.48 K/UL (ref 3.9–12.7)

## 2020-08-18 PROCEDURE — 93010 ELECTROCARDIOGRAM REPORT: CPT | Mod: ,,, | Performed by: INTERNAL MEDICINE

## 2020-08-18 PROCEDURE — 93005 ELECTROCARDIOGRAM TRACING: CPT

## 2020-08-18 PROCEDURE — 25000003 PHARM REV CODE 250: Performed by: INTERNAL MEDICINE

## 2020-08-18 PROCEDURE — 84484 ASSAY OF TROPONIN QUANT: CPT

## 2020-08-18 PROCEDURE — 87040 BLOOD CULTURE FOR BACTERIA: CPT

## 2020-08-18 PROCEDURE — G0378 HOSPITAL OBSERVATION PER HR: HCPCS

## 2020-08-18 PROCEDURE — 86592 SYPHILIS TEST NON-TREP QUAL: CPT

## 2020-08-18 PROCEDURE — 36415 COLL VENOUS BLD VENIPUNCTURE: CPT

## 2020-08-18 PROCEDURE — 63600175 PHARM REV CODE 636 W HCPCS: Performed by: EMERGENCY MEDICINE

## 2020-08-18 PROCEDURE — 93010 EKG 12-LEAD: ICD-10-PCS | Mod: ,,, | Performed by: INTERNAL MEDICINE

## 2020-08-18 PROCEDURE — 80053 COMPREHEN METABOLIC PANEL: CPT

## 2020-08-18 PROCEDURE — 81003 URINALYSIS AUTO W/O SCOPE: CPT

## 2020-08-18 PROCEDURE — 85025 COMPLETE CBC W/AUTO DIFF WBC: CPT

## 2020-08-18 RX ORDER — IBUPROFEN 200 MG
16 TABLET ORAL
Status: DISCONTINUED | OUTPATIENT
Start: 2020-08-18 | End: 2020-08-18 | Stop reason: HOSPADM

## 2020-08-18 RX ORDER — RANOLAZINE 500 MG/1
1000 TABLET, EXTENDED RELEASE ORAL 2 TIMES DAILY
Status: DISCONTINUED | OUTPATIENT
Start: 2020-08-18 | End: 2020-08-18 | Stop reason: HOSPADM

## 2020-08-18 RX ORDER — ASPIRIN 81 MG/1
81 TABLET ORAL DAILY
Status: DISCONTINUED | OUTPATIENT
Start: 2020-08-18 | End: 2020-08-18 | Stop reason: HOSPADM

## 2020-08-18 RX ORDER — QUINAPRIL 5 1/1
10 TABLET ORAL DAILY
Status: DISCONTINUED | OUTPATIENT
Start: 2020-08-18 | End: 2020-08-18

## 2020-08-18 RX ORDER — ENOXAPARIN SODIUM 100 MG/ML
40 INJECTION SUBCUTANEOUS EVERY 24 HOURS
Status: DISCONTINUED | OUTPATIENT
Start: 2020-08-18 | End: 2020-08-18 | Stop reason: HOSPADM

## 2020-08-18 RX ORDER — SODIUM CHLORIDE 0.9 % (FLUSH) 0.9 %
10 SYRINGE (ML) INJECTION
Status: DISCONTINUED | OUTPATIENT
Start: 2020-08-18 | End: 2020-08-18 | Stop reason: HOSPADM

## 2020-08-18 RX ORDER — GLUCAGON 1 MG
1 KIT INJECTION
Status: DISCONTINUED | OUTPATIENT
Start: 2020-08-18 | End: 2020-08-18 | Stop reason: HOSPADM

## 2020-08-18 RX ORDER — METOPROLOL SUCCINATE 25 MG/1
25 TABLET, EXTENDED RELEASE ORAL DAILY
Status: DISCONTINUED | OUTPATIENT
Start: 2020-08-18 | End: 2020-08-18 | Stop reason: HOSPADM

## 2020-08-18 RX ORDER — ATORVASTATIN CALCIUM 40 MG/1
80 TABLET, FILM COATED ORAL NIGHTLY
Status: DISCONTINUED | OUTPATIENT
Start: 2020-08-18 | End: 2020-08-18 | Stop reason: HOSPADM

## 2020-08-18 RX ORDER — IBUPROFEN 200 MG
24 TABLET ORAL
Status: DISCONTINUED | OUTPATIENT
Start: 2020-08-18 | End: 2020-08-18 | Stop reason: HOSPADM

## 2020-08-18 RX ADMIN — SODIUM CHLORIDE, SODIUM LACTATE, POTASSIUM CHLORIDE, AND CALCIUM CHLORIDE 1000 ML: .6; .31; .03; .02 INJECTION, SOLUTION INTRAVENOUS at 12:08

## 2020-08-18 RX ADMIN — ASPIRIN 81 MG: 81 TABLET, COATED ORAL at 09:08

## 2020-08-18 RX ADMIN — METOPROLOL SUCCINATE 25 MG: 25 TABLET, EXTENDED RELEASE ORAL at 09:08

## 2020-08-18 RX ADMIN — RANOLAZINE 1000 MG: 500 TABLET, FILM COATED, EXTENDED RELEASE ORAL at 09:08

## 2020-08-18 NOTE — PLAN OF CARE
Pt remains free from falls/injuries this shift. Safety precautions maintained. No s/s of acute distress noted. Will continue to monitor. Chart check completed.

## 2020-08-18 NOTE — PROGRESS NOTES
Patient and daughter in ED room 10.  AAO x 4. Vitals stable at this time. Febrile temp 99.7 orally.  Had recent cystectomy bilaterally axilla.  Gauze dressings to sites CDI.  Reports falls at home. No s/s of trauma or injuries from falls.  Heart NSR 70's.  Sats wnl Room air.  Urine is concentrated.  Voids per urinal.  IV started to L FA x 1 attempt. IV bolus started as ordered

## 2020-08-18 NOTE — ASSESSMENT & PLAN NOTE
S/p excision by Dr Rojo.  He was admitted with subjective fever and delirium .  Will review case with DR Rojo in AM

## 2020-08-18 NOTE — SUBJECTIVE & OBJECTIVE
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       Past Surgical History:   Procedure Laterality Date    CARDIAC CATHETERIZATION      CARDIAC SURGERY  2006    CATARACT EXTRACTION W/  INTRAOCULAR LENS IMPLANT Right 05/24/2017    CATARACT EXTRACTION W/  INTRAOCULAR LENS IMPLANT Left 04/12/2017    CLOSED REDUCTION OF FRACTURE OF FIBULA Left 7/18/2019    Procedure: CLOSED REDUCTION, FRACTURE, FIBULA;  Surgeon: Philippe Bustamante Jr., MD;  Location: [facility] OR;  Service: Orthopedics;  Laterality: Left;  tibia/fibula fracture    CORONARY ARTERY BYPASS GRAFT      EYE SURGERY      Cataract extraction with intraocular lens implant    LYMPH NODES, LEFT NECK, EXCISIONAL BIOPSY  5/21/2014    MEDIPORT INSERTION, SINGLE  5/28/14    ORIF FIBULA FRACTURE Left 8/9/2019    Procedure: ORIF, FRACTURE, FIBULA;  Surgeon: Flynn White MD;  Location: [facility] OR;  Service: Orthopedics;  Laterality: Left;    ORIF TIBIA FRACTURE Left 8/9/2019    Procedure: ORIF, FRACTURE, TIBIA;  Surgeon: Flynn White MD;  Location: [facility] OR;  Service: Orthopedics;  Laterality: Left;    PCIOL Left 04/12/2017    DR. MCCRAY    PROSTATE SURGERY  02/2014    REMOVAL OF EXTERNAL FIXATION DEVICE Left 8/9/2019    Procedure: REMOVAL, EXTERNAL FIXATION DEVICE;  Surgeon: Flynn White MD;  Location: [facility];  Service: Orthopedics;  Laterality: Left;    SIMPLE CYSTECTOMY Bilateral 08/18/2020    bilateral axilla       Review of patient's allergies indicates:  No Known Allergies    Current Facility-Administered Medications on File Prior to Encounter   Medication    [COMPLETED] cefazolin (ANCEF) 2 gram in dextrose 5% 50 mL IVPB (premix)    [DISCONTINUED] 0.9%  NaCl infusion    [DISCONTINUED] 0.9%  NaCl  infusion    [DISCONTINUED] bupivacaine (PF) 0.25% (2.5 mg/ml) injection    [DISCONTINUED] fentaNYL injection    [DISCONTINUED] hydromorphone (PF) injection 0.2 mg    [DISCONTINUED] lactated ringers infusion    [DISCONTINUED] lidocaine (cardiac) injection    [DISCONTINUED] nozaseptin (NOZIN) nasal     [DISCONTINUED] ondansetron injection 4 mg    [DISCONTINUED] ondansetron injection    [DISCONTINUED] propofol (DIPRIVAN) 10 mg/mL infusion    [DISCONTINUED] rocuronium injection    [DISCONTINUED] succinylcholine injection     Current Outpatient Medications on File Prior to Encounter   Medication Sig    cephALEXin (KEFLEX) 500 MG capsule Take 1 capsule (500 mg total) by mouth every 8 (eight) hours. for 7 days    loratadine (CLARITIN) 10 mg tablet Take 10 mg by mouth daily as needed for Allergies.    pantoprazole (PROTONIX) 40 MG tablet Take 1 tablet (40 mg total) by mouth once daily.    acetaminophen (TYLENOL) 325 MG tablet Take 2 tablets (650 mg total) by mouth every 4 (four) hours as needed.    aspirin (ECOTRIN) 81 MG EC tablet Take 81 mg by mouth once daily.    atorvastatin (LIPITOR) 80 MG tablet 80 mg every evening.     benzonatate (TESSALON) 100 MG capsule Take 100 mg by mouth 3 (three) times daily as needed for Cough.    cholecalciferol, vitamin D3, (VITAMIN D3) 400 unit Tab Take 1 tablet by mouth once daily.    cyanocobalamin (VITAMIN B-12) 500 MCG tablet Take 500 mcg by mouth once daily.    fish oil-omega-3 fatty acids 300-1,000 mg capsule Take 1 g by mouth 2 (two) times daily.     FLUZONE HIGH-DOSE 2018-19, PF, 180 mcg/0.5 mL vaccine     FOLIC ACID ORAL Take 2,400 mcg by mouth once daily.     hydroCHLOROthiazide (HYDRODIURIL) 25 MG tablet Take by mouth once daily.    HYDROcodone-acetaminophen (NORCO)  mg per tablet Take 1 tablet by mouth every 6 (six) hours as needed. (Patient not taking: Reported on 8/7/2020)    metoprolol succinate (TOPROL-XL) 25 MG 24 hr tablet Take 25  mg by mouth once daily.    NITROSTAT 0.4 mg SL tablet Patient states that he takes this medication as needed    nozaseptin (NOZIN) nasal  1 each by Each Nostril route 2 (two) times daily. (Patient not taking: Reported on 8/7/2020)    polyethylene glycol (GLYCOLAX) 17 gram PwPk Take by mouth daily as needed.    quinapril (ACCUPRIL) 10 MG tablet Take 10 mg by mouth once daily.    ranolazine (RANEXA) 1,000 mg Tb12 TAKE 1 TABLET BY MOUTH TWICE A DAY (Patient not taking: Reported on 8/4/2020)     Family History     Problem Relation (Age of Onset)    Blindness Father        Tobacco Use    Smoking status: Former Smoker     Packs/day: 1.00     Years: 45.00     Pack years: 45.00     Quit date: 1/1/2005     Years since quitting: 15.6    Smokeless tobacco: Never Used    Tobacco comment: Pt no longer smokes   Substance and Sexual Activity    Alcohol use: Not Currently     Comment: former    Drug use: No    Sexual activity: Yes     Birth control/protection: None     Review of Systems   Constitutional: Positive for activity change, appetite change, chills and fever.   HENT: Negative for congestion and dental problem.    Respiratory: Negative for apnea, cough, choking, chest tightness and shortness of breath.    Gastrointestinal: Negative for abdominal distention and abdominal pain.   Musculoskeletal: Negative for arthralgias and back pain.   Psychiatric/Behavioral: Positive for confusion. Negative for behavioral problems.     Objective:     Vital Signs (Most Recent):  Temp: 96.6 °F (35.9 °C) (08/18/20 0320)  Pulse: 80 (08/18/20 0320)  Resp: 16 (08/18/20 0320)  BP: (!) 156/71 (08/18/20 0320)  SpO2: 97 % (08/18/20 0320) Vital Signs (24h Range):  Temp:  [96.6 °F (35.9 °C)-99.7 °F (37.6 °C)] 96.6 °F (35.9 °C)  Pulse:  [72-90] 80  Resp:  [13-26] 16  SpO2:  [97 %-100 %] 97 %  BP: (107-188)/(64-95) 156/71     Weight: 62 kg (136 lb 11 oz)  Body mass index is 21.41 kg/m².    Physical Exam  Vitals signs and nursing note  reviewed.   Constitutional:       Appearance: He is well-developed.   HENT:      Head: Normocephalic and atraumatic.      Nose: Nose normal.   Eyes:      Comments: PERRL   Neck:      Musculoskeletal: Normal range of motion and neck supple.   Cardiovascular:      Rate and Rhythm: Normal rate and regular rhythm.      Heart sounds: Normal heart sounds.   Pulmonary:      Effort: Pulmonary effort is normal. No respiratory distress.      Breath sounds: Normal breath sounds. No wheezing or rales.   Abdominal:      General: Abdomen is flat.      Tenderness: There is no abdominal tenderness.   Musculoskeletal: Normal range of motion.   Skin:     General: Skin is dry.      Comments: dressing over right axilla   Neurological:      Mental Status: He is alert.      Comments: Oriented to self but thinks that Bj Pittman is the [place] president              Significant Labs:   BMP:   Recent Labs   Lab 08/18/20  0028      *   K 4.7      CO2 20*   BUN 39*   CREATININE 1.8*   CALCIUM 8.8     CBC:   Recent Labs   Lab 08/18/20 0028   WBC 4.48   HGB 9.8*   HCT 29.8*        All pertinent labs within the past 24 hours have been reviewed.    Significant Imaging: I have reviewed and interpreted all pertinent imaging results/findings within the past 24 hours.

## 2020-08-18 NOTE — NURSING
Pt being discharged home in stable condition. IV removed, catheter intact, pt tolerated well. Discharge instructions given to pt, pt verbalized understanding.  Pt niece called and will be here for pt discharge around 230. Will continue to monitor pt.

## 2020-08-18 NOTE — ASSESSMENT & PLAN NOTE
He reported fever at home, No fever noted in the Ed , will monitor fever curve and will do blood cultures

## 2020-08-18 NOTE — DISCHARGE INSTRUCTIONS
Take the dressing tomorrow, and shower on a daily basis and continue to take his antibiotics which was prescribed postoperatively.

## 2020-08-18 NOTE — ASSESSMENT & PLAN NOTE
Will avoid all psychotropic meds .  Will monitor neuro function.  Etiology is unclear .  Will send RPR .

## 2020-08-18 NOTE — ED PROVIDER NOTES
SCRIBE #1 NOTE: I, Cande Carvajal, am scribing for, and in the presence of, Troy Lord MD. I have scribed the entire note.       History     Chief Complaint   Patient presents with    Post-op Problem     fever at home and delierium post op per family. pt states he fell at home     Fall     Review of patient's allergies indicates:  No Known Allergies      History of Present Illness     HPI    8/18/2020, 12:01 AM  History obtained from the patient      History of Present Illness: Foreign Holland is a 75 y.o. male patient with a PMHx of HTN, CAD, heart failure, lymphadenopathy, CHF, cataract, MI and cancer who presents to the Emergency Department for evaluation of subjective fever (tmax 100.2) which onset gradually several hours PTA. Pt had sebaceous cysts removed in surgery today by Dr. Rojo. Pt's daughter reports delirium at home PTA. Symptoms are constant and moderate in severity. No mitigating or exacerbating factors reported. Associated sxs include generalized weakness, HA and cough. Patient denies any dysuria, n/v/d, SOB, CP, congestion and all other sxs at this time. No prior Tx. No further complaints or concerns at this time.       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 OR;  Service: Orthopedics;  Laterality: Left;  tibia/fibula fracture    CORONARY ARTERY BYPASS GRAFT      EYE SURGERY      Cataract extraction with intraocular lens implant    LYMPH NODES, LEFT NECK, EXCISIONAL BIOPSY  5/21/2014    MEDIPORT INSERTION, SINGLE  5/28/14    ORIF FIBULA FRACTURE Left 8/9/2019    Procedure: ORIF, FRACTURE, FIBULA;  Surgeon: Flynn White MD;  Location: [facility] OR;  Service: Orthopedics;  Laterality: Left;    ORIF TIBIA FRACTURE Left 8/9/2019    Procedure: ORIF, FRACTURE, TIBIA;  Surgeon: Flynn White MD;  Location: [facility] OR;  Service: Orthopedics;  Laterality: Left;    PCIOL Left 04/12/2017    DR. MCCRAY    PROSTATE SURGERY  02/2014    REMOVAL OF EXTERNAL FIXATION DEVICE Left 8/9/2019    Procedure: REMOVAL, EXTERNAL FIXATION DEVICE;  Surgeon: Flynn White MD;  Location: [facility] OR;  Service: Orthopedics;  Laterality: Left;    SIMPLE CYSTECTOMY Bilateral 08/18/2020    bilateral axilla    SURGICAL REMOVAL OF MASS OF AXILLA Bilateral 8/17/2020    Procedure: EXCISION, MASS, AXILLARY;  Surgeon: Ray Rojo MD;  Location: [facility] OR;  Service: General;  Laterality: Bilateral;         Family History:  Family History   Problem Relation Age of Onset    Blindness Father     Cancer Neg Hx     Diabetes Neg Hx     Heart failure Neg Hx     Coronary artery disease Neg Hx     Cataracts Neg Hx     Glaucoma Neg Hx     Hypertension Neg Hx     Macular degeneration Neg Hx     Retinal detachment Neg Hx     Strabismus Neg Hx     Stroke Neg Hx     Thyroid disease Neg Hx        Social History:  Social History     Tobacco Use    Smoking status: Former Smoker     Packs/day: 1.00     Years: 45.00     Pack years: 45.00     Quit date: 1/1/2005     Years since quitting: 15.6    Smokeless tobacco: Never Used    Tobacco comment: Pt no longer smokes   Substance and Sexual Activity    Alcohol use: Not Currently     Comment: former    Drug use:  No    Sexual activity: Yes     Birth control/protection: None        Review of Systems     Review of Systems   Constitutional: Positive for fever (tmax 100.2).   HENT: Negative for congestion and sore throat.    Respiratory: Positive for cough. Negative for shortness of breath.    Cardiovascular: Negative for chest pain.   Gastrointestinal: Negative for diarrhea, nausea and vomiting.   Genitourinary: Negative for dysuria.   Musculoskeletal: Negative for back pain.   Skin: Negative for rash.   Neurological: Positive for weakness (generalized) and headaches.   Hematological: Does not bruise/bleed easily.   All other systems reviewed and are negative.       Physical Exam     Initial Vitals [08/17/20 2355]   BP Pulse Resp Temp SpO2   107/65 90 18 99.2 °F (37.3 °C) 98 %      MAP       --          Physical Exam  Nursing Notes and Vital Signs Reviewed.  Constitutional: Patient is in no acute distress. Well-developed and well-nourished.  Head: Atraumatic. Normocephalic.  Eyes: PERRL. EOM intact. Conjunctivae are not pale. No scleral icterus.  ENT: Mucous membranes are dry. Oropharynx is clear and symmetric.    Neck: Supple. Full ROM. No lymphadenopathy.  Cardiovascular: Regular rate. Regular rhythm. No rubs or gallops. Distal pulses are 2+ and symmetric. Soft systolic murmur appreciated.  Pulmonary/Chest: No respiratory distress. Clear to auscultation bilaterally. No wheezing or rales.  Abdominal: Soft and non-distended.  There is no tenderness.  No rebound, guarding, or rigidity. Good bowel sounds.  Genitourinary: No CVA tenderness  Musculoskeletal: Moves all extremities. No obvious deformities. No edema. No calf tenderness.  Skin: Dry.  Neurological:  Alert, awake, and appropriate. Oriented x2.  Normal speech.  No acute focal neurological deficits are appreciated.  Psychiatric: Normal affect. Good eye contact. Appropriate in content.     ED Course   Procedures  ED Vital Signs:  Vitals:    08/18/20 0030 08/18/20 0045  "08/18/20 0100 08/18/20 0115   BP: 114/64 114/64 128/64 123/65   Pulse: 83  83 79   Resp: 20  19 13   Temp: 99.7 °F (37.6 °C)      TempSrc: Oral      SpO2: 100%  99% 99%   Weight:       Height:        08/18/20 0130 08/18/20 0145 08/18/20 0200 08/18/20 0215   BP: (!) 150/70 137/70 135/69 119/65   Pulse: 89 78 79 76   Resp: (!) 26 15 (!) 22 19   Temp:       TempSrc:       SpO2: 100% 100% 100% 100%   Weight:       Height:        08/18/20 0230 08/18/20 0245 08/18/20 0300 08/18/20 0302   BP: 133/69 112/71 (!) 140/74    Pulse: 77 80 79    Resp: 16 (!) 23 14 20   Temp:       TempSrc:       SpO2: 99% 99% 100%    Weight:       Height:        08/18/20 0320 08/18/20 0735 08/18/20 1227   BP: (!) 156/71 (!) 140/80 118/69   Pulse: 80 75 69   Resp: 16 18 18   Temp: 96.6 °F (35.9 °C) 98.6 °F (37 °C) 97.4 °F (36.3 °C)   TempSrc: Oral Oral Oral   SpO2: 97% 100% 96%   Weight: 62 kg (136 lb 11 oz)     Height: 5' 7" (1.702 m)         Abnormal Lab Results:  Labs Reviewed   CBC W/ AUTO DIFFERENTIAL - Abnormal; Notable for the following components:       Result Value    RBC 3.39 (*)     Hemoglobin 9.8 (*)     Hematocrit 29.8 (*)     RDW 16.1 (*)     Lymph # 0.4 (*)     Gran% 82.9 (*)     Lymph% 8.7 (*)     All other components within normal limits   COMPREHENSIVE METABOLIC PANEL - Abnormal; Notable for the following components:    Sodium 135 (*)     CO2 20 (*)     BUN, Bld 39 (*)     Creatinine 1.8 (*)     Albumin 2.7 (*)     eGFR if  41 (*)     eGFR if non  36 (*)     All other components within normal limits   TROPONIN I - Abnormal; Notable for the following components:    Troponin I 0.062 (*)     All other components within normal limits   URINALYSIS, REFLEX TO URINE CULTURE - Abnormal; Notable for the following components:    Specific Gravity, UA >=1.030 (*)     All other components within normal limits    Narrative:     Specimen Source->Urine        All Lab Results:  Results for orders placed or " performed during the hospital encounter of 08/17/20   Blood culture    Specimen: Blood   Result Value Ref Range    Blood Culture, Routine No Growth to date     Blood Culture, Routine No Growth to date    Blood culture    Specimen: Blood   Result Value Ref Range    Blood Culture, Routine No Growth to date     Blood Culture, Routine No Growth to date    CBC auto differential   Result Value Ref Range    WBC 4.48 3.90 - 12.70 K/uL    RBC 3.39 (L) 4.60 - 6.20 M/uL    Hemoglobin 9.8 (L) 14.0 - 18.0 g/dL    Hematocrit 29.8 (L) 40.0 - 54.0 %    Mean Corpuscular Volume 88 82 - 98 fL    Mean Corpuscular Hemoglobin 28.9 27.0 - 31.0 pg    Mean Corpuscular Hemoglobin Conc 32.9 32.0 - 36.0 g/dL    RDW 16.1 (H) 11.5 - 14.5 %    Platelets 242 150 - 350 K/uL    MPV 9.4 9.2 - 12.9 fL    Immature Granulocytes 0.4 0.0 - 0.5 %    Gran # (ANC) 3.7 1.8 - 7.7 K/uL    Immature Grans (Abs) 0.02 0.00 - 0.04 K/uL    Lymph # 0.4 (L) 1.0 - 4.8 K/uL    Mono # 0.3 0.3 - 1.0 K/uL    Eos # 0.0 0.0 - 0.5 K/uL    Baso # 0.02 0.00 - 0.20 K/uL    nRBC 0 0 /100 WBC    Gran% 82.9 (H) 38.0 - 73.0 %    Lymph% 8.7 (L) 18.0 - 48.0 %    Mono% 7.4 4.0 - 15.0 %    Eosinophil% 0.2 0.0 - 8.0 %    Basophil% 0.4 0.0 - 1.9 %    Platelet Estimate Appears normal     Aniso Slight     Poik Slight     Schistocytes Present     Differential Method Automated    Comprehensive metabolic panel   Result Value Ref Range    Sodium 135 (L) 136 - 145 mmol/L    Potassium 4.7 3.5 - 5.1 mmol/L    Chloride 103 95 - 110 mmol/L    CO2 20 (L) 23 - 29 mmol/L    Glucose 101 70 - 110 mg/dL    BUN, Bld 39 (H) 8 - 23 mg/dL    Creatinine 1.8 (H) 0.5 - 1.4 mg/dL    Calcium 8.8 8.7 - 10.5 mg/dL    Total Protein 7.1 6.0 - 8.4 g/dL    Albumin 2.7 (L) 3.5 - 5.2 g/dL    Total Bilirubin 0.9 0.1 - 1.0 mg/dL    Alkaline Phosphatase 77 55 - 135 U/L    AST 26 10 - 40 U/L    ALT 18 10 - 44 U/L    Anion Gap 12 8 - 16 mmol/L    eGFR if African American 41 (A) >60 mL/min/1.73 m^2    eGFR if non African  American 36 (A) >60 mL/min/1.73 m^2   Troponin I   Result Value Ref Range    Troponin I 0.062 (H) 0.000 - 0.026 ng/mL   Urinalysis, Reflex to Urine Culture Urine, Clean Catch    Specimen: Urine   Result Value Ref Range    Specimen UA Urine, Clean Catch     Color, UA Yellow Yellow, Straw, Avril    Appearance, UA Clear Clear    pH, UA 5.0 5.0 - 8.0    Specific Gravity, UA >=1.030 (A) 1.005 - 1.030    Protein, UA Negative Negative    Glucose, UA Negative Negative    Ketones, UA Negative Negative    Bilirubin (UA) Negative Negative    Occult Blood UA Negative Negative    Nitrite, UA Negative Negative    Urobilinogen, UA Negative <2.0 EU/dL    Leukocytes, UA Negative Negative   RPR   Result Value Ref Range    RPR Non-reactive Non-reactive   CBC auto differential   Result Value Ref Range    WBC 3.97 3.90 - 12.70 K/uL    RBC 3.20 (L) 4.60 - 6.20 M/uL    Hemoglobin 9.3 (L) 14.0 - 18.0 g/dL    Hematocrit 27.9 (L) 40.0 - 54.0 %    Mean Corpuscular Volume 87 82 - 98 fL    Mean Corpuscular Hemoglobin 29.1 27.0 - 31.0 pg    Mean Corpuscular Hemoglobin Conc 33.3 32.0 - 36.0 g/dL    RDW 15.9 (H) 11.5 - 14.5 %    Platelets 221 150 - 350 K/uL    MPV 9.3 9.2 - 12.9 fL    Immature Granulocytes 0.3 0.0 - 0.5 %    Gran # (ANC) 3.0 1.8 - 7.7 K/uL    Immature Grans (Abs) 0.01 0.00 - 0.04 K/uL    Lymph # 0.5 (L) 1.0 - 4.8 K/uL    Mono # 0.4 0.3 - 1.0 K/uL    Eos # 0.0 0.0 - 0.5 K/uL    Baso # 0.03 0.00 - 0.20 K/uL    nRBC 0 0 /100 WBC    Gran% 75.9 (H) 38.0 - 73.0 %    Lymph% 13.6 (L) 18.0 - 48.0 %    Mono% 9.1 4.0 - 15.0 %    Eosinophil% 0.3 0.0 - 8.0 %    Basophil% 0.8 0.0 - 1.9 %    Differential Method Automated          Imaging Results:  Imaging Results          CT Head Without Contrast (Final result)  Result time 08/18/20 07:27:13    Final result by Ranulfo Chacon MD (08/18/20 07:27:13)                 Impression:      Chronic microvascular ischemic changes.    All CT scans at this facility use dose modulation, iterative  reconstruction, and/or weight based dosing when appropriate to reduce radiation dose to as low as reasonable achievable.      Electronically signed by: Ranulfo Chacon MD  Date:    08/18/2020  Time:    07:27             Narrative:    EXAMINATION:  CT HEAD WITHOUT CONTRAST    CLINICAL HISTORY:  Altered mental status;    TECHNIQUE:  Low dose axial CT images obtained throughout the head without intravenous contrast. Sagittal and coronal reconstructions were performed.    COMPARISON:  07/09/2014    FINDINGS:  Intracranial compartment:    The brain parenchyma demonstrates areas of decreased attenuation with moderate periventricular white matter consistent with chronic microvascular ischemic changes..  No parenchymal mass, hemorrhage, edema or major vascular distribution infarct.  Vascular calcifications are noted.    Mild prominence of the sulci and ventricles are consistent with age-related involutional changes.    No extra-axial blood or fluid collections.    Skull/extracranial contents (limited evaluation): No fracture.  Mild diffuse sinus mucosal thickening.  Mastoid air cells and paranasal sinuses are essentially clear.                               X-Ray Chest AP Portable (Final result)  Result time 08/18/20 07:44:16    Final result by RADHA Tim Sr., MD (08/18/20 07:44:16)                 Impression:      1. There is no evidence of an acute pulmonary process. There is a mild amount of scarring in both lungs.  2. There are calcified paratracheal lymph nodes.  This is characteristic of a prior granulomatous infection.  3. There are multiple sternotomy wires in place.  .      Electronically signed by: Victor Manuel Tim MD  Date:    08/18/2020  Time:    07:44             Narrative:    EXAMINATION:  XR CHEST AP PORTABLE    CLINICAL HISTORY:  Unspecified fall, initial encounter    COMPARISON:  Comparison was made to chest x-rays dating back to 05/28/2014.    FINDINGS:  There are multiple sternotomy wires in place.   The size of the heart is normal.  There is no evidence of an acute pulmonary process.  There is a mild amount of scarring in both lungs.  There are calcified paratracheal lymph nodes.  There is no pneumothorax.  The costophrenic angles are sharp.                               1:29 AM: Per [facility] radiology, pt's CT Head Without Intravenous Contrast results: No acute intracranial abnormality. Chronic small vessel ischemic changes and cerebral volume loss.    1:30 AM: Pt's Chest X-Ray showed sternotomy wires and no acute pathology.      The EKG was ordered, reviewed, and independently interpreted by the ED provider.  Interpretation time: 1:50  Rate: 77 BPM  Rhythm: normal sinus rhythm  Interpretation: Left axis deviation. No STEMI.             The Emergency Provider reviewed the vital signs and test results, which are outlined above.     ED Discussion       2:01 AM: Discussed case with Loan Goncalves ([facility]). Dr. Villatoro agrees with current care and management of pt and accepts admission.   Admitting Service: [facility]   Admitting Physician: Dr. Villatoro  Admit to: obs med/surg    2:03 AM: Re-evaluated pt. I have discussed test results, shared treatment plan, and the need for admission with patient and family at bedside. Pt and family express understanding at this time and agree with all information. All questions answered. Pt and family have no further questions or concerns at this time. Pt is ready for admit.         Medical Decision Making:   Clinical Tests:   Lab Tests: Ordered and Reviewed  Radiological Study: Ordered and Reviewed  Medical Tests: Ordered and Reviewed           ED Medication(s):  Medications   lactated ringers bolus 1,000 mL (1,000 mLs Intravenous New Bag 8/18/20 0047)       Discharge Medication List as of 8/18/2020 10:19 AM          Follow-up Information     Ray Rojo MD. Go on 9/4/2020.    Specialties: General Surgery, Bariatrics  Why: hospital post-op follow up   Contact  information:  [address] [place] [address] [address] [place] [id number]  [phone]             Dipika R May, FNP-C. Go on 8/31/2020.    Specialty: Cardiology  Why: schedule follow up   Contact information:  [id number] [place] [address] [address] [place] [address]  [phone]             Javier Pimentel MD. Go on 9/1/2020.    Specialty: Hematology and Oncology  Why: scheduled follow up   Contact information:  [address]  [place] [address]  [phone]                       Scribe Attestation:   Scribe #1: I performed the above scribed service and the documentation accurately describes the services I performed. I attest to the accuracy of the note.     Attending:   Physician Attestation Statement for Scribe #1: I, Troy Lord MD, personally performed the services described in this documentation, as scribed by Cande Carvajal, in my presence, and it is both accurate and complete.           Clinical Impression       ICD-10-CM ICD-9-CM   1. Postoperative delirium  F05 780.09   2. Fall  W19.XXXA E888.9   3. Sebaceous cyst of left axilla  L72.3 706.2   4. Elevated temperature  R50.9 780.60       Disposition:   Disposition: Admitted  Condition: Fair         Troy Lord MD  08/19/20 1928

## 2020-08-18 NOTE — CONSULTS
The patient was seen and examined this morning.  He is resting comfortably sleeping in his bed.  There is no issues with his operative site:  Bilateral axillary sebaceous cyst excision measuring 1.5 cm.    The patient can be discharged from the admission if there are no medical issues.  From surgical standpoint the surgery is recommending to discharge.  He needs to take the dressing tomorrow, and shower on a daily basis and continue to take his antibiotics which was prescribed postoperatively.  He is to follow up with me as scheduled.    Total time approximately 15 min was spent with this patient, and more than 50% of that was spent for treatment planning and counseling.    Ray Rojo

## 2020-08-18 NOTE — PLAN OF CARE
Pt denies any pain. Pt turns independently in bed. Bed alarm on. L & R axilla incision, CDI. VSS. Afebrile. No acute adverse events noted. Pt remained free from falls this shift. Will continue to monitor. Chart reviewed.

## 2020-08-18 NOTE — PROGRESS NOTES
MD Lord secure chat w/ MD Rojo advising him of patient disposition in ER and POC to admit to hospital.  POC also discussed with patient and daughter - VU.

## 2020-08-18 NOTE — HOSPITAL COURSE
Mr Holland is a 75 year old male who presented to the Emergency Room last night of evaluation for subjective fever. Patient underwent bilateral axillary cyst excision by Dr Rojo on 8/17/2010. Patient was evaluated by Dr Rojo this AM, ok to discharge from general surgery standpoint. Today, patient reports feeling well, vital signs and labs stable. He was giver discharge instruction and will follow up with Dr Rojo in clinic as scheduled. Patient was seen, examined and deemed suitable for discharge.

## 2020-08-18 NOTE — HPI
75 year old   male patient with a PMHx of HTN, CAD, heart failure, lymphadenopathy, CHF, cataract, MI ,Stage III Peripheral T Cell Lymphoma   who presents to the Emergency Department for evaluation of subjective fever (tmax 100.2) which onset gradually several hours PTA.  There was reported history of fall.He had recent bilateral axillary cyst excision by Dr Rojo on 08/17/2020.(pathology is pending ) There is associated history of confusion -he thinks that Bj Pittman is the current [place] President. He denies headache or photophobia at this time. History was taken from electronic chart .  In the Ed, imaging test showed-  Per [facility] radiology, pt's CT Head Without Intravenous Contrast results: No acute intracranial abnormality. Chronic small vessel ischemic changes and cerebral volume loss.  Pt's Chest X-Ray showed sternotomy wires and no acute pathology.  He will observed for worsening fever or confusion .

## 2020-08-19 LAB — RPR SER QL: NORMAL

## 2020-08-19 NOTE — DISCHARGE SUMMARY
[facility] Hospital Medicine  Discharge Summary      Patient Name: Foreign Holland  MRN: [id number]  Admission Date: 8/17/2020  Hospital Length of Stay: 0 days  Discharge Date and Time: 8/18/2020  1:20 PM  Attending Physician: Sami Lopez MD   Discharging Provider: Ryan Vazquez NP  Primary Care Provider: Oziel Mendieta MD      HPI:   75 year old   male patient with a PMHx of HTN, CAD, heart failure, lymphadenopathy, CHF, cataract, MI ,Stage III Peripheral T Cell Lymphoma   who presents to the Emergency Department for evaluation of subjective fever (tmax 100.2) which onset gradually several hours PTA.  There was reported history of fall.He had recent bilateral axillary cyst excision by Dr Rojo on 08/17/2020.(pathology is pending ) There is associated history of confusion -he thinks that Bj Pittman is the current [place] President. He denies headache or photophobia at this time. History was taken from electronic chart .  In the Ed, imaging test showed-  Per [facility] radiology, pt's CT Head Without Intravenous Contrast results: No acute intracranial abnormality. Chronic small vessel ischemic changes and cerebral volume loss.  Pt's Chest X-Ray showed sternotomy wires and no acute pathology.  He will observed for worsening fever or confusion .    * No surgery found *      Hospital Course:   Mr Holland is a 75 year old male who presented to the Emergency Room last night of evaluation for subjective fever. Patient underwent bilateral axillary cyst excision by Dr Rojo on 8/17/2010. Patient was evaluated by Dr Rojo this AM, ok to discharge from general surgery standpoint. Today, patient reports feeling well, vital signs and labs stable. He was giver discharge instruction and will follow up with Dr Rojo in clinic as scheduled. Patient was seen, examined and deemed suitable for discharge.      Consults:   Consults (From admission, onward)        Status Ordering Provider     Inpatient consult to General Surgery   Once     Provider:  Ray Rojo MD    Completed SILVERIO HYDE.          No new Assessment & Plan notes have been filed under this hospital service since the last note was generated.  Service: Hospital Medicine    Final Active Diagnoses:    Diagnosis Date Noted POA    PRINCIPAL PROBLEM:  Sebaceous cyst of left axilla [L72.3] 08/17/2020 Yes    Fall [W19.XXXA] 08/18/2020 Yes    Fever [R50.9] 08/18/2020 No    Delirium [R41.0] 08/18/2020 Unknown    CKD (chronic kidney disease) stage 3, GFR 30-59 ml/min [N18.3] 07/24/2018 Yes    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 [C84.48] 06/05/2014 Yes    CAD (coronary artery disease) [I25.10] 04/17/2014 Yes    Dyslipidemia [E78.5] 04/17/2014 Yes    HTN (hypertension) [I10] 04/17/2014 Yes      Problems Resolved During this Admission:       Discharged Condition: stable    Disposition: Home or Self Care    Follow Up:  Follow-up Information     Ray Rojo MD. Go on 9/4/2020.    Specialties: General Surgery, Bariatrics  Why: hospital post-op follow up   Contact information:  [address] [place] [address] [address] [place] [address]  [phone]             Dipika R May, FNP-C. Go on 8/31/2020.    Specialty: Cardiology  Why: schedule follow up   Contact information:  [address] [place] [address] [address] [place] [address]  [phone]             Javier Pimentel MD. Go on 9/1/2020.    Specialty: Hematology and Oncology  Why: scheduled follow up   Contact information:  [address]  [place] [address]  [phone]                 Patient Instructions:      Diet Cardiac     Notify your health care provider if you experience any of the following:  temperature >100.4     Notify your health care provider if you experience any of the following:  redness, tenderness, or signs of infection (pain, swelling, redness, odor or green/yellow discharge around incision site)     Activity as tolerated       Significant Diagnostic Studies: Labs:   CMP   Recent Labs   Lab 08/18/20  0028    *   K 4.7      CO2 20*      BUN 39*   CREATININE 1.8*   CALCIUM 8.8   PROT 7.1   ALBUMIN 2.7*   BILITOT 0.9   ALKPHOS 77   AST 26   ALT 18   ANIONGAP 12   ESTGFRAFRICA 41*   EGFRNONAA 36*    and CBC   Recent Labs   Lab 08/18/20  0028 08/18/20  0746   WBC 4.48 3.97   HGB 9.8* 9.3*   HCT 29.8* 27.9*    221       Pending Diagnostic Studies:     Procedure Component Value Units Date/Time    RPR [[id number]] Collected: 08/18/20 0746    Order Status: Sent Lab Status: In process Updated: 08/18/20 4714    Specimen: Blood          Medications:  Reconciled Home Medications:      Medication List      CONTINUE taking these medications    acetaminophen 325 MG tablet  Commonly known as: TYLENOL  Take 2 tablets (650 mg total) by mouth every 4 (four) hours as needed.     aspirin 81 MG EC tablet  Commonly known as: ECOTRIN  Take 81 mg by mouth once daily.     atorvastatin 80 MG tablet  Commonly known as: LIPITOR  80 mg every evening.     benzonatate 100 MG capsule  Commonly known as: TESSALON  Take 100 mg by mouth 3 (three) times daily as needed for Cough.     cephALEXin 500 MG capsule  Commonly known as: KEFLEX  Take 1 capsule (500 mg total) by mouth every 8 (eight) hours. for 7 days     fish oil-omega-3 fatty acids 300-1,000 mg capsule  Take 1 g by mouth 2 (two) times daily.     FLUZONE HIGH-DOSE 2018-19 (PF) 180 mcg/0.5 mL vaccine  Generic drug: influenza     FOLIC ACID ORAL  Take 2,400 mcg by mouth once daily.     hydroCHLOROthiazide 25 MG tablet  Commonly known as: HYDRODIURIL  Take by mouth once daily.     HYDROcodone-acetaminophen  mg per tablet  Commonly known as: NORCO  Take 1 tablet by mouth every 6 (six) hours as needed.     loratadine 10 mg tablet  Commonly known as: CLARITIN  Take 10 mg by mouth daily as needed for Allergies.     metoprolol succinate 25 MG 24 hr tablet  Commonly known as: TOPROL-XL  Take 25 mg by mouth once daily.     NITROSTAT 0.4 MG SL tablet  Generic drug:  nitroGLYCERIN  Patient states that he takes this medication as needed     nozaseptin  nasal   Commonly known as: NOZIN  1 each by Each Nostril route 2 (two) times daily.     pantoprazole 40 MG tablet  Commonly known as: PROTONIX  Take 1 tablet (40 mg total) by mouth once daily.     polyethylene glycol 17 gram Pwpk  Commonly known as: GLYCOLAX  Take by mouth daily as needed.     quinapriL 10 MG tablet  Commonly known as: ACCUPRIL  Take 10 mg by mouth once daily.     ranolazine 1,000 mg Tb12  Commonly known as: RANEXA  TAKE 1 TABLET BY MOUTH TWICE A DAY     VITAMIN B-12 500 MCG tablet  Generic drug: cyanocobalamin  Take 500 mcg by mouth once daily.     VITAMIN D3 10 mcg (400 unit) Tab  Generic drug: cholecalciferol (vitamin D3)  Take 1 tablet by mouth once daily.            Indwelling Lines/Drains at time of discharge:   Lines/Drains/Airways     None                 Time spent on the discharge of patient: 40 minutes  Patient was seen and examined on the date of discharge and determined to be suitable for discharge.         Ryan Vazquez NP  Department of Hospital Medicine  [facility]

## 2020-08-23 LAB
BACTERIA BLD CULT: NORMAL
BACTERIA BLD CULT: NORMAL

## 2020-08-23 NOTE — PLAN OF CARE
08/23/20 1519   Final Note   Assessment Type Final Discharge Note   Anticipated Discharge Disposition Home   Right Care Referral Info   Post Acute Recommendation No Care

## 2020-08-25 ENCOUNTER — HOSPITAL ENCOUNTER (EMERGENCY)
Facility: HOSPITAL | Age: 75
Discharge: HOME | End: 2020-08-25
Attending: EMERGENCY MEDICINE
Payer: MEDICARE

## 2020-08-25 VITALS
TEMPERATURE: 98 F | HEART RATE: 83 BPM | HEIGHT: 67 IN | BODY MASS INDEX: 20.38 KG/M2 | RESPIRATION RATE: 20 BRPM | DIASTOLIC BLOOD PRESSURE: 85 MMHG | SYSTOLIC BLOOD PRESSURE: 123 MMHG | OXYGEN SATURATION: 100 % | WEIGHT: 129.88 LBS

## 2020-08-25 DIAGNOSIS — R13.10 DYSPHAGIA: ICD-10-CM

## 2020-08-25 DIAGNOSIS — R13.10 DYSPHAGIA, UNSPECIFIED TYPE: Primary | ICD-10-CM

## 2020-08-25 LAB
ALBUMIN SERPL BCP-MCNC: 2.6 G/DL (ref 3.5–5.2)
ALP SERPL-CCNC: 65 U/L (ref 55–135)
ALT SERPL W/O P-5'-P-CCNC: 19 U/L (ref 10–44)
ANION GAP SERPL CALC-SCNC: 13 MMOL/L (ref 8–16)
AST SERPL-CCNC: 28 U/L (ref 10–40)
BASOPHILS # BLD AUTO: 0.02 K/UL (ref 0–0.2)
BASOPHILS NFR BLD: 0.4 % (ref 0–1.9)
BILIRUB SERPL-MCNC: 0.6 MG/DL (ref 0.1–1)
BUN SERPL-MCNC: 37 MG/DL (ref 8–23)
CALCIUM SERPL-MCNC: 8.9 MG/DL (ref 8.7–10.5)
CHLORIDE SERPL-SCNC: 99 MMOL/L (ref 95–110)
CO2 SERPL-SCNC: 22 MMOL/L (ref 23–29)
CREAT SERPL-MCNC: 1.7 MG/DL (ref 0.5–1.4)
DIFFERENTIAL METHOD: ABNORMAL
EOSINOPHIL # BLD AUTO: 0 K/UL (ref 0–0.5)
EOSINOPHIL NFR BLD: 0.4 % (ref 0–8)
ERYTHROCYTE [DISTWIDTH] IN BLOOD BY AUTOMATED COUNT: 14.7 % (ref 11.5–14.5)
EST. GFR  (AFRICAN AMERICAN): 44 ML/MIN/1.73 M^2
EST. GFR  (NON AFRICAN AMERICAN): 38 ML/MIN/1.73 M^2
GLUCOSE SERPL-MCNC: 93 MG/DL (ref 70–110)
HCT VFR BLD AUTO: 29.7 % (ref 40–54)
HGB BLD-MCNC: 10.2 G/DL (ref 14–18)
IMM GRANULOCYTES # BLD AUTO: 0.03 K/UL (ref 0–0.04)
IMM GRANULOCYTES NFR BLD AUTO: 0.6 % (ref 0–0.5)
LIPASE SERPL-CCNC: 39 U/L (ref 4–60)
LYMPHOCYTES # BLD AUTO: 0.8 K/UL (ref 1–4.8)
LYMPHOCYTES NFR BLD: 16.3 % (ref 18–48)
MCH RBC QN AUTO: 29.6 PG (ref 27–31)
MCHC RBC AUTO-ENTMCNC: 34.3 G/DL (ref 32–36)
MCV RBC AUTO: 86 FL (ref 82–98)
MONOCYTES # BLD AUTO: 0.4 K/UL (ref 0.3–1)
MONOCYTES NFR BLD: 7.1 % (ref 4–15)
NEUTROPHILS # BLD AUTO: 3.7 K/UL (ref 1.8–7.7)
NEUTROPHILS NFR BLD: 75.2 % (ref 38–73)
NRBC BLD-RTO: 0 /100 WBC
PLATELET # BLD AUTO: 306 K/UL (ref 150–350)
PMV BLD AUTO: 9 FL (ref 9.2–12.9)
POTASSIUM SERPL-SCNC: 4.8 MMOL/L (ref 3.5–5.1)
PROT SERPL-MCNC: 7.4 G/DL (ref 6–8.4)
RBC # BLD AUTO: 3.45 M/UL (ref 4.6–6.2)
SODIUM SERPL-SCNC: 134 MMOL/L (ref 136–145)
WBC # BLD AUTO: 4.92 K/UL (ref 3.9–12.7)

## 2020-08-25 PROCEDURE — 99284 EMERGENCY DEPT VISIT MOD MDM: CPT | Mod: 25

## 2020-08-25 PROCEDURE — 80053 COMPREHEN METABOLIC PANEL: CPT

## 2020-08-25 PROCEDURE — 83690 ASSAY OF LIPASE: CPT

## 2020-08-25 PROCEDURE — 85025 COMPLETE CBC W/AUTO DIFF WBC: CPT

## 2020-08-25 NOTE — ED PROVIDER NOTES
SCRIBE #1 NOTE: I, Kaila Carey, am scribing for, and in the presence of, Ranulfo Urrutia MD. I have scribed the entire note.       History     Chief Complaint   Patient presents with    Abdominal Pain     pt c/o abd pain for the past two weeks that has been getting worse, pt denies N/V and chest pain     Review of patient's allergies indicates:  No Known Allergies      History of Present Illness     HPI    8/25/2020, 5:02 PM  History obtained from the patient      History of Present Illness: Foreign Holland is a 75 y.o. male patient with a PMHx of CAD, HTN, CHF, left lung cancer and MI s/p surgical removal of mass of axilla (8/17/2020) who presents to the Emergency Department for evaluation of dysphagia which onset gradually 2 weeks ago. Pt reports he has difficulty swallowing food but he is able to swallow liquids and mashed potatoes. Symptoms are episodic and moderate in severity. No mitigating or exacerbating factors reported. Associated sxs include LLQ pain and weight loss. Patient denies any fever, chills, constipation, hematochezia, dysuria, hematuria, urinary frequency, n/v/d, and all other sxs at this time. No prior Tx reported. No further complaints or concerns at this time. While the chief complaint by nursing assessment was documented as abdominal pain, the patient indicates their chief complaint during my interview as dysphagia.         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       Past Surgical History:  Past Surgical History:   Procedure Laterality Date    CARDIAC CATHETERIZATION      CARDIAC SURGERY  2006    CATARACT EXTRACTION W/  INTRAOCULAR  LENS IMPLANT Right 05/24/2017    CATARACT EXTRACTION W/  INTRAOCULAR LENS IMPLANT Left 04/12/2017    CLOSED REDUCTION OF FRACTURE OF FIBULA Left 7/18/2019    Procedure: CLOSED REDUCTION, FRACTURE, FIBULA;  Surgeon: Philippe Bustamante Jr., MD;  Location: [facility];  Service: Orthopedics;  Laterality: Left;  tibia/fibula fracture    CORONARY ARTERY BYPASS GRAFT      EYE SURGERY      Cataract extraction with intraocular lens implant    LYMPH NODES, LEFT NECK, EXCISIONAL BIOPSY  5/21/2014    MEDIPORT INSERTION, SINGLE  5/28/14    ORIF FIBULA FRACTURE Left 8/9/2019    Procedure: ORIF, FRACTURE, FIBULA;  Surgeon: Flynn White MD;  Location: [facility] OR;  Service: Orthopedics;  Laterality: Left;    ORIF TIBIA FRACTURE Left 8/9/2019    Procedure: ORIF, FRACTURE, TIBIA;  Surgeon: Flynn White MD;  Location: [facility];  Service: Orthopedics;  Laterality: Left;    PCIOL Left 04/12/2017    DR. MCCRAY    PROSTATE SURGERY  02/2014    REMOVAL OF EXTERNAL FIXATION DEVICE Left 8/9/2019    Procedure: REMOVAL, EXTERNAL FIXATION DEVICE;  Surgeon: Flynn White MD;  Location: [facility];  Service: Orthopedics;  Laterality: Left;    SIMPLE CYSTECTOMY Bilateral 08/18/2020    bilateral axilla    SURGICAL REMOVAL OF MASS OF AXILLA Bilateral 8/17/2020    Procedure: EXCISION, MASS, AXILLARY;  Surgeon: Ray Rojo MD;  Location: [facility];  Service: General;  Laterality: Bilateral;         Family History:  Family History   Problem Relation Age of Onset    Blindness Father     Cancer Neg Hx     Diabetes Neg Hx     Heart failure Neg Hx     Coronary artery disease Neg Hx     Cataracts Neg Hx     Glaucoma Neg Hx     Hypertension Neg Hx     Macular degeneration Neg Hx     Retinal detachment Neg Hx     Strabismus Neg Hx     Stroke Neg Hx     Thyroid disease Neg Hx        Social History:  Social History     Tobacco Use    Smoking status: Former Smoker     Packs/day: 1.00     Years: 45.00     Pack years: 45.00     Quit date:  1/1/2005     Years since quitting: 15.6    Smokeless tobacco: Never Used    Tobacco comment: Pt no longer smokes   Substance and Sexual Activity    Alcohol use: Not Currently     Comment: former    Drug use: No    Sexual activity: Yes     Birth control/protection: None        Review of Systems     Review of Systems   Constitutional: Positive for unexpected weight change (weight loss). Negative for chills and fever.   HENT: Positive for trouble swallowing (food). Negative for sore throat.    Respiratory: Negative for shortness of breath.    Cardiovascular: Negative for chest pain.   Gastrointestinal: Positive for abdominal pain (LLQ). Negative for blood in stool, constipation, diarrhea, nausea and vomiting.   Genitourinary: Negative for dysuria, frequency and hematuria.   Musculoskeletal: Negative for back pain.   Skin: Negative for rash.   Neurological: Negative for weakness.   Hematological: Does not bruise/bleed easily.   All other systems reviewed and are negative.       Physical Exam     Initial Vitals [08/25/20 1607]   BP Pulse Resp Temp SpO2   128/84 85 18 97.8 °F (36.6 °C) 100 %      MAP       --          Physical Exam  Nursing Notes and Vital Signs Reviewed.  Constitutional: Patient is in no acute distress. Well-developed and well-nourished.  Head: Atraumatic. Normocephalic.  Eyes: PERRL. EOM intact. Conjunctivae are not pale. No scleral icterus.  ENT: Mucous membranes are moist. Oropharynx is clear and symmetric.    Neck: Supple. Full ROM. No lymphadenopathy.  Cardiovascular: Regular rate. Regular rhythm. No murmurs, rubs, or gallops. Distal pulses are 2+ and symmetric.  Pulmonary/Chest: No respiratory distress. Clear to auscultation bilaterally. No wheezing or rales.  Abdominal: Soft and non-distended.  There is no tenderness.  No rebound, guarding, or rigidity. Good bowel sounds.  Genitourinary: No CVA tenderness  Musculoskeletal: Moves all extremities. No obvious deformities. No edema. No calf  "tenderness.  Skin: Warm and dry.  Neurological:  Alert, awake, and appropriate.  Normal speech.  No acute focal neurological deficits are appreciated. NIHSS 0  Psychiatric: Normal affect. Good eye contact. Appropriate in content.     ED Course   Procedures  ED Vital Signs:  Vitals:    08/25/20 1607 08/25/20 1700 08/25/20 1730 08/25/20 1800   BP: 128/84 127/71 119/76    Pulse: 85 75 76    Resp: 18  20 20   Temp: 97.8 °F (36.6 °C)      TempSrc: Oral      SpO2: 100% 100% 100%    Weight: 58.9 kg (129 lb 13.6 oz)      Height: 5' 7" (1.702 m)       08/25/20 1932   BP: 132/81   Pulse: 79   Resp: (!) 24   Temp:    TempSrc:    SpO2: 100%   Weight:    Height:        Abnormal Lab Results:  Labs Reviewed   CBC W/ AUTO DIFFERENTIAL - Abnormal; Notable for the following components:       Result Value    RBC 3.45 (*)     Hemoglobin 10.2 (*)     Hematocrit 29.7 (*)     RDW 14.7 (*)     MPV 9.0 (*)     Immature Granulocytes 0.6 (*)     Lymph # 0.8 (*)     Gran% 75.2 (*)     Lymph% 16.3 (*)     All other components within normal limits   COMPREHENSIVE METABOLIC PANEL - Abnormal; Notable for the following components:    Sodium 134 (*)     CO2 22 (*)     BUN, Bld 37 (*)     Creatinine 1.7 (*)     Albumin 2.6 (*)     eGFR if  44 (*)     eGFR if non  38 (*)     All other components within normal limits   LIPASE        All Lab Results:  Results for orders placed or performed during the hospital encounter of 08/25/20   CBC auto differential   Result Value Ref Range    WBC 4.92 3.90 - 12.70 K/uL    RBC 3.45 (L) 4.60 - 6.20 M/uL    Hemoglobin 10.2 (L) 14.0 - 18.0 g/dL    Hematocrit 29.7 (L) 40.0 - 54.0 %    Mean Corpuscular Volume 86 82 - 98 fL    Mean Corpuscular Hemoglobin 29.6 27.0 - 31.0 pg    Mean Corpuscular Hemoglobin Conc 34.3 32.0 - 36.0 g/dL    RDW 14.7 (H) 11.5 - 14.5 %    Platelets 306 150 - 350 K/uL    MPV 9.0 (L) 9.2 - 12.9 fL    Immature Granulocytes 0.6 (H) 0.0 - 0.5 %    Gran # (ANC) 3.7 1.8 - " 7.7 K/uL    Immature Grans (Abs) 0.03 0.00 - 0.04 K/uL    Lymph # 0.8 (L) 1.0 - 4.8 K/uL    Mono # 0.4 0.3 - 1.0 K/uL    Eos # 0.0 0.0 - 0.5 K/uL    Baso # 0.02 0.00 - 0.20 K/uL    nRBC 0 0 /100 WBC    Gran% 75.2 (H) 38.0 - 73.0 %    Lymph% 16.3 (L) 18.0 - 48.0 %    Mono% 7.1 4.0 - 15.0 %    Eosinophil% 0.4 0.0 - 8.0 %    Basophil% 0.4 0.0 - 1.9 %    Differential Method Automated    Comprehensive metabolic panel   Result Value Ref Range    Sodium 134 (L) 136 - 145 mmol/L    Potassium 4.8 3.5 - 5.1 mmol/L    Chloride 99 95 - 110 mmol/L    CO2 22 (L) 23 - 29 mmol/L    Glucose 93 70 - 110 mg/dL    BUN, Bld 37 (H) 8 - 23 mg/dL    Creatinine 1.7 (H) 0.5 - 1.4 mg/dL    Calcium 8.9 8.7 - 10.5 mg/dL    Total Protein 7.4 6.0 - 8.4 g/dL    Albumin 2.6 (L) 3.5 - 5.2 g/dL    Total Bilirubin 0.6 0.1 - 1.0 mg/dL    Alkaline Phosphatase 65 55 - 135 U/L    AST 28 10 - 40 U/L    ALT 19 10 - 44 U/L    Anion Gap 13 8 - 16 mmol/L    eGFR if African American 44 (A) >60 mL/min/1.73 m^2    eGFR if non African American 38 (A) >60 mL/min/1.73 m^2   Lipase   Result Value Ref Range    Lipase 39 4 - 60 U/L         Imaging Results:  Imaging Results          X-Ray Neck Soft Tissue (Final result)  Result time 08/25/20 18:43:14    Final result by Ronald Naranjo MD (08/25/20 18:43:14)                 Impression:      No acute abnormality.      Electronically signed by: Ronald Naranjo  Date:    08/25/2020  Time:    18:43             Narrative:    EXAMINATION:  XR NECK SOFT TISSUE    CLINICAL HISTORY:  Dysphagia, unspecified    TECHNIQUE:  AP and lateral soft tissue views the neck were performed.    COMPARISON:  CT soft tissue neck without contrast 06/29/2020    FINDINGS:  No radiopaque foreign body.    No prevertebral soft tissue thickening.  Soft tissues of the neck are unremarkable on these radiographs.    Osseous structures are unchanged in comparison the prior exam.                               X-Ray Chest AP Portable (Final result)  Result  time 08/25/20 18:46:12    Final result by Ronald Naranjo MD (08/25/20 18:46:12)                 Impression:      No detrimental change in comparison the prior chest radiograph 08/18/2020      Electronically signed by: Ronald Naranjo  Date:    08/25/2020  Time:    18:46             Narrative:    EXAMINATION:  XR CHEST AP PORTABLE    CLINICAL HISTORY:  dysphagia;    TECHNIQUE:  Single frontal view of the chest was performed.    COMPARISON:  CT chest 06/29/2020, chest radiograph 08/18/2020 07/17/2019    FINDINGS:  Median sternotomy wires and monitoring leads overlie the chest.    Calcified right paratracheal lymph node.    Lungs are clear.  No detrimental change in comparison to the prior exam of 08/18/2020.  Bilateral pulmonary scarring again noted.    The cardiac silhouette is stable.    Bones are intact.                                            The Emergency Provider reviewed the vital signs and test results, which are outlined above.     ED Discussion       8:09 PM: Reassessed pt at this time. Discussed with pt all pertinent ED information and results. Discussed pt dx and plan of tx. Gave pt all f/u and return to the ED instructions. All questions and concerns were addressed at this time. Pt expresses understanding of information and instructions, and is comfortable with plan to discharge. Pt is stable for discharge.    I discussed with patient and/or family/caretaker that evaluation in the ED does not suggest any emergent or life threatening medical conditions requiring immediate intervention beyond what was provided in the ED, and I believe patient is safe for discharge.  Regardless, an unremarkable evaluation in the ED does not preclude the development or presence of a serious of life threatening condition. As such, patient was instructed to return immediately for any worsening or change in current symptoms.       Medical Decision Making:   Clinical Tests:   Lab Tests: Ordered and Reviewed  Radiological  Study: Ordered and Reviewed           ED Medication(s):  Medications - No data to display    New Prescriptions    No medications on file       Follow-up Information     Schedule an appointment as soon as possible for a visit  with Ben Hillman Iii, MD.    Specialty: Gastroenterology  Contact information:  [address]  [place] [address]  [phone]             [facility]    Specialty: Emergency Medicine  Why: As needed, If symptoms worsen  Contact information:  [address]  [facility] [address] [phone]                     Scribe Attestation:   Scribe #1: I performed the above scribed service and the documentation accurately describes the services I performed. I attest to the accuracy of the note.     Attending:   Physician Attestation Statement for Scribe #1: I, Ranulfo Urrutia MD, personally performed the services described in this documentation, as scribed by Kaila Carey, in my presence, and it is both accurate and complete.           Clinical Impression       ICD-10-CM ICD-9-CM   1. Dysphagia, unspecified type  R13.10 787.20   2. Dysphagia  R13.10 787.20       Disposition:   Disposition: Discharged  Condition: Stable         Ranulfo Urrutia MD  08/25/20 0917

## 2020-08-26 ENCOUNTER — OFFICE VISIT (OUTPATIENT)
Dept: GASTROENTEROLOGY | Facility: CLINIC | Age: 75
End: 2020-08-26
Payer: MEDICARE

## 2020-08-26 ENCOUNTER — TELEPHONE (OUTPATIENT)
Dept: SURGERY | Facility: CLINIC | Age: 75
End: 2020-08-26

## 2020-08-26 VITALS
DIASTOLIC BLOOD PRESSURE: 70 MMHG | WEIGHT: 130.75 LBS | BODY MASS INDEX: 20.52 KG/M2 | SYSTOLIC BLOOD PRESSURE: 120 MMHG | HEIGHT: 67 IN | HEART RATE: 58 BPM

## 2020-08-26 DIAGNOSIS — R10.32 LLQ ABDOMINAL PAIN: Primary | ICD-10-CM

## 2020-08-26 DIAGNOSIS — R13.10 ODYNOPHAGIA: ICD-10-CM

## 2020-08-26 DIAGNOSIS — R13.19 ESOPHAGEAL DYSPHAGIA: ICD-10-CM

## 2020-08-26 PROCEDURE — 1125F AMNT PAIN NOTED PAIN PRSNT: CPT | Mod: S$GLB,,, | Performed by: INTERNAL MEDICINE

## 2020-08-26 PROCEDURE — 99999 PR PBB SHADOW E&M-EST. PATIENT-LVL III: ICD-10-PCS | Mod: PBBFAC,,, | Performed by: INTERNAL MEDICINE

## 2020-08-26 PROCEDURE — 1159F PR MEDICATION LIST DOCUMENTED IN MEDICAL RECORD: ICD-10-PCS | Mod: S$GLB,,, | Performed by: INTERNAL MEDICINE

## 2020-08-26 PROCEDURE — 3074F SYST BP LT 130 MM HG: CPT | Mod: CPTII,S$GLB,, | Performed by: INTERNAL MEDICINE

## 2020-08-26 PROCEDURE — 1101F PT FALLS ASSESS-DOCD LE1/YR: CPT | Mod: CPTII,S$GLB,, | Performed by: INTERNAL MEDICINE

## 2020-08-26 PROCEDURE — 1101F PR PT FALLS ASSESS DOC 0-1 FALLS W/OUT INJ PAST YR: ICD-10-PCS | Mod: CPTII,S$GLB,, | Performed by: INTERNAL MEDICINE

## 2020-08-26 PROCEDURE — 3078F DIAST BP <80 MM HG: CPT | Mod: CPTII,S$GLB,, | Performed by: INTERNAL MEDICINE

## 2020-08-26 PROCEDURE — 99203 OFFICE O/P NEW LOW 30 MIN: CPT | Mod: S$GLB,,, | Performed by: INTERNAL MEDICINE

## 2020-08-26 PROCEDURE — 1125F PR PAIN SEVERITY QUANTIFIED, PAIN PRESENT: ICD-10-PCS | Mod: S$GLB,,, | Performed by: INTERNAL MEDICINE

## 2020-08-26 PROCEDURE — 3078F PR MOST RECENT DIASTOLIC BLOOD PRESSURE < 80 MM HG: ICD-10-PCS | Mod: CPTII,S$GLB,, | Performed by: INTERNAL MEDICINE

## 2020-08-26 PROCEDURE — 3074F PR MOST RECENT SYSTOLIC BLOOD PRESSURE < 130 MM HG: ICD-10-PCS | Mod: CPTII,S$GLB,, | Performed by: INTERNAL MEDICINE

## 2020-08-26 PROCEDURE — 1159F MED LIST DOCD IN RCRD: CPT | Mod: S$GLB,,, | Performed by: INTERNAL MEDICINE

## 2020-08-26 PROCEDURE — 99203 PR OFFICE/OUTPT VISIT, NEW, LEVL III, 30-44 MIN: ICD-10-PCS | Mod: S$GLB,,, | Performed by: INTERNAL MEDICINE

## 2020-08-26 PROCEDURE — 99999 PR PBB SHADOW E&M-EST. PATIENT-LVL III: CPT | Mod: PBBFAC,,, | Performed by: INTERNAL MEDICINE

## 2020-08-26 NOTE — PROGRESS NOTES
Subjective:       Patient ID: Foreign Holland is a 75 y.o. male.    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 He had been anemic which was felt either to be due to MDS or chronic disease with renal failure. He has been watched since chemo for peripheral T cell lymphoma in 2014. Counts have remained pretty stable.    He was seen recently in the ED for what sounds like lower abdominal pain and dysphagia/odynophagia type presentation. On further questioning symptom onset seemed to parallel the starting of his antibiotic therapy following resection of  Axillary mass. He has not been eating very much or tolerating diet very well since while attempting to take meds. He has forced himself through therapy to this point and has very little left to complete. Will see if Dr. Rojo will allow us to discontinue now and observe for response.     Review of Systems   Constitutional: Positive for chills, fatigue, fever and unexpected weight change. Negative for activity change, appetite change and diaphoresis.   HENT: Positive for trouble swallowing and voice change. Negative for congestion, ear discharge, facial swelling, hearing loss, nosebleeds, postnasal drip, sinus pressure, sneezing and tinnitus.    Eyes: Positive for visual disturbance. Negative for photophobia and redness.   Respiratory: Positive for cough. Negative for chest tightness, shortness of breath and wheezing.    Cardiovascular: Positive for chest pain. Negative for palpitations.   Gastrointestinal: Positive for abdominal pain. Negative for abdominal distention, blood in stool, constipation, diarrhea, nausea, rectal pain and vomiting.   Genitourinary: Negative for difficulty urinating, discharge, dysuria, flank pain, frequency, hematuria, scrotal swelling, testicular pain and urgency.   Musculoskeletal: Negative for arthralgias, back  pain, gait problem, joint swelling, myalgias and neck stiffness.        Leg weakness   Skin: Negative for color change, pallor, rash and wound.   Neurological: Positive for dizziness and headaches. Negative for tremors, seizures, syncope, facial asymmetry, speech difficulty, weakness, light-headedness and numbness.   Hematological: Negative for adenopathy. Does not bruise/bleed easily.   Psychiatric/Behavioral: Negative for agitation, confusion, hallucinations, sleep disturbance and suicidal ideas.       Objective:      Physical Exam  Constitutional:       General: He is not in acute distress.     Appearance: He is well-developed. He is not diaphoretic.   HENT:      Head: Normocephalic and atraumatic.      Nose: Nose normal.      Mouth/Throat:      Pharynx: No oropharyngeal exudate.   Eyes:      General: No scleral icterus.     Conjunctiva/sclera: Conjunctivae normal.      Pupils: Pupils are equal, round, and reactive to light.   Neck:      Musculoskeletal: Normal range of motion and neck supple.      Thyroid: No thyromegaly.   Cardiovascular:      Rate and Rhythm: Normal rate and regular rhythm.      Heart sounds: No murmur. No friction rub. No gallop.    Pulmonary:      Effort: Pulmonary effort is normal. No respiratory distress.      Breath sounds: Normal breath sounds. No wheezing or rales.   Abdominal:      General: Bowel sounds are normal. There is no distension.      Palpations: Abdomen is soft. There is no mass.      Tenderness: There is no abdominal tenderness. There is no guarding or rebound.   Musculoskeletal:         General: No tenderness.   Lymphadenopathy:      Cervical: No cervical adenopathy.   Skin:     General: Skin is warm.      Findings: No rash.   Neurological:      Mental Status: He is alert and oriented to person, place, and time.      Motor: No abnormal muscle tone.      Coordination: Coordination normal.   Psychiatric:         Behavior: Behavior normal.         Thought Content: Thought  content normal.         Judgment: Judgment normal.         Assessment:   LLQ Abdominal Pain  Dysphagia/Odynophagia      Plan:   As above. If continued problem, will reconsider for Endoscopy.

## 2020-08-26 NOTE — TELEPHONE ENCOUNTER
Returned call to patient's niece. Patient is experiencing some abdominal pain and believes that it may be the antibiotic that he is taking. Wants to know if he can d/c it. He only has about 4 pills left. Advised that I would forward a message to Dr Rojo and call them back. Understanding verbalized.

## 2020-08-26 NOTE — TELEPHONE ENCOUNTER
----- Message from Cheri Pantoja sent at 8/26/2020  4:16 PM CDT -----  Patient niece ( Miguel) called in regards to speak to an nurse asap, please call back at [phone].          Thanks,  Cheri Pantoja

## 2020-08-27 ENCOUNTER — TELEPHONE (OUTPATIENT)
Dept: SURGERY | Facility: CLINIC | Age: 75
End: 2020-08-27

## 2020-08-27 NOTE — TELEPHONE ENCOUNTER
----- Message from Ray Rojo MD sent at 8/27/2020  7:15 AM CDT -----  It has nothing to do with the antibiotic but he can stop the med. If he continues with the pain, he is to be seen by his primary as soon as possible.  Thank you.  ----- Message -----  From: Cande Hernandez MA  Sent: 8/26/2020   4:24 PM CDT  To: Ray Rojo MD, Ree Miner LPN    Patient is experiencing some abdominal pain and believes that it may caused by the antibiotic that you prescribed for him. He only has about 4 pills left and wants to know if he can d/c them. Please advise  ----- Message -----  From: Cheri Pantoja  Sent: 8/26/2020   4:16 PM CDT  To: Alia [facility] Staff    Patient niece ( Miguel) called in regards to speak to an nurse asap, please call back at [phone].          Thanks,  Cheri Pantoja

## 2020-08-28 ENCOUNTER — TELEPHONE (OUTPATIENT)
Dept: SURGERY | Facility: CLINIC | Age: 75
End: 2020-08-28

## 2020-08-28 NOTE — TELEPHONE ENCOUNTER
----- Message from Cande Hernandez MA sent at 8/26/2020  4:24 PM CDT -----  Patient is experiencing some abdominal pain and believes that it may caused by the antibiotic that you prescribed for him. He only has about 4 pills left and wants to know if he can d/c them. Please advise  ----- Message -----  From: Cheri Pantoja  Sent: 8/26/2020   4:16 PM CDT  To: Alia Bell Staff    Patient niece ( Miguel) called in regards to speak to an nurse asap, please call back at [phone].          Thanks,  Cheri Pantoja

## 2020-09-03 ENCOUNTER — TELEPHONE (OUTPATIENT)
Dept: HEMATOLOGY/ONCOLOGY | Facility: CLINIC | Age: 75
End: 2020-09-03

## 2020-09-03 LAB — MAYO MISCELLANEOUS RESULT (REF): NORMAL

## 2020-09-03 NOTE — TELEPHONE ENCOUNTER
Spoke with pt sister, Dionicio over the phone regarding pt upcoming appt on 9/8/20 @ 1040am at the [place] location. Asked her if it was ok to have pt see Dr. Espinoza instead of Dr. Pimentel as we are trying to accommodate the [facility] patients. Dionicio states that's perfectly fine to have him see Dr. Espinoza instead. States she is the one bringing him to the appt and they will see us next Tuesday, 9/8/20. She knows to call with any questions/concerns.

## 2021-05-28 NOTE — TRANSFER OF CARE
"Anesthesia Transfer of Care Note    Patient: Foreign Holland    Procedure(s) Performed: Procedure(s) (LRB):  REMOVAL, EXTERNAL FIXATION DEVICE (Left)  ORIF, FRACTURE, TIBIA (Left)  OPEN REDUCTION INTERNAL FIXATION (ORIF) Fibula (Left)    Patient location: PACU    Anesthesia Type: general    Transport from OR: Transported from OR on room air with adequate spontaneous ventilation    Post pain: adequate analgesia    Post assessment: no apparent anesthetic complications and tolerated procedure well    Post vital signs: stable    Level of consciousness: awake    Nausea/Vomiting: no nausea/vomiting    Complications: none    Transfer of care protocol was followed      Last vitals:   Visit Vitals  /63 (BP Location: Left arm, Patient Position: Lying)   Pulse (!) 58   Temp 37.1 °C (98.8 °F) (Skin)   Resp 16   Ht 5' 7" (1.702 m)   Wt 69.6 kg (153 lb 6 oz)   SpO2 99%   BMI 24.02 kg/m²     "
Constitutional: (-) fever  (-)chills  (-)sweats  Eyes/ENT: (-)   Cardiovascular: (-) chest pain, (-) palpitations (-) edema   Respiratory: (-) cough, (-) shortness of breath   Gastrointestinal: (-)nausea  (-)vomiting, (-) diarrhea  (-) abdominal pain   :  (-)dysuria, (-)frequency, (-)urgency, (-)hematuria  Musculoskeletal: (-) neck pain, (-) back pain, (-) joint pain  Integumentary: (-) rash, (-) edema  Neurological: (-) headache, (-) altered mental status  (-)LOC  +unsteady gait

## 2023-01-10 NOTE — PROGRESS NOTES
Hematology/Oncology Office Note    Reason for referral: Stage III Peripheral T Cell Lymphoma NOS    CC: Lymphoma    Referred by: Dr Mendieta    Diagnosis:  6/5/14: Stage III  Peripheral T cell lymphoma NOS    5/21/14 Left lower cervical Node:  Supplemental Diagnosis  [place] DIAGNOSIS:  LYMPH NODES, LEFT NECK, EXCISIONAL BIOPSY ([id number]; 5/21/2014):  Peripheral T-cell lymphoma. See comment.  Interpreted by: GUILHERME Vasques  Note: Please see attached report for comment.    5/26/14 Bone marrow bx:  COMMENT: Concomitantly submitted flow cytometric analysis detects a small subset of atypical T lymphocytes.  This subset, approximately 2% of total, shows expression of CD2, CD5, and CD7 with restriction to CD4, but loss of  surface CD3. It is negative for CD30. These findings are suggestive of involvement by the patient's previously diagnosed atypical T cell population; however, correlation with morphology, particularly the pending lymph nodebiopsy (pending outside consultation) and cytogenetic or molecular data, especially T cell gene rearrangement byPCR, as well as with clinical findings is highly recommended for further characterization of this process.Background B cells consist predominantly of developing hematogones, and the blast gate is not increased. Othersubsets of T cells appear immunophenotypically unremarkable.The flow cytometry finding suggests that the marrow is involved by this atypical T cell process which was seenpreviously on tissue biopsies. Correlation with the findings of the lymph node biopsy report is required for furtherclassification. If needed, the clot section of this biopsy could be sent for T-cell gene rearrangements by PCR;    Tcell gene rearrangement negative by PCR    5/21/14 PET:  Widespread hypermetabolic lymphadenopathy in the neck, chest, and abdomen as detailed consistent with reported diagnosis of lymphoma.    5/23/14 Echo:  LVEF 55-60%        Treatment:  6/9/14: C1D1  CHOP  Excellent clinic response  6/30/14: C2D1  7/8/14 to 7/14/14 Hospitalization for neutropenic fever  Prolonged recovery after cycle 2  8/5/14 cycle 3 with 15% DR  8/26/14: cycle 4  9/16/14: cycle 5   10/7/14 cycle 6   11/5/14 PET:  Findings: There has been significant interval improvement with complete resolution of previously demonstrated hypermetabolic lymph node groups in the neck, chest, and abdomen. Prior tonsillar uptake has likewise resolved. No pathologically enlarged or FDG avid nodes are identified at this time.   6/25/15: Bone marrow biopsy for persistent cytopenias was noted to be negative for pathology      4/17/14 visit: Mr Holland is a 69yo male referred for anemia and cervical/supraclavicular LAD.  Duration is unknown and patient cannot remember having anemia or LAD in the past.  He reports a 20lb wt loss over the past 18 months. He also reports a questionable history of lung cancer; he reports resection (no visible scar from surgery) and denies treatment with adjuvant therapy.  Patient denies pain, night sweast, n/v/d.  He reports sob with exertion and chest pain prolonged exertion.     I have reviewed and updated the HPI, PMHx, Social Hx, Family Hx and treatment history.    Today's Visit:  Patient is without complaints today and specifically denies fever, chills, night sweats or weight loss.    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   No family history of anemia or malignancy    Anemia   Presents for follow-up visit. There has been no abdominal pain, bruising/bleeding easily, confusion,  "pallor or palpitations.   Nausea   Pertinent negatives include no abdominal pain, arthralgias, chest pain, congestion, coughing, diaphoresis, fatigue, headaches, joint swelling, myalgias, nausea, numbness, rash, sore throat, vomiting or weakness.     Review of Systems   Constitutional: Negative for activity change, appetite change, diaphoresis, fatigue and unexpected weight change.   HENT: Negative for congestion, dental problem, drooling, ear discharge, ear pain, facial swelling, mouth sores, nosebleeds, sinus pressure, sore throat, tinnitus and voice change.    Eyes: Negative for pain, discharge, redness and itching.   Respiratory: Negative for apnea, cough, shortness of breath, wheezing and stridor.    Cardiovascular: Negative for chest pain, palpitations and leg swelling.   Gastrointestinal: Negative for abdominal distention, abdominal pain, blood in stool, constipation, nausea and vomiting.   Endocrine: Negative for cold intolerance and polydipsia.   Genitourinary: Negative for difficulty urinating, flank pain, frequency, genital sores, hematuria, penile pain and penile swelling.   Musculoskeletal: Negative for arthralgias, back pain, gait problem, joint swelling, myalgias and neck stiffness.   Skin: Negative for pallor and rash.   Allergic/Immunologic: Negative for environmental allergies, food allergies and immunocompromised state.   Neurological: Negative for dizziness, seizures, syncope, weakness, numbness and headaches.   Hematological: Negative for adenopathy. Does not bruise/bleed easily.   Psychiatric/Behavioral: Negative for agitation, confusion, hallucinations and sleep disturbance. The patient is not nervous/anxious and is not hyperactive.        Objective:      Vitals:    03/23/17 0910   BP: (!) 98/56   Pulse: 68   Resp: 18   Temp: 98.7 °F (37.1 °C)   TempSrc: Oral   SpO2: 98%   Weight: 73.4 kg (161 lb 13.1 oz)   Height: 5' 7" (1.702 m)           Physical Exam   Constitutional: He is oriented to " person, place, and time. He appears well-developed and well-nourished.  Non-toxic appearance. No distress.   HENT:   Head: Normocephalic and atraumatic.   Right Ear: External ear normal.   Left Ear: External ear normal.   Nose: Nose normal.   Mouth/Throat: Oropharynx is clear and moist and mucous membranes are normal. Mucous membranes are not pale, not dry and not cyanotic. No oral lesions. Normal dentition. No oropharyngeal exudate or posterior oropharyngeal erythema.   Eyes: Conjunctivae and EOM are normal. Pupils are equal, round, and reactive to light. Right eye exhibits no chemosis and no discharge. Left eye exhibits no chemosis and no discharge. Right conjunctiva is not injected. Left conjunctiva is not injected. No scleral icterus. Pupils are equal.   Neck: Normal range of motion. Neck supple. No JVD present. No tracheal deviation present. No thyroid mass and no thyromegaly present.   Cardiovascular: Normal rate, regular rhythm and intact distal pulses.  Exam reveals no gallop and no friction rub.    Murmur heard.   Systolic murmur is present with a grade of 1/6   Pulmonary/Chest: Effort normal and breath sounds normal. No respiratory distress. He has no decreased breath sounds. He has no wheezes. He has no rhonchi. He has no rales. He exhibits no tenderness.   Abdominal: Soft. Bowel sounds are normal. He exhibits no distension, no fluid wave and no mass. There is no tenderness. There is no rebound and no guarding.   Musculoskeletal: Normal range of motion. He exhibits no edema, tenderness or deformity.   Lymphadenopathy:        Head (right side): No submental and no submandibular adenopathy present.        Head (left side): No submental and no submandibular adenopathy present.     He has no cervical adenopathy.        Right cervical: No superficial cervical and no deep cervical adenopathy present.       Left cervical: No superficial cervical adenopathy present.     He has no axillary adenopathy.         Right: No inguinal and no supraclavicular adenopathy present.        Left: No inguinal and no supraclavicular adenopathy present.   Neurological: He is alert and oriented to person, place, and time. No cranial nerve deficit. He exhibits normal muscle tone. Coordination normal.   Skin: Skin is warm, dry and intact. No abrasion, no bruising and no rash noted. Rash is not nodular and not urticarial. He is not diaphoretic. No cyanosis. No pallor. Nails show no clubbing.   Psychiatric: He has a normal mood and affect. His behavior is normal. Judgment and thought content normal.         Lab Results   Component Value Date    WBC 3.03 (L) 03/23/2017    HGB 11.0 (L) 03/23/2017    HCT 30.9 (L) 03/23/2017    MCV 85 03/23/2017     03/23/2017     Lab Results   Component Value Date    CREATININE 1.3 03/23/2017    BUN 20 03/23/2017     03/23/2017    K 3.6 03/23/2017     03/23/2017    CO2 25 03/23/2017     Lab Results   Component Value Date    ALT 19 03/23/2017    AST 25 03/23/2017    ALKPHOS 50 (L) 03/23/2017    BILITOT 0.8 03/23/2017 5/21/14: PET  Widespread hypermetabolic lymphadenopathy in the neck, chest, and abdomen as detailed consistent with reported diagnosis of lymphoma.        Assessment:  Mr Holland is a 67yo male with Stage III Peripheral T Cell Lymphoma NOS  s/p 6 cycles of CHOP (last cycle 10/7/14) with Complete Response per PET.  Of note there was abnormal T cell population in the bone marrow, however; PCR for T Cell receptor gene rearrangement were negative.  Bone marrow biopsy performed on 6/25/15 was negative for significant pathology. Counts continue to be mildly suppressed but remain stable.    He continues to remain KANWAL, therefore, we will arrange to have port removed and continue active surveillance.  He will follow-up in 4 months with repeat CBC, CMP, LDH    Plan:  Stage III Peripheral T cell lymphoma NOS: with CR after CHOP x 6  --s/p 6 cycles of CHOP  --Repeat PET as clinically  indicate  --Refer for Mediport removal    Pancytopenia: Secondary to chemotherapy versus low-grade MDS  --counts stable today  --Continue to monitor; bone marrow bx upon significant progression      F/u 4 months with repeat cbc, cmp, ldh       DISPLAY PLAN FREE TEXT

## 2023-08-04 NOTE — PLAN OF CARE
POC DISCUSSED WITH PT. VERBALIZED UNDERSTANDING. VSS.    Alert and oriented to person, place and time

## (undated) DEVICE — DECANTER VIAL ASEPTIC TRANSFER

## (undated) DEVICE — SEE MEDLINE ITEM 157131

## (undated) DEVICE — ELECTRODE REM PLYHSV RETURN 9

## (undated) DEVICE — Device

## (undated) DEVICE — SCREW CORTEX 3.5MM X 28MM
Type: IMPLANTABLE DEVICE | Site: LEG | Status: NON-FUNCTIONAL
Removed: 2019-08-09

## (undated) DEVICE — SEE MEDLINE ITEM 157027

## (undated) DEVICE — SLEEVE OTR PROTCT STRL 12MM

## (undated) DEVICE — SEE MEDLINE ITEM 157117

## (undated) DEVICE — NDL SAFETY 25G X 1.5 ECLIPSE

## (undated) DEVICE — DRAPE C-ARMOR EQUIPMENT COVER

## (undated) DEVICE — BIT DRILL 2.5

## (undated) DEVICE — SCREW 2.7X20MM
Type: IMPLANTABLE DEVICE | Site: LEG | Status: NON-FUNCTIONAL
Removed: 2019-08-09

## (undated) DEVICE — CONTAINER SPECIMEN STRL 4OZ

## (undated) DEVICE — DRAPE MOBILE C-ARM

## (undated) DEVICE — SEE MEDLINE ITEM 152529

## (undated) DEVICE — MANIFOLD 4 PORT

## (undated) DEVICE — APPLICATOR CHLORAPREP ORN 26ML

## (undated) DEVICE — COVER OVERHEAD SURG LT BLUE

## (undated) DEVICE — SCREW CORTEX 3.5 32M
Type: IMPLANTABLE DEVICE | Site: LEG | Status: NON-FUNCTIONAL
Removed: 2019-08-09

## (undated) DEVICE — DRAPE PLASTIC U 60X72

## (undated) DEVICE — SEE MEDLINE ITEM 152522

## (undated) DEVICE — SEE MEDLINE ITEM 146231

## (undated) DEVICE — SOL NS 1000CC

## (undated) DEVICE — DRESSING XEROFORM FOIL PK 1X8

## (undated) DEVICE — SEE MEDLINE ITEM 157216

## (undated) DEVICE — SYR 10CC LUER LOCK

## (undated) DEVICE — SEE MEDLINE ITEM 152622

## (undated) DEVICE — DRAPE STERI U-SHAPED 47X51IN

## (undated) DEVICE — SUT ETHILON 3-0 PS2 18 BLK

## (undated) DEVICE — KIT IRR SUCTION HND PIECE

## (undated) DEVICE — ALCOHOL 70% ISOP RUBBING 4OZ

## (undated) DEVICE — SCREW 2.7X18MM
Type: IMPLANTABLE DEVICE | Site: LEG | Status: NON-FUNCTIONAL
Removed: 2019-08-09

## (undated) DEVICE — UNDERGLOVES BIOGEL PI SIZE 8

## (undated) DEVICE — BIT DRILL 2.0MM D DEPTH 110MM

## (undated) DEVICE — SEE MEDLINE ITEM 152530

## (undated) DEVICE — PAD CAST SPECIALIST STRL 4

## (undated) DEVICE — SPONGE DERMACEA GAUZE 4X4

## (undated) DEVICE — SEE MEDLINE ITEM 146298

## (undated) DEVICE — BIT DRILL 3FLUTE 4.2X330 SS ST

## (undated) DEVICE — BIT DRILL QC 3FLUTED 4.2X145 S

## (undated) DEVICE — WIRE GUIDE 3.2MM 400MM
Type: IMPLANTABLE DEVICE | Site: LEG | Status: NON-FUNCTIONAL
Removed: 2019-08-09

## (undated) DEVICE — PLATE BONE 7H 2.7/3.5X125MM L
Type: IMPLANTABLE DEVICE | Site: LEG | Status: NON-FUNCTIONAL
Removed: 2019-08-09

## (undated) DEVICE — PAD ABD 8X10 STERILE

## (undated) DEVICE — SUT 2-0 MONOCRYL PLUS SH UD

## (undated) DEVICE — BIT DRILL 3.2MM CANNULATED

## (undated) DEVICE — GAUZE SPONGE 4X4 12PLY

## (undated) DEVICE — K-WIRE THRD TRCR PT 4.5MM 1.6X
Type: IMPLANTABLE DEVICE | Site: LEG | Status: NON-FUNCTIONAL
Removed: 2019-08-09

## (undated) DEVICE — GLOVE SURG BIOGEL LATEX SZ 7.5